# Patient Record
Sex: FEMALE | Race: WHITE | Employment: OTHER | URBAN - METROPOLITAN AREA
[De-identification: names, ages, dates, MRNs, and addresses within clinical notes are randomized per-mention and may not be internally consistent; named-entity substitution may affect disease eponyms.]

---

## 2017-02-17 ENCOUNTER — OFFICE VISIT (OUTPATIENT)
Dept: INTERNAL MEDICINE CLINIC | Age: 72
End: 2017-02-17

## 2017-02-17 VITALS
TEMPERATURE: 99 F | SYSTOLIC BLOOD PRESSURE: 117 MMHG | HEART RATE: 73 BPM | BODY MASS INDEX: 21.05 KG/M2 | HEIGHT: 62 IN | RESPIRATION RATE: 16 BRPM | DIASTOLIC BLOOD PRESSURE: 74 MMHG | OXYGEN SATURATION: 99 % | WEIGHT: 114.4 LBS

## 2017-02-17 DIAGNOSIS — K21.9 GASTROESOPHAGEAL REFLUX DISEASE WITHOUT ESOPHAGITIS: ICD-10-CM

## 2017-02-17 DIAGNOSIS — N89.8 VAGINAL CYSTS: ICD-10-CM

## 2017-02-17 DIAGNOSIS — Z23 ENCOUNTER FOR IMMUNIZATION: ICD-10-CM

## 2017-02-17 DIAGNOSIS — Z71.89 ADVANCE CARE PLANNING: ICD-10-CM

## 2017-02-17 DIAGNOSIS — Z00.00 MEDICARE ANNUAL WELLNESS VISIT, SUBSEQUENT: Primary | ICD-10-CM

## 2017-02-17 DIAGNOSIS — N75.1 ABSCESS OF BARTHOLIN'S GLAND: ICD-10-CM

## 2017-02-17 DIAGNOSIS — Z12.4 SCREENING FOR CERVICAL CANCER: ICD-10-CM

## 2017-02-17 RX ORDER — CEPHALEXIN 250 MG/1
250 CAPSULE ORAL 4 TIMES DAILY
Qty: 28 CAP | Refills: 0 | Status: SHIPPED | OUTPATIENT
Start: 2017-02-17 | End: 2017-02-24

## 2017-02-17 RX ORDER — LAMOTRIGINE 25 MG/1
25 TABLET ORAL DAILY
COMMUNITY
Start: 2017-01-19 | End: 2017-03-17 | Stop reason: ALTCHOICE

## 2017-02-17 RX ORDER — OMEPRAZOLE 20 MG/1
20 CAPSULE, DELAYED RELEASE ORAL DAILY
Qty: 30 CAP | Refills: 2 | Status: SHIPPED | OUTPATIENT
Start: 2017-02-17 | End: 2017-03-17 | Stop reason: ALTCHOICE

## 2017-02-17 NOTE — PROGRESS NOTES
CC:   Chief Complaint   Patient presents with    Other     burning in vagina with swelling    Heartburn     severe       HISTORY OF PRESENT ILLNESS  Stef Causey is a 73 y.o. female. Presents for evaluation of     ROS  Constitutional: negative for fevers, chills, night sweats  ENT:   negative for sore throat, nasal congestion, ear pains, hoarseness  Respiratory:  negative for cough, hemoptysis, dyspnea,wheezing  CV:   negative for chest pain, palpitations, lower extremity edema  GI:   negative for heartburn, abd pain, nausea, vomiting, diarrhea, constipation  Genitourinary: negative for frequency, dysuria and hematuria  Integument:  negative for rash and pruritus  Musculoskel: negative for myalgias, arthralgias, back pain, muscle weakness, joint pain  Neurological:  negative for headaches, dizziness, vertigo, gait problems  Behavl/Psych: negative for feelings of anxiety, depression, mood change     Patient Active Problem List   Diagnosis Code    Major depression F32.9    Anxiety disorder F41.9    Arthritis M19.90    Advance care planning Z71.89     Past Medical History   Diagnosis Date    Arthritis      Osteoarthritis    Excessive sweating      Occurs in summers; TSH/CMP/CBC normal    MRSA (methicillin resistant Staphylococcus aureus) infection      MRSA + abd abscess; nasal swab negative after treatment    Unspecified adverse effect of anesthesia      EYES WERE NOT TAPED CLOSED DURING -EYES VERY PAINFUL AFTER SURGERY FOR 3 DAYS     No Known Allergies  Current Outpatient Prescriptions   Medication Sig Dispense Refill    lamoTRIgine (LAMICTAL) 25 mg tablet Take 25 mg by mouth daily.            PHYSICAL EXAM  Visit Vitals    /74 (BP 1 Location: Left arm, BP Patient Position: Sitting)    Pulse 73    Temp 99 °F (37.2 °C) (Oral)    Resp 16    Ht 5' 1.5\" (1.562 m)    Wt 114 lb 6.4 oz (51.9 kg)    SpO2 99%    BMI 21.27 kg/m2       General: Well-developed and well-nourished, no distress. HEENT:  Head normocephalic/atraumatic, no scleral icterus  Lungs:  Clear to ausculation bilaterally. Good air movement. Heart:  Regular rate and rhythm, normal S1 and S2, no murmur, gallop, or rub  Extremities: No clubbing, cyanosis, or edema. Neurological: Alert and oriented. Psychiatric: Normal mood and affect. Behavior is normal.     Results for orders placed or performed in visit on 07/13/16   AMB POC RAPID STREP A   Result Value Ref Range    VALID INTERNAL CONTROL POC Yes     Group A Strep Ag Negative Negative         ASSESSMENT AND PLAN  {ASSESSMENT/PLAN:78578}    Provided patient and/or family with advanced directive information and answered pertinent questions. Encouraged patient to provide a copy of advanced directive to the office when available. I have discussed the diagnosis with the patient and the intended plan as seen in the above orders. Patient is in agreement. The patient has received an after-visit summary and questions were answered concerning future plans. I have discussed medication side effects and warnings with the patient as well.

## 2017-02-17 NOTE — PROGRESS NOTES
Reviewed record  In preparation for visit and have obtained necessary documentation. 1. Have you been to the ER, urgent care clinic since your last visit? Hospitalized since your last visit?no  2. Have you seen or consulted any other health care providers outside of the [facility] since your last visit? Include any pap smears or colon screening. Saw mental health and was prescribed a new medication  Patient does not currently have advance directives. Patient given information on advance directives and brochure and printed blank advance directive form made available to patient. Patient is also asked to make copy of directives available to this office for our/[facility] records once advanced directives are completed. Cannot afford eye exam at this time. Pneumococcal 13-valent (Prevnar 13)Conjugate vaccine 0.5 ml given right deltoid IM. [employer] exp. 2/18 Lot [id number] VIS given. Patient tolerated procedure without incident.

## 2017-02-17 NOTE — MR AVS SNAPSHOT
Visit Information Date & Time Provider Department Dept. Phone Encounter #  
 2/17/2017 10:10 AM Sarah Batres, [facility] [phone] [id number] Follow-up Instructions Return in about 4 months (around 6/17/2017), or if symptoms worsen or fail to improve, for depression, anxiety, arthritis. Upcoming Health Maintenance Date Due  
 GLAUCOMA SCREENING Q2Y 6/14/2012 Pneumococcal 65+ Low/Medium Risk (2 of 2 - PCV13) 2/9/2017 MEDICARE YEARLY EXAM 2/9/2017 BREAST CANCER SCRN MAMMOGRAM 12/9/2018 COLONOSCOPY 3/31/2025 DTaP/Tdap/Td series (2 - Td) 7/22/2025 Allergies as of 2/17/2017  Review Complete On: 2/17/2017 By: Sarah Batres MD  
 No Known Allergies Current Immunizations  Reviewed on 2/9/2016 Name Date Pneumococcal Conjugate (PCV-13)  Incomplete Pneumococcal Polysaccharide (PPSV-23) 2/9/2016 Tdap 7/22/2015 Not reviewed this visit You Were Diagnosed With   
  
 Codes Comments Medicare annual wellness visit, subsequent    -  Primary ICD-10-CM: Z00.00 ICD-9-CM: V70.0 Vaginal cysts     ICD-10-CM: N89.8 ICD-9-CM: 623.8 Abscess of Bartholin's gland     ICD-10-CM: N75.1 ICD-9-CM: 616.3 Encounter for immunization     ICD-10-CM: G95 ICD-9-CM: V03.89 Vitals BP Pulse Temp Resp Height(growth percentile) Weight(growth percentile) 117/74 (BP 1 Location: Left arm, BP Patient Position: Sitting) 73 99 °F (37.2 °C) (Oral) 16 5' 1.5\" (1.562 m) 114 lb 6.4 oz (51.9 kg) SpO2 BMI OB Status Smoking Status 99% 21.27 kg/m2 Hysterectomy Never Smoker Vitals History BMI and BSA Data Body Mass Index Body Surface Area  
 21.27 kg/m 2 1.5 m 2 Preferred Pharmacy Pharmacy Name Phone  [address] [phone] Your Updated Medication List  
  
   
This list is accurate as of: 2/17/17 10:51 AM.  Always use your most recent med list.  
  
  
  
 cephALEXin 250 mg capsule Commonly known as:  Bettina Abran Take 1 Cap by mouth four (4) times daily for 7 days. lamoTRIgine 25 mg tablet Commonly known as: LaMICtal  
Take 25 mg by mouth daily. Prescriptions Sent to Pharmacy Refills  
 cephALEXin (KEFLEX) 250 mg capsule 0 Sig: Take 1 Cap by mouth four (4) times daily for 7 days. Class: Normal  
 Pharmacy: [facility] [address] Ph #: [phone] Route: Oral  
  
We Performed the Following ADMIN INFLUENZA VIRUS VAC [ [facility]] PNEUMOCOCCAL CONJ VACCINE 13 VALENT IM [id number] CPT(R)] REFERRAL TO GYNECOLOGY [REF30 Custom] Comments:  
 Please evaluate for Bartholin cysts and possible abscess. Follow-up Instructions Return in about 4 months (around 6/17/2017), or if symptoms worsen or fail to improve, for depression, anxiety, arthritis. Referral Information Referral ID Referred By Referred To  
  
 [id number] [address] MD Juan Antonio   
   [address] [place] [address] [address] Phone: [phone] Fax: [phone] Visits Status Start Date End Date 1 New Request 2/17/17 2/17/18 If your referral has a status of pending review or denied, additional information will be sent to support the outcome of this decision. Patient Instructions Vaccine Information Statement Pneumococcal Conjugate Vaccine (PCV13): What You Need to Know Many Vaccine Information Statements are available in Cambodian and other languages. See www.immunize.org/vis. Hojas de información Sobre Vacunas están disponibles en español y en muchos otros idiomas. Visite www.immunize.org/vis. 1. Why get vaccinated? Vaccination can protect both children and adults from pneumococcal disease.  
 
Pneumococcal disease is caused by bacteria that can spread from person to person through close contact. It can cause ear infections, and it can also lead to more serious infections of the: 
 Lungs (pneumonia),  Blood (bacteremia), and 
 Covering of the brain and spinal cord (meningitis). Pneumococcal pneumonia is most common among adults. Pneumococcal meningitis can cause deafness and brain damage, and it kills about 1 child in 10 who get it. Anyone can get pneumococcal disease, but children under 3years of age and adults 72 years and older, people with certain medical conditions, and cigarette smokers are at the highest risk. Before there was a vaccine, the [facility] saw: 
 more than 700 cases of meningitis, 
 about 13,000 blood infections, 
 about 5 million ear infections, and 
 about 200 deaths 
in children under 5 each year from pneumococcal disease. Since vaccine became available, severe pneumococcal disease in these children has fallen by 88%. About 18,000 older adults die of pneumococcal disease each year in the [place]. Treatment of pneumococcal infections with penicillin and other drugs is not as effective as it used to be, because some strains of the disease have become resistant to these drugs. This makes prevention of the disease, through vaccination, even more important. 2. PCV13 vaccine Pneumococcal conjugate vaccine (called PCV13) protects against 13 types of pneumococcal bacteria. PCV13 is routinely given to children at 2, 4, 6, and 1515 months of age. It is also recommended for children and adults 3to 59years of age with certain health conditions, and for all adults 72years of age and older. Your doctor can give you details. 3. Some people should not get this vaccine Anyone who has ever had a life-threatening allergic reaction to a dose of this vaccine, to an earlier pneumococcal vaccine called PCV7, or to any vaccine containing diphtheria toxoid (for example, DTaP), should not get PCV13. Anyone with a severe allergy to any component of PCV13 should not get the vaccine. Tell your doctor if the person being vaccinated has any severe allergies. If the person scheduled for vaccination is not feeling well, your healthcare provider might decide to reschedule the shot on another day. 4. Risks of a vaccine reaction With any medicine, including vaccines, there is a chance of reactions. These are usually mild and go away on their own, but serious reactions are also possible. Problems reported following PCV13 varied by age and dose in the series. The most common problems reported among children were:  About half became drowsy after the shot, had a temporary loss of appetite, or had redness or tenderness where the shot was given.  About 1 out of 3 had swelling where the shot was given.  About 1 out of 3 had a mild fever, and about 1 in 20 had a fever over 102.2°F. 
 Up to about 8 out of 10 became fussy or irritable. Adults have reported pain, redness, and swelling where the shot was given; also mild fever, fatigue, headache, chills, or muscle pain. Levon Neumann children who get PCV13 along with inactivated flu vaccine at the same time may be at increased risk for seizures caused by fever. Ask your doctor for more information. Problems that could happen after any vaccine:  People sometimes faint after a medical procedure, including vaccination. Sitting or lying down for about 15 minutes can help prevent fainting, and injuries caused by a fall. Tell your doctor if you feel dizzy, or have vision changes or ringing in the ears.  Some older children and adults get severe pain in the shoulder and have difficulty moving the arm where a shot was given. This happens very rarely.  Any medication can cause a severe allergic reaction.  Such reactions from a vaccine are very rare, estimated at about 1 in a million doses, and would happen within a few minutes to a few hours after the vaccination. As with any medicine, there is a very small chance of a vaccine causing a serious injury or death. The safety of vaccines is always being monitored. For more information, visit: www.cdc.gov/vaccinesafety/  
 
5. What if there is a serious reaction? What should I look for?  Look for anything that concerns you, such as signs of a severe allergic reaction, very high fever, or unusual behavior. Signs of a severe allergic reaction can include hives, swelling of the face and throat, difficulty breathing, a fast heartbeat, dizziness, and weakness  usually within a few minutes to a few hours after the vaccination. What should I do?  If you think it is a severe allergic reaction or other emergency that cant wait, call 9-1-1 or get the person to the nearest hospital. Otherwise, call your doctor. Reactions should be reported to the Vaccine Adverse Event Reporting System (VAERS). Your doctor should file this report, or you can do it yourself through the VAERS web site at www.vaers. [facility].gov, or by calling 6-357.722.4648. VAERS does not give medical advice. 6. The National Vaccine Injury Compensation Program 
 
The [facility] Vaccine Injury Compensation Program (VICP) is a federal program that was created to compensate people who may have been injured by certain vaccines. Persons who believe they may have been injured by a vaccine can learn about the program and about filing a claim by calling 8-995.199.9566 or visiting the [employer] website at www.[facility].gov/vaccinecompensation. There is a time limit to file a claim for compensation. 7. How can I learn more?  Ask your healthcare provider. He or she can give you the vaccine package insert or suggest other sources of information.  Call your local or state health department.  
 Contact the Centers for Disease Control and Prevention (CDC): 
 - Call 0-230.491.4435 (1-800-CDC-INFO) or 
- Visit CDCs website at www.cdc.gov/vaccines Vaccine Information Statement PCV13 Vaccine 11/5/2015  
Matt Valenzuela [id number] [facility] [employer] Centers for Disease Control and Prevention Office Use Only Schedule of Personalized Health Plan (Provide Copy to Patient) The best way to stay healthy is to live a healthy lifestyle. A healthy lifestyle includes regular exercise, eating a well-balanced diet, keeping a healthy weight and not smoking. Regular physical exams and screening tests are another important way to take care of yourself. Preventive exams provided by health care providers can find health problems early when treatment works best and can keep you from getting certain diseases or illnesses. Preventive services include exams, lab tests, screenings, shots, monitoring and information to help you take care of your own health. All people over 65 should have a pneumonia shot. Pneumonia shots are usually only needed once in a lifetime unless your doctor decides differently. All people over 65 should have a yearly flu shot. People over 65 are at medium to high risk for Hepatitis B. Three shots are needed for complete protection. In addition to your physical exam, some screening tests are recommended: 
 
Bone mass measurement (dexa scan) is recommended every two years Diabetes Mellitus screening is recommended every year. Glaucoma is an eye disease caused by high pressure in the eye. An eye exam is recommended every year. Cardiovascular screening tests that check your cholesterol and other blood fat (lipid) levels are recommended every five years. Colorectal Cancer screening tests help to find pre-cancerous polyps (growths in the colon) so they can be removed before they turn into cancer. Tests ordered for screening depend on your personal and family history risk factors. Screening for Breast Cancer is recommended yearly with a mammogram. 
 
Screening for Cervical Cancer is recommended every two years (annually for certain risk factors, such as previous history of STD or abnormal PAP in past 7 years), with a Pelvic Exam with PAP Here is a list of your current Health Maintenance items with a due date: 
Health Maintenance Topic Date Due  GLAUCOMA SCREENING Q2Y  06/14/2012  Pneumococcal 65+ Low/Medium Risk (2 of 2 - PCV13) 02/09/2017  MEDICARE YEARLY EXAM  02/09/2017  BREAST CANCER SCRN MAMMOGRAM  12/09/2018  COLONOSCOPY  03/31/2025  DTaP/Tdap/Td series (2 - Td) 07/22/2025  Hepatitis C Screening  Completed  OSTEOPOROSIS SCREENING (DEXA)  Completed  ZOSTER VACCINE AGE 60>  Addressed  INFLUENZA AGE 9 TO ADULT  Addressed Introducing [facility] SERVICES! Dear Argentina Raymundo: 
Thank you for requesting a [employer] account. Our records indicate that you have previously registered for a [employer] account but its currently inactive. Please call our [employer] support line at [phone]. Additional Information If you have questions, please visit the Frequently Asked Questions section of the [employer] website at [email or web address] Remember, [employer] is NOT to be used for urgent needs. For medical emergencies, dial 911. Now available from your iPhone and Android! Please provide this summary of care documentation to your next provider. Your primary care clinician is listed as Saadia Kemp. If you have any questions after today's visit, please call [phone].

## 2017-02-17 NOTE — PATIENT INSTRUCTIONS
Vaccine Information Statement     Pneumococcal Conjugate Vaccine (PCV13): What You Need to Know    Many Vaccine Information Statements are available in Macedonian and other languages. See www.immunize.org/vis. Hojas de información Sobre Vacunas están disponibles en español y en muchos otros idiomas. Visite www.immunize.org/vis. 1. Why get vaccinated? Vaccination can protect both children and adults from pneumococcal disease. Pneumococcal disease is caused by bacteria that can spread from person to person through close contact. It can cause ear infections, and it can also lead to more serious infections of the:   Lungs (pneumonia),   Blood (bacteremia), and   Covering of the brain and spinal cord (meningitis). Pneumococcal pneumonia is most common among adults. Pneumococcal meningitis can cause deafness and brain damage, and it kills about 1 child in 10 who get it. Anyone can get pneumococcal disease, but children under 3years of age and adults 72 years and older, people with certain medical conditions, and cigarette smokers are at the highest risk. Before there was a vaccine, the [facility] saw:   more than 700 cases of meningitis,   about 13,000 blood infections,   about 5 million ear infections, and   about 200 deaths  in children under 5 each year from pneumococcal disease. Since vaccine became available, severe pneumococcal disease in these children has fallen by 88%. About 18,000 older adults die of pneumococcal disease each year in the [place]. Treatment of pneumococcal infections with penicillin and other drugs is not as effective as it used to be, because some strains of the disease have become resistant to these drugs. This makes prevention of the disease, through vaccination, even more important. 2. PCV13 vaccine    Pneumococcal conjugate vaccine (called PCV13) protects against 13 types of pneumococcal bacteria.       PCV13 is routinely given to children at 2, 4, 6, and 1515 months of age. It is also recommended for children and adults 3to 59years of age with certain health conditions, and for all adults 72years of age and older. Your doctor can give you details. 3. Some people should not get this vaccine    Anyone who has ever had a life-threatening allergic reaction to a dose of this vaccine, to an earlier pneumococcal vaccine called PCV7, or to any vaccine containing diphtheria toxoid (for example, DTaP), should not get PCV13. Anyone with a severe allergy to any component of PCV13 should not get the vaccine. Tell your doctor if the person being vaccinated has any severe allergies. If the person scheduled for vaccination is not feeling well, your healthcare provider might decide to reschedule the shot on another day. 4. Risks of a vaccine reaction    With any medicine, including vaccines, there is a chance of reactions. These are usually mild and go away on their own, but serious reactions are also possible. Problems reported following PCV13 varied by age and dose in the series. The most common problems reported among children were:    About half became drowsy after the shot, had a temporary loss of appetite, or had redness or tenderness where the shot was given.  About 1 out of 3 had swelling where the shot was given.  About 1 out of 3 had a mild fever, and about 1 in 20 had a fever over 102.2°F.   Up to about 8 out of 10 became fussy or irritable. Adults have reported pain, redness, and swelling where the shot was given; also mild fever, fatigue, headache, chills, or muscle pain. Cristina Narvaez children who get PCV13 along with inactivated flu vaccine at the same time may be at increased risk for seizures caused by fever. Ask your doctor for more information. Problems that could happen after any vaccine:     People sometimes faint after a medical procedure, including vaccination.  Sitting or lying down for about 15 minutes can help prevent fainting, and injuries caused by a fall. Tell your doctor if you feel dizzy, or have vision changes or ringing in the ears.  Some older children and adults get severe pain in the shoulder and have difficulty moving the arm where a shot was given. This happens very rarely.  Any medication can cause a severe allergic reaction. Such reactions from a vaccine are very rare, estimated at about 1 in a million doses, and would happen within a few minutes to a few hours after the vaccination. As with any medicine, there is a very small chance of a vaccine causing a serious injury or death. The safety of vaccines is always being monitored. For more information, visit: www.cdc.gov/vaccinesafety/     5. What if there is a serious reaction? What should I look for?  Look for anything that concerns you, such as signs of a severe allergic reaction, very high fever, or unusual behavior. Signs of a severe allergic reaction can include hives, swelling of the face and throat, difficulty breathing, a fast heartbeat, dizziness, and weakness  usually within a few minutes to a few hours after the vaccination. What should I do?  If you think it is a severe allergic reaction or other emergency that cant wait, call 9-1-1 or get the person to the nearest hospital. Otherwise, call your doctor. Reactions should be reported to the Vaccine Adverse Event Reporting System (VAERS). Your doctor should file this report, or you can do it yourself through the VAERS web site at www.vaers. hhs.gov, or by calling 2-998.812.6946. VAERS does not give medical advice. 6. The National Vaccine Injury Compensation Program    The [facility] Vaccine Injury Compensation Program (VICP) is a federal program that was created to compensate people who may have been injured by certain vaccines.     Persons who believe they may have been injured by a vaccine can learn about the program and about filing a claim by calling 1-993.410.8553 or visiting the wavecatch website at www.[facility].gov/vaccinecompensation. There is a time limit to file a claim for compensation. 7. How can I learn more?  Ask your healthcare provider. He or she can give you the vaccine package insert or suggest other sources of information.  Call your local or state health department.  Contact the Centers for Disease Control and Prevention (CDC):  - Call 7-386.482.9751 (9-411-BQD-INFO) or  - Visit CDCs website at www.cdc.gov/vaccines    Vaccine Information Statement   PCV13 Vaccine   11/5/2015   [address] [place] Cruz [id number]    Department of Health and Human Services  Centers for Disease Control and Prevention    Office Use Only        Schedule of Personalized Health Plan  (Provide Copy to Patient)  The best way to stay healthy is to live a healthy lifestyle. A healthy lifestyle includes regular exercise, eating a well-balanced diet, keeping a healthy weight and not smoking. Regular physical exams and screening tests are another important way to take care of yourself. Preventive exams provided by health care providers can find health problems early when treatment works best and can keep you from getting certain diseases or illnesses. Preventive services include exams, lab tests, screenings, shots, monitoring and information to help you take care of your own health. All people over 65 should have a pneumonia shot. Pneumonia shots are usually only needed once in a lifetime unless your doctor decides differently. All people over 65 should have a yearly flu shot. People over 65 are at medium to high risk for Hepatitis B. Three shots are needed for complete protection. In addition to your physical exam, some screening tests are recommended:    Bone mass measurement (dexa scan) is recommended every two years  Diabetes Mellitus screening is recommended every year. Glaucoma is an eye disease caused by high pressure in the eye.   An eye exam is recommended every year.     Cardiovascular screening tests that check your cholesterol and other blood fat (lipid) levels are recommended every five years. Colorectal Cancer screening tests help to find pre-cancerous polyps (growths in the colon) so they can be removed before they turn into cancer. Tests ordered for screening depend on your personal and family history risk factors.     Screening for Breast Cancer is recommended yearly with a mammogram.    Screening for Cervical Cancer is recommended every two years (annually for certain risk factors, such as previous history of STD or abnormal PAP in past 7 years), with a Pelvic Exam with PAP    Here is a list of your current Health Maintenance items with a due date:  Health Maintenance   Topic Date Due    GLAUCOMA SCREENING Q2Y  06/14/2012    Pneumococcal 65+ Low/Medium Risk (2 of 2 - PCV13) 02/09/2017    MEDICARE YEARLY EXAM  02/09/2017    BREAST CANCER SCRN MAMMOGRAM  12/09/2018    COLONOSCOPY  03/31/2025    DTaP/Tdap/Td series (2 - Td) 07/22/2025    Hepatitis C Screening  Completed    OSTEOPOROSIS SCREENING (DEXA)  Completed    ZOSTER VACCINE AGE 60>  Addressed    INFLUENZA AGE 9 TO ADULT  Addressed

## 2017-02-17 NOTE — PROGRESS NOTES
This is a Subsequent Medicare Annual Wellness Visit providing Personalized Prevention Plan Services (PPPS) (Performed 12 months after initial AWV and PPPS )    I have reviewed the patient's medical history in detail and updated the computerized patient record. History   Chely Lu is a 73 y.o. female. She presents for evaluation for gynecologic issues and Medicare Annual Wellness Visit. Today she complains of swelling in vagina and round white balls that have been swollen for 3 days. Associated with burning. Has had twice before, last time at end of ; went away on own after a week. No discharge. No fevers or chills, abdominal pain, or back pain. Follows with [facility]-Dr. Martin. Was changed to Lamictal; has been on for 3 weeks.      Constitutional: negative for fevers, chills, night sweats  ENT:   negative for sore throat, nasal congestion, ear pains, hoarseness  Respiratory:  negative for cough, hemoptysis, dyspnea,wheezing  CV:   negative for chest pain, palpitations, lower extremity edema  GI:   negative for abd pain, nausea, vomiting, diarrhea, constipation; has heartburn  Genitourinary: negative for frequency, dysuria and hematuria  Integument:  negative for rash and pruritus  Musculoskel: negative for myalgias, arthralgias, back pain, muscle weakness, joint pain  Neurological:  negative for headaches, dizziness, vertigo, gait problems  Behavl/Psych: negative for feelings of anxiety, depression, mood change    Past Medical History   Diagnosis Date    Arthritis      Osteoarthritis    Excessive sweating      Occurs in summers; TSH/CMP/CBC normal    MRSA (methicillin resistant Staphylococcus aureus) infection      MRSA + abd abscess; nasal swab negative after treatment    Unspecified adverse effect of anesthesia      EYES WERE NOT TAPED CLOSED DURING -EYES VERY PAINFUL AFTER SURGERY FOR 3 DAYS      Past Surgical History   Procedure Laterality Date    Pr breast surgery procedure unlisted Bilateral 1974     AUGMENTATION    Hx  section      Hx hysterectomy       Age 29 for endometriosis    Pr revise breast reconstruction  2014     Bilateral breast implant exchange due to  cosmetic defect of previous implants    Implant breast silicone/eq Bilateral 5267     1st set was 43 yrs ago, replaced in . Current Outpatient Prescriptions   Medication Sig Dispense Refill    lamoTRIgine (LAMICTAL) 25 mg tablet Take 25 mg by mouth daily. No Known Allergies  Family History   Problem Relation Age of Onset    Diabetes Father     Cancer Father      PROSTATE CANCER    Arthritis-osteo Mother     Arthritis-osteo Sister     Anesth Problems Neg Hx      Social History   Substance Use Topics    Smoking status: Never Smoker    Smokeless tobacco: Not on file    Alcohol use No     Patient Active Problem List   Diagnosis Code    Major depression F32.9    Anxiety disorder F41.9    Arthritis M19.90    Advance care planning Z71.89       Depression Risk Factor Screening:     PHQ 2 / 9, over the last two weeks 2016   Little interest or pleasure in doing things Not at all   Feeling down, depressed or hopeless Not at all   Total Score PHQ 2 0   Trouble falling or staying asleep, or sleeping too much -   Feeling tired or having little energy -   Poor appetite or overeating -   Feeling bad about yourself - or that you are a failure or have let yourself or your family down -   Trouble concentrating on things such as school, work, reading or watching TV -   Moving or speaking so slowly that other people could have noticed; or the opposite being so fidgety that others notice -   Thoughts of being better off dead, or hurting yourself in some way -   How difficult have these problems made it for you to do your work, take care of your home and get along with others -     Alcohol Risk Factor Screening:    On any occasion during the past 3 months, have you had more than 3 drinks containing alcohol? No    Do you average more than 7 drinks per week? No      Functional Ability and Level of Safety:     Hearing Loss   normal-to-mild    Activities of Daily Living   Self-care. Requires assistance with: no ADLs    Fall Risk     Fall Risk Assessment, last 12 mths 2/17/2017   Able to walk? Yes   Fall in past 12 months? No   Fall with injury? -   Number of falls in past 12 months -   Fall Risk Score -     Abuse Screen   Patient is not abused    Review of Systems   Pertinent items are noted in HPI. Physical Examination     Evaluation of Cognitive Function:  Mood/affect:  neutral  Appearance: age appropriate  Family member/caregiver input: none    Visit Vitals    /74 (BP 1 Location: Left arm, BP Patient Position: Sitting)    Pulse 73    Temp 99 °F (37.2 °C) (Oral)    Resp 16    Ht 5' 1.5\" (1.562 m)    Wt 114 lb 6.4 oz (51.9 kg)    SpO2 99%    BMI 21.27 kg/m2       General: Well-developed and well-nourished, no distress. HEENT:  Head normocephalic/atraumatic, no scleral icterus  Lungs:  Clear to ausculation bilaterally. Good air movement. Heart:  Regular rate and rhythm, normal S1 and S2, no murmur, gallop, or rub  Pelvic: Red papule at urethra, non-tender. 2 small white nodules, one at each side of lower labia minora. Mild swelling at left labia  Extremities: No clubbing, cyanosis, or edema. Neurological: Alert and oriented. Psychiatric: Normal mood and affect. Behavior is normal.     Patient Care Team:  Rhett Sarabia MD as PCP - General (Internal Medicine)      Advice/Referrals/Counseling   Education and counseling provided:  Are appropriate based on today's review and evaluation  End-of-Life planning (with patient's consent)  Pneumococcal Vaccine   Glaucoma Screening    Assessment/Plan       ICD-10-CM ICD-9-CM    1. Medicare annual wellness visit, subsequent Z00.00 V70.0    2. Vaginal cysts N89.8 623.8 REFERRAL TO GYNECOLOGY      CTNG,TRICH,HSV1/2, SHILA   3. Abscess of Bartholin's gland N75.1 616.3 REFERRAL TO GYNECOLOGY      cephALEXin (KEFLEX) 250 mg capsule   4. Gastroesophageal reflux disease without esophagitis K21.9 530.81 omeprazole (PRILOSEC) 20 mg capsule   5. Encounter for immunization Z23 V03.89 PNEUMOCOCCAL CONJ VACCINE 13 VALENT IM      ADMIN INFLUENZA VIRUS VAC       Kisha Torres was seen today for other, heartburn, Medicare Annual Wellness Visit and immunization/injection. Diagnoses and all orders for this visit:    Medicare annual wellness visit, subsequent    Vaginal cysts  Most likely Bartholin duct cysts based on location.   -     REFERRAL TO GYNECOLOGY  -     CTNG,TRICH,HSV1/2, SHILA    Abscess of Bartholin's gland  Possible abscess (not classic for it). I also suspect urethral caruncle or prolapse.   -     REFERRAL TO GYNECOLOGY  -     cephALEXin (KEFLEX) 250 mg capsule; Take 1 Cap by mouth four (4) times daily for 7 days. Gastroesophageal reflux disease without esophagitis  -     Start omeprazole (PRILOSEC) 20 mg capsule; Take 1 Cap by mouth daily. Indications: GASTROESOPHAGEAL REFLUX    Encounter for immunization  -     Pneumococcal Conjugate vaccine - 13 valent (50 years and older)  -     Administration fee () for Medicare insured patients    Follow-up Disposition:  Return in about 4 months (around 6/17/2017), or if symptoms worsen or fail to improve, for depression, anxiety, arthritis. .    Patient seen and had Medicare Annual Wellness Exam; Wellness Schedule printed, reviewed, and given to patient.

## 2017-02-19 NOTE — ACP (ADVANCE CARE PLANNING)

## 2017-02-20 ENCOUNTER — HOSPITAL ENCOUNTER (OUTPATIENT)
Dept: LAB | Age: 72
Discharge: HOME | End: 2017-02-20
Payer: MEDICARE

## 2017-02-20 PROCEDURE — 88142 CYTOPATH C/V THIN LAYER: CPT

## 2017-02-20 PROCEDURE — 87491 CHLMYD TRACH DNA AMP PROBE: CPT

## 2017-02-24 LAB
C TRACH RRNA SPEC QL NAA+PROBE: NEGATIVE
CYTOLOGIST CVX/VAG CYTO: NORMAL
CYTOLOGY CVX/VAG DOC THIN PREP: NORMAL
DX ICD CODE: NORMAL
HSV1 DNA SPEC QL NAA+PROBE: NEGATIVE
HSV2 DNA SPEC QL NAA+PROBE: POSITIVE
LABCORP, 190119: NORMAL
Lab: NORMAL
N GONORRHOEA RRNA SPEC QL NAA+PROBE: NEGATIVE
OTHER STN SPEC: NORMAL
PATH REPORT.FINAL DX SPEC: NORMAL
SPECIMEN STATUS REPORT, ROLRST: NORMAL
STAT OF ADQ CVX/VAG CYTO-IMP: NORMAL
T VAGINALIS RRNA SPEC QL NAA+PROBE: NEGATIVE

## 2017-02-28 NOTE — PROGRESS NOTES
Inform patient that her vaginal test showed normal Pap smear. Also showed she has genital herpes. No other infections seen. If she is still having vaginal pain, Dr. Yuriy Landry will send in a prescription for an anti-viral medication that helps with herpes flares.

## 2017-03-03 NOTE — PROGRESS NOTES
Spoke with patient and after verifying name and date of birth of patient gave her test results per Dr. Gui West. Patient stated understanding. She also stated she is not aware of any breakouts and does not believe she needs any medication for herpes treatment at this time.

## 2017-03-17 ENCOUNTER — OFFICE VISIT (OUTPATIENT)
Dept: INTERNAL MEDICINE CLINIC | Age: 72
End: 2017-03-17

## 2017-03-17 VITALS
BODY MASS INDEX: 20.91 KG/M2 | DIASTOLIC BLOOD PRESSURE: 68 MMHG | SYSTOLIC BLOOD PRESSURE: 104 MMHG | TEMPERATURE: 98.2 F | WEIGHT: 113.6 LBS | RESPIRATION RATE: 18 BRPM | OXYGEN SATURATION: 98 % | HEART RATE: 85 BPM | HEIGHT: 62 IN

## 2017-03-17 DIAGNOSIS — R05.9 COUGH: Primary | ICD-10-CM

## 2017-03-17 DIAGNOSIS — R06.2 WHEEZING: ICD-10-CM

## 2017-03-17 RX ORDER — RANITIDINE 150 MG/1
150 TABLET, FILM COATED ORAL DAILY
COMMUNITY
End: 2017-05-11 | Stop reason: ALTCHOICE

## 2017-03-17 RX ORDER — TOPIRAMATE 25 MG/1
TABLET ORAL
COMMUNITY
Start: 2017-03-08 | End: 2018-02-20 | Stop reason: ALTCHOICE

## 2017-03-17 NOTE — MR AVS SNAPSHOT
Visit Information Date & Time Provider Department Dept. Phone Encounter #  
 3/17/2017  1:20 PM MD Efren Munoz [phone] [id number] Follow-up Instructions Return if symptoms worsen or fail to improve, for Scheduled appointment 6/16/17. Your Appointments 6/16/2017 11:30 AM  
ROUTINE CARE with MD Efren Munoz [address]) Appt Note: 4mth f/u; depression, anxiety, arthritis [address] [facility] [address] [phone]  
  
   
 8 [facility] [address] Upcoming Health Maintenance Date Due  
 GLAUCOMA SCREENING Q2Y 6/14/2012 MEDICARE YEARLY EXAM 2/18/2018 BREAST CANCER SCRN MAMMOGRAM 12/9/2018 COLONOSCOPY 3/31/2025 DTaP/Tdap/Td series (2 - Td) 7/22/2025 Allergies as of 3/17/2017  Review Complete On: 3/17/2017 By: Lukas Crowe MD  
 No Known Allergies Current Immunizations  Reviewed on 2/17/2017 Name Date Pneumococcal Conjugate (PCV-13) 2/17/2017 Pneumococcal Polysaccharide (PPSV-23) 2/9/2016 Tdap 7/22/2015 Not reviewed this visit You Were Diagnosed With   
  
 Codes Comments Cough    -  Primary ICD-10-CM: F53 ICD-9-CM: 786.2 Wheezing     ICD-10-CM: R06.2 ICD-9-CM: 786.07 Vitals BP Pulse Temp Resp Height(growth percentile) Weight(growth percentile) 104/68 (BP 1 Location: Left arm, BP Patient Position: Sitting) 85 98.2 °F (36.8 °C) (Oral) 18 5' 1.5\" (1.562 m) 113 lb 9.6 oz (51.5 kg) SpO2 BMI OB Status Smoking Status 98% 21.12 kg/m2 Hysterectomy Never Smoker BMI and BSA Data Body Mass Index Body Surface Area  
 21.12 kg/m 2 1.49 m 2 Preferred Pharmacy Pharmacy Name Phone [facility] [address] [facility] [address] [phone] Your Updated Medication List  
  
   
 This list is accurate as of: 3/17/17  1:37 PM.  Always use your most recent med list.  
  
  
  
  
 topiramate 25 mg tablet Commonly known as:  TOPAMAX ZANTAC 150 mg tablet Generic drug:  raNITIdine Take 150 mg by mouth daily. Follow-up Instructions Return if symptoms worsen or fail to improve, for Scheduled appointment 6/16/17. Patient Instructions Cough: Care Instructions Your Care Instructions A cough is your body's response to something that bothers your throat or airways. Many things can cause a cough. You might cough because of a cold or the flu, bronchitis, or asthma. Smoking, postnasal drip, allergies, and stomach acid that backs up into your throat also can cause coughs. A cough is a symptom, not a disease. Most coughs stop when the cause, such as a cold, goes away. You can take a few steps at home to cough less and feel better. Follow-up care is a key part of your treatment and safety. Be sure to make and go to all appointments, and call your doctor if you are having problems. It's also a good idea to know your test results and keep a list of the medicines you take. How can you care for yourself at home? · Drink lots of water and other fluids. This helps thin the mucus and soothes a dry or sore throat. Honey or lemon juice in hot water or tea may ease a dry cough. · Take cough medicine as directed by your doctor. · Prop up your head on pillows to help you breathe and ease a dry cough. · Try cough drops to soothe a dry or sore throat. Cough drops don't stop a cough. Medicine-flavored cough drops are no better than candy-flavored drops or hard candy. · Do not smoke. Avoid secondhand smoke. If you need help quitting, talk to your doctor about stop-smoking programs and medicines. These can increase your chances of quitting for good. When should you call for help? Call 911 anytime you think you may need emergency care. For example, call if: · You have severe trouble breathing. Call your doctor now or seek immediate medical care if: 
· You cough up blood. · You have new or worse trouble breathing. · You have a new or higher fever. · You have a new rash. Watch closely for changes in your health, and be sure to contact your doctor if: 
· You cough more deeply or more often, especially if you notice more mucus or a change in the color of your mucus. · You have new symptoms, such as a sore throat, an earache, or sinus pain. · You do not get better as expected. Where can you learn more? Go to http://michael-carmela.info/. Enter D279 in the search box to learn more about \"Cough: Care Instructions. \" Current as of: May 27, 2016 Content Version: 11.1 © 6104-7046 [employer]. Care instructions adapted under license by [facility] (which disclaims liability or warranty for this information). If you have questions about a medical condition or this instruction, always ask your healthcare professional. [place] [address] any warranty or liability for your use of this information. Introducing [facility] & HE[facility]! Dear Zahraa Blood: 
Thank you for requesting a [employer] account. Our records indicate that you have previously registered for a [employer] account but its currently inactive. Please call our [employer] support line at [phone]. Additional Information If you have questions, please visit the Frequently Asked Questions section of the [employer] website at [email or web address] Remember, [employer] is NOT to be used for urgent needs. For medical emergencies, dial 911. Now available from your iPhone and Android! Please provide this summary of care documentation to your next provider. Your primary care clinician is listed as Jim Garces. If you have any questions after today's visit, please call [phone].

## 2017-03-17 NOTE — PROGRESS NOTES
CC:  Chief Complaint   Patient presents with    Cough     times two months     HISTORY OF PRESENT ILLNESS  Yadira Mesa is a 73 y.o. female. She complains of 2 month history of dry cough and wheezing when she takes in a deep breath. She is concerned because her twin sister and mother have problems with chronic cough. Denies dyspnea, fevers, runny nose, sneezing. She requests a referral to see a pulmonologist.    PMH: major depression with anxiety, osteoarthritis  Follows with [facility]-Dr. Martin. ROS  Constitutional: negative for fevers, chills, night sweats  ENT:   negative for sore throat, nasal congestion, ear pains, hoarseness  Respiratory:  negative for hemoptysis, dyspnea, wheezing  CV:   negative for chest pain, palpitations, lower extremity edema  GI:   negative for heartburn, abd pain, nausea, vomiting, diarrhea, constipation  Genitourinary: negative for frequency, dysuria and hematuria  Integument:  negative for rash and pruritus  Musculoskel: negative for myalgias, arthralgias, back pain, muscle weakness, joint pain  Neurological:  negative for headaches, dizziness, vertigo, gait problems  Behavl/Psych: negative for feelings of anxiety, depression, mood change     Patient Active Problem List   Diagnosis Code    Major depression F32.9    Anxiety disorder F41.9    Arthritis M19.90    Advance care planning Z71.89    Cough R05     Past Medical History:   Diagnosis Date    Arthritis     Osteoarthritis    Excessive sweating     Occurs in summers; TSH/CMP/CBC normal    MRSA (methicillin resistant Staphylococcus aureus) infection     MRSA + abd abscess; nasal swab negative after treatment    Unspecified adverse effect of anesthesia     EYES WERE NOT TAPED CLOSED DURING -EYES VERY PAINFUL AFTER SURGERY FOR 3 DAYS     No Known Allergies  Current Outpatient Prescriptions   Medication Sig Dispense Refill    raNITIdine (ZANTAC) 150 mg tablet Take 150 mg by mouth daily.       topiramate (TOPAMAX) 25 mg tablet            PHYSICAL EXAM  Visit Vitals    /68 (BP 1 Location: Left arm, BP Patient Position: Sitting)    Pulse 85    Temp 98.2 °F (36.8 °C) (Oral)    Resp 18    Ht 5' 1.5\" (1.562 m)    Wt 113 lb 9.6 oz (51.5 kg)    SpO2 98%    BMI 21.12 kg/m2       General: Well-developed and well-nourished, no distress. Coughs occasionally. HEENT:  Head normocephalic/atraumatic, no scleral icterus or conjunctival injection. Oropharynx benign. No sinus tenderness. Neck: Supple. No lymphadenopathy. Lungs:  Clear to ausculation bilaterally. Good air movement. No wheezing. Heart:  Regular rate and rhythm, normal S1 and S2, no murmur, gallop, or rub  Extremities: No clubbing, cyanosis, or edema. Neurological: Alert and oriented. Psychiatric: Normal mood and affect. Behavior is normal.       ASSESSMENT AND PLAN    ICD-10-CM ICD-9-CM    1. Cough R05 786.2 REFERRAL TO PULMONARY DISEASE   2. Wheezing R06.2 786.07 REFERRAL TO PULMONARY DISEASE       Kisha Torres was seen today for cough. Diagnoses and all orders for this visit:    Cough/Wheezing  -     REFERRAL TO PULMONARY DISEASE      Follow-up Disposition:  Return if symptoms worsen or fail to improve, for Scheduled appointment 6/16/17. Provided patient and/or family with advanced directive information and answered pertinent questions. Encouraged patient to provide a copy of advanced directive to the office when available. I have discussed the diagnosis with the patient and the intended plan as seen in the above orders. Patient is in agreement. The patient has received an after-visit summary and questions were answered concerning future plans. I have discussed medication side effects and warnings with the patient as well.

## 2017-03-17 NOTE — PROGRESS NOTES
Reviewed record  In preparation for visit and have obtained necessary documentation. 1. Have you been to the ER, urgent care clinic since your last visit? Hospitalized since your last visit?no  2. Have you seen or consulted any other health care providers outside of the [address] [place] [address] since your last visit? Include any pap smears or colon screening. No  Patient has been given information on advanced directives at a previous visit.

## 2017-03-17 NOTE — PATIENT INSTRUCTIONS
Cough: Care Instructions  Your Care Instructions  A cough is your body's response to something that bothers your throat or airways. Many things can cause a cough. You might cough because of a cold or the flu, bronchitis, or asthma. Smoking, postnasal drip, allergies, and stomach acid that backs up into your throat also can cause coughs. A cough is a symptom, not a disease. Most coughs stop when the cause, such as a cold, goes away. You can take a few steps at home to cough less and feel better. Follow-up care is a key part of your treatment and safety. Be sure to make and go to all appointments, and call your doctor if you are having problems. It's also a good idea to know your test results and keep a list of the medicines you take. How can you care for yourself at home? · Drink lots of water and other fluids. This helps thin the mucus and soothes a dry or sore throat. Honey or lemon juice in hot water or tea may ease a dry cough. · Take cough medicine as directed by your doctor. · Prop up your head on pillows to help you breathe and ease a dry cough. · Try cough drops to soothe a dry or sore throat. Cough drops don't stop a cough. Medicine-flavored cough drops are no better than candy-flavored drops or hard candy. · Do not smoke. Avoid secondhand smoke. If you need help quitting, talk to your doctor about stop-smoking programs and medicines. These can increase your chances of quitting for good. When should you call for help? Call 911 anytime you think you may need emergency care. For example, call if:  · You have severe trouble breathing. Call your doctor now or seek immediate medical care if:  · You cough up blood. · You have new or worse trouble breathing. · You have a new or higher fever. · You have a new rash.   Watch closely for changes in your health, and be sure to contact your doctor if:  · You cough more deeply or more often, especially if you notice more mucus or a change in the color of your mucus. · You have new symptoms, such as a sore throat, an earache, or sinus pain. · You do not get better as expected. Where can you learn more? Go to http://michael-carmela.info/. Enter D279 in the search box to learn more about \"Cough: Care Instructions. \"  Current as of: May 27, 2016  Content Version: 11.1  © 4070-8071 [employer]. Care instructions adapted under license by [employer] (which disclaims liability or warranty for this information). If you have questions about a medical condition or this instruction, always ask your healthcare professional. [address] any warranty or liability for your use of this information.

## 2017-04-17 ENCOUNTER — OFFICE VISIT (OUTPATIENT)
Dept: INTERNAL MEDICINE CLINIC | Age: 72
End: 2017-04-17

## 2017-04-17 VITALS
DIASTOLIC BLOOD PRESSURE: 59 MMHG | TEMPERATURE: 98.7 F | SYSTOLIC BLOOD PRESSURE: 116 MMHG | HEART RATE: 70 BPM | HEIGHT: 62 IN | WEIGHT: 112 LBS | RESPIRATION RATE: 16 BRPM | BODY MASS INDEX: 20.61 KG/M2 | OXYGEN SATURATION: 97 %

## 2017-04-17 DIAGNOSIS — L23.9 ALLERGIC DERMATITIS: Primary | ICD-10-CM

## 2017-04-17 RX ORDER — METHYLPREDNISOLONE SODIUM SUCCINATE 40 MG/ML
40 INJECTION, POWDER, LYOPHILIZED, FOR SOLUTION INTRAMUSCULAR; INTRAVENOUS ONCE
Qty: 1 VIAL | Refills: 0
Start: 2017-04-17 | End: 2017-04-17

## 2017-04-17 RX ORDER — METHYLPREDNISOLONE 4 MG/1
TABLET ORAL
Qty: 1 DOSE PACK | Refills: 0 | Status: SHIPPED | OUTPATIENT
Start: 2017-04-17 | End: 2017-05-11 | Stop reason: ALTCHOICE

## 2017-04-17 RX ORDER — TRIAMCINOLONE ACETONIDE 1 MG/G
CREAM TOPICAL 2 TIMES DAILY
Qty: 85.2 G | Refills: 0 | Status: SHIPPED | OUTPATIENT
Start: 2017-04-17 | End: 2017-05-11 | Stop reason: ALTCHOICE

## 2017-04-17 NOTE — PROGRESS NOTES
Reviewed record  In preparation for visit and have obtained necessary documentation. 1. Have you been to the ER, urgent care clinic since your last visit? Hospitalized since your last visit?no  2. Have you seen or consulted any other health care providers outside of the [address] [place] [address] since your last visit? Include any pap smears or colon screening. No  Patient has been given information on advanced directives at a previous visit. Still has contact information for eye exam. Requisition reprinted. After obtaining consent, and per orders of Dr. Beatrice Narvaez, injection of solu-medrol 40 mg given left gluteus IM. Patient instucted to wait in office after injection. Patient tolerated injection of solumedrol with out any negative side effects.

## 2017-04-17 NOTE — PATIENT INSTRUCTIONS
Poison [place], [place], and [place]: Care Instructions  Your Care Instructions    Poison ivy, poison oak, and poison sumac are plants that can cause a skin rash upon contact. The red, itchy rash often shows up in lines or streaks and may cause fluid-filled blisters or large, raised hives. The rash is caused by an allergic reaction to an oil in poison ivy, oak, and sumac. The rash may occur when you touch the plant or when you touch clothing, pet fur, sporting gear, gardening tools, or other objects that have come in contact with one of these plants. You cannot catch or spread the rash, even if you touch it or the blister fluid, because the plant oil will already have been absorbed or washed off the skin. The rash may seem to be spreading, but either it is still developing from earlier contact or you have touched something that still has the plant oil on it. Follow-up care is a key part of your treatment and safety. Be sure to make and go to all appointments, and call your doctor if you are having problems. It's also a good idea to know your test results and keep a list of the medicines you take. How can you care for yourself at home? · If your doctor prescribed a cream, use it as directed. If your doctor prescribed medicine, take it exactly as prescribed. Call your doctor if you think you are having a problem with your medicine. · Use cold, wet cloths to reduce itching. · Keep cool, and stay out of the sun. · Leave the rash open to the air. · Wash all clothing or other things that may have come in contact with the plant oil. · Avoid most lotions and ointments until the rash heals. Calamine lotion may help relieve symptoms of a plant rash. Use it 3 or 4 times a day. To prevent poison ivy exposure  If you know that you will be near poison ivy, oak, or sumac, you can try these options:  · Use a product designed to help prevent plant oil from getting on the skin.  These products, such as Ivy X Pre-Contact Skin Solution, come in lotions, sprays, or towelettes. You put the product on your skin right before you go outdoors. · If you did not use a preventive product and you have had contact with plant oil, clean it off your skin as soon as possible. Use a product such as [employer] Original Outdoor Skin Cleanser. These products can also be used to clean plant oil from clothing or tools. When should you call for help? Call your doctor now or seek immediate medical care if:  · Your rash gets worse, and you start to feel bad and have a fever, a stiff neck, nausea, and vomiting. · You have signs of infection, such as:  ¨ Increased pain, swelling, warmth, or redness. ¨ Red streaks leading from the rash. ¨ Pus draining from the rash. ¨ A fever. Watch closely for changes in your health, and be sure to contact your doctor if:  · You have new blisters or bruises, or the rash spreads and looks like a sunburn. · The rash gets worse, or it comes back after nearly disappearing. · You think a medicine you are using is making your rash worse. · Your rash does not clear up after 1 to 2 weeks of home treatment. · You have joint aches or body aches with your rash. Where can you learn more? Go to http://michael-carmela.info/. Enter T072 in the search box to learn more about \"Poison [place], [place], and [place]: Care Instructions. \"  Current as of: October 13, 2016  Content Version: 11.2  © 1584-1092 [employer]. Care instructions adapted under license by [employer] (which disclaims liability or warranty for this information). If you have questions about a medical condition or this instruction, always ask your healthcare professional. [place] [address] any warranty or liability for your use of this information.

## 2017-04-17 NOTE — MR AVS SNAPSHOT
Visit Information Date & Time Provider Department Dept. Phone Encounter #  
 4/17/2017  9:30 AM Mayur Méndez, [facility] [phone] [id number] Follow-up Instructions Return if symptoms worsen or fail to improve, for Schedule appointment 6/16/17. Your Appointments 6/16/2017 11:30 AM  
ROUTINE CARE with Mayur Méndez MD  
40 [facility]) Appt Note: 4mth f/u; depression, anxiety, arthritis [address] [place] [address] [phone]  
  
   
 8 [facility] [address] Upcoming Health Maintenance Date Due  
 GLAUCOMA SCREENING Q2Y 6/14/2012 MEDICARE YEARLY EXAM 2/18/2018 BREAST CANCER SCRN MAMMOGRAM 12/9/2018 COLONOSCOPY 3/31/2025 DTaP/Tdap/Td series (2 - Td) 7/22/2025 Allergies as of 4/17/2017  Review Complete On: 4/17/2017 By: Mayur Méndez MD  
 No Known Allergies Current Immunizations  Reviewed on 2/17/2017 Name Date Pneumococcal Conjugate (PCV-13) 2/17/2017 Pneumococcal Polysaccharide (PPSV-23) 2/9/2016 Tdap 7/22/2015 Not reviewed this visit You Were Diagnosed With   
  
 Codes Comments Allergic dermatitis    -  Primary ICD-10-CM: L23.9 ICD-9-CM: 692.9 Vitals BP Pulse Temp Resp Height(growth percentile) Weight(growth percentile) 116/59 (BP 1 Location: Left arm, BP Patient Position: Sitting) 70 98.7 °F (37.1 °C) (Oral) 16 5' 1.5\" (1.562 m) 112 lb (50.8 kg) SpO2 BMI OB Status Smoking Status 97% 20.82 kg/m2 Hysterectomy Never Smoker Vitals History BMI and BSA Data Body Mass Index Body Surface Area  
 20.82 kg/m 2 1.48 m 2 Preferred Pharmacy Pharmacy Name Phone [facility] [address] [facility], [facility] [address] [facility] [address] [facility] [place] [phone] Your Updated Medication List  
  
   
This list is accurate as of: 4/17/17 10:00 AM.  Always use your most recent med list.  
 methylPREDNISolone 4 mg tablet Commonly known as:  Auther Rick Use following package instructions. Start on 4/18/17. methylPREDNISolone 40 mg Solr solution Commonly known as:  SOLU-MEDROL 40 mg by IntraMUSCular route once for 1 dose. OTHER Cortisone cream 10%  
  
 topiramate 25 mg tablet Commonly known as:  TOPAMAX  
  
 triamcinolone acetonide 0.1 % topical cream  
Commonly known as:  KENALOG Apply  to affected area two (2) times a day. Use thin layer. ZANTAC 150 mg tablet Generic drug:  raNITIdine Take 150 mg by mouth daily. Prescriptions Sent to Pharmacy Refills  
 methylPREDNISolone (MEDROL DOSEPACK) 4 mg tablet 0 Sig: Use following package instructions. Start on 4/18/17. Class: Normal  
 Pharmacy: [address] [facility] Ph #: [phone]  
 triamcinolone acetonide (KENALOG) 0.1 % topical cream 0 Sig: Apply  to affected area two (2) times a day. Use thin layer. Class: Normal  
 Pharmacy: [id number] [place] [address] Ph #: [phone] Route: Topical  
  
We Performed the Following METHYLPREDNISOLONE INJECTION [ [facility]] [place] THER/PROPH/DIAG INJECTION, SUBCUT/IM [id number] CPT(R)] Follow-up Instructions Return if symptoms worsen or fail to improve, for Schedule appointment 6/16/17. Patient Instructions Poison [place], [place], and [place]: Care Instructions Your Care Instructions Poison ivy, poison oak, and poison sumac are plants that can cause a skin rash upon contact. The red, itchy rash often shows up in lines or streaks and may cause fluid-filled blisters or large, raised hives. The rash is caused by an allergic reaction to an oil in poison ivy, oak, and sumac.  The rash may occur when you touch the plant or when you touch clothing, pet fur, sporting gear, gardening tools, or other objects that have come in contact with one of these plants. You cannot catch or spread the rash, even if you touch it or the blister fluid, because the plant oil will already have been absorbed or washed off the skin. The rash may seem to be spreading, but either it is still developing from earlier contact or you have touched something that still has the plant oil on it. Follow-up care is a key part of your treatment and safety. Be sure to make and go to all appointments, and call your doctor if you are having problems. It's also a good idea to know your test results and keep a list of the medicines you take. How can you care for yourself at home? · If your doctor prescribed a cream, use it as directed. If your doctor prescribed medicine, take it exactly as prescribed. Call your doctor if you think you are having a problem with your medicine. · Use cold, wet cloths to reduce itching. · Keep cool, and stay out of the sun. · Leave the rash open to the air. · Wash all clothing or other things that may have come in contact with the plant oil. · Avoid most lotions and ointments until the rash heals. Calamine lotion may help relieve symptoms of a plant rash. Use it 3 or 4 times a day. To prevent poison ivy exposure If you know that you will be near poison ivy, oak, or sumac, you can try these options: · Use a product designed to help prevent plant oil from getting on the skin. These products, such as Ivy X Pre-Contact Skin Solution, come in lotions, sprays, or towelettes. You put the product on your skin right before you go outdoors. · If you did not use a preventive product and you have had contact with plant oil, clean it off your skin as soon as possible. Use a product such as [employer] Original Outdoor Skin Cleanser. These products can also be used to clean plant oil from clothing or tools. When should you call for help? Call your doctor now or seek immediate medical care if: · Your rash gets worse, and you start to feel bad and have a fever, a stiff neck, nausea, and vomiting. · You have signs of infection, such as: 
¨ Increased pain, swelling, warmth, or redness. ¨ Red streaks leading from the rash. ¨ Pus draining from the rash. ¨ A fever. Watch closely for changes in your health, and be sure to contact your doctor if: 
· You have new blisters or bruises, or the rash spreads and looks like a sunburn. · The rash gets worse, or it comes back after nearly disappearing. · You think a medicine you are using is making your rash worse. · Your rash does not clear up after 1 to 2 weeks of home treatment. · You have joint aches or body aches with your rash. Where can you learn more? Go to http://michael-carmela.info/. Enter Q328 in the search box to learn more about \"Poison [place], [place], and [place]: Care Instructions. \" Current as of: October 13, 2016 Content Version: 11.2 © 8591-2345 [employer]. Care instructions adapted under license by [employer] (which disclaims liability or warranty for this information). If you have questions about a medical condition or this instruction, always ask your healthcare professional. [address] any warranty or liability for your use of this information. Introducing [facility]! Dear Meliza Nelson: 
Thank you for requesting a xzoops account. Our records indicate that you have previously registered for a xzoops account but its currently inactive. Please call our xzoops support line at [phone]. Additional Information If you have questions, please visit the Frequently Asked Questions section of the xzoops website at [email or web address] Remember, xzoops is NOT to be used for urgent needs. For medical emergencies, dial 911. Now available from your iPhone and Android! Please provide this summary of care documentation to your next provider. Your primary care clinician is listed as Reinaldo So. If you have any questions after today's visit, please call [phone].

## 2017-04-17 NOTE — PROGRESS NOTES
CC:  Chief Complaint   Patient presents with    Allergic Reaction     rash on hands and bogy     HISTORY OF PRESENT ILLNESS  Maria Del Carmen Chavez is a 73 y.o. female. Presents with complaint of 1 week history of rash. Started at legs, but now also involves, arms, abdomen, and low back; very itchy; did some gardening the day before symptoms started. Does not know if she was exposed to poison ivy. No one else in her household is affected. Using cortisone-10, calamine lotion, and Benadryl tablets with much relief. Spoke to a pharmacist who recommended she see her PCP for a shot. Patient also complains of right eye pain and burning over past few days.     ROS  Constitutional: negative for fevers, chills, night sweats  ENT:   negative for sore throat, nasal congestion  Respiratory:  negative for cough, dyspnea,wheezing  CV:   negative for chest pain, palpitations, lower extremity edema  GI:   negative for heartburn, abd pain, nausea, vomiting, diarrhea, constipation  Genitourinary: negative for frequency, dysuria and hematuria  Integument:  positive for rash and pruritus  Musculoskel: negative for myalgias, arthralgias, back pain, muscle weakness  Neurological:  negative for headaches, dizziness, gait problems  Behavl/Psych: negative for feelings of anxiety, depression, mood change     Patient Active Problem List   Diagnosis Code    Major depression F32.9    Anxiety disorder F41.9    Arthritis M19.90    Advance care planning Z71.89    Cough R05     Past Medical History:   Diagnosis Date    Arthritis     Osteoarthritis    Excessive sweating     Occurs in summers; TSH/CMP/CBC normal    MRSA (methicillin resistant Staphylococcus aureus) infection     MRSA + abd abscess; nasal swab negative after treatment    Unspecified adverse effect of anesthesia     EYES WERE NOT TAPED CLOSED DURING -EYES VERY PAINFUL AFTER SURGERY FOR 3 DAYS     No Known Allergies  Current Outpatient Prescriptions   Medication Sig Dispense Refill    OTHER Cortisone cream 10%      raNITIdine (ZANTAC) 150 mg tablet Take 150 mg by mouth daily.  topiramate (TOPAMAX) 25 mg tablet            PHYSICAL EXAM  Visit Vitals    /59 (BP 1 Location: Left arm, BP Patient Position: Sitting)    Pulse 70    Temp 98.7 °F (37.1 °C) (Oral)    Resp 16    Ht 5' 1.5\" (1.562 m)    Wt 112 lb (50.8 kg)    SpO2 97%    BMI 20.82 kg/m2     General: Well-developed and well-nourished, no distress. HEENT:  Head normocephalic/atraumatic, no scleral icterus  Lungs:  Clear to ausculation bilaterally. Good air movement. Heart:  Regular rate and rhythm, normal S1 and S2, no murmur, gallop, or rub  Extremities: No clubbing, cyanosis, or edema. Skin: Erythematous excoriations, some linear, at legs and arms; small erythematous papules at legs, arms, including hands and finger webs, and abdomen  Neurological: Alert and oriented. Psychiatric: Normal mood and affect. Behavior is normal.       ASSESSMENT AND PLAN    ICD-10-CM ICD-9-CM    1. Allergic dermatitis L23.9 692.9 methylPREDNISolone (SOLU-MEDROL) 40 mg solr solution      METHYLPREDNISOLONE INJECTION      OR THER/PROPH/DIAG INJECTION, SUBCUT/IM      methylPREDNISolone (MEDROL DOSEPACK) 4 mg tablet      triamcinolone acetonide (KENALOG) 0.1 % topical cream       Kisha Torres was seen today for allergic reaction. Diagnoses and all orders for this visit:    Allergic dermatitis  Possible poison ivy. -Given Solumedrol 40 mg IM in clinic:   -     methylPREDNISolone (SOLU-MEDROL) 40 mg solr solution; 40 mg by IntraMUSCular route once for 1 dose. -     METHYLPREDNISOLONE INJECTION   -     OR THER/PROPH/DIAG INJECTION, SUBCUT/IM  -     Start methylPREDNISolone (MEDROL DOSEPACK) 4 mg tablet; Use following package instructions. Start on 4/18/17.  -     triamcinolone acetonide (KENALOG) 0.1 % topical cream; Apply  to affected area two (2) times a day. Use thin layer.     Follow-up Disposition:  Return if symptoms worsen or fail to improve, for Schedule appointment 6/16/17. Provided patient and/or family with advanced directive information and answered pertinent questions. Encouraged patient to provide a copy of advanced directive to the office when available. I have discussed the diagnosis with the patient and the intended plan as seen in the above orders. Patient is in agreement. The patient has received an after-visit summary and questions were answered concerning future plans. I have discussed medication side effects and warnings with the patient as well.

## 2017-05-11 ENCOUNTER — TELEPHONE (OUTPATIENT)
Dept: INTERNAL MEDICINE CLINIC | Age: 72
End: 2017-05-11

## 2017-05-11 ENCOUNTER — OFFICE VISIT (OUTPATIENT)
Dept: INTERNAL MEDICINE CLINIC | Age: 72
End: 2017-05-11

## 2017-05-11 ENCOUNTER — HOSPITAL ENCOUNTER (OUTPATIENT)
Dept: LAB | Age: 72
Discharge: HOME | End: 2017-05-11
Payer: MEDICARE

## 2017-05-11 VITALS
OXYGEN SATURATION: 99 % | HEART RATE: 65 BPM | SYSTOLIC BLOOD PRESSURE: 114 MMHG | HEIGHT: 62 IN | WEIGHT: 111 LBS | RESPIRATION RATE: 16 BRPM | DIASTOLIC BLOOD PRESSURE: 66 MMHG | BODY MASS INDEX: 20.43 KG/M2 | TEMPERATURE: 98 F

## 2017-05-11 DIAGNOSIS — R10.9 SIDE PAIN: ICD-10-CM

## 2017-05-11 DIAGNOSIS — Z13.220 LIPID SCREENING: ICD-10-CM

## 2017-05-11 DIAGNOSIS — F41.9 ANXIETY DISORDER, UNSPECIFIED TYPE: ICD-10-CM

## 2017-05-11 DIAGNOSIS — R10.9 RIGHT FLANK PAIN: Primary | ICD-10-CM

## 2017-05-11 DIAGNOSIS — R73.01 IFG (IMPAIRED FASTING GLUCOSE): ICD-10-CM

## 2017-05-11 DIAGNOSIS — E55.9 VITAMIN D DEFICIENCY: ICD-10-CM

## 2017-05-11 LAB
BILIRUB UR QL STRIP: NEGATIVE
GLUCOSE UR-MCNC: NEGATIVE MG/DL
KETONES P FAST UR STRIP-MCNC: NEGATIVE MG/DL
PH UR STRIP: 7 [PH] (ref 4.6–8)
PROT UR QL STRIP: NEGATIVE MG/DL
SP GR UR STRIP: 1.02 (ref 1–1.03)
UA UROBILINOGEN AMB POC: NORMAL (ref 0.2–1)
URINALYSIS CLARITY POC: CLEAR
URINALYSIS COLOR POC: YELLOW
URINE BLOOD POC: NEGATIVE
URINE LEUKOCYTES POC: NEGATIVE
URINE NITRITES POC: NEGATIVE

## 2017-05-11 PROCEDURE — 85025 COMPLETE CBC W/AUTO DIFF WBC: CPT

## 2017-05-11 PROCEDURE — 80061 LIPID PANEL: CPT

## 2017-05-11 PROCEDURE — 82306 VITAMIN D 25 HYDROXY: CPT

## 2017-05-11 PROCEDURE — 36415 COLL VENOUS BLD VENIPUNCTURE: CPT

## 2017-05-11 PROCEDURE — 84443 ASSAY THYROID STIM HORMONE: CPT

## 2017-05-11 PROCEDURE — 80053 COMPREHEN METABOLIC PANEL: CPT

## 2017-05-11 PROCEDURE — 83036 HEMOGLOBIN GLYCOSYLATED A1C: CPT

## 2017-05-11 NOTE — PROGRESS NOTES
CC:  Chief Complaint   Patient presents with    Side Pain     right side     HISTORY OF PRESENT ILLNESS  Clara Henriquez is a 73 y.o. female    She complains of 1 year history of right sided flank pain that has worsened in severity and frequency over the past 2 months. Pain starts at right flank and radiates to right mid-back and to right upper and mid-quadrant of abdomen. Burning pain, 10/10, intermittent, lasts for hours, used to occur a couple of times a month now weekly, worse with bending while doing yard work. Also occurred once after prolonged sitting on a plane. Reports increased urinary frequency yesterday that resolved. Denies associated heartburn, nausea, vomiting, diarrhea, constipation, fevers, chills, or change in appetite. Denies history of kidney stones, trauma/injury to affected areas. Occurs weekly. Patient Active Problem List   Diagnosis Code    Major depression F32.9    Anxiety disorder F41.9    Arthritis M19.90    Advance care planning Z71.89    Cough R05     Past Medical History:   Diagnosis Date    Arthritis     Osteoarthritis    Excessive sweating     Occurs in summers; TSH/CMP/CBC normal    MRSA (methicillin resistant Staphylococcus aureus) infection     MRSA + abd abscess; nasal swab negative after treatment    Unspecified adverse effect of anesthesia     EYES WERE NOT TAPED CLOSED DURING -EYES VERY PAINFUL AFTER SURGERY FOR 3 DAYS     No Known Allergies  Current Outpatient Prescriptions   Medication Sig Dispense Refill    ATOMOXETINE HCL (STRATTERA PO) Take 150 mg by mouth daily as needed.  topiramate (TOPAMAX) 25 mg tablet            PHYSICAL EXAM  Visit Vitals    /66 (BP 1 Location: Left arm, BP Patient Position: Sitting)    Pulse 65    Temp 98 °F (36.7 °C) (Oral)    Resp 16    Ht 5' 1.5\" (1.562 m)    Wt 111 lb (50.3 kg)    SpO2 99%    BMI 20.63 kg/m2       General: Well-developed and well-nourished, no distress.   HEENT:  Head normocephalic/atraumatic, no scleral icterus  Lungs:  Clear to ausculation bilaterally. Good air movement. Heart:  Regular rate and rhythm, normal S1 and S2, no murmur, gallop, or rub  Abdomen: Soft, non-distended, normal bowel sounds, no tenderness, no guarding, masses, rebound tenderness, or HSM. No flank or CVA tenderness. Back: Normal ROM. No paravertebral or vertebral tenderness. Extremities: No clubbing, cyanosis, or edema. Neurological: Alert and oriented. Psychiatric: Normal mood and affect. Behavior is normal.     Results for orders placed or performed in visit on 05/11/17   AMB POC URINALYSIS DIP STICK AUTO W/ MICRO   Result Value Ref Range    Color (UA POC) Yellow     Clarity (UA POC) Clear     Glucose (UA POC) Negative Negative    Bilirubin (UA POC) Negative Negative    Ketones (UA POC) Negative Negative    Specific gravity (UA POC) 1.020 1.001 - 1.035    Blood (UA POC) Negative Negative    pH (UA POC) 7.0 4.6 - 8.0    Protein (UA POC) Negative Negative mg/dL    Urobilinogen (UA POC) 0.2 mg/dL 0.2 - 1    Nitrites (UA POC) Negative Negative    Leukocyte esterase (UA POC) Negative Negative           ASSESSMENT AND PLAN    ICD-10-CM ICD-9-CM    1. Right flank pain B48.8 502.71 METABOLIC PANEL, COMPREHENSIVE      CBC WITH AUTOMATED DIFF      CT ABD PELV W WO CONT   2. Side pain R10.9 789.00 AMB POC URINALYSIS DIP STICK AUTO W/ MICRO   3. IFG (impaired fasting glucose) R73.01 790.21 HEMOGLOBIN A1C WITH EAG   4. Lipid screening Z13.220 V77.91 LIPID PANEL   5. Vitamin D deficiency E55.9 268.9 VITAMIN D, 25 HYDROXY   6. Anxiety disorder, unspecified type F41.9 300.00 TSH AND FREE T4       Kisha Torres was seen today for side pain. Diagnoses and all orders for this visit:    Right flank pain  No evidence of UTI.  Consider kidney stones, liver disease, atypical GERD, interstitial cystitis, or recurrent muscular strain.  -     METABOLIC PANEL, COMPREHENSIVE  -     CBC WITH AUTOMATED DIFF  -     CT ABD PELV W WO CONT; Future    Side pain  -     AMB POC URINALYSIS DIP STICK AUTO W/ MICRO    IFG (impaired fasting glucose)  -     HEMOGLOBIN A1C WITH EAG    Lipid screening  -     LIPID PANEL    Vitamin D deficiency  -     VITAMIN D, 25 HYDROXY    Anxiety disorder, unspecified type  -     TSH AND FREE T4      Follow-up Disposition:  Return in about 3 weeks (around 6/1/2017), or if symptoms worsen or fail to improve, for Right flank pain. Provided patient and/or family with advanced directive information and answered pertinent questions. Encouraged patient to provide a copy of advanced directive to the office when available. I have discussed the diagnosis with the patient and the intended plan as seen in the above orders. Patient is in agreement. The patient has received an after-visit summary and questions were answered concerning future plans. I have discussed medication side effects and warnings with the patient as well.

## 2017-05-11 NOTE — PROGRESS NOTES
Reviewed record  In preparation for visit and have obtained necessary documentation. 1. Have you been to the ER, urgent care clinic since your last visit? Hospitalized since your last visit?no  2. Have you seen or consulted any other health care providers outside of the [facility] since your last visit? Include any pap smears or colon screening. no  Per Dr. Avendaño Severe verbal order read back orders placed for poc urine dip. Patient has been given information on advanced directives at a previous visit.

## 2017-05-11 NOTE — PATIENT INSTRUCTIONS
Flank Pain: Care Instructions  Your Care Instructions  Flank pain is pain on the side of the back just below the rib cage and above the waist. It can be on one or both sides. Flank pain has many possible causes, including a kidney stone, a urinary tract infection, or back strain. Flank pain may get better on its own. But don't ignore new symptoms, such as fever, nausea and vomiting, urination problems, pain that gets worse, and dizziness. These may be signs of a more serious problem. You may have to have tests or other treatment. Follow-up care is a key part of your treatment and safety. Be sure to make and go to all appointments, and call your doctor if you are having problems. It's also a good idea to know your test results and keep a list of the medicines you take. How can you care for yourself at home? · Rest until you feel better. · Take pain medicines exactly as directed. ¨ If the doctor gave you a prescription medicine for pain, take it as prescribed. ¨ If you are not taking a prescription pain medicine, ask your doctor if you can take an over-the-counter pain medicine, such as acetaminophen (Tylenol), ibuprofen (Advil, Motrin), or naproxen (Aleve). Read and follow all instructions on the label. · If your doctor prescribed antibiotics, take them as directed. Do not stop taking them just because you feel better. You need to take the full course of antibiotics. · To apply heat, put a warm water bottle, a heating pad set on low, or a warm cloth on the painful area. Do not go to sleep with a heating pad on your skin. · To prevent dehydration, drink plenty of fluids, enough so that your urine is light yellow or clear like water. Choose water and other caffeine-free clear liquids until you feel better. If you have kidney, heart, or liver disease and have to limit fluids, talk with your doctor before you increase the amount of fluids you drink. When should you call for help?   Call your doctor now or seek immediate medical care if:  · Your flank pain gets worse. · You have new symptoms, such as fever, nausea, or vomiting. · You have symptoms of a urinary problem. For example:  ¨ You have blood or pus in your urine. ¨ You have chills or body aches. ¨ It hurts to urinate. ¨ You have groin or belly pain. Watch closely for changes in your health, and be sure to contact your doctor if you do not get better as expected. Where can you learn more? Go to http://michael-carmela.info/. Enter S191 in the search box to learn more about \"Flank Pain: Care Instructions. \"  Current as of: May 27, 2016  Content Version: 11.2  © 1274-5867 [employer]. Care instructions adapted under license by [employer] (which disclaims liability or warranty for this information). If you have questions about a medical condition or this instruction, always ask your healthcare professional. [place] [address] any warranty or liability for your use of this information.

## 2017-05-11 NOTE — TELEPHONE ENCOUNTER
Patient has concern over having to constantly clear her throat. She states she feels like she has a glob of mucus always hung up in the upper back part of her throat. She wants to know what she can do for this.

## 2017-05-11 NOTE — LETTER
5/17/2017 8:21 AM 
 
Ms. Esperanza GARY [facility] [id number] Dear Belinda Castro: 
 
Please find your most recent results below. Resulted Orders AMB POC URINALYSIS DIP STICK AUTO W/ MICRO Result Value Ref Range Color (UA POC) Yellow Clarity (UA POC) Clear Glucose (UA POC) Negative Negative Bilirubin (UA POC) Negative Negative Ketones (UA POC) Negative Negative Specific gravity (UA POC) 1.020 1.001 - 1.035 Blood (UA POC) Negative Negative pH (UA POC) 7.0 4.6 - 8.0 Protein (UA POC) Negative Negative mg/dL Urobilinogen (UA POC) 0.2 mg/dL 0.2 - 1 Nitrites (UA POC) Negative Negative Leukocyte esterase (UA POC) Negative Negative METABOLIC PANEL, COMPREHENSIVE Result Value Ref Range Glucose 90 65 - 99 mg/dL BUN 15 8 - 27 mg/dL Creatinine 0.87 0.57 - 1.00 mg/dL GFR est non-AA 68 >59 mL/min/1.73 GFR est AA 79 >59 mL/min/1.73  
 BUN/Creatinine ratio 17 12 - 28 Sodium 143 134 - 144 mmol/L Potassium 4.8 3.5 - 5.2 mmol/L Chloride 106 96 - 106 mmol/L  
 CO2 22 18 - 29 mmol/L Calcium 9.6 8.7 - 10.3 mg/dL Protein, total 6.4 6.0 - 8.5 g/dL Albumin 4.4 3.6 - 4.8 g/dL GLOBULIN, TOTAL 2.0 1.5 - 4.5 g/dL A-G Ratio 2.2 1.2 - 2.2 Bilirubin, total 0.4 0.0 - 1.2 mg/dL Alk. phosphatase 85 39 - 117 IU/L  
 AST (SGOT) 17 0 - 40 IU/L  
 ALT (SGPT) 12 0 - 32 IU/L Narrative CBC WITH AUTOMATED DIFF Result Value Ref Range WBC 3.8 3.4 - 10.8 x10E3/uL  
 RBC 4.41 3.77 - 5.28 x10E6/uL HGB 13.1 11.1 - 15.9 g/dL HCT 39.2 34.0 - 46.6 % MCV 89 79 - 97 fL  
 MCH 29.7 26.6 - 33.0 pg  
 MCHC 33.4 31.5 - 35.7 g/dL  
 RDW 14.7 12.3 - 15.4 % PLATELET 216 423 - 669 x10E3/uL NEUTROPHILS 54 % Lymphocytes 35 % MONOCYTES 9 % EOSINOPHILS 2 % BASOPHILS 0 %  
 ABS. NEUTROPHILS 2.0 1.4 - 7.0 x10E3/uL Abs Lymphocytes 1.3 0.7 - 3.1 x10E3/uL  
 ABS. MONOCYTES 0.4 0.1 - 0.9 x10E3/uL ABS. EOSINOPHILS 0.1 0.0 - 0.4 x10E3/uL  
 ABS. BASOPHILS 0.0 0.0 - 0.2 x10E3/uL IMMATURE GRANULOCYTES 0 %  
 ABS. IMM. GRANS. 0.0 0.0 - 0.1 x10E3/uL HEMOGLOBIN A1C WITH EAG Result Value Ref Range Hemoglobin A1c 5.8 (H) 4.8 - 5.6 % Comment:  
            Pre-diabetes: 5.7 - 6.4 Diabetes: >6.4 Glycemic control for adults with diabetes: <7.0 Estimated average glucose 120 mg/dL TSH AND FREE T4 Result Value Ref Range TSH 0.961 0.450 - 4.500 uIU/mL T4, Free 0.99 0.82 - 1.77 ng/dL LIPID PANEL Result Value Ref Range Cholesterol, total 215 (H) 100 - 199 mg/dL Triglyceride 72 0 - 149 mg/dL HDL Cholesterol 65 >39 mg/dL VLDL, calculated 14 5 - 40 mg/dL LDL, calculated 136 (H) 0 - 99 mg/dL VITAMIN D, 25 HYDROXY Result Value Ref Range VITAMIN D, 25-HYDROXY 23.0 (L) 30.0 - 100.0 ng/mL Your labs showed normal kidney and liver tests, blood counts, and thyroid. Your vitamin D level was low. Dr. Lashonda Main recommends you start taking OTC vitamin D 1,000 units once daily. Your diabetes screening test showed mild prediabetes, meaning your average blood sugar over the past 3 months has been a little high. Try to cut down on sweets and starchy foods. Your total cholesterol was high at 215. Normal is less than 200. To lower cholesterol, work on diet and exercise. For exercise, aim for 30 minutes of activity 5 days a week. For diet, increase fruits, vegetables, and low-fat dairy products (such as low-fat yogurt, 1% or skim milk), and decrease fried foods, fast foods, and red meats such as beef, marie, sausage, hot dogs, and pork. Please call me if you have any questions: [phone] Sincerely, Sonal Cline MD

## 2017-05-11 NOTE — MR AVS SNAPSHOT
Visit Information Date & Time Provider Department Dept. Phone Encounter #  
 5/11/2017 11:30 AM Tae Stewart Ranks [phone] [id number] Follow-up Instructions Return in about 3 weeks (around 6/1/2017), or if symptoms worsen or fail to improve, for Right flank pain. Your Appointments 6/16/2017 11:30 AM  
ROUTINE CARE with MD Tae Stewart Ranks [facility]) Appt Note: 4mth f/u; depression, anxiety, arthritis [address] [place] [address] [phone]  
  
   
 [facility] [address] Upcoming Health Maintenance Date Due  
 GLAUCOMA SCREENING Q2Y 6/14/2012 INFLUENZA AGE 9 TO ADULT 8/1/2017 MEDICARE YEARLY EXAM 2/18/2018 BREAST CANCER SCRN MAMMOGRAM 12/9/2018 COLONOSCOPY 3/31/2025 DTaP/Tdap/Td series (2 - Td) 7/22/2025 Allergies as of 5/11/2017  Review Complete On: 5/11/2017 By: Geneva Blackburn MD  
 No Known Allergies Current Immunizations  Reviewed on 2/17/2017 Name Date Pneumococcal Conjugate (PCV-13) 2/17/2017 Pneumococcal Polysaccharide (PPSV-23) 2/9/2016 Tdap 7/22/2015 Not reviewed this visit You Were Diagnosed With   
  
 Codes Comments Right flank pain    -  Primary ICD-10-CM: R10.9 ICD-9-CM: 789.09 Side pain     ICD-10-CM: R10.9 ICD-9-CM: 789.00 IFG (impaired fasting glucose)     ICD-10-CM: R73.01 
ICD-9-CM: 790.21 Lipid screening     ICD-10-CM: D04.073 ICD-9-CM: V77.91 Vitamin D deficiency     ICD-10-CM: E55.9 ICD-9-CM: 268.9 Anxiety disorder, unspecified type     ICD-10-CM: F41.9 ICD-9-CM: 300.00 Vitals BP Pulse Temp Resp Height(growth percentile) Weight(growth percentile) 114/66 (BP 1 Location: Left arm, BP Patient Position: Sitting) 65 98 °F (36.7 °C) (Oral) 16 5' 1.5\" (1.562 m) 111 lb (50.3 kg) SpO2 BMI OB Status Smoking Status 99% 20.63 kg/m2 Hysterectomy Never Smoker BMI and BSA Data Body Mass Index Body Surface Area  
 20.63 kg/m 2 1.48 m 2 Preferred Pharmacy Pharmacy Name Phone [facility] [address] [facility], [facility] [address] [place] Sánchez [phone] Your Updated Medication List  
  
   
This list is accurate as of: 5/11/17 11:53 AM.  Always use your most recent med list.  
  
  
  
  
 STRATTERA PO Take 150 mg by mouth daily as needed. topiramate 25 mg tablet Commonly known as:  TOPAMAX We Performed the Following AMB POC URINALYSIS DIP STICK AUTO W/ MICRO [64395 CPT(R)] CBC WITH AUTOMATED DIFF [46118 CPT(R)] HEMOGLOBIN A1C WITH EAG [10984 CPT(R)] LIPID PANEL [33023 CPT(R)] METABOLIC PANEL, COMPREHENSIVE [89538 CPT(R)] TSH AND FREE T4 [39687 CPT(R)] VITAMIN D, 25 HYDROXY [id number] CPT(R)] Follow-up Instructions Return in about 3 weeks (around 6/1/2017), or if symptoms worsen or fail to improve, for Right flank pain. To-Do List   
 05/13/2017 Imaging:  CT ABD PELV W WO CONT Patient Instructions Flank Pain: Care Instructions Your Care Instructions Flank pain is pain on the side of the back just below the rib cage and above the waist. It can be on one or both sides. Flank pain has many possible causes, including a kidney stone, a urinary tract infection, or back strain. Flank pain may get better on its own. But don't ignore new symptoms, such as fever, nausea and vomiting, urination problems, pain that gets worse, and dizziness. These may be signs of a more serious problem. You may have to have tests or other treatment. Follow-up care is a key part of your treatment and safety. Be sure to make and go to all appointments, and call your doctor if you are having problems. It's also a good idea to know your test results and keep a list of the medicines you take. How can you care for yourself at home? · Rest until you feel better. · Take pain medicines exactly as directed. ¨ If the doctor gave you a prescription medicine for pain, take it as prescribed. ¨ If you are not taking a prescription pain medicine, ask your doctor if you can take an over-the-counter pain medicine, such as acetaminophen (Tylenol), ibuprofen (Advil, Motrin), or naproxen (Aleve). Read and follow all instructions on the label. · If your doctor prescribed antibiotics, take them as directed. Do not stop taking them just because you feel better. You need to take the full course of antibiotics. · To apply heat, put a warm water bottle, a heating pad set on low, or a warm cloth on the painful area. Do not go to sleep with a heating pad on your skin. · To prevent dehydration, drink plenty of fluids, enough so that your urine is light yellow or clear like water. Choose water and other caffeine-free clear liquids until you feel better. If you have kidney, heart, or liver disease and have to limit fluids, talk with your doctor before you increase the amount of fluids you drink. When should you call for help? Call your doctor now or seek immediate medical care if: 
· Your flank pain gets worse. · You have new symptoms, such as fever, nausea, or vomiting. · You have symptoms of a urinary problem. For example: ¨ You have blood or pus in your urine. ¨ You have chills or body aches. ¨ It hurts to urinate. ¨ You have groin or belly pain. Watch closely for changes in your health, and be sure to contact your doctor if you do not get better as expected. Where can you learn more? Go to http://michael-carmela.info/. Enter S191 in the search box to learn more about \"Flank Pain: Care Instructions. \" Current as of: May 27, 2016 Content Version: 11.2 © 6995-1563 [employer].  Care instructions adapted under license by [employer] (which disclaims liability or warranty for this information). If you have questions about a medical condition or this instruction, always ask your healthcare professional. [address] any warranty or liability for your use of this information. Introducing [facility]! Dear Cathy Keyes: 
Thank you for requesting a iStorez account. Our records indicate that you have previously registered for a iStorez account but its currently inactive. Please call our iStorez support line at [phone]. Additional Information If you have questions, please visit the Frequently Asked Questions section of the iStorez website at [email or web address] Remember, iStorez is NOT to be used for urgent needs. For medical emergencies, dial 911. Now available from your iPhone and Android! Please provide this summary of care documentation to your next provider. Your primary care clinician is listed as Olga Lozano. If you have any questions after today's visit, please call [phone].

## 2017-05-12 LAB
25(OH)D3+25(OH)D2 SERPL-MCNC: 23 NG/ML (ref 30–100)
ALBUMIN SERPL-MCNC: 4.4 G/DL (ref 3.6–4.8)
ALBUMIN/GLOB SERPL: 2.2 {RATIO} (ref 1.2–2.2)
ALP SERPL-CCNC: 85 IU/L (ref 39–117)
ALT SERPL-CCNC: 12 IU/L (ref 0–32)
AST SERPL-CCNC: 17 IU/L (ref 0–40)
BASOPHILS # BLD AUTO: 0 X10E3/UL (ref 0–0.2)
BASOPHILS NFR BLD AUTO: 0 %
BILIRUB SERPL-MCNC: 0.4 MG/DL (ref 0–1.2)
BUN SERPL-MCNC: 15 MG/DL (ref 8–27)
BUN/CREAT SERPL: 17 (ref 12–28)
CALCIUM SERPL-MCNC: 9.6 MG/DL (ref 8.7–10.3)
CHLORIDE SERPL-SCNC: 106 MMOL/L (ref 96–106)
CHOLEST SERPL-MCNC: 215 MG/DL (ref 100–199)
CO2 SERPL-SCNC: 22 MMOL/L (ref 18–29)
CREAT SERPL-MCNC: 0.87 MG/DL (ref 0.57–1)
EOSINOPHIL # BLD AUTO: 0.1 X10E3/UL (ref 0–0.4)
EOSINOPHIL NFR BLD AUTO: 2 %
ERYTHROCYTE [DISTWIDTH] IN BLOOD BY AUTOMATED COUNT: 14.7 % (ref 12.3–15.4)
EST. AVERAGE GLUCOSE BLD GHB EST-MCNC: 120 MG/DL
GLOBULIN SER CALC-MCNC: 2 G/DL (ref 1.5–4.5)
GLUCOSE SERPL-MCNC: 90 MG/DL (ref 65–99)
HBA1C MFR BLD: 5.8 % (ref 4.8–5.6)
HCT VFR BLD AUTO: 39.2 % (ref 34–46.6)
HDLC SERPL-MCNC: 65 MG/DL
HGB BLD-MCNC: 13.1 G/DL (ref 11.1–15.9)
IMM GRANULOCYTES # BLD: 0 X10E3/UL (ref 0–0.1)
IMM GRANULOCYTES NFR BLD: 0 %
INTERPRETATION, 910389: NORMAL
LDLC SERPL CALC-MCNC: 136 MG/DL (ref 0–99)
LYMPHOCYTES # BLD AUTO: 1.3 X10E3/UL (ref 0.7–3.1)
LYMPHOCYTES NFR BLD AUTO: 35 %
MCH RBC QN AUTO: 29.7 PG (ref 26.6–33)
MCHC RBC AUTO-ENTMCNC: 33.4 G/DL (ref 31.5–35.7)
MCV RBC AUTO: 89 FL (ref 79–97)
MONOCYTES # BLD AUTO: 0.4 X10E3/UL (ref 0.1–0.9)
MONOCYTES NFR BLD AUTO: 9 %
NEUTROPHILS # BLD AUTO: 2 X10E3/UL (ref 1.4–7)
NEUTROPHILS NFR BLD AUTO: 54 %
PLATELET # BLD AUTO: 257 X10E3/UL (ref 150–379)
POTASSIUM SERPL-SCNC: 4.8 MMOL/L (ref 3.5–5.2)
PROT SERPL-MCNC: 6.4 G/DL (ref 6–8.5)
RBC # BLD AUTO: 4.41 X10E6/UL (ref 3.77–5.28)
SODIUM SERPL-SCNC: 143 MMOL/L (ref 134–144)
T4 FREE SERPL-MCNC: 0.99 NG/DL (ref 0.82–1.77)
TRIGL SERPL-MCNC: 72 MG/DL (ref 0–149)
TSH SERPL DL<=0.005 MIU/L-ACNC: 0.96 UIU/ML (ref 0.45–4.5)
VLDLC SERPL CALC-MCNC: 14 MG/DL (ref 5–40)
WBC # BLD AUTO: 3.8 X10E3/UL (ref 3.4–10.8)

## 2017-05-12 NOTE — TELEPHONE ENCOUNTER
Inform her that the sensation of having a lump in the throat, called globus sensation, is sometimes caused by acid reflux. Dr. Ana Blair recommends she take OTC omeprazole for 4 weeks and see if this helps.

## 2017-05-16 NOTE — PROGRESS NOTES
Returned call to patient at  [phone].  Left message for patient to call the office for test results.

## 2017-05-16 NOTE — PROGRESS NOTES
Your labs showed normal kidney and liver tests, blood counts, and thyroid. Your vitamin D level was low. Dr. Adriano Walker recommends you start taking OTC vitamin D 1,000 units once daily. Your diabetes screening test showed mild prediabetes, meaning your average blood sugar over the past 3 months has been a little high. Try to cut down on sweets and starchy foods. Your total cholesterol was high at 215. Normal is less than 200. To lower cholesterol, work on diet and exercise. For exercise, aim for 30 minutes of activity 5 days a week. For diet, increase fruits, vegetables, and low-fat dairy products (such as low-fat yogurt, 1% or skim milk), and decrease fried foods, fast foods, and red meats such as beef, marie, sausage, hot dogs, and pork.

## 2017-05-17 NOTE — PROGRESS NOTES
Spoke with patient and gave her test results per Dr. Anatoly Mckinley. Patient stated understanding. Gave her information on globus sensation. Mailing information on cholesterol nd prediabetes.

## 2017-05-24 ENCOUNTER — HOSPITAL ENCOUNTER (OUTPATIENT)
Dept: CT IMAGING | Age: 72
Discharge: HOME | End: 2017-05-24
Attending: INTERNAL MEDICINE
Payer: MEDICARE

## 2017-05-24 DIAGNOSIS — R10.9 RIGHT FLANK PAIN: ICD-10-CM

## 2017-05-24 PROCEDURE — 74011636320 HC RX REV CODE- 636/320: Performed by: INTERNAL MEDICINE

## 2017-05-24 PROCEDURE — 74178 CT ABD&PLV WO CNTR FLWD CNTR: CPT

## 2017-05-24 PROCEDURE — 74011000258 HC RX REV CODE- 258: Performed by: INTERNAL MEDICINE

## 2017-05-24 RX ORDER — SODIUM CHLORIDE 0.9 % (FLUSH) 0.9 %
10 SYRINGE (ML) INJECTION
Status: COMPLETED | OUTPATIENT
Start: 2017-05-24 | End: 2017-05-24

## 2017-05-24 RX ADMIN — SODIUM CHLORIDE 100 ML: 900 INJECTION, SOLUTION INTRAVENOUS at 13:41

## 2017-05-24 RX ADMIN — IOPAMIDOL 100 ML: 755 INJECTION, SOLUTION INTRAVENOUS at 13:40

## 2017-05-24 RX ADMIN — Medication 10 ML: at 13:40

## 2017-05-25 ENCOUNTER — TELEPHONE (OUTPATIENT)
Dept: INTERNAL MEDICINE CLINIC | Age: 72
End: 2017-05-25

## 2017-05-25 DIAGNOSIS — R10.9 RIGHT FLANK PAIN: Primary | ICD-10-CM

## 2017-05-25 NOTE — TELEPHONE ENCOUNTER
Your CT abd/pelvis showed several small kidney stones, but none seemed to be causing any blockages. You can feel pain from kidney stones only when they cause blockage somewhere in the urinary system. The CT scan also showed mild swelling at the right kidney due to an abnormality of the right ureter, which is a urine duct that drains from the kidney. Dr. Adriano Walker is referring you to see a urologist to see if any of these findings may be causing the pain you have been having. In the meantime, try to drink a lot of water to flush out some of the kidney stones.

## 2017-05-26 NOTE — TELEPHONE ENCOUNTER
Patient informed of note from dr Kristina San. Patient is currently driving out of state and will call back Tuesday to discuss issues/results in depth.

## 2017-06-05 ENCOUNTER — TELEPHONE (OUTPATIENT)
Dept: INTERNAL MEDICINE CLINIC | Age: 72
End: 2017-06-05

## 2017-06-05 NOTE — TELEPHONE ENCOUNTER
Per Fabio:    Dr. Dennis Castellanos / Telephone  Received: Today       Jannette LUNA [facility] Front Office Pool                     Pt would like to speak with someone about her CT Scan test results and the next step she is to take and possibly obtain a referral. Pt best contact number is  home [phone] or cell [phone].

## 2017-06-09 ENCOUNTER — OFFICE VISIT (OUTPATIENT)
Dept: INTERNAL MEDICINE CLINIC | Age: 72
End: 2017-06-09

## 2017-06-09 VITALS
RESPIRATION RATE: 16 BRPM | WEIGHT: 110 LBS | SYSTOLIC BLOOD PRESSURE: 116 MMHG | HEART RATE: 60 BPM | DIASTOLIC BLOOD PRESSURE: 62 MMHG | OXYGEN SATURATION: 98 % | HEIGHT: 62 IN | BODY MASS INDEX: 20.24 KG/M2 | TEMPERATURE: 98 F

## 2017-06-09 DIAGNOSIS — K21.9 GASTROESOPHAGEAL REFLUX DISEASE WITHOUT ESOPHAGITIS: ICD-10-CM

## 2017-06-09 DIAGNOSIS — S63.259S DISLOCATION OF FINGER, SEQUELA: ICD-10-CM

## 2017-06-09 DIAGNOSIS — N13.30 HYDRONEPHROSIS, UNSPECIFIED HYDRONEPHROSIS TYPE: Primary | ICD-10-CM

## 2017-06-09 DIAGNOSIS — H93.12 TINNITUS, LEFT EAR: ICD-10-CM

## 2017-06-09 DIAGNOSIS — N20.0 KIDNEY STONES: ICD-10-CM

## 2017-06-09 DIAGNOSIS — Z13.5 SCREENING FOR GLAUCOMA: ICD-10-CM

## 2017-06-09 NOTE — PATIENT INSTRUCTIONS
Learning About Diet for Kidney Stone Prevention  What are kidney stones? Kidney stones are made of salts and minerals in the urine that form small \"higinio. \" Stones can form in the kidneys and the ureters (the tubes that lead from the kidneys to the bladder). They can also form in the bladder. Stones may not cause a problem as long as they stay in the kidneys. But they can cause sudden, severe pain. Pain is most likely when the stones travel from the kidneys to the bladder. Kidney stones can cause bloody urine. Kidney stones often run in families. You are more likely to get them if you don't drink enough fluids, mainly water. Certain foods and drinks and some dietary supplements may also increase your risk for kidney stones if you consume too much of them. What can you do to prevent kidney stones? Changing what you eat may not prevent all types of kidney stones. But for people who have a history of certain kinds of kidney stones, some changes in diet may help. A dietitian can help you set up a meal plan that includes healthy, low-oxalate choices. Here are some general guidelines to get you started. Plan your meals and snacks around foods that are low in oxalate. These foods include:  · Corn, kale, parsnips, and squash,. · Beef, chicken, pork, turkey, and fish. · Milk, butter, cheese, and yogurt. You can eat certain foods that are medium-high in oxalate, but eat them only once in a while. These foods include:  · Bread. · Brown rice. · English muffins. · Figs. · Popcorn. · String beans. · Tomatoes. Limit very high-oxalate foods, including:  · Black tea. · Coffee. · Chocolate. · Dark green vegetables. · Nuts. Here are some other things you can do to help prevent kidney stones. · Drink plenty of fluids. If you have kidney, heart, or liver disease and have to limit fluids, talk with your doctor before you increase the amount of fluids you drink.   · Do not take more than the recommended daily dose of vitamins C and D.  · Limit the salt in your diet. · Eat a balanced diet that is not too high in protein. Follow-up care is a key part of your treatment and safety. Be sure to make and go to all appointments, and call your doctor if you are having problems. It's also a good idea to know your test results and keep a list of the medicines you take. Where can you learn more? Go to http://michael-carmela.info/. Enter C138 in the search box to learn more about \"Learning About Diet for Kidney Stone Prevention. \"  Current as of: July 26, 2016  Content Version: 11.2  © 4243-5766 [employer]. Care instructions adapted under license by [employer] (which disclaims liability or warranty for this information). If you have questions about a medical condition or this instruction, always ask your healthcare professional. [address] any warranty or liability for your use of this information.

## 2017-06-09 NOTE — MR AVS SNAPSHOT
Visit Information Date & Time Provider Department Dept. Phone Encounter #  
 6/9/2017 11:30 AM Duy Shearer [phone] [id number] Follow-up Instructions Return if symptoms worsen or fail to improve. Your Appointments 6/16/2017 11:30 AM  
ROUTINE CARE with MD Duy Shearer [address] [place] [address]) Appt Note: 4mth f/u; depression, anxiety, arthritis [address] [facility] [address] [phone]  
  
   
 8 [facility] [address]  Upcoming Health Maintenance Date Due  
 GLAUCOMA SCREENING Q2Y 6/14/2012 INFLUENZA AGE 9 TO ADULT 8/1/2017 MEDICARE YEARLY EXAM 2/18/2018 BREAST CANCER SCRN MAMMOGRAM 12/9/2018 COLONOSCOPY 3/31/2025 DTaP/Tdap/Td series (2 - Td) 7/22/2025 Allergies as of 6/9/2017  Review Complete On: 6/9/2017 By: Elmo Rangel MD  
 No Known Allergies Current Immunizations  Reviewed on 2/17/2017 Name Date Pneumococcal Conjugate (PCV-13) 2/17/2017 Pneumococcal Polysaccharide (PPSV-23) 2/9/2016 Tdap 7/22/2015 Not reviewed this visit You Were Diagnosed With   
  
 Codes Comments Hydronephrosis, unspecified hydronephrosis type    -  Primary ICD-10-CM: N13.30 ICD-9-CM: 860 Kidney stones     ICD-10-CM: N20.0 ICD-9-CM: 592.0 Gastroesophageal reflux disease without esophagitis     ICD-10-CM: K21.9 ICD-9-CM: 530.81 Tinnitus, left ear     ICD-10-CM: H93.12 
ICD-9-CM: 388.30 Screening for glaucoma     ICD-10-CM: Z13.5 ICD-9-CM: V80.1 Dislocation of finger, sequela     ICD-10-CM: W98.320G 
ICD-9-CM: 753. 6 Vitals BP Pulse Temp Resp Height(growth percentile) Weight(growth percentile) 116/62 (BP 1 Location: Left arm, BP Patient Position: Sitting) 60 98 °F (36.7 °C) (Oral) 16 5' 1.5\" (1.562 m) 110 lb (49.9 kg) SpO2 BMI OB Status Smoking Status 98% 20.45 kg/m2 Hysterectomy Never Smoker Vitals History BMI and BSA Data Body Mass Index Body Surface Area  
 20.45 kg/m 2 1.47 m 2 Preferred Pharmacy Pharmacy Name Phone [facility] [address] [place], [place] - [address] [place] [address] [place] [phone] Your Updated Medication List  
  
   
This list is accurate as of: 6/9/17 11:56 AM.  Always use your most recent med list.  
  
  
  
  
 topiramate 25 mg tablet Commonly known as:  TOPAMAX We Performed the Following REFERRAL TO ENT-OTOLARYNGOLOGY [IBZ52 Custom] Comments:  
 Please evaluate patient for tapping noise at left ear. REFERRAL TO OPHTHALMOLOGY [REF57 Custom] Comments:  
 Please evaluate patient for glaucoma exam.  
 REFERRAL TO PLASTIC SURGERY [REF89 Custom] Comments:  
 Straightening of fingers. Follow-up Instructions Return if symptoms worsen or fail to improve. Referral Information Referral ID Referred By Referred To  
  
 [id number] [facility],  Eloisa Bay MD   
   [address] [place]   
   [place], [address] Phone: [phone] Fax: [phone] Visits Status Start Date End Date 1 New Request 6/9/17 6/9/18 If your referral has a status of pending review or denied, additional information will be sent to support the outcome of this decision. Referral ID Referred By Referred To  
 [id number] [facility] [address] [place] [address] PhillipwVilma jamesmairayuliya 23 Fax: [phone] Visits Status Start Date End Date 1 New Request 6/9/17 6/9/18 If your referral has a status of pending review or denied, additional information will be sent to support the outcome of this decision. Referral ID Referred By Referred To  
 [id number] [facility], [address] [place] [address] [facility] [address] Visits Status Start Date End Date 1 New Request 6/9/17 6/9/18 If your referral has a status of pending review or denied, additional information will be sent to support the outcome of this decision. Patient Instructions Learning About Diet for Kidney Stone Prevention What are kidney stones? Kidney stones are made of salts and minerals in the urine that form small \"higinio. \" Stones can form in the kidneys and the ureters (the tubes that lead from the kidneys to the bladder). They can also form in the bladder. Stones may not cause a problem as long as they stay in the kidneys. But they can cause sudden, severe pain. Pain is most likely when the stones travel from the kidneys to the bladder. Kidney stones can cause bloody urine. Kidney stones often run in families. You are more likely to get them if you don't drink enough fluids, mainly water. Certain foods and drinks and some dietary supplements may also increase your risk for kidney stones if you consume too much of them. What can you do to prevent kidney stones? Changing what you eat may not prevent all types of kidney stones. But for people who have a history of certain kinds of kidney stones, some changes in diet may help. A dietitian can help you set up a meal plan that includes healthy, low-oxalate choices. Here are some general guidelines to get you started. Plan your meals and snacks around foods that are low in oxalate. These foods include: · Corn, kale, parsnips, and squash,. · Beef, chicken, pork, turkey, and fish. · Milk, butter, cheese, and yogurt. You can eat certain foods that are medium-high in oxalate, but eat them only once in a while. These foods include: · Bread. · Brown rice. · English muffins. · Figs. · Popcorn. · String beans. · Tomatoes. Limit very high-oxalate foods, including: · Black tea. · Coffee. · Chocolate. · Dark green vegetables. · Nuts. Here are some other things you can do to help prevent kidney stones. · Drink plenty of fluids. If you have kidney, heart, or liver disease and have to limit fluids, talk with your doctor before you increase the amount of fluids you drink. · Do not take more than the recommended daily dose of vitamins C and D. 
· Limit the salt in your diet. · Eat a balanced diet that is not too high in protein. Follow-up care is a key part of your treatment and safety. Be sure to make and go to all appointments, and call your doctor if you are having problems. It's also a good idea to know your test results and keep a list of the medicines you take. Where can you learn more? Go to http://michael-carmela.info/. Enter C138 in the search box to learn more about \"Learning About Diet for Kidney Stone Prevention. \" Current as of: July 26, 2016 Content Version: 11.2 © 7414-3567 [employer]. Care instructions adapted under license by [employer] (which disclaims liability or warranty for this information). If you have questions about a medical condition or this instruction, always ask your healthcare professional. [address] any warranty or liability for your use of this information. Introducing [facility]! Dear Brian Julian: 
Thank you for requesting a nVoq account. Our records indicate that you have previously registered for a nVoq account but its currently inactive. Please call our nVoq support line at [phone]. Additional Information If you have questions, please visit the Frequently Asked Questions section of the nVoq website at [email or web address]. [employer]/. Remember, nVoq is NOT to be used for urgent needs. For medical emergencies, dial 911. Now available from your iPhone and Android! Please provide this summary of care documentation to your next provider. Your primary care clinician is listed as Shruthi Monroy.  If you have any questions after today's visit, please call [phone].

## 2017-06-09 NOTE — PROGRESS NOTES
CC:   Chief Complaint   Patient presents with    Results     follow up on CT scan    Other     concerns over prediabetes status/cholesterol        HISTORY OF PRESENT ILLNESS  Karrie Bradley is a 73 y.o. female. Presents to discuss results of CT scan. Seen a month ago with complaint of  right flank pain that had worsened in severity and frequency. No improvement since last clinic visit. Pain is intermittent, up to 10/10 in intensity, lasts up to 2 hrs. Now occurs after she does prolonged yard work outdoors. Other issues:    1) She is also concerned that her lab results showed she has prediabetes and high cholesterol. Her 81 yo father, who is still living, has type 2 DM. Her 81 yo mother, also alive, has only osteoarthritis. 2) Patient also has osteoarthritis and mentioned at the end of our session that she wants a referral to see a plastic surgeon to straighten some of her fingers affected by arthritis and old injuries. 3) Has tapping and roaring noise at her left ear. Wants referral to see an ENT doctor. CT Abd Pelvis 5/24/17:  1. Duplicated right collecting system. Hydroureteronephrosis involving the lower  pole moiety extending to the right proximal ureter. Slight irregularity of the  ureter is seen in this region, which is not well evaluated with no delayed  images. Consider delayed postcontrast imaging for further evaluation of the  right ureter.     2. Multiple 1 mm nonobstructing calculi in the kidneys.     ROS  A complete review of systems was performed and is negative except for those mentioned in the HPI.        Patient Active Problem List   Diagnosis Code    Major depression F32.9    Anxiety disorder F41.9    Arthritis M19.90    Advance care planning Z71.89    Cough R05     Past Medical History:   Diagnosis Date    Arthritis     Osteoarthritis    Excessive sweating     Occurs in summers; TSH/CMP/CBC normal    MRSA (methicillin resistant Staphylococcus aureus) infection 2007    MRSA + abd abscess; nasal swab negative after treatment    Unspecified adverse effect of anesthesia     EYES WERE NOT TAPED CLOSED DURING -EYES VERY PAINFUL AFTER SURGERY FOR 3 DAYS     No Known Allergies  Current Outpatient Prescriptions   Medication Sig Dispense Refill    topiramate (TOPAMAX) 25 mg tablet            PHYSICAL EXAM  Vitals:    17 1104   BP: 116/62   Pulse: 60   Resp: 16   Temp: 98 °F (36.7 °C)   TempSrc: Oral   SpO2: 98%   Weight: 110 lb (49.9 kg)   Height: 5' 1.5\" (1.562 m)   PainSc:   0 - No pain       General: Well-developed and well-nourished, no distress. HEENT:  Head normocephalic/atraumatic, no scleral icterus  Neurological: Alert and oriented. Psychiatric: Anxious. Behavior is normal.     Results for orders placed or performed in visit on    METABOLIC PANEL, COMPREHENSIVE   Result Value Ref Range    Glucose 90 65 - 99 mg/dL    BUN 15 8 - 27 mg/dL    Creatinine 0.87 0.57 - 1.00 mg/dL    GFR est non-AA 68 >59 mL/min/1.73    GFR est AA 79 >59 mL/min/1.73    BUN/Creatinine ratio 17 12 - 28    Sodium 143 134 - 144 mmol/L    Potassium 4.8 3.5 - 5.2 mmol/L    Chloride 106 96 - 106 mmol/L    CO2 22 18 - 29 mmol/L    Calcium 9.6 8.7 - 10.3 mg/dL    Protein, total 6.4 6.0 - 8.5 g/dL    Albumin 4.4 3.6 - 4.8 g/dL    GLOBULIN, TOTAL 2.0 1.5 - 4.5 g/dL    A-G Ratio 2.2 1.2 - 2.2    Bilirubin, total 0.4 0.0 - 1.2 mg/dL    Alk. phosphatase 85 39 - 117 IU/L    AST (SGOT) 17 0 - 40 IU/L    ALT (SGPT) 12 0 - 32 IU/L   CBC WITH AUTOMATED DIFF   Result Value Ref Range    WBC 3.8 3.4 - 10.8 x10E3/uL    RBC 4.41 3.77 - 5.28 x10E6/uL    HGB 13.1 11.1 - 15.9 g/dL    HCT 39.2 34.0 - 46.6 %    MCV 89 79 - 97 fL    MCH 29.7 26.6 - 33.0 pg    MCHC 33.4 31.5 - 35.7 g/dL    RDW 14.7 12.3 - 15.4 %    PLATELET 510 276 - 063 x10E3/uL    NEUTROPHILS 54 %    Lymphocytes 35 %    MONOCYTES 9 %    EOSINOPHILS 2 %    BASOPHILS 0 %    ABS.  NEUTROPHILS 2.0 1.4 - 7.0 x10E3/uL    Abs Lymphocytes 1.3 0.7 - 3.1 x10E3/uL ABS. MONOCYTES 0.4 0.1 - 0.9 x10E3/uL    ABS. EOSINOPHILS 0.1 0.0 - 0.4 x10E3/uL    ABS. BASOPHILS 0.0 0.0 - 0.2 x10E3/uL    IMMATURE GRANULOCYTES 0 %    ABS. IMM. GRANS. 0.0 0.0 - 0.1 x10E3/uL   HEMOGLOBIN A1C WITH EAG   Result Value Ref Range    Hemoglobin A1c 5.8 (H) 4.8 - 5.6 %    Estimated average glucose 120 mg/dL   TSH AND FREE T4   Result Value Ref Range    TSH 0.961 0.450 - 4.500 uIU/mL    T4, Free 0.99 0.82 - 1.77 ng/dL   LIPID PANEL   Result Value Ref Range    Cholesterol, total 215 (H) 100 - 199 mg/dL    Triglyceride 72 0 - 149 mg/dL    HDL Cholesterol 65 >39 mg/dL    VLDL, calculated 14 5 - 40 mg/dL    LDL, calculated 136 (H) 0 - 99 mg/dL   VITAMIN D, 25 HYDROXY   Result Value Ref Range    VITAMIN D, 25-HYDROXY 23.0 (L) 30.0 - 100.0 ng/mL   CVD REPORT   Result Value Ref Range    INTERPRETATION Note    AMB POC URINALYSIS DIP STICK AUTO W/ MICRO   Result Value Ref Range    Color (UA POC) Yellow     Clarity (UA POC) Clear     Glucose (UA POC) Negative Negative    Bilirubin (UA POC) Negative Negative    Ketones (UA POC) Negative Negative    Specific gravity (UA POC) 1.020 1.001 - 1.035    Blood (UA POC) Negative Negative    pH (UA POC) 7.0 4.6 - 8.0    Protein (UA POC) Negative Negative mg/dL    Urobilinogen (UA POC) 0.2 mg/dL 0.2 - 1    Nitrites (UA POC) Negative Negative    Leukocyte esterase (UA POC) Negative Negative         ASSESSMENT AND PLAN    ICD-10-CM ICD-9-CM    1. Hydronephrosis, unspecified hydronephrosis type N13.30 591    2. Kidney stones N20.0 592.0    3. Gastroesophageal reflux disease without esophagitis K21.9 530.81    4. Tinnitus, left ear H93.12 388.30 REFERRAL TO ENT-OTOLARYNGOLOGY   5. Screening for glaucoma Z13.5 V80.1 REFERRAL TO OPHTHALMOLOGY   6. Dislocation of finger, sequela S63.259S 905.6 REFERRAL TO PLASTIC SURGERY       Brian Torres was seen today for results and other. I reviewed results of labs and CT abd pelvis.     Diagnoses and all orders for this visit:    Hydronephrosis, unspecified hydronephrosis type  Reprinted referral to Urology. Kidney stones  Advised she drink plenty of water. Gastroesophageal reflux disease without esophagitis  Controlled. Take occasional Rolaids or Tums. Tinnitus, left ear  -     REFERRAL TO ENT-OTOLARYNGOLOGY    Screening for glaucoma  -     REFERRAL TO OPHTHALMOLOGY    Dislocation of finger, sequela  -     REFERRAL TO PLASTIC SURGERY      Follow-up Disposition:  Return if symptoms worsen or fail to improve. Provided patient and/or family with advanced directive information and answered pertinent questions. Encouraged patient to provide a copy of advanced directive to the office when available. I have discussed the diagnosis with the patient and the intended plan as seen in the above orders. Patient is in agreement. The patient has received an after-visit summary and questions were answered concerning future plans. I have discussed medication side effects and warnings with the patient as well.

## 2017-06-09 NOTE — PROGRESS NOTES
Reviewed record  In preparation for visit and have obtained necessary documentation. 1. Have you been to the ER, urgent care clinic since your last visit? Hospitalized since your last visit?no  2. Have you seen or consulted any other health care providers outside of the [address] [place] [address] since your last visit? Include any pap smears or colon screening. No  Patient has been given information on advanced directives at a previous visit.

## 2017-06-09 NOTE — TELEPHONE ENCOUNTER
She was given referral to Dr Myers/contact information at the [address] [place] [address] Rd today.

## 2017-08-23 ENCOUNTER — TELEPHONE (OUTPATIENT)
Dept: INTERNAL MEDICINE CLINIC | Age: 72
End: 2017-08-23

## 2017-08-23 DIAGNOSIS — Z51.81 MEDICATION MONITORING ENCOUNTER: Primary | ICD-10-CM

## 2017-08-23 NOTE — TELEPHONE ENCOUNTER
Pt states taking a mood stabilizer (topomax) and was advised to check with her PCP to have a blood test done because a side effect is the possibility of acid building up in the blood  Would like return call from nurse to further discuss and see if needs to be seen in reference to this issue  May be reached at [phone]

## 2017-08-23 NOTE — TELEPHONE ENCOUNTER
We typically do not check topiramate levels, but Dr. Bertha Kinsey can check hers to see if it is high. She should schedule a time to come in for this non-fasting lab.

## 2017-08-23 NOTE — TELEPHONE ENCOUNTER
Patient has been on Topamax over 8 months. Patient read some information that her levels need to be checked. Patient reports dr Niya Mobley sp? Started this medication. Patient has developed kidney stones and one of the side effects from Topamax is it causes kidney stones.

## 2017-08-28 ENCOUNTER — HOSPITAL ENCOUNTER (OUTPATIENT)
Dept: LAB | Age: 72
Discharge: HOME | End: 2017-08-28
Payer: MEDICARE

## 2017-08-28 ENCOUNTER — APPOINTMENT (OUTPATIENT)
Dept: INTERNAL MEDICINE CLINIC | Age: 72
End: 2017-08-28

## 2017-08-28 DIAGNOSIS — Z51.81 MEDICATION MONITORING ENCOUNTER: ICD-10-CM

## 2017-08-28 PROCEDURE — 36415 COLL VENOUS BLD VENIPUNCTURE: CPT

## 2017-08-28 PROCEDURE — 80201 ASSAY OF TOPIRAMATE: CPT

## 2017-08-28 NOTE — LETTER
9/21/2017 2:04 PM 
 
[facility] [id number] Dear Amador Hansened: 
 
Please find your most recent results below. Resulted Orders TOPIRAMATE Result Value Ref Range Topiramate 3.6 2.0 - 25.0 ug/mL Comment:  
                                   Detection Limit = 1.0 Narrative Performed at:  [address]  [id number] : Zayda Andrade MD, Phone:  [phone] RECOMMENDATIONS: 
Your topiramate (Topamax) level was within normal range. Dr. Zack Hernandez saw that you were seen in the ED on 9/7/17 for depression. Schedule an appointment with your psychiatrist, Dr. Carlitos Ricketts ([facility]), or with the psychiatry group in [place] (you were given the phone number in your ED paperwork) as soon as possible. Please call me if you have any questions: [phone] Sincerely, [facility]

## 2017-08-29 LAB — TOPIRAMATE SERPL-MCNC: 3.6 UG/ML (ref 2–25)

## 2017-09-07 ENCOUNTER — HOSPITAL ENCOUNTER (EMERGENCY)
Age: 72
Discharge: HOME | End: 2017-09-08
Attending: EMERGENCY MEDICINE | Admitting: EMERGENCY MEDICINE
Payer: MEDICARE

## 2017-09-07 VITALS
BODY MASS INDEX: 20.08 KG/M2 | RESPIRATION RATE: 16 BRPM | SYSTOLIC BLOOD PRESSURE: 142 MMHG | HEART RATE: 66 BPM | HEIGHT: 60 IN | OXYGEN SATURATION: 100 % | TEMPERATURE: 97.6 F | WEIGHT: 102.29 LBS | DIASTOLIC BLOOD PRESSURE: 71 MMHG

## 2017-09-07 DIAGNOSIS — F32.A DEPRESSION, UNSPECIFIED DEPRESSION TYPE: Primary | ICD-10-CM

## 2017-09-07 DIAGNOSIS — F41.1 ANXIETY STATE: ICD-10-CM

## 2017-09-07 DIAGNOSIS — N39.0 URINARY TRACT INFECTION WITHOUT HEMATURIA, SITE UNSPECIFIED: ICD-10-CM

## 2017-09-07 DIAGNOSIS — Z00.8 MEDICAL CLEARANCE FOR PSYCHIATRIC ADMISSION: ICD-10-CM

## 2017-09-07 DIAGNOSIS — R45.851 SUICIDAL THOUGHTS: ICD-10-CM

## 2017-09-07 LAB
ALBUMIN SERPL-MCNC: 4.2 G/DL (ref 3.5–5)
ALBUMIN/GLOB SERPL: 1.4 {RATIO} (ref 1.1–2.2)
ALP SERPL-CCNC: 97 U/L (ref 45–117)
ALT SERPL-CCNC: 23 U/L (ref 12–78)
AMPHET UR QL SCN: NEGATIVE
ANION GAP SERPL CALC-SCNC: 7 MMOL/L (ref 5–15)
APAP SERPL-MCNC: <2 UG/ML (ref 10–30)
APPEARANCE UR: ABNORMAL
AST SERPL-CCNC: 16 U/L (ref 15–37)
BACTERIA URNS QL MICRO: ABNORMAL /HPF
BARBITURATES UR QL SCN: NEGATIVE
BENZODIAZ UR QL: NEGATIVE
BILIRUB SERPL-MCNC: 0.3 MG/DL (ref 0.2–1)
BILIRUB UR QL: NEGATIVE
BUN SERPL-MCNC: 20 MG/DL (ref 6–20)
BUN/CREAT SERPL: 20 (ref 12–20)
CALCIUM SERPL-MCNC: 8.7 MG/DL (ref 8.5–10.1)
CANNABINOIDS UR QL SCN: NEGATIVE
CAOX CRY URNS QL MICRO: ABNORMAL
CHLORIDE SERPL-SCNC: 107 MMOL/L (ref 97–108)
CO2 SERPL-SCNC: 24 MMOL/L (ref 21–32)
COCAINE UR QL SCN: NEGATIVE
COLOR UR: ABNORMAL
CREAT SERPL-MCNC: 1 MG/DL (ref 0.55–1.02)
DRUG SCRN COMMENT,DRGCM: NORMAL
EPITH CASTS URNS QL MICRO: ABNORMAL /LPF
ERYTHROCYTE [DISTWIDTH] IN BLOOD BY AUTOMATED COUNT: 13.1 % (ref 11.5–14.5)
ETHANOL SERPL-MCNC: <10 MG/DL
GLOBULIN SER CALC-MCNC: 3 G/DL (ref 2–4)
GLUCOSE SERPL-MCNC: 103 MG/DL (ref 65–100)
GLUCOSE UR STRIP.AUTO-MCNC: NEGATIVE MG/DL
HCT VFR BLD AUTO: 38.8 % (ref 35–47)
HGB BLD-MCNC: 12.9 G/DL (ref 11.5–16)
HGB UR QL STRIP: NEGATIVE
KETONES UR QL STRIP.AUTO: NEGATIVE MG/DL
LEUKOCYTE ESTERASE UR QL STRIP.AUTO: ABNORMAL
MCH RBC QN AUTO: 29.9 PG (ref 26–34)
MCHC RBC AUTO-ENTMCNC: 33.2 G/DL (ref 30–36.5)
MCV RBC AUTO: 90 FL (ref 80–99)
METHADONE UR QL: NEGATIVE
MUCOUS THREADS URNS QL MICRO: ABNORMAL /LPF
NITRITE UR QL STRIP.AUTO: NEGATIVE
OPIATES UR QL: NEGATIVE
PCP UR QL: NEGATIVE
PH UR STRIP: 6 [PH] (ref 5–8)
PLATELET # BLD AUTO: 220 K/UL (ref 150–400)
POTASSIUM SERPL-SCNC: 3.7 MMOL/L (ref 3.5–5.1)
PROT SERPL-MCNC: 7.2 G/DL (ref 6.4–8.2)
PROT UR STRIP-MCNC: ABNORMAL MG/DL
RBC # BLD AUTO: 4.31 M/UL (ref 3.8–5.2)
RBC #/AREA URNS HPF: ABNORMAL /HPF (ref 0–5)
SALICYLATES SERPL-MCNC: <1.7 MG/DL (ref 2.8–20)
SODIUM SERPL-SCNC: 138 MMOL/L (ref 136–145)
SP GR UR REFRACTOMETRY: 1.02 (ref 1–1.03)
UA: UC IF INDICATED,UAUC: ABNORMAL
UROBILINOGEN UR QL STRIP.AUTO: 0.2 EU/DL (ref 0.2–1)
WBC # BLD AUTO: 5.3 K/UL (ref 3.6–11)
WBC URNS QL MICRO: ABNORMAL /HPF (ref 0–4)

## 2017-09-07 PROCEDURE — 99283 EMERGENCY DEPT VISIT LOW MDM: CPT

## 2017-09-07 PROCEDURE — 80307 DRUG TEST PRSMV CHEM ANLYZR: CPT | Performed by: EMERGENCY MEDICINE

## 2017-09-07 PROCEDURE — 80053 COMPREHEN METABOLIC PANEL: CPT | Performed by: EMERGENCY MEDICINE

## 2017-09-07 PROCEDURE — 81001 URINALYSIS AUTO W/SCOPE: CPT | Performed by: EMERGENCY MEDICINE

## 2017-09-07 PROCEDURE — 85027 COMPLETE CBC AUTOMATED: CPT | Performed by: EMERGENCY MEDICINE

## 2017-09-07 PROCEDURE — 87086 URINE CULTURE/COLONY COUNT: CPT | Performed by: EMERGENCY MEDICINE

## 2017-09-07 PROCEDURE — 36415 COLL VENOUS BLD VENIPUNCTURE: CPT | Performed by: EMERGENCY MEDICINE

## 2017-09-08 PROCEDURE — 74011250637 HC RX REV CODE- 250/637: Performed by: EMERGENCY MEDICINE

## 2017-09-08 RX ORDER — CEPHALEXIN 500 MG/1
500 CAPSULE ORAL 3 TIMES DAILY
Qty: 21 CAP | Refills: 0 | Status: SHIPPED | OUTPATIENT
Start: 2017-09-08 | End: 2018-02-20 | Stop reason: ALTCHOICE

## 2017-09-08 RX ORDER — CEPHALEXIN 250 MG/1
500 CAPSULE ORAL
Status: COMPLETED | OUTPATIENT
Start: 2017-09-08 | End: 2017-09-08

## 2017-09-08 RX ADMIN — CEPHALEXIN 500 MG: 250 CAPSULE ORAL at 00:47

## 2017-09-08 NOTE — ED PROVIDER NOTES
HPI Comments: Blanca Kinsey is a 81 y.o. female with history significant for arthritis and MRSA presenting ambulatory to [facility] ED with need for medical clearance for admission into [facility]. Per pt and family, the pt has a history of suicidal ideations and depression which has been progressively worsening over the course of the past several months. Pt states she has attempted suicide in the past when she was in high school with the use of pills; pt notes she has since not attempted. Pt informs if she were to attempt again, she would use pills. Per family, pt is on Prozac and was recently placed on Topamax after which her condition seems to be worsening. Family informs that the pt will \"literally want to chop her head off at times\" or will \"claw and climb at the walls\" in her home. Family notes pt does not like to be home. Pt states her home is very isolated, \"dead and quiet\". Family notes pt has been accepted to [facility] pending medical clearance. Of note, family reports that the pt is not on \"good terms\" with her psychiatrist or counselor. Pt specifically denies any current suicidal ideations. Pt additionally specifically denies any nausea, vomiting, fevers, chills, abdominal pain, chest pain, or SOB. PCP: Quoc Britton MD    PSHx: hysterectomy   Social Hx: - EtOH; - Smoker; - Illicit Drugs    There are no other changes, complaints or physical findings at this time. Written by SIDDHARTHA Harrison, as dictated by Kenia Acuna MD.     The history is provided by the patient and a relative.       Past Medical History:   Diagnosis Date    Arthritis     Osteoarthritis    Excessive sweating     Occurs in summers; TSH/CMP/CBC normal    MRSA (methicillin resistant Staphylococcus aureus) infection     MRSA + abd abscess; nasal swab negative after treatment    Unspecified adverse effect of anesthesia     EYES WERE NOT TAPED CLOSED DURING -EYES VERY PAINFUL AFTER SURGERY FOR 3 DAYS     Past Surgical History:   Procedure Laterality Date    BREAST SURGERY PROCEDURE UNLISTED Bilateral 1974    AUGMENTATION    HX  SECTION      HX HYSTERECTOMY      Age 29 for endometriosis    IMPLANT BREAST SILICONE/EQ Bilateral 5827    1st set was 43 yrs ago, replaced in 2014.  REVISE BREAST RECONSTRUCTION  2014    Bilateral breast implant exchange due to  cosmetic defect of previous implants     Family History:   Problem Relation Age of Onset    Diabetes Father     Cancer Father      PROSTATE CANCER    Arthritis-osteo Mother     Arthritis-osteo Sister     Anesth Problems Neg Hx      Social History     Social History    Marital status:      Spouse name: N/A    Number of children: N/A    Years of education: N/A     Occupational History    Not on file. Social History Main Topics    Smoking status: Never Smoker    Smokeless tobacco: Not on file    Alcohol use No    Drug use: No    Sexual activity: Not on file     Other Topics Concern    Not on file     Social History Narrative     ALLERGIES: Review of patient's allergies indicates no known allergies. Review of Systems   Constitutional: Negative for activity change, appetite change, fatigue and fever. HENT: Negative. Negative for congestion, rhinorrhea and sore throat. Respiratory: Negative. Negative for cough, shortness of breath and wheezing. Cardiovascular: Negative. Negative for chest pain and leg swelling. Gastrointestinal: Negative. Negative for abdominal distention, abdominal pain, constipation, diarrhea, nausea and vomiting. Endocrine: Negative. Genitourinary: Negative for difficulty urinating, dysuria, menstrual problem, vaginal bleeding and vaginal discharge. Musculoskeletal: Negative. Negative for arthralgias, joint swelling and myalgias. Skin: Negative. Negative for rash. Neurological: Negative. Negative for dizziness, weakness, light-headedness and headaches. Psychiatric/Behavioral: Positive for dysphoric mood (depression). Negative for self-injury and suicidal ideas. Vitals:    09/07/17 2153   BP: 142/71   Pulse: 66   Resp: 16   Temp: 97.6 °F (36.4 °C)   SpO2: 100%   Weight: 46.4 kg (102 lb 4.7 oz)   Height: 5' (1.524 m)          Physical Exam   Constitutional: She is oriented to person, place, and time. Borderline cachectic    HENT:   Head: Atraumatic. Eyes: EOM are normal.   Cardiovascular: Normal rate, regular rhythm, normal heart sounds and intact distal pulses. Exam reveals no gallop and no friction rub. No murmur heard. Pulmonary/Chest: Effort normal and breath sounds normal. No respiratory distress. She has no wheezes. She has no rales. She exhibits no tenderness. Abdominal: Soft. Bowel sounds are normal. She exhibits no distension and no mass. There is no tenderness. There is no rebound and no guarding. Musculoskeletal: Normal range of motion. She exhibits no edema or tenderness. Neurological: She is oriented to person, place, and time. Skin: Skin is warm. Psychiatric:   Maintains eye contact but overall flat affect with acute change in demeanor with mentioning of returning home. Pt then became agitated and curled in a fetal position. Nursing note and vitals reviewed. MDM  Number of Diagnoses or Management Options  Diagnosis management comments: Acute on chronic depression reportedly here for medical clearance for [facility]. Will attempt to contact them for their medical clearance routine confirmation and assess necessary labs. Offered psychiatric evaluation for admission which was declined by family. Given pt not with current SI and plan, am comfortable with plan for admission into [facility].         Amount and/or Complexity of Data Reviewed  Clinical lab tests: reviewed and ordered  Obtain history from someone other than the patient: yes (Family)  Review and summarize past medical records: yes    Patient Progress  Patient progress: stable    ED Course     Procedures     Progress Note:   11:05 PM  [address] to confirm medical clearance testing required by their facility. Spoke to admission representative who stated they required a CBC, UA, UDS, and vital sign documentation. Informs there is no need for a pregnancy screen given pt's age. Admission #: [phone]; Fax #: [phone]. Written by Sophie Roberts, ED Scribe, as dictated by Melisa Sharma MD.     Progress Note:   12:26 AM  [address] to informs them of faxed medical results. Confirmed received results. Maciej Ibrahim states he will confirm acceptance with their MD and call back ASAP with an update. Written by Sophie Roberts ED Scribe, as dictated by Melisa Sharma MD.     Progress Note:   12:40 AM  Updated pt and family on correspondence with Maciej Ibrahim and waiting on MD acceptance. Written by Sophie Roberts ED Scribe, as dictated by Melisa Sharma MD.     Progress Note:   1:15 AM  Maciej Ibrahim called back to informs that pt was being accepted by Dr. Mady Shepherd. Report number [id number]. Written by Sophie Roberts ED Scribe, as dictated by Melisa Sharma MD.     Progress Note:   1:23 AM  Family updated on acceptance to [facility] and subsequent discharge.   Written by Sophie Roberts ED Scribe, as dictated by Melisa Sharma MD.     LABORATORY TESTS:  Recent Results (from the past 12 hour(s))   CBC W/O DIFF    Collection Time: 09/07/17 10:46 PM   Result Value Ref Range    WBC 5.3 3.6 - 11.0 K/uL    RBC 4.31 3.80 - 5.20 M/uL    HGB 12.9 11.5 - 16.0 g/dL    HCT 38.8 35.0 - 47.0 %    MCV 90.0 80.0 - 99.0 FL    MCH 29.9 26.0 - 34.0 PG    MCHC 33.2 30.0 - 36.5 g/dL    RDW 13.1 11.5 - 14.5 %    PLATELET 073 646 - 391 K/uL   METABOLIC PANEL, COMPREHENSIVE    Collection Time: 09/07/17 10:46 PM   Result Value Ref Range    Sodium 138 136 - 145 mmol/L    Potassium 3.7 3.5 - 5.1 mmol/L    Chloride 107 97 - 108 mmol/L    CO2 24 21 - 32 mmol/L    Anion gap 7 5 - 15 mmol/L    Glucose 103 (H) 65 - 100 mg/dL    BUN 20 6 - 20 MG/DL    Creatinine 1.00 0.55 - 1.02 MG/DL    BUN/Creatinine ratio 20 12 - 20      GFR est AA >60 >60 ml/min/1.73m2    GFR est non-AA 55 (L) >60 ml/min/1.73m2    Calcium 8.7 8.5 - 10.1 MG/DL    Bilirubin, total 0.3 0.2 - 1.0 MG/DL    ALT (SGPT) 23 12 - 78 U/L    AST (SGOT) 16 15 - 37 U/L    Alk.  phosphatase 97 45 - 117 U/L    Protein, total 7.2 6.4 - 8.2 g/dL    Albumin 4.2 3.5 - 5.0 g/dL    Globulin 3.0 2.0 - 4.0 g/dL    A-G Ratio 1.4 1.1 - 2.2     ACETAMINOPHEN    Collection Time: 09/07/17 10:46 PM   Result Value Ref Range    Acetaminophen level <2 (L) 10 - 30 ug/mL   SALICYLATE    Collection Time: 09/07/17 10:46 PM   Result Value Ref Range    SALICYLATE <8.0 (L) 2.8 - 20.0 MG/DL   ETHYL ALCOHOL    Collection Time: 09/07/17 10:46 PM   Result Value Ref Range    ALCOHOL(ETHYL),SERUM <10 <10 MG/DL   URINALYSIS W/ REFLEX CULTURE    Collection Time: 09/07/17 10:48 PM   Result Value Ref Range    Color YELLOW/STRAW      Appearance CLOUDY (A) CLEAR      Specific gravity 1.024 1.003 - 1.030      pH (UA) 6.0 5.0 - 8.0      Protein TRACE (A) NEG mg/dL    Glucose NEGATIVE  NEG mg/dL    Ketone NEGATIVE  NEG mg/dL    Bilirubin NEGATIVE  NEG      Blood NEGATIVE  NEG      Urobilinogen 0.2 0.2 - 1.0 EU/dL    Nitrites NEGATIVE  NEG      Leukocyte Esterase LARGE (A) NEG      WBC 10-20 0 - 4 /hpf    RBC 0-5 0 - 5 /hpf    Epithelial cells FEW FEW /lpf    Bacteria 2+ (A) NEG /hpf    UA:UC IF INDICATED URINE CULTURE ORDERED (A) CNI      Mucus 2+ (A) NEG /lpf    CA Oxalate crystals 3+ (A) NEG   DRUG SCREEN, URINE    Collection Time: 09/07/17 10:48 PM   Result Value Ref Range    AMPHETAMINES NEGATIVE  NEG      BARBITURATES NEGATIVE  NEG      BENZODIAZEPINE NEGATIVE  NEG      COCAINE NEGATIVE  NEG      METHADONE NEGATIVE  NEG      OPIATES NEGATIVE  NEG      PCP(PHENCYCLIDINE) NEGATIVE  NEG      THC (TH-CANNABINOL) NEGATIVE  NEG      Drug screen comment (NOTE) MEDICATIONS GIVEN:  Medications   cephALEXin (KEFLEX) capsule 500 mg (500 mg Oral Given 9/8/17 0047)     IMPRESSION:  1. Depression, unspecified depression type    2. Suicidal thoughts    3. Anxiety state    4. Medical clearance for psychiatric admission    5. Urinary tract infection without hematuria, site unspecified      PLAN:  1. Current Discharge Medication List      START taking these medications    Details   cephALEXin (KEFLEX) 500 mg capsule Take 1 Cap by mouth three (3) times daily. Qty: 21 Cap, Refills: 0           2. Follow-up Information     Follow up With Details Comments Contact Info    [facility] AT [address] [place] [address] Today as scheduled for admission [address] [place] [address]  [phone]    [facility] EMERGENCY DEPT  As needed, If symptoms worsen [address] [place] [address]  [phone]        Return to ED if worse     DISCHARGE NOTE:    1:25 AM  The patient is ready for discharge. The patient signs, symptoms, diagnosis, and discharge instructions have been discussed and the patient has conveyed their understanding. The patient is to follow-up as reccommended or returned to the ER should their symptoms worsen. Plan has been discussed and patient is in agreement. This note is prepared by Eddie Machado acting as Scribe for Donald Morales MD.    Donald Morales MD: This scribe's documentation has been prepared under my direction and personally reviewed by me in its entirety. I confirm that the note above accurately reflects all work, treatment procedures and medical decision makings performed by me.

## 2017-09-08 NOTE — ED NOTES
Pt eval. Complete. Pt discharged and medically cleared by Dr. Jacob Smith and is going to [facility] via private vehicle. Report called to Bill Rodriguez RN at [facility].

## 2017-09-08 NOTE — DISCHARGE INSTRUCTIONS
Depression and Chronic Disease: Care Instructions  Your Care Instructions  A chronic disease is one that you have for a long time. Some chronic diseases can be controlled, but they usually cannot be cured. Depression is common in people with chronic diseases, but it often goes unnoticed. Many people have concerns about seeking treatment for a mental health problem. You may think it's a sign of weakness, or you don't want people to know about it. It's important to overcome these reasons for not seeking treatment. Treating depression or anxiety is good for your health. Follow-up care is a key part of your treatment and safety. Be sure to make and go to all appointments, and call your doctor if you are having problems. It's also a good idea to know your test results and keep a list of the medicines you take. How can you care for yourself at home? Watch for symptoms of depression  The symptoms of depression are often subtle at first. You may think they are caused by your disease rather than depression. Or you may think it is normal to be depressed when you have a chronic disease. If you are depressed you may:  · Feel sad or hopeless. · Feel guilty or worthless. · Not enjoy the things you used to enjoy. · Feel hopeless, as though life is not worth living. · Have trouble thinking or remembering. · Have low energy, and you may not eat or sleep well. · Pull away from others. · Think often about death or killing yourself. (Keep the numbers for these national suicide hotlines: 6-652-091-TALK [1-333.704.3343] and 4-293-LIRXTFW [1-100.871.7921]. )  Get treatment  By treating your depression, you can feel more hopeful and have more energy. If you feel better, you may take better care of yourself, so your health may improve. · Talk to your doctor if you have any changes in mood during treatment for your disease. · Ask your doctor for help.  Counseling, antidepressant medicine, or a combination of the two can help most people with depression. Often a combination works best. Counseling can also help you cope with having a chronic disease. When should you call for help? Call 911 anytime you think you may need emergency care. For example, call if:  · You feel like hurting yourself or someone else. · Someone you know has depression and is about to attempt or is attempting suicide. Call your doctor now or seek immediate medical care if:  · You hear voices. · Someone you know has depression and:  ¨ Starts to give away his or her possessions. ¨ Uses illegal drugs or drinks alcohol heavily. ¨ Talks or writes about death, including writing suicide notes or talking about guns, knives, or pills. ¨ Starts to spend a lot of time alone. ¨ Acts very aggressively or suddenly appears calm. Watch closely for changes in your health, and be sure to contact your doctor if:  · You do not get better as expected. Where can you learn more? Go to http://michael-carmela.info/. Enter G416 in the search box to learn more about \"Depression and Chronic Disease: Care Instructions. \"  Current as of: July 26, 2016  Content Version: 11.3  © 3251-8099 [employer]. Care instructions adapted under license by [employer] (which disclaims liability or warranty for this information). If you have questions about a medical condition or this instruction, always ask your healthcare professional. [address] any warranty or liability for your use of this information.

## 2017-09-09 LAB
BACTERIA SPEC CULT: NORMAL
CC UR VC: NORMAL
SERVICE CMNT-IMP: NORMAL

## 2017-09-11 NOTE — PROGRESS NOTES
Your topiramate (Topamax) level was within normal range. Dr. Soham Cabrera saw that you were seen in the ED on 9/7/17 for depression. Schedule an appointment with your psychiatrist, Dr. Liliana Diallo ([facility]), or with the psychiatry group in [facility] (you were given the phone number in your ED paperwork) as soon as possible.

## 2017-09-12 NOTE — PROGRESS NOTES
Attempted call to patient and received message that \"the number you have dialed is unallocated\". Will attempt latter.

## 2017-12-04 ENCOUNTER — HOSPITAL ENCOUNTER (OUTPATIENT)
Dept: MAMMOGRAPHY | Age: 72
Discharge: HOME | End: 2017-12-04
Attending: INTERNAL MEDICINE
Payer: MEDICARE

## 2017-12-04 DIAGNOSIS — Z12.31 VISIT FOR SCREENING MAMMOGRAM: ICD-10-CM

## 2017-12-04 PROCEDURE — 77063 BREAST TOMOSYNTHESIS BI: CPT

## 2018-02-20 ENCOUNTER — HOSPITAL ENCOUNTER (OUTPATIENT)
Dept: LAB | Age: 73
Discharge: HOME | End: 2018-02-20
Payer: MEDICARE

## 2018-02-20 ENCOUNTER — OFFICE VISIT (OUTPATIENT)
Dept: INTERNAL MEDICINE CLINIC | Age: 73
End: 2018-02-20

## 2018-02-20 VITALS
OXYGEN SATURATION: 98 % | RESPIRATION RATE: 16 BRPM | HEART RATE: 72 BPM | TEMPERATURE: 98.5 F | SYSTOLIC BLOOD PRESSURE: 111 MMHG | DIASTOLIC BLOOD PRESSURE: 70 MMHG | HEIGHT: 60 IN | BODY MASS INDEX: 23.56 KG/M2 | WEIGHT: 120 LBS

## 2018-02-20 DIAGNOSIS — Z13.5 SCREENING FOR GLAUCOMA: ICD-10-CM

## 2018-02-20 DIAGNOSIS — E55.9 VITAMIN D DEFICIENCY: ICD-10-CM

## 2018-02-20 DIAGNOSIS — Z71.89 ADVANCE CARE PLANNING: ICD-10-CM

## 2018-02-20 DIAGNOSIS — R73.01 IFG (IMPAIRED FASTING GLUCOSE): ICD-10-CM

## 2018-02-20 DIAGNOSIS — K21.9 GASTROESOPHAGEAL REFLUX DISEASE, ESOPHAGITIS PRESENCE NOT SPECIFIED: ICD-10-CM

## 2018-02-20 DIAGNOSIS — Z00.00 MEDICARE ANNUAL WELLNESS VISIT, SUBSEQUENT: Primary | ICD-10-CM

## 2018-02-20 DIAGNOSIS — F41.9 ANXIETY DISORDER, UNSPECIFIED TYPE: ICD-10-CM

## 2018-02-20 DIAGNOSIS — F33.1 MODERATE EPISODE OF RECURRENT MAJOR DEPRESSIVE DISORDER (HCC): ICD-10-CM

## 2018-02-20 DIAGNOSIS — Z12.89 ENCOUNTER FOR PELVIC SCREENING FOR CANCER: ICD-10-CM

## 2018-02-20 DIAGNOSIS — Z13.220 SCREENING, LIPID: ICD-10-CM

## 2018-02-20 PROBLEM — R05.9 COUGH: Status: RESOLVED | Noted: 2017-03-17 | Resolved: 2018-02-20

## 2018-02-20 PROCEDURE — 83036 HEMOGLOBIN GLYCOSYLATED A1C: CPT

## 2018-02-20 PROCEDURE — 36415 COLL VENOUS BLD VENIPUNCTURE: CPT

## 2018-02-20 PROCEDURE — 80053 COMPREHEN METABOLIC PANEL: CPT

## 2018-02-20 PROCEDURE — 84443 ASSAY THYROID STIM HORMONE: CPT

## 2018-02-20 PROCEDURE — 80061 LIPID PANEL: CPT

## 2018-02-20 PROCEDURE — 82306 VITAMIN D 25 HYDROXY: CPT

## 2018-02-20 PROCEDURE — 85025 COMPLETE CBC W/AUTO DIFF WBC: CPT

## 2018-02-20 RX ORDER — ESCITALOPRAM OXALATE 10 MG/1
10 TABLET ORAL DAILY
Status: ON HOLD | COMMUNITY
Start: 2018-02-09 | End: 2018-05-10

## 2018-02-20 RX ORDER — OXCARBAZEPINE 600 MG/1
600 TABLET, FILM COATED ORAL 2 TIMES DAILY
Status: ON HOLD | COMMUNITY
Start: 2018-02-09 | End: 2018-05-10

## 2018-02-20 NOTE — PATIENT INSTRUCTIONS
Schedule of Personalized Health Plan  (Provide Copy to Patient)  The best way to stay healthy is to live a healthy lifestyle. A healthy lifestyle includes regular exercise, eating a well-balanced diet, keeping a healthy weight and not smoking. Regular physical exams and screening tests are another important way to take care of yourself. Preventive exams provided by health care providers can find health problems early when treatment works best and can keep you from getting certain diseases or illnesses. Preventive services include exams, lab tests, screenings, shots, monitoring and information to help you take care of your own health. All people over 65 should have a pneumonia shot. Pneumonia shots are usually only needed once in a lifetime unless your doctor decides differently. All people over 65 should have a yearly flu shot. People over 65 are at medium to high risk for Hepatitis B. Three shots are needed for complete protection. In addition to your physical exam, some screening tests are recommended:    Bone mass measurement (dexa scan) is recommended every two years  Diabetes Mellitus screening is recommended every year. Glaucoma is an eye disease caused by high pressure in the eye. An eye exam is recommended every year. Cardiovascular screening tests that check your cholesterol and other blood fat (lipid) levels are recommended every five years. Colorectal Cancer screening tests help to find pre-cancerous polyps (growths in the colon) so they can be removed before they turn into cancer. Tests ordered for screening depend on your personal and family history risk factors.     Screening for Breast Cancer is recommended yearly with a mammogram.    Screening for Cervical Cancer is recommended every two years (annually for certain risk factors, such as previous history of STD or abnormal PAP in past 7 years), with a Pelvic Exam with PAP    Here is a list of your current Health Maintenance items with a due date:  Health Maintenance   Topic Date Due    GLAUCOMA SCREENING Q2Y  06/14/2012    MEDICARE YEARLY EXAM  02/18/2018    BREAST CANCER SCRN MAMMOGRAM  12/04/2019    COLONOSCOPY  03/31/2025    DTaP/Tdap/Td series (2 - Td) 07/22/2025    Hepatitis C Screening  Completed    OSTEOPOROSIS SCREENING (DEXA)  Completed    ZOSTER VACCINE AGE 60>  Addressed    Pneumococcal 65+ Low/Medium Risk  Completed    Influenza Age 5 to Adult  Addressed

## 2018-02-20 NOTE — MR AVS SNAPSHOT
[address] [facility] 
 
 
 [address] [facility] [address] [phone] Patient: Raghu Treadwell MRN: [id number] EJY:[id number] Visit Information Date & Time Provider Department Dept. Phone Encounter #  
 2/20/2018 10:10 AM Annalee Siemens, 40 [facility] [phone] [id number] Follow-up Instructions Return in about 6 months (around 8/20/2018), or if symptoms worsen or fail to improve, for GERD, depression. Your Appointments 2/27/2018  2:40 PM  
ESTABLISHED PATIENT with Annalee Siemens, MD  
40 [facility] 3651 [facility]) Appt Note: Annual CPE  
 [address] [facility] [address] [phone]  
  
   
 8 [facility] [address] Upcoming Health Maintenance Date Due  
 GLAUCOMA SCREENING Q2Y 6/14/2012 MEDICARE YEARLY EXAM 2/18/2018 BREAST CANCER SCRN MAMMOGRAM 12/4/2019 COLONOSCOPY 3/31/2025 DTaP/Tdap/Td series (2 - Td) 7/22/2025 Allergies as of 2/20/2018  Review Complete On: 2/20/2018 By: Annalee Siemens, MD  
 No Known Allergies Current Immunizations  Reviewed on 2/17/2017 Name Date Pneumococcal Conjugate (PCV-13) 2/17/2017 Pneumococcal Polysaccharide (PPSV-23) 2/9/2016 Tdap 7/22/2015 Not reviewed this visit You Were Diagnosed With   
  
 Codes Comments Medicare annual wellness visit, subsequent    -  Primary ICD-10-CM: Z00.00 ICD-9-CM: V70.0 Gastroesophageal reflux disease, esophagitis presence not specified     ICD-10-CM: K21.9 ICD-9-CM: 530.81 Moderate episode of recurrent major depressive disorder (HCC)     ICD-10-CM: F33.1 ICD-9-CM: 296.32 Anxiety disorder, unspecified type     ICD-10-CM: F41.9 ICD-9-CM: 300.00 Vitamin D deficiency     ICD-10-CM: E55.9 ICD-9-CM: 268.9 Screening for glaucoma     ICD-10-CM: Z13.5 ICD-9-CM: V80.1 Encounter for pelvic screening for cancer     ICD-10-CM: Z12.89 ICD-9-CM: V76.49 Advance care planning     ICD-10-CM: Z71.89 ICD-9-CM: V65.49 IFG (impaired fasting glucose)     ICD-10-CM: R73.01 
ICD-9-CM: 790.21 Screening, lipid     ICD-10-CM: D32.708 ICD-9-CM: V77.91 Vitals BP Pulse Temp Resp Height(growth percentile) Weight(growth percentile) 111/70 (BP 1 Location: Left arm, BP Patient Position: Sitting) 72 98.5 °F (36.9 °C) (Oral) 16 5' (1.524 m) 120 lb (54.4 kg) SpO2 BMI OB Status Smoking Status 98% 23.44 kg/m2 Hysterectomy Never Smoker Vitals History BMI and BSA Data Body Mass Index Body Surface Area  
 23.44 kg/m 2 1.52 m 2 Preferred Pharmacy Pharmacy Name Phone [facility] [address] [place] [address] Ritesh Og [phone] Your Updated Medication List  
  
   
This list is accurate as of: 2/20/18 10:56 AM.  Always use your most recent med list.  
  
  
  
  
 escitalopram oxalate 10 mg tablet Commonly known as:  Vernal Maryann Take 10 mg by mouth daily. OXcarbaxepine 600 mg tablet Commonly known as:  TRILEPTAL Take 600 mg by mouth two (2) times a day. We Performed the Following CBC WITH AUTOMATED DIFF [77250 CPT(R)] HEMOGLOBIN A1C WITH EAG [56448 CPT(R)] LIPID PANEL [02829 CPT(R)] METABOLIC PANEL, COMPREHENSIVE [06252 CPT(R)] REFERRAL TO GASTROENTEROLOGY [HKN88 Custom] Comments: Worsening GERD symptoms. REFERRAL TO GYNECOLOGY [REF30 Custom] Comments:  
 Routine pelvic exam.  
 REFERRAL TO OPHTHALMOLOGY [REF57 Custom] Comments:  
 Please evaluate patient for glaucoma exam.  
 TSH RFX ON ABNORMAL TO FREE T4 [[id number] Custom] VITAMIN D, 25 HYDROXY [id number] CPT(R)] Follow-up Instructions Return in about 6 months (around 8/20/2018), or if symptoms worsen or fail to improve, for GERD, depression. Referral Information Referral ID Referred By Referred To  
  
 [id number] [facility], [address] 7 [facility], [address] Phone: [phone] Fax: [phone] Visits Status Start Date End Date 1 New Request 2/20/18 2/20/19 If your referral has a status of pending review or denied, additional information will be sent to support the outcome of this decision. Referral ID Referred By Referred To  
 [id number] [facility], SEKOU Alvares MD  
   [facility] 202 [address] [facility], [address] Phone: [phone] Fax: [phone] Visits Status Start Date End Date 1 New Request 2/20/18 2/20/19 If your referral has a status of pending review or denied, additional information will be sent to support the outcome of this decision. Referral ID Referred By Referred To  
 [id number] [facility], SEKOU Nassar MD  
   [address] Phone: [phone] Fax: [phone] Visits Status Start Date End Date 1 New Request 2/20/18 2/20/19 If your referral has a status of pending review or denied, additional information will be sent to support the outcome of this decision. Patient Instructions Schedule of Personalized Health Plan (Provide Copy to Patient) The best way to stay healthy is to live a healthy lifestyle. A healthy lifestyle includes regular exercise, eating a well-balanced diet, keeping a healthy weight and not smoking. Regular physical exams and screening tests are another important way to take care of yourself. Preventive exams provided by health care providers can find health problems early when treatment works best and can keep you from getting certain diseases or illnesses. Preventive services include exams, lab tests, screenings, shots, monitoring and information to help you take care of your own health. All people over 65 should have a pneumonia shot. Pneumonia shots are usually only needed once in a lifetime unless your doctor decides differently. All people over 65 should have a yearly flu shot. People over 65 are at medium to high risk for Hepatitis B. Three shots are needed for complete protection. In addition to your physical exam, some screening tests are recommended: 
 
Bone mass measurement (dexa scan) is recommended every two years Diabetes Mellitus screening is recommended every year. Glaucoma is an eye disease caused by high pressure in the eye. An eye exam is recommended every year. Cardiovascular screening tests that check your cholesterol and other blood fat (lipid) levels are recommended every five years. Colorectal Cancer screening tests help to find pre-cancerous polyps (growths in the colon) so they can be removed before they turn into cancer. Tests ordered for screening depend on your personal and family history risk factors. Screening for Breast Cancer is recommended yearly with a mammogram. 
 
Screening for Cervical Cancer is recommended every two years (annually for certain risk factors, such as previous history of STD or abnormal PAP in past 7 years), with a Pelvic Exam with PAP Here is a list of your current Health Maintenance items with a due date: 
Health Maintenance Topic Date Due  GLAUCOMA SCREENING Q2Y  06/14/2012  MEDICARE YEARLY EXAM  02/18/2018  BREAST CANCER SCRN MAMMOGRAM  12/04/2019  COLONOSCOPY  03/31/2025  DTaP/Tdap/Td series (2 - Td) 07/22/2025  Hepatitis C Screening  Completed  OSTEOPOROSIS SCREENING (DEXA)  Completed  ZOSTER VACCINE AGE 60>  Addressed  Pneumococcal 65+ Low/Medium Risk  Completed  Influenza Age 5 to Adult  Addressed Introducing [facility]! Dear Cassandra Bell: 
Thank you for requesting a [employer] account.   Our records indicate that you have previously registered for a [employer] account but its currently inactive. Please call our [employer] support line at [phone]. Additional Information If you have questions, please visit the Frequently Asked Questions section of the [employer] website at [email or web address] Remember, [employer] is NOT to be used for urgent needs. For medical emergencies, dial 911. Now available from your iPhone and Android! Please provide this summary of care documentation to your next provider. Your primary care clinician is listed as Ivory Orantes. If you have any questions after today's visit, please call [phone].

## 2018-02-20 NOTE — PROGRESS NOTES
Kisha Torres  Identified pt with two pt identifiers(name and ). No chief complaint on file. 1. Have you been to the ER, urgent care clinic since your last visit? Hospitalized since your last visit? NO    2. Have you seen or consulted any other health care providers outside of the [address] [place] [address] since your last visit? Include any pap smears or colon screening.had hand surgery with Dr Trcay Herbert provider has been notified of reason for visit, vitals and flowsheets obtained on patients.      Patient received paperwork for advance directive during previous visit but has not completed at this time     Reviewed record In preparation for visit, huddled with provider and have obtained necessary documentation      Health Maintenance Due   Topic    GLAUCOMA SCREENING Q2Y     Influenza Age 5 to Adult     MEDICARE YEARLY EXAM        Wt Readings from Last 3 Encounters:   18 120 lb (54.4 kg)   17 102 lb 4.7 oz (46.4 kg)   17 110 lb (49.9 kg)     Temp Readings from Last 3 Encounters:   18 98.5 °F (36.9 °C) (Oral)   17 97.6 °F (36.4 °C)   17 98 °F (36.7 °C) (Oral)     BP Readings from Last 3 Encounters:   18 111/70   17 142/71   17 116/62     Pulse Readings from Last 3 Encounters:   18 72   17 66   17 60     Vitals:    18 1027   BP: 111/70   Pulse: 72   Resp: 16   Temp: 98.5 °F (36.9 °C)   TempSrc: Oral   SpO2: 98%   Weight: 120 lb (54.4 kg)   Height: 5' (1.524 m)   PainSc:   0 - No pain         Learning Assessment:  :     Learning Assessment 2015   PRIMARY LEARNER Patient   BARRIERS PRIMARY LEARNER NONE   CO-LEARNER CAREGIVER No   CO-LEARNER HIGHEST LEVEL OF EDUCATION GRADUATED HIGH SCHOOL OR GED   PRIMARY LANGUAGE ENGLISH   LEARNER PREFERENCE PRIMARY LISTENING   ANSWERED BY patient   RELATIONSHIP SELF       Depression Screening:  :     PHQ over the last two weeks 2016   Little interest or pleasure in doing things Not at all   Feeling down, depressed or hopeless Not at all   Total Score PHQ 2 0   Trouble falling or staying asleep, or sleeping too much -   Feeling tired or having little energy -   Poor appetite or overeating -   Feeling bad about yourself - or that you are a failure or have let yourself or your family down -   Trouble concentrating on things such as school, work, reading or watching TV -   Moving or speaking so slowly that other people could have noticed; or the opposite being so fidgety that others notice -   Thoughts of being better off dead, or hurting yourself in some way -   How difficult have these problems made it for you to do your work, take care of your home and get along with others -       Fall Risk Assessment:  :     Fall Risk Assessment, last 12 mths 2/20/2018   Able to walk? Yes   Fall in past 12 months? No   Fall with injury? -   Number of falls in past 12 months -   Fall Risk Score -       Abuse Screening:  :     Abuse Screening Questionnaire 11/23/2015   Do you ever feel afraid of your partner? N   Are you in a relationship with someone who physically or mentally threatens you? N   Is it safe for you to go home?  Y       ADL Screening:  :     ADL Assessment 1/9/2015   Feeding yourself No Help Needed   Getting from bed to chair No Help Needed   Getting dressed No Help Needed   Bathing or showering No Help Needed   Walk across the room (includes cane/walker) No Help Needed   Using the telphone No Help Needed   Taking your medications No Help Needed   Preparing meals No Help Needed   Managing money (expenses/bills) No Help Needed   Moderately strenuous housework (laundry) No Help Needed   Shopping for personal items (toiletries/medicines) No Help Needed   Shopping for groceries No Help Needed   Driving No Help Needed   Climbing a flight of stairs No Help Needed   Getting to places beyond walking distances No Help Needed     Medication reconciliation up to date and corrected with patient at this time.

## 2018-02-20 NOTE — PROGRESS NOTES
This is a Subsequent Medicare Annual Wellness Visit providing Personalized Prevention Plan Services (PPPS) (Performed 12 months after initial AWV and PPPS )    I have reviewed the patient's medical history in detail and updated the computerized patient record. History     Ruben Bynum is a 81 y.o. female. Presents for Giovana Visit and 6 month follow up visit. She complains of heartburn that has been bothering her daily despite taking Zantac 2 tabs a day. Over the past few months had surgery on left 5th DIP joint and right 3rd DIP joint by Dr. Nella Urena; healed well. Now following with Dr. Frida Bess for Psychiatry. Health Maintenance  Flu vaccine: declined                                           Pneumonia vaccine: PPSV-23 16, PCV-13 17                                                                                       Tetanus vaccine:  Tdap 7/22/15                                                                  Zoster vaccine: declined 7/22/15  Colonoscopy: 3/31/15  Mammogram: 17  Bone density: DEXA 9/29/15- osteopenia  Eye exam: due for this  Lipids:  (tot chol 215, )  A1c: 17 (5.8%)  Advanced Directives: given information   End of Life: given information                     Past Medical History:   Diagnosis Date    Arthritis     Osteoarthritis    Excessive sweating     Occurs in summers; TSH/CMP/CBC normal    MRSA (methicillin resistant Staphylococcus aureus) infection     MRSA + abd abscess; nasal swab negative after treatment    Unspecified adverse effect of anesthesia     EYES WERE NOT TAPED CLOSED DURING -EYES VERY PAINFUL AFTER SURGERY FOR 3 DAYS      Past Surgical History:   Procedure Laterality Date    BREAST SURGERY PROCEDURE UNLISTED Bilateral     AUGMENTATION    HX  SECTION      HX HYSTERECTOMY      Age 29 for endometriosis    IMPLANT BREAST SILICONE/EQ Bilateral 9675    1st set was 43 yrs ago, replaced in 2014.  REVISE BREAST RECONSTRUCTION  03/12/2014    Bilateral breast implant exchange due to  cosmetic defect of previous implants     Current Outpatient Prescriptions   Medication Sig Dispense Refill    escitalopram oxalate (LEXAPRO) 10 mg tablet Take 10 mg by mouth daily.  OXcarbaxepine (TRILEPTAL) 600 mg tablet Take 600 mg by mouth two (2) times a day. No Known Allergies     Family History   Problem Relation Age of Onset    Diabetes Father     Cancer Father      PROSTATE CANCER    Arthritis-osteo Mother     Arthritis-osteo Sister     Anesth Problems Neg Hx      Social History   Substance Use Topics    Smoking status: Never Smoker    Smokeless tobacco: Never Used    Alcohol use No     Patient Active Problem List   Diagnosis Code    Major depression F32.9    Anxiety disorder F41.9    Arthritis M19.90    Advance care planning Z71.89    Cough R05       Depression Risk Factor Screening:     PHQ over the last two weeks 7/13/2016   Little interest or pleasure in doing things Not at all   Feeling down, depressed or hopeless Not at all   Total Score PHQ 2 0   Trouble falling or staying asleep, or sleeping too much -   Feeling tired or having little energy -   Poor appetite or overeating -   Feeling bad about yourself - or that you are a failure or have let yourself or your family down -   Trouble concentrating on things such as school, work, reading or watching TV -   Moving or speaking so slowly that other people could have noticed; or the opposite being so fidgety that others notice -   Thoughts of being better off dead, or hurting yourself in some way -   How difficult have these problems made it for you to do your work, take care of your home and get along with others -     Alcohol Risk Factor Screening: You do not drink alcohol or very rarely. Functional Ability and Level of Safety:     Hearing Loss   Hearing is good. Activities of Daily Living   Self-care.    Requires assistance with: no ADLs    Fall Risk     Fall Risk Assessment, last 12 mths 2/20/2018   Able to walk? Yes   Fall in past 12 months? No   Fall with injury? -   Number of falls in past 12 months -   Fall Risk Score -     Abuse Screen   Patient is not abused    Review of Systems   Pertinent items are noted in HPI. Physical Examination     Evaluation of Cognitive Function:  Mood/affect:  neutral  Appearance: age appropriate  Family member/caregiver input: none    Visit Vitals    /70 (BP 1 Location: Left arm, BP Patient Position: Sitting)    Pulse 72    Temp 98.5 °F (36.9 °C) (Oral)    Resp 16    Ht 5' (1.524 m)    Wt 120 lb (54.4 kg)    SpO2 98%    BMI 23.44 kg/m2       General: Well-developed and well-nourished, no distress. HEENT:  Head normocephalic/atraumatic, no scleral icterus  Lungs:  Clear to ausculation bilaterally. Good air movement. Heart:  Regular rate and rhythm, normal S1 and S2, no murmur, gallop, or rub  Extremities: No clubbing, cyanosis, or edema. Neurological: Alert and oriented. Psychiatric: Normal mood and affect. Behavior is normal.     Patient Care Team:  Roney Rivera MD as PCP - General (Internal Medicine)  Syd Ayala MD (Psychiatry)      Advice/Referrals/Counseling   Education and counseling provided:  Are appropriate based on today's review and evaluation  End-of-Life planning (with patient's consent)  Screening for glaucoma    Assessment/Plan       ICD-10-CM ICD-9-CM    1. Medicare annual wellness visit, subsequent Z00.00 V70.0    2. Gastroesophageal reflux disease, esophagitis presence not specified K21.9 530.81 REFERRAL TO GASTROENTEROLOGY   3. Moderate episode of recurrent major depressive disorder (HCC) I11.7 561.51 METABOLIC PANEL, COMPREHENSIVE      CBC WITH AUTOMATED DIFF      TSH RFX ON ABNORMAL TO FREE T4   4. Anxiety disorder, unspecified type F41.9 300.00    5.  Vitamin D deficiency E55.9 268.9 VITAMIN D, 25 HYDROXY   6. Screening for glaucoma Z13.5 V80.1 REFERRAL TO OPHTHALMOLOGY   7. Encounter for pelvic screening for cancer Z12.89 V76.49 REFERRAL TO GYNECOLOGY   8. Advance care planning Z71.89 V65.49    9. IFG (impaired fasting glucose) R73.01 790.21 HEMOGLOBIN A1C WITH EAG   10. Screening, lipid Z13.220 V77.91 LIPID PANEL       Diagnoses and all orders for this visit:    1. Medicare annual wellness visit, subsequent    2. Gastroesophageal reflux disease, esophagitis presence not specified  -     REFERRAL TO GASTROENTEROLOGY    3. Moderate episode of recurrent major depressive disorder ([facility] [address]  Controlled. Continue to follow with Dr. Loraine Muse.  -     METABOLIC PANEL, COMPREHENSIVE  -     CBC WITH AUTOMATED DIFF  -     TSH RFX ON ABNORMAL TO FREE T4    4. Anxiety disorder, unspecified type  Mild anxiety. Continue following with Dr. Loraine Muse. 5. Vitamin D deficiency  -     VITAMIN D, 25 HYDROXY    6. Screening for glaucoma  -     REFERRAL TO OPHTHALMOLOGY    7. Encounter for pelvic screening for cancer  -     REFERRAL TO GYNECOLOGY    8. Advance care planning    9. IFG (impaired fasting glucose)  -     HEMOGLOBIN A1C WITH EAG    10. Screening, lipid  -     LIPID PANEL      Follow-up Disposition:  Return in about 6 months (around 8/20/2018), or if symptoms worsen or fail to improve, for GERD, depression. Patient seen and had Medicare Annual Wellness Exam; Wellness Schedule printed, reviewed, and given to patient.

## 2018-02-20 NOTE — LETTER
2/27/2018 8:18 AM 
 
Ms. Esperanza [place] [phone] Dear Roe Johnson: 
 
Please find your most recent results below. Resulted Orders METABOLIC PANEL, COMPREHENSIVE Result Value Ref Range Glucose 107 (H) 65 - 99 mg/dL BUN 15 8 - 27 mg/dL Creatinine 0.73 0.57 - 1.00 mg/dL GFR est non-AA 84 >59 GFR est AA 96 >59 BUN/Creatinine ratio 21 12 - 28 Sodium 140 134 - 144 mmol/L Potassium 4.6 3.5 - 5.2 mmol/L Chloride 100 96 - 106 mmol/L  
 CO2 24 18 - 29 mmol/L Calcium 8.8 8.7 - 10.3 mg/dL Protein, total 6.3 6.0 - 8.5 g/dL Albumin 4.3 3.5 - 4.8 g/dL GLOBULIN, TOTAL 2.0 1.5 - 4.5 A-G Ratio 2.2 1.2 - 2.2 Bilirubin, total 0.3 0.0 - 1.2 mg/dL Alk. phosphatase 99 39 - 117 IU/L  
 AST (SGOT) 21 0 - 40 IU/L  
 ALT (SGPT) 18 0 - 32 IU/L  
   
   
CBC WITH AUTOMATED DIFF Result Value Ref Range WBC 3.8 3.4 - 10.8 x10E3/uL  
 RBC 4.23 3.77 - 5.28 x10E6/uL HGB 12.6 11.1 - 15.9 g/dL HCT 38.0 34.0 - 46.6 % MCV 90 79 - 97 fL  
 MCH 29.8 26.6 - 33.0 pg  
 MCHC 33.2 31.5 - 35.7 g/dL  
 RDW 13.3 12.3 - 15.4 % PLATELET 334 993 - 736 x10E3/uL NEUTROPHILS 60 Not Estab. % Lymphocytes 29 Not Estab. % MONOCYTES 8 Not Estab. % EOSINOPHILS 2 Not Estab. % BASOPHILS 1 Not Estab. %  
 ABS. NEUTROPHILS 2.4 1.4 - 7.0 x10E3/uL Abs Lymphocytes 1.1 0.7 - 3.1 x10E3/uL  
 ABS. MONOCYTES 0.3 0.1 - 0.9 x10E3/uL  
 ABS. EOSINOPHILS 0.1 0.0 - 0.4 x10E3/uL  
 ABS. BASOPHILS 0.0 0.0 - 0.2 x10E3/uL IMMATURE GRANULOCYTES 0 Not Estab. %  
 ABS. IMM. GRANS. 0.0 0.0 - 0.1 x10E3/uL HEMOGLOBIN A1C WITH EAG Result Value Ref Range Hemoglobin A1c 5.1 4.8 - 5.6 % Comment:  
            Pre-diabetes: 5.7 - 6.4 Diabetes: >6.4 Glycemic control for adults with diabetes: <7.0 Estimated average glucose 100 TSH RFX ON ABNORMAL TO FREE T4 Result Value Ref Range TSH 1.590 0.450 - 4.500 uIU/mL  
   
   
LIPID PANEL Result Value Ref Range Cholesterol, total 212 (H) 100 - 199 mg/dL Triglyceride 102 0 - 149 mg/dL HDL Cholesterol 68 >39 mg/dL VLDL, calculated 20 5 - 40 LDL, calculated 124 (H) 0 - 99 VITAMIN D, 25 HYDROXY Result Value Ref Range VITAMIN D, 25-HYDROXY 22.0 (L) 30.0 - 100.0 ng/mL Comment:  
 
RECOMMENDATIONS: 
Your labs showed that your cholesterol is high at 212. Normal is less than 200. Dr. Danial Andrade recommends you stay on a low-fat, low-cholesterol diet and exercise regularly. Your vitamin D level was low. Dr. Danial Andrade recommends you take OTC vitamin D 1000 units daily. The rest of your labs were normal, including your kidney and liver tests, blood counts, diabetes screening test, and thyroid tests. Please call me if you have any questions: [phone] Sincerely, Rd Farah LPN

## 2018-02-21 LAB
25(OH)D3+25(OH)D2 SERPL-MCNC: 22 NG/ML (ref 30–100)
ALBUMIN SERPL-MCNC: 4.3 G/DL (ref 3.5–4.8)
ALBUMIN/GLOB SERPL: 2.2 {RATIO} (ref 1.2–2.2)
ALP SERPL-CCNC: 99 IU/L (ref 39–117)
ALT SERPL-CCNC: 18 IU/L (ref 0–32)
AST SERPL-CCNC: 21 IU/L (ref 0–40)
BASOPHILS # BLD AUTO: 0 X10E3/UL (ref 0–0.2)
BASOPHILS NFR BLD AUTO: 1 %
BILIRUB SERPL-MCNC: 0.3 MG/DL (ref 0–1.2)
BUN SERPL-MCNC: 15 MG/DL (ref 8–27)
BUN/CREAT SERPL: 21 (ref 12–28)
CALCIUM SERPL-MCNC: 8.8 MG/DL (ref 8.7–10.3)
CHLORIDE SERPL-SCNC: 100 MMOL/L (ref 96–106)
CHOLEST SERPL-MCNC: 212 MG/DL (ref 100–199)
CO2 SERPL-SCNC: 24 MMOL/L (ref 18–29)
CREAT SERPL-MCNC: 0.73 MG/DL (ref 0.57–1)
EOSINOPHIL # BLD AUTO: 0.1 X10E3/UL (ref 0–0.4)
EOSINOPHIL NFR BLD AUTO: 2 %
ERYTHROCYTE [DISTWIDTH] IN BLOOD BY AUTOMATED COUNT: 13.3 % (ref 12.3–15.4)
EST. AVERAGE GLUCOSE BLD GHB EST-MCNC: 100 MG/DL
GFR SERPLBLD CREATININE-BSD FMLA CKD-EPI: 84 ML/MIN/{1.73_M2}
GFR SERPLBLD CREATININE-BSD FMLA CKD-EPI: 96 ML/MIN/{1.73_M2}
GLOBULIN SER CALC-MCNC: 2 G/L (ref 1.5–4.5)
GLUCOSE SERPL-MCNC: 107 MG/DL (ref 65–99)
HBA1C MFR BLD: 5.1 % (ref 4.8–5.6)
HCT VFR BLD AUTO: 38 % (ref 34–46.6)
HDLC SERPL-MCNC: 68 MG/DL
HGB BLD-MCNC: 12.6 G/DL (ref 11.1–15.9)
IMM GRANULOCYTES # BLD: 0 X10E3/UL (ref 0–0.1)
IMM GRANULOCYTES NFR BLD: 0 %
INTERPRETATION, 910389: NORMAL
LDLC SERPL CALC-MCNC: 124 MG/DL (ref 0–99)
LYMPHOCYTES # BLD AUTO: 1.1 X10E3/UL (ref 0.7–3.1)
LYMPHOCYTES NFR BLD AUTO: 29 %
MCH RBC QN AUTO: 29.8 PG (ref 26.6–33)
MCHC RBC AUTO-ENTMCNC: 33.2 G/DL (ref 31.5–35.7)
MCV RBC AUTO: 90 FL (ref 79–97)
MONOCYTES # BLD AUTO: 0.3 X10E3/UL (ref 0.1–0.9)
MONOCYTES NFR BLD AUTO: 8 %
NEUTROPHILS # BLD AUTO: 2.4 X10E3/UL (ref 1.4–7)
NEUTROPHILS NFR BLD AUTO: 60 %
PLATELET # BLD AUTO: 179 X10E3/UL (ref 150–379)
POTASSIUM SERPL-SCNC: 4.6 MMOL/L (ref 3.5–5.2)
PROT SERPL-MCNC: 6.3 G/DL (ref 6–8.5)
RBC # BLD AUTO: 4.23 X10E6/UL (ref 3.77–5.28)
SODIUM SERPL-SCNC: 140 MMOL/L (ref 134–144)
TRIGL SERPL-MCNC: 102 MG/DL (ref 0–149)
TSH SERPL DL<=0.005 MIU/L-ACNC: 1.59 UIU/ML (ref 0.45–4.5)
VLDLC SERPL CALC-MCNC: 20 MG/DL (ref 5–40)
WBC # BLD AUTO: 3.8 X10E3/UL (ref 3.4–10.8)

## 2018-02-21 NOTE — ACP (ADVANCE CARE PLANNING)

## 2018-02-23 NOTE — PROGRESS NOTES
Your labs showed that your cholesterol is high at 212. Normal is less than 200. Dr. Alesia Olson recommends you stay on a low-fat, low-cholesterol diet and exercise regularly. [Her 10-yr ASCVD risk is 7.1%.] Your vitamin D level was low. Dr. Alesia Olson recommends you take OTC vitamin D 1000 units daily. The rest of your labs were normal, including your kidney and liver tests, blood counts, diabetes screening test, and thyroid tests.

## 2018-05-10 ENCOUNTER — ANESTHESIA (OUTPATIENT)
Dept: ENDOSCOPY | Age: 73
End: 2018-05-10
Payer: MEDICARE

## 2018-05-10 ENCOUNTER — ANESTHESIA EVENT (OUTPATIENT)
Dept: ENDOSCOPY | Age: 73
End: 2018-05-10
Payer: MEDICARE

## 2018-05-10 ENCOUNTER — HOSPITAL ENCOUNTER (OUTPATIENT)
Age: 73
Setting detail: OUTPATIENT SURGERY
Discharge: HOME | End: 2018-05-10
Attending: SPECIALIST | Admitting: SPECIALIST
Payer: MEDICARE

## 2018-05-10 VITALS
OXYGEN SATURATION: 98 % | TEMPERATURE: 97.9 F | HEIGHT: 60 IN | BODY MASS INDEX: 23.56 KG/M2 | SYSTOLIC BLOOD PRESSURE: 149 MMHG | DIASTOLIC BLOOD PRESSURE: 64 MMHG | HEART RATE: 81 BPM | WEIGHT: 120 LBS | RESPIRATION RATE: 22 BRPM

## 2018-05-10 PROCEDURE — 74011250636 HC RX REV CODE- 250/636

## 2018-05-10 PROCEDURE — 76060000031 HC ANESTHESIA FIRST 0.5 HR: Performed by: SPECIALIST

## 2018-05-10 PROCEDURE — 74011000250 HC RX REV CODE- 250

## 2018-05-10 PROCEDURE — 76040000019: Performed by: SPECIALIST

## 2018-05-10 PROCEDURE — 77030009426 HC FCPS BIOP ENDOSC BSC -B: Performed by: SPECIALIST

## 2018-05-10 PROCEDURE — 74011000258 HC RX REV CODE- 258

## 2018-05-10 PROCEDURE — 88305 TISSUE EXAM BY PATHOLOGIST: CPT | Performed by: SPECIALIST

## 2018-05-10 RX ORDER — SODIUM CHLORIDE 9 MG/ML
50 INJECTION, SOLUTION INTRAVENOUS CONTINUOUS
Status: DISPENSED | OUTPATIENT
Start: 2018-05-10 | End: 2018-05-10

## 2018-05-10 RX ORDER — ATROPINE SULFATE 0.1 MG/ML
0.5 INJECTION INTRAVENOUS
Status: ACTIVE | OUTPATIENT
Start: 2018-05-10 | End: 2018-05-10

## 2018-05-10 RX ORDER — FLUMAZENIL 0.1 MG/ML
0.2 INJECTION INTRAVENOUS
Status: ACTIVE | OUTPATIENT
Start: 2018-05-10 | End: 2018-05-10

## 2018-05-10 RX ORDER — RANITIDINE HCL 75 MG
75 TABLET ORAL AS NEEDED
COMMUNITY
End: 2018-10-08 | Stop reason: ALTCHOICE

## 2018-05-10 RX ORDER — CALCIUM CARBONATE 200(500)MG
2 TABLET,CHEWABLE ORAL AS NEEDED
COMMUNITY
End: 2018-06-29 | Stop reason: ALTCHOICE

## 2018-05-10 RX ORDER — SODIUM CHLORIDE 0.9 % (FLUSH) 0.9 %
5-10 SYRINGE (ML) INJECTION EVERY 8 HOURS
Status: ACTIVE | OUTPATIENT
Start: 2018-05-10 | End: 2018-05-10

## 2018-05-10 RX ORDER — PROPOFOL 10 MG/ML
INJECTION, EMULSION INTRAVENOUS AS NEEDED
Status: DISCONTINUED | OUTPATIENT
Start: 2018-05-10 | End: 2018-05-10 | Stop reason: HOSPADM

## 2018-05-10 RX ORDER — EPINEPHRINE 0.1 MG/ML
1 INJECTION INTRACARDIAC; INTRAVENOUS
Status: ACTIVE | OUTPATIENT
Start: 2018-05-10 | End: 2018-05-10

## 2018-05-10 RX ORDER — PANTOPRAZOLE SODIUM 20 MG/1
20 TABLET, DELAYED RELEASE ORAL DAILY
COMMUNITY
End: 2018-10-08 | Stop reason: ALTCHOICE

## 2018-05-10 RX ORDER — LIDOCAINE HYDROCHLORIDE 20 MG/ML
INJECTION, SOLUTION EPIDURAL; INFILTRATION; INTRACAUDAL; PERINEURAL AS NEEDED
Status: DISCONTINUED | OUTPATIENT
Start: 2018-05-10 | End: 2018-05-10 | Stop reason: HOSPADM

## 2018-05-10 RX ORDER — DEXTROMETHORPHAN/PSEUDOEPHED 2.5-7.5/.8
1.2 DROPS ORAL
Status: DISCONTINUED | OUTPATIENT
Start: 2018-05-10 | End: 2018-05-11 | Stop reason: HOSPADM

## 2018-05-10 RX ORDER — MIDAZOLAM HYDROCHLORIDE 1 MG/ML
.25-1 INJECTION, SOLUTION INTRAMUSCULAR; INTRAVENOUS
Status: ACTIVE | OUTPATIENT
Start: 2018-05-10 | End: 2018-05-10

## 2018-05-10 RX ORDER — SODIUM CHLORIDE 9 MG/ML
INJECTION, SOLUTION INTRAVENOUS
Status: DISCONTINUED | OUTPATIENT
Start: 2018-05-10 | End: 2018-05-10 | Stop reason: HOSPADM

## 2018-05-10 RX ORDER — NALOXONE HYDROCHLORIDE 0.4 MG/ML
0.4 INJECTION, SOLUTION INTRAMUSCULAR; INTRAVENOUS; SUBCUTANEOUS
Status: ACTIVE | OUTPATIENT
Start: 2018-05-10 | End: 2018-05-10

## 2018-05-10 RX ORDER — SODIUM CHLORIDE 0.9 % (FLUSH) 0.9 %
5-10 SYRINGE (ML) INJECTION AS NEEDED
Status: ACTIVE | OUTPATIENT
Start: 2018-05-10 | End: 2018-05-10

## 2018-05-10 RX ORDER — FENTANYL CITRATE 50 UG/ML
200 INJECTION, SOLUTION INTRAMUSCULAR; INTRAVENOUS
Status: ACTIVE | OUTPATIENT
Start: 2018-05-10 | End: 2018-05-10

## 2018-05-10 RX ADMIN — PROPOFOL 25 MG: 10 INJECTION, EMULSION INTRAVENOUS at 14:39

## 2018-05-10 RX ADMIN — PROPOFOL 25 MG: 10 INJECTION, EMULSION INTRAVENOUS at 14:41

## 2018-05-10 RX ADMIN — LIDOCAINE HYDROCHLORIDE 60 MG: 20 INJECTION, SOLUTION EPIDURAL; INFILTRATION; INTRACAUDAL; PERINEURAL at 14:34

## 2018-05-10 RX ADMIN — PROPOFOL 25 MG: 10 INJECTION, EMULSION INTRAVENOUS at 14:35

## 2018-05-10 RX ADMIN — SODIUM CHLORIDE: 9 INJECTION, SOLUTION INTRAVENOUS at 14:29

## 2018-05-10 RX ADMIN — PROPOFOL 50 MG: 10 INJECTION, EMULSION INTRAVENOUS at 14:34

## 2018-05-10 RX ADMIN — PROPOFOL 25 MG: 10 INJECTION, EMULSION INTRAVENOUS at 14:38

## 2018-05-10 NOTE — DISCHARGE INSTRUCTIONS
Esha [facility]  [id number]  1945    EGD DISCHARGE INSTRUCTIONS  Discomfort:  Sore throat- throat lozenges or warm salt water gargle  redness at IV site- apply warm compress to area; if redness or soreness persist- contact your physician  Gaseous discomfort- walking, belching will help relieve any discomfort  You may not operate a vehicle for 12 hours  You may not engage in an occupation involving machinery or appliances for rest of today. You may not drink alcoholic beverages for at least 12 hours  Avoid making any critical decisions for at least 24 hour  DIET  You may resume your regular diet - however -  remember your colon is empty and a heavy meal will produce gas. Avoid these foods:  vegetables, fried / greasy foods, carbonated drinks  MEDICATIONS   Regarding Aspirin or Nonsteroidal medications specifically, please see below. ACTIVITY  You may resume your normal daily activities. Spend the remainder of the day resting -  avoid any strenuous activity. CALL M.D. ANY SIGN OF   Increasing pain, nausea, vomiting  Abdominal distension (swelling)  New increased bleeding (oral or rectal)  Fever (chills)  Pain in chest area  Bloody discharge from nose or mouth  Shortness of breath    You may not  take any Advil, Aspirin, Ibuprofen, Motrin, Aleve, or Goodys for 10 days, ONLY  Tylenol as needed for pain. Follow-up Instructions:   Call Dr. Truong Fournier  Results of procedure / biopsy in 10 days, take Pantoprazole as prescribed.   Telephone #  [phone]        DISCHARGE SUMMARY from Nurse    The following personal items collected during your admission are returned to you:   Dental Appliance: Dental Appliances: None  Vision: Visual Aid: None  Hearing Aid:    Jewelry:    Clothing:    Other Valuables:    Valuables sent to safe:

## 2018-05-10 NOTE — IP AVS SNAPSHOT
[address] 
[phone] Patient: Nicolasa Cornelius MRN: [id number]:[id number] About your hospitalization You were admitted on:  May 10, 2018 [facility] You were discharged on:  May 10, 2018 Why you were hospitalized Your primary diagnosis was:  Not on File Follow-up Information None Discharge Orders None A check torsten indicates which time of day the medication should be taken. My Medications CONTINUE taking these medications Instructions Each Dose to Equal  
 Morning Noon Evening Bedtime  
 calcium carbonate 200 mg calcium (500 mg) Chew Commonly known as:  TUMS Your last dose was: Your next dose is: Take 2 Tabs by mouth as needed. 2 Tab  
    
   
   
   
  
 pantoprazole 20 mg tablet Commonly known as:  PROTONIX Your last dose was: Your next dose is: Take 20 mg by mouth daily. 20 mg  
    
   
   
   
  
 ZANTAC 75 75 mg tablet Generic drug:  raNITIdine Your last dose was: Your next dose is: Take 75 mg by mouth two (2) times a day. 75 mg Discharge Instructions Nicolasa Cornelius [id number] 1945 EGD DISCHARGE INSTRUCTIONS Discomfort: 
Sore throat- throat lozenges or warm salt water gargle 
redness at IV site- apply warm compress to area; if redness or soreness persist- contact your physician Gaseous discomfort- walking, belching will help relieve any discomfort You may not operate a vehicle for 12 hours You may not engage in an occupation involving machinery or appliances for rest of today. You may not drink alcoholic beverages for at least 12 hours Avoid making any critical decisions for at least 24 hour DIET You may resume your regular diet  however -  remember your colon is empty and a heavy meal will produce gas. Avoid these foods:  vegetables, fried / greasy foods, carbonated drinks MEDICATIONS Regarding Aspirin or Nonsteroidal medications specifically, please see below. ACTIVITY You may resume your normal daily activities. Spend the remainder of the day resting -  avoid any strenuous activity. CALL M.D. ANY SIGN OF Increasing pain, nausea, vomiting Abdominal distension (swelling) New increased bleeding (oral or rectal) Fever (chills) Pain in chest area Bloody discharge from nose or mouth Shortness of breath You may not  take any Advil, Aspirin, Ibuprofen, Motrin, Aleve, or Goodys for 10 days, ONLY  Tylenol as needed for pain. Follow-up Instructions: 
 Call Dr. Clara Shoemaker Results of procedure / biopsy in 10 days, take Pantoprazole as prescribed. Telephone #  [phone] DISCHARGE SUMMARY from Nurse The following personal items collected during your admission are returned to you:  
Dental Appliance: Dental Appliances: None Vision: Visual Aid: None Hearing Aid:   
Jewelry:   
Clothing:   
Other Valuables:   
Valuables sent to safe:   
 
 
 
 
  
 
 
ACO Transitions of Care [place] [address] Steffanie Jefry offers a voluntary care coordination program to provide high quality service and care to [facility] fee-for-service beneficiaries. Abran Siddiqi was designed to help you enhance your health and well-being through the following services: ? Transitions of Care  support for individuals who are transitioning from one care setting to another (example: Hospital to home). ? Chronic and Complex Care Coordination  support for individuals and caregivers of those with serious or chronic illnesses or with more than one chronic (ongoing) condition and those who take a number of different medications.   
 
 
If you meet specific medical criteria, a [employer] Coordinator may call you directly to coordinate your care with your primary care physician and your other care providers. For questions about the [facility] programs, please, contact your physicians office. For general questions or additional information about Accountable Care Organizations: 
Please visit www.medicare.gov/acos. html or call 1-800-MEDICARE (6-756.464.1476) TTY users should call 6-142.802.6194. Introducing [facility]! Dear Reilly Obando: 
Thank you for requesting a [employer] account. Our records indicate that you have previously registered for a C[employer]onne[employer] account but its currently inactive. Please call our [employer] support line at [phone]. Additional Information If you have questions, please visit the Frequently Asked Questions section of the [employer] website at [email or web address] Remember, [employer]ok[employer] is NOT to be used for urgent needs. For medical emergencies, dial 911. Now available from your iPhone and Android! Introducing Brian Bender As a [facility] patient, I wanted to make you aware of our electronic visit tool called Brian Bender. [employer] 24/7 allows you to connect within minutes with a medical provider 24 hours a day, seven days a week via a mobile device or tablet or logging into a secure website from your computer. You can access Brian Bender from anywhere in the [place]. A virtual visit might be right for you when you have a simple condition and feel like you just dont want to get out of bed, or cant get away from work for an appointment, when your regular [facility] provider is not available (evenings, weekends or holidays), or when youre out of town and need minor care. Electronic visits cost only $49 and if the [facility]els [facility] 24/7 provider determines a prescription is needed to treat your condition, one can be electronically transmitted to a nearby pharmacy*. Please take a moment to enroll today if you have not already done so. The enrollment process is free and takes just a few minutes. To enroll, please download the [employer] 24/7 claudia to your tablet or phone, or visit [email or web address] to enroll on your computer. And, as an [address] [place] [address] patient with a [employer] Electronic account, the results of your visits will be scanned into your electronic medical record and your primary care provider will be able to view the scanned results. We urge you to continue to see your regular [employer] provider for your ongoing medical care. And while your primary care provider may not be the one available when you seek a tenfarms virtual visit, the peace of mind you get from getting a real diagnosis real time can be priceless. For more information on tenfarms, view our Frequently Asked Questions (FAQs) at [email or web address]. Sincerely, 
 
Rissa Segovia MD 
Chief Medical Officer [facility] Steffanie Capps *:  certain medications cannot be prescribed via tenfarms Providers Seen During Your Hospitalization Provider Specialty Primary office phone Eldia Brown MD Gastroenterology [phone] Your Primary Care Physician (PCP) Primary Care Physician Office Phone Office Fax Jona Augustin [phone] You are allergic to the following No active allergies Recent Documentation Height Weight Breastfeeding? BMI OB Status Smoking Status 1.524 m 54.4 kg No 23.44 kg/m2 Hysterectomy Never Smoker Emergency Contacts Name Discharge Info Relation Home Work Mobile TashiRaniAngeline DISCHARGE CAREGIVER [3] Sister [23] [phone] Jameson Menon DISCHARGE CAREGIVER [3] Friend [5] [phone] Bernardino Platt [5] [phone] Patient Belongings The following personal items are in your possession at time of discharge: Dental Appliances: None  Visual Aid: None Please provide this summary of care documentation to your next provider. Signatures-by signing, you are acknowledging that this After Visit Summary has been reviewed with you and you have received a copy. Patient Signature:  ____________________________________________________________ Date:  ____________________________________________________________  
  
Lillie Pane Provider Signature:  ____________________________________________________________ Date:  ____________________________________________________________

## 2018-05-10 NOTE — ANESTHESIA POSTPROCEDURE EVALUATION
Post-Anesthesia Evaluation and Assessment    Patient: Gianluca Ramirez MRN: [id number]  SSN: [id number]    YOB: 1945  Age: 81 y.o. Sex: female       Cardiovascular Function/Vital Signs  Visit Vitals    /64    Pulse 79    Temp 36.6 °C (97.9 °F)    Resp 14    Ht 5' (1.524 m)    Wt 54.4 kg (120 lb)    SpO2 96%    Breastfeeding No    BMI 23.44 kg/m2       Patient is status post MAC anesthesia for Procedure(s):  ESOPHAGOGASTRODUODENOSCOPY (EGD)  ESOPHAGOGASTRODUODENAL (EGD) BIOPSY. Nausea/Vomiting: None    Postoperative hydration reviewed and adequate. Pain:  Pain Scale 1: Adult Nonverbal Pain Scale (05/10/18 1448)  Pain Intensity 1: 0 (05/10/18 1448)   Managed    Neurological Status: At baseline    Mental Status and Level of Consciousness: Arousable    Pulmonary Status:   O2 Device: Room air (05/10/18 1448)   Adequate oxygenation and airway patent    Complications related to anesthesia: None    Post-anesthesia assessment completed.  No concerns    Signed By: Ced Villafana MD     May 10, 2018

## 2018-05-10 NOTE — H&P
Pre-endoscopy H and P     The patient was seen and examined in the endoscopy suite. The airway was assessed and docuemented. The problem list and medications were reviewed. Patient Active Problem List   Diagnosis Code    Moderate episode of recurrent major depressive disorder (HCC) F33.1    Anxiety disorder F41.9    Primary osteoarthritis of both hands M19.041, M19.042    Advance care planning Z71.89     Social History     Social History    Marital status:      Spouse name: N/A    Number of children: N/A    Years of education: N/A     Occupational History    Not on file. Social History Main Topics    Smoking status: Never Smoker    Smokeless tobacco: Never Used    Alcohol use No    Drug use: No    Sexual activity: Not on file     Other Topics Concern    Not on file     Social History Narrative     Past Medical History:   Diagnosis Date    Arthritis     Osteoarthritis    Excessive sweating     Occurs in summers; TSH/CMP/CBC normal    GERD (gastroesophageal reflux disease)     MRSA (methicillin resistant Staphylococcus aureus) infection 2007    MRSA + abd abscess; nasal swab negative after treatment         Prior to Admission Medications   Prescriptions Last Dose Informant Patient Reported? Taking?   calcium carbonate (TUMS) 200 mg calcium (500 mg) chew 5/7/2018  Yes Yes   Sig: Take 2 Tabs by mouth as needed. raNITIdine (ZANTAC 75) 75 mg tablet 5/8/2018  Yes Yes   Sig: Take 75 mg by mouth two (2) times a day. Facility-Administered Medications: None       Chief complaint, history of present illness, and review of systems and Past medical History are positive for: Heartburn, GERD and chest pain. The heart, lungs and mental status were satisfactory for the administration of sedation and for the procedure. I discussed with the patient the objectives, risks, consequences and alternatives to the procedure.      Plan: Endoscopic procedure with sedation     Roderick Stallings MD 5/10/2018  2:26 PM

## 2018-05-10 NOTE — PERIOP NOTES
1434 Procedure being performed under MAC; MARJORIE Linda at bedside monitoring patient. See anesthesia notes. 1438 Endoscope was pre-cleaned at bedside immediately following procedure by Carline De La Cruz. 1444 Patient received Propofol and Lidocaine, per MARJORIE Linda. Care of patient assumed from the anesthesia provider. Patient tolerated procedure well. Abdomen remains soft and non tender post procedure, no complaints or indication of discomfort noted at this time. See anesthesia note. Patient transferred to Endoscopy Recovery and report given to recovery nurse.

## 2018-05-10 NOTE — PROCEDURES
2626 [facility]  174 [facility], [address] [place] [address]                 Nury Obregon   :   1945   MRN:   [id number]     Date/Time:  5/10/2018 2:56 PM    Esophagogastroduodenoscopy (EGD) Procedure Note    :  Lynn Tong MD    Referring Provider:  Adwoa Mills MD    Anethesia/Sedation:  MAC anesthesia Propofol    Preoperative diagnosis: CHEST PAIN, EPIGASTRIC PAIN     Postoperative diagnosis: Large Hiatal hernia  Gastritis  Esophagitis    Procedure Details     After infom consent was obtained for the procedure, with all risks and benefits of procedure explained the patient was taken to the endoscopy suite and placed in the left lateral decubitus position. Following sequential administration of sedation as per above, the [id number] gastroscope was inserted into the mouth and advanced under direct vision to second portion of the duodenum. A careful inspection was made as the gastroscope was withdrawn, including a retroflexed view of the proximal stomach; findings and interventions are described below. Findings:  Esophagus:Large hiatal hernia, Z line at 31 cm, Grade 2 distal esophagitis biopsied. Stomach:Antral gastritis, biopsies done. Duodenum/jejunum:normal      Therapies:  none    Specimens: Antral biopsies           EBL: None    Complications:   None; patient tolerated the procedure well. Impression:    See Postoperative diagnosis above    Recommendations:  -Acid suppression with a proton pump inhibitor. , -Await pathology. , -No NSAIDS    Discharge disposition:  Home in the company of  when able to ambulate    Lynn Tong MD

## 2018-05-10 NOTE — ROUTINE PROCESS
Anastasia Preciado  1945  [id number]    Situation:  Verbal report received from: Susan Aceves RN  Procedure: Procedure(s):  ESOPHAGOGASTRODUODENOSCOPY (EGD)    Background:    Preoperative diagnosis: CHEST PAIN, EPIGASTRIC PAIN   Postoperative diagnosis: Large Hiatal hernia  Gastritis  Esophagitis    :  Dr. Kb Rebollar  Assistant(s): Endoscopy Technician-1: Pedro Hanna  Endoscopy RN-1: Flor De Dios RN    Specimens:   ID Type Source Tests Collected by Time Destination   1 : Antrum bxs Preservative   Katey Park MD 5/10/2018 1438 Pathology   2 : bxs Preservative Esophagus, Distal  Katey Park MD 5/10/2018 1440 Pathology     H. Pylori  no    Assessment:  Intra-procedure medications     Anesthesia gave intra-procedure sedation and medications, see anesthesia flow sheet yes    Intravenous fluids: NS@ KVO     Vital signs stable     Abdominal assessment: round and soft     Recommendation:  Discharge patient per MD order.     Family or Friend   Permission to share finding with family or friend yes

## 2018-06-29 ENCOUNTER — OFFICE VISIT (OUTPATIENT)
Dept: INTERNAL MEDICINE CLINIC | Facility: CLINIC | Age: 73
End: 2018-06-29

## 2018-06-29 VITALS
RESPIRATION RATE: 16 BRPM | OXYGEN SATURATION: 96 % | TEMPERATURE: 99 F | HEART RATE: 77 BPM | SYSTOLIC BLOOD PRESSURE: 132 MMHG | WEIGHT: 124.4 LBS | BODY MASS INDEX: 24.42 KG/M2 | HEIGHT: 60 IN | DIASTOLIC BLOOD PRESSURE: 72 MMHG

## 2018-06-29 DIAGNOSIS — M19.042 PRIMARY OSTEOARTHRITIS OF BOTH HANDS: ICD-10-CM

## 2018-06-29 DIAGNOSIS — Z00.00 ROUTINE GENERAL MEDICAL EXAMINATION AT A HEALTH CARE FACILITY: Primary | ICD-10-CM

## 2018-06-29 DIAGNOSIS — M19.041 PRIMARY OSTEOARTHRITIS OF BOTH HANDS: ICD-10-CM

## 2018-06-29 DIAGNOSIS — K21.00 GASTROESOPHAGEAL REFLUX DISEASE WITH ESOPHAGITIS: ICD-10-CM

## 2018-06-29 DIAGNOSIS — E55.9 VITAMIN D DEFICIENCY: ICD-10-CM

## 2018-06-29 DIAGNOSIS — E78.00 PURE HYPERCHOLESTEROLEMIA: ICD-10-CM

## 2018-06-29 RX ORDER — CHOLECALCIFEROL TAB 125 MCG (5000 UNIT) 125 MCG
5000 TAB ORAL DAILY
Qty: 90 TAB | Refills: 1
Start: 2018-06-29 | End: 2019-06-14

## 2018-06-29 NOTE — PROGRESS NOTES
Kisha Torres  Identified pt with two pt identifiers(name and ). Chief Complaint   Patient presents with    Physical       1. Have you been to the ER, urgent care clinic since your last visit? Hospitalized since your last visit? NO    2. Have you seen or consulted any other health care providers outside of the [address] [place] [address] since your last visit? Include any pap smears or colon screening. DR CHAD Comer([facility])    Today's provider has been notified of reason for visit, vitals and flowsheets obtained on patients.      Patient received paperwork for advance directive during previous visit but has not completed at this time     Reviewed record In preparation for visit, huddled with provider and have obtained necessary documentation      Health Maintenance Due   Topic    GLAUCOMA SCREENING Q2Y        Wt Readings from Last 3 Encounters:   18 124 lb 6.4 oz (56.4 kg)   05/10/18 120 lb (54.4 kg)   18 120 lb (54.4 kg)     Temp Readings from Last 3 Encounters:   18 99 °F (37.2 °C) (Oral)   05/10/18 97.9 °F (36.6 °C)   18 98.5 °F (36.9 °C) (Oral)     BP Readings from Last 3 Encounters:   18 132/72   05/10/18 149/64   18 111/70     Pulse Readings from Last 3 Encounters:   18 77   05/10/18 81   18 72     Vitals:    18 1358   BP: 132/72   Pulse: 77   Resp: 16   Temp: 99 °F (37.2 °C)   TempSrc: Oral   SpO2: 96%   Weight: 124 lb 6.4 oz (56.4 kg)   Height: 5' (1.524 m)   PainSc:   0 - No pain         Learning Assessment:  :     Learning Assessment 2015   PRIMARY LEARNER Patient   BARRIERS PRIMARY LEARNER NONE   CO-LEARNER CAREGIVER No   CO-LEARNER HIGHEST LEVEL OF EDUCATION GRADUATED HIGH SCHOOL OR GED   PRIMARY LANGUAGE ENGLISH   LEARNER PREFERENCE PRIMARY LISTENING   ANSWERED BY patient   RELATIONSHIP SELF       Depression Screening:  :     PHQ over the last two weeks 2018   Little interest or pleasure in doing things Not at all   Feeling down, depressed or hopeless Not at all   Total Score PHQ 2 0   Trouble falling or staying asleep, or sleeping too much -   Feeling tired or having little energy -   Poor appetite or overeating -   Feeling bad about yourself - or that you are a failure or have let yourself or your family down -   Trouble concentrating on things such as school, work, reading or watching TV -   Moving or speaking so slowly that other people could have noticed; or the opposite being so fidgety that others notice -   Thoughts of being better off dead, or hurting yourself in some way -   How difficult have these problems made it for you to do your work, take care of your home and get along with others -       Fall Risk Assessment:  :     Fall Risk Assessment, last 12 mths 6/29/2018   Able to walk? Yes   Fall in past 12 months? No   Fall with injury? -   Number of falls in past 12 months -   Fall Risk Score -       Abuse Screening:  :     Abuse Screening Questionnaire 6/29/2018 11/23/2015   Do you ever feel afraid of your partner? N N   Are you in a relationship with someone who physically or mentally threatens you? N N   Is it safe for you to go home?  Y Y       ADL Screening:  :     ADL Assessment 6/29/2018   Feeding yourself No Help Needed   Getting from bed to chair No Help Needed   Getting dressed No Help Needed   Bathing or showering No Help Needed   Walk across the room (includes cane/walker) No Help Needed   Using the telphone No Help Needed   Taking your medications No Help Needed   Preparing meals No Help Needed   Managing money (expenses/bills) No Help Needed   Moderately strenuous housework (laundry) No Help Needed   Shopping for personal items (toiletries/medicines) No Help Needed   Shopping for groceries No Help Needed   Driving No Help Needed   Climbing a flight of stairs No Help Needed   Getting to places beyond walking distances No Help Needed                 Medication reconciliation up to date and corrected with patient at this time.

## 2018-06-29 NOTE — MR AVS SNAPSHOT
[address] [address] [phone] Patient: Candie Vergara MRN: [id number] SBT:[id number]/[id number] Visit Information Date & Time Provider Department Dept. Phone Encounter #  
 6/29/2018  1:40 PM Jacklyn Montiel MD [facility] Internal Medicine [address] [id number] Follow-up Instructions Return in about 4 months (around 10/29/2018), or if symptoms worsen or fail to improve, for Hyperlipdemia, vit D. Upcoming Health Maintenance Date Due  
 GLAUCOMA SCREENING Q2Y 6/14/2012 Influenza Age 5 to Adult 8/1/2018 MEDICARE YEARLY EXAM 2/21/2019 BREAST CANCER SCRN MAMMOGRAM 12/4/2019 COLONOSCOPY 3/31/2025 DTaP/Tdap/Td series (2 - Td) 7/22/2025 Allergies as of 6/29/2018  Review Complete On: 6/29/2018 By: Jacklyn Montiel MD  
 No Known Allergies Current Immunizations  Reviewed on 2/17/2017 Name Date Pneumococcal Conjugate (PCV-13) 2/17/2017 Pneumococcal Polysaccharide (PPSV-23) 2/9/2016 Tdap 7/22/2015 Not reviewed this visit You Were Diagnosed With   
  
 Codes Comments Routine general medical examination at a health care facility    -  Primary ICD-10-CM: Z00.00 ICD-9-CM: V70.0 Primary osteoarthritis of both hands     ICD-10-CM: M19.041, N70.044 ICD-9-CM: 715.14 Gastroesophageal reflux disease with esophagitis     ICD-10-CM: K21.0 ICD-9-CM: 530.11 Vitamin D deficiency     ICD-10-CM: E55.9 ICD-9-CM: 268.9 Pure hypercholesterolemia     ICD-10-CM: E78.00 ICD-9-CM: 272.0 Vitals BP Pulse Temp Resp Height(growth percentile) Weight(growth percentile) 132/72 (BP 1 Location: Left arm, BP Patient Position: Sitting) 77 99 °F (37.2 °C) (Oral) 16 5' (1.524 m) 124 lb 6.4 oz (56.4 kg) SpO2 BMI OB Status Smoking Status 96% 24.3 kg/m2 Hysterectomy Never Smoker BMI and BSA Data  Body Mass Index Body Surface Area  
 24.3 kg/m 2 1.55 m 2  
  
  
 Preferred Pharmacy Pharmacy Name Phone [facility] [address] [facility], [place]aljit Mendoza Numbers [phone] Your Updated Medication List  
  
   
This list is accurate as of 6/29/18  2:24 PM.  Always use your most recent med list.  
  
  
  
  
 pantoprazole 20 mg tablet Commonly known as:  PROTONIX Take 20 mg by mouth daily. ZANTAC 75 75 mg tablet Generic drug:  raNITIdine Take 75 mg by mouth as needed. Follow-up Instructions Return in about 4 months (around 10/29/2018), or if symptoms worsen or fail to improve, for Hyperlipdemia, vit D. To-Do List   
 10/08/2018 Lab:  LIPID PANEL   
  
 10/08/2018 Lab:  METABOLIC PANEL, BASIC   
  
 10/08/2018 Lab:  VITAMIN D, 25 HYDROXY Patient Instructions A Healthy Lifestyle: Care Instructions Your Care Instructions A healthy lifestyle can help you feel good, stay at a healthy weight, and have plenty of energy for both work and play. A healthy lifestyle is something you can share with your whole family. A healthy lifestyle also can lower your risk for serious health problems, such as high blood pressure, heart disease, and diabetes. You can follow a few steps listed below to improve your health and the health of your family. Follow-up care is a key part of your treatment and safety. Be sure to make and go to all appointments, and call your doctor if you are having problems. It's also a good idea to know your test results and keep a list of the medicines you take. How can you care for yourself at home? · Do not eat too much sugar, fat, or fast foods. You can still have dessert and treats now and then. The goal is moderation. · Start small to improve your eating habits. Pay attention to portion sizes, drink less juice and soda pop, and eat more fruits and vegetables. ¨ Eat a healthy amount of food.  A 3-ounce serving of meat, for example, is about the size of a deck of cards. Fill the rest of your plate with vegetables and whole grains. ¨ Limit the amount of soda and sports drinks you have every day. Drink more water when you are thirsty. ¨ Eat at least 5 servings of fruits and vegetables every day. It may seem like a lot, but it is not hard to reach this goal. A serving or helping is 1 piece of fruit, 1 cup of vegetables, or 2 cups of leafy, raw vegetables. Have an apple or some carrot sticks as an afternoon snack instead of a candy bar. Try to have fruits and/or vegetables at every meal. 
· Make exercise part of your daily routine. You may want to start with simple activities, such as walking, bicycling, or slow swimming. Try to be active 30 to 60 minutes every day. You do not need to do all 30 to 60 minutes all at once. For example, you can exercise 3 times a day for 10 or 20 minutes. Moderate exercise is safe for most people, but it is always a good idea to talk to your doctor before starting an exercise program. 
· Keep moving. Suyapa Hunger the lawn, work in the garden, or [employer]. Take the stairs instead of the elevator at work. · If you smoke, quit. People who smoke have an increased risk for heart attack, stroke, cancer, and other lung illnesses. Quitting is hard, but there are ways to boost your chance of quitting tobacco for good. ¨ Use nicotine gum, patches, or lozenges. ¨ Ask your doctor about stop-smoking programs and medicines. ¨ Keep trying. In addition to reducing your risk of diseases in the future, you will notice some benefits soon after you stop using tobacco. If you have shortness of breath or asthma symptoms, they will likely get better within a few weeks after you quit. · Limit how much alcohol you drink. Moderate amounts of alcohol (up to 2 drinks a day for men, 1 drink a day for women) are okay. But drinking too much can lead to liver problems, high blood pressure, and other health problems. Family health If you have a family, there are many things you can do together to improve your health. · Eat meals together as a family as often as possible. · Eat healthy foods. This includes fruits, vegetables, lean meats and dairy, and whole grains. · Include your family in your fitness plan. Most people think of activities such as jogging or tennis as the way to fitness, but there are many ways you and your family can be more active. Anything that makes you breathe hard and gets your heart pumping is exercise. Here are some tips: 
¨ Walk to do errands or to take your child to school or the bus. ¨ Go for a family bike ride after dinner instead of watching TV. Where can you learn more? Go to http://michael-carmela.info/. Enter N293 in the search box to learn more about \"A Healthy Lifestyle: Care Instructions. \" Current as of: May 12, 2017 Content Version: 11.4 © 9226-6445 [employer]. Care instructions adapted under license by [employer]) (which disclaims liability or warranty for this information). If you have questions about a medical condition or this instruction, always ask your healthcare professional. [address] [address] any warranty or liability for your use of this information. Introducing \[facility]\"" & HEALTH SERVICES! Jessi Rose introduces [employer] patient portal. Now you can access parts of your medical record, email your doctor's office, and request medication refills online. 1. In your internet browser, go to [email or web address] 2. Click on the First Time User? Click Here link in the Sign In box. You will see the New Member Sign Up page. 3. Enter your [employer] Access Code exactly as it appears below. You will not need to use this code after youve completed the sign-up process. If you do not sign up before the expiration date, you must request a new code. · [employer] Access Code: [email or web address] Expires: 9/27/2018  2:05 PM 
 
 4. Enter the last four digits of your Social Security Number (xxxx) and Date of Birth (mm/dd/yyyy) as indicated and click Submit. You will be taken to the next sign-up page. 5. Create a Datavail ID. This will be your Datavail login ID and cannot be changed, so think of one that is secure and easy to remember. 6. Create a Datavail password. You can change your password at any time. 7. Enter your Password Reset Question and Answer. This can be used at a later time if you forget your password. 8. Enter your e-mail address. You will receive e-mail notification when new information is available in [address] 9. Click Sign Up. You can now view and download portions of your medical record. 10. Click the Download Summary menu link to download a portable copy of your medical information. If you have questions, please visit the Frequently Asked Questions section of the [employer] website. Remember, [employer] is NOT to be used for urgent needs. For medical emergencies, dial 911. Now available from your iPhone and Android! Please provide this summary of care documentation to your next provider. Your primary care clinician is listed as Sabrina Machado. If you have any questions after today's visit, please call [phone].

## 2018-06-29 NOTE — PATIENT INSTRUCTIONS

## 2018-06-29 NOTE — PROGRESS NOTES
CC:   Chief Complaint   Patient presents with    Physical       HISTORY OF PRESENT ILLNESS  Prudence Sons is a 72 y.o. female. Presents for physical exam.  Last seen 4 months ago. She has GERD, hypercholesterolemia, OA of hands, and depression with anxiety. She has no complaints today. Has been having surgery on hand joints done by Dr. Alphonse Fuller. Most recent surgery was at [facility] of right thumb. Previously had surgery on left 5th DIP joint and right 3rd DIP joint. Follows with Dr. Debi Pérez for Psychiatry; presently having psychotherapy for management of depression and anxiety. Patient reports that her mood has been good on no medications. Also reports no heartburn on Protonix daily with Zantac as needed. Had an EGD done by Dr. Truong Fournier on 5/10/18. Showed gastritis and esophagitis. Health Maintenance  Flu vaccine: declined                                           Pneumonia vaccine: PPSV-23 2/9/16, PCV-13 2/17/17                                                                                       Tetanus vaccine: Tdap 7/22/15                                                                  Zoster vaccine: declined   Colonoscopy: 3/31/15  Mammogram: 12/4/17  Bone density: DEXA 9/29/15- osteopenia  Eye exam: Dr. Gabe Diego, 12/17  Lipids: 2/20/18 (tot chol 212,   A1c: 2/20/18 (normal at 5.1%)  Advanced Directives: given information   End of Life: given information                 ROS  A complete review of systems was performed and is negative except for those mentioned in the HPI.        Patient Active Problem List   Diagnosis Code    Moderate episode of recurrent major depressive disorder (HCC) F33.1    Anxiety disorder F41.9    Primary osteoarthritis of both hands M19.041, M19.042    Advance care planning Z71.89     Past Medical History:   Diagnosis Date    Arthritis     Osteoarthritis    Excessive sweating     Occurs in summers; TSH/CMP/CBC normal    GERD (gastroesophageal reflux disease)     MRSA (methicillin resistant Staphylococcus aureus) infection 2007    MRSA + abd abscess; nasal swab negative after treatment     No Known Allergies    Current Outpatient Prescriptions   Medication Sig Dispense Refill    calcium carbonate (TUMS) 200 mg calcium (500 mg) chew Take 2 Tabs by mouth as needed.  raNITIdine (ZANTAC 75) 75 mg tablet Take 75 mg by mouth as needed.  pantoprazole (PROTONIX) 20 mg tablet Take 20 mg by mouth daily. PHYSICAL EXAM  Visit Vitals    /72 (BP 1 Location: Left arm, BP Patient Position: Sitting)    Pulse 77    Temp 99 °F (37.2 °C) (Oral)    Resp 16    Ht 5' (1.524 m)    Wt 124 lb 6.4 oz (56.4 kg)    SpO2 96%    BMI 24.3 kg/m2       General: Well-developed and well-nourished, no distress. HEENT:  Head normocephalic/atraumatic, no scleral icterus  Lungs:  Clear to ausculation bilaterally. Good air movement. Heart:  Regular rate and rhythm, normal S1 and S2, no murmur, gallop, or rub  Extremities: No clubbing, cyanosis, or edema. Normal ROM at both knees. Healing laceration at dorsum of right thumb. Neurological: Alert and oriented. Psychiatric: Normal mood and affect. Behavior is normal.     Results for orders placed or performed in visit on 54/19/57   METABOLIC PANEL, COMPREHENSIVE   Result Value Ref Range    Glucose 107 (H) 65 - 99 mg/dL    BUN 15 8 - 27 mg/dL    Creatinine 0.73 0.57 - 1.00 mg/dL    GFR est non-AA 84 >59    GFR est AA 96 >59    BUN/Creatinine ratio 21 12 - 28    Sodium 140 134 - 144 mmol/L    Potassium 4.6 3.5 - 5.2 mmol/L    Chloride 100 96 - 106 mmol/L    CO2 24 18 - 29 mmol/L    Calcium 8.8 8.7 - 10.3 mg/dL    Protein, total 6.3 6.0 - 8.5 g/dL    Albumin 4.3 3.5 - 4.8 g/dL    GLOBULIN, TOTAL 2.0 1.5 - 4.5    A-G Ratio 2.2 1.2 - 2.2    Bilirubin, total 0.3 0.0 - 1.2 mg/dL    Alk.  phosphatase 99 39 - 117 IU/L    AST (SGOT) 21 0 - 40 IU/L    ALT (SGPT) 18 0 - 32 IU/L   CBC WITH AUTOMATED DIFF   Result Value Ref Range WBC 3.8 3.4 - 10.8 x10E3/uL    RBC 4.23 3.77 - 5.28 x10E6/uL    HGB 12.6 11.1 - 15.9 g/dL    HCT 38.0 34.0 - 46.6 %    MCV 90 79 - 97 fL    MCH 29.8 26.6 - 33.0 pg    MCHC 33.2 31.5 - 35.7 g/dL    RDW 13.3 12.3 - 15.4 %    PLATELET 786 701 - 092 x10E3/uL    NEUTROPHILS 60 Not Estab. %    Lymphocytes 29 Not Estab. %    MONOCYTES 8 Not Estab. %    EOSINOPHILS 2 Not Estab. %    BASOPHILS 1 Not Estab. %    ABS. NEUTROPHILS 2.4 1.4 - 7.0 x10E3/uL    Abs Lymphocytes 1.1 0.7 - 3.1 x10E3/uL    ABS. MONOCYTES 0.3 0.1 - 0.9 x10E3/uL    ABS. EOSINOPHILS 0.1 0.0 - 0.4 x10E3/uL    ABS. BASOPHILS 0.0 0.0 - 0.2 x10E3/uL    IMMATURE GRANULOCYTES 0 Not Estab. %    ABS. IMM. GRANS. 0.0 0.0 - 0.1 x10E3/uL   HEMOGLOBIN A1C WITH EAG   Result Value Ref Range    Hemoglobin A1c 5.1 4.8 - 5.6 %    Estimated average glucose 100    TSH RFX ON ABNORMAL TO FREE T4   Result Value Ref Range    TSH 1.590 0.450 - 4.500 uIU/mL   LIPID PANEL   Result Value Ref Range    Cholesterol, total 212 (H) 100 - 199 mg/dL    Triglyceride 102 0 - 149 mg/dL    HDL Cholesterol 68 >39 mg/dL    VLDL, calculated 20 5 - 40    LDL, calculated 124 (H) 0 - 99   VITAMIN D, 25 HYDROXY   Result Value Ref Range    VITAMIN D, 25-HYDROXY 22.0 (L) 30.0 - 100.0 ng/mL   CVD REPORT   Result Value Ref Range    INTERPRETATION Note          ASSESSMENT AND PLAN    ICD-10-CM ICD-9-CM    1. Routine general medical examination at a health care facility Z00.00 V70.0    2. Primary osteoarthritis of both hands M19.041 715.14     M19.042     3. Gastroesophageal reflux disease with esophagitis K21.0 530.11    4. Vitamin D deficiency E55.9 268.9 VITAMIN D, 25 HYDROXY   5. Pure hypercholesterolemia Q01.67 058.7 METABOLIC PANEL, BASIC      LIPID PANEL       Diagnoses and all orders for this visit:    1. Routine general medical examination at a health care facility    2. Primary osteoarthritis of both hands    3.  Gastroesophageal reflux disease with esophagitis  Continue Protonix and Zantac. 4. Vitamin D deficiency  Continue vitamin D 5000 units once daily. -     VITAMIN D, 25 HYDROXY; Future    5. Pure hypercholesterolemia  Counseled on low-fat diet. -     METABOLIC PANEL, BASIC; Future  -     LIPID PANEL; Future      Follow-up Disposition:  Return in about 4 months (around 10/29/2018), or if symptoms worsen or fail to improve, for Hyperlipdemia, vit D. Provided patient and/or family with advanced directive information and answered pertinent questions. Encouraged patient to provide a copy of advanced directive to the office when available. I have discussed the diagnosis with the patient and the intended plan as seen in the above orders. Patient is in agreement. The patient has received an after-visit summary and questions were answered concerning future plans. I have discussed medication side effects and warnings with the patient as well.

## 2018-10-01 ENCOUNTER — DOCUMENTATION ONLY (OUTPATIENT)
Dept: INTERNAL MEDICINE CLINIC | Facility: CLINIC | Age: 73
End: 2018-10-01

## 2018-10-08 ENCOUNTER — OFFICE VISIT (OUTPATIENT)
Dept: INTERNAL MEDICINE CLINIC | Facility: CLINIC | Age: 73
End: 2018-10-08

## 2018-10-08 VITALS
SYSTOLIC BLOOD PRESSURE: 128 MMHG | DIASTOLIC BLOOD PRESSURE: 75 MMHG | WEIGHT: 117.4 LBS | HEART RATE: 82 BPM | RESPIRATION RATE: 16 BRPM | TEMPERATURE: 97.6 F | OXYGEN SATURATION: 98 % | HEIGHT: 60 IN | BODY MASS INDEX: 23.05 KG/M2

## 2018-10-08 DIAGNOSIS — E55.9 VITAMIN D DEFICIENCY: Primary | ICD-10-CM

## 2018-10-08 DIAGNOSIS — Z01.419 VISIT FOR PELVIC EXAM: ICD-10-CM

## 2018-10-08 DIAGNOSIS — E78.00 PURE HYPERCHOLESTEROLEMIA: ICD-10-CM

## 2018-10-08 DIAGNOSIS — F33.1 MODERATE EPISODE OF RECURRENT MAJOR DEPRESSIVE DISORDER (HCC): ICD-10-CM

## 2018-10-08 DIAGNOSIS — F41.1 GENERALIZED ANXIETY DISORDER: ICD-10-CM

## 2018-10-08 RX ORDER — ESCITALOPRAM OXALATE 10 MG/1
TABLET ORAL
COMMUNITY
Start: 2018-08-27 | End: 2019-05-06 | Stop reason: SDUPTHER

## 2018-10-08 RX ORDER — ERGOCALCIFEROL 1.25 MG/1
50000 CAPSULE ORAL
Qty: 12 CAP | Refills: 0 | Status: SHIPPED | OUTPATIENT
Start: 2018-10-08 | End: 2019-01-15

## 2018-10-08 NOTE — PROGRESS NOTES
Chief Complaint   Patient presents with    Cholesterol Problem     hyperlipdemia           1. Have you been to the ER, urgent care clinic since your last visit? Hospitalized since your last visit? no    2. Have you seen or consulted any other health care providers outside of the [address] [place] [address] since your last visit? Include any pap smears or colon screening. N0    Refused flu and shingles vacine today.

## 2018-10-08 NOTE — PROGRESS NOTES
CC:   Chief Complaint   Patient presents with    Cholesterol Problem     hyperlipdemia       HISTORY OF PRESENT ILLNESS  Libra Narvaez is a 72 y.o. female. Presents for 4 month follow up evaluation. She has GERD, hypercholesterolemia, OA of hands, and depression with anxiety. She has no complaints today. Follows with Dr. Clakr Terrell for Psychiatry for depression and anxiety. Was started on Lexapro 20 mg daily about 1.5 months ago; helping her mood, tolerating with no side effects. Read that it could lower sodium level; wanted to see how her sodium level is. Denies SI/HI. Would like to see a therapist also. Reports she stopped taking Protonix and Zantac on her own about a month. Denies heartburn, problems swallowing, or epigastric abdominal pain. Had an EGD done by Dr. Denver Mckoy on 5/10/18. Showed gastritis and esophagitis.     Health Maintenance  Flu vaccine: declined                                           Pneumonia vaccine: PPSV-23 2/9/16, PCV-13 2/17/17                                                                                       Tetanus vaccine: Tdap 7/22/15     Shingles vaccine: declined                                                               Colonoscopy: 3/31/15  Mammogram: 12/4/17  Bone density: DEXA 9/29/15- osteopenia  Eye exam: Dr. Olga Morales, 12/17  Lipids: 10/2/18 (tot chol 193, ); 2/20/18 (tot chol 212, )  A1c: 2/20/18 (normal at 5.1%)  Advanced Directives: given information   End of Life: given information               ROS  A complete review of systems was performed and is negative except for those mentioned in the HPI.        Patient Active Problem List   Diagnosis Code    Moderate episode of recurrent major depressive disorder (HCC) F33.1    Anxiety disorder F41.9    Primary osteoarthritis of both hands M19.041, M19.042    Advance care planning Z71.89    Pure hypercholesterolemia E78.00    Vitamin D deficiency E55.9     Past Medical History: Diagnosis Date    Arthritis     Osteoarthritis    Excessive sweating     Occurs in summers; TSH/CMP/CBC normal    GERD (gastroesophageal reflux disease)     MRSA (methicillin resistant Staphylococcus aureus) infection 2007    MRSA + abd abscess; nasal swab negative after treatment     No Known Allergies    Current Outpatient Prescriptions   Medication Sig Dispense Refill    escitalopram oxalate (LEXAPRO) 10 mg tablet       cholecalciferol, VITAMIN D3, (VITAMIN D3) 5,000 unit tab tablet Take 1 Tab by mouth daily. 90 Tab 1         PHYSICAL EXAM  Visit Vitals    /75 (BP 1 Location: Left arm, BP Patient Position: Sitting)    Pulse 82    Temp 97.6 °F (36.4 °C) (Oral)    Resp 16    Ht 5' (1.524 m)    Wt 117 lb 6.4 oz (53.3 kg)    SpO2 98%    BMI 22.93 kg/m2       General: Well-developed and well-nourished, no distress. HEENT:  Head normocephalic/atraumatic, no scleral icterus  Neck: Supple. No carotid bruits, JVD, lymphadenopathy, or thyromegaly. Lungs:  Clear to ausculation bilaterally. Good air movement. Heart:  Regular rate and rhythm, normal S1 and S2, no murmur, gallop, or rub  Abdomen: Soft, non-distended, normal bowel sounds, no tenderness, no guarding, masses, rebound tenderness, or HSM. Extremities: No clubbing, cyanosis, or edema. 2+ dorsalis pedis pulses. Neurological: Alert and oriented. Psychiatric: Normal mood and affect.  Behavior is normal.     Results for orders placed or performed in visit on 44/37/32   METABOLIC PANEL, BASIC   Result Value Ref Range    Glucose 112 (H) 65 - 99 mg/dL    BUN 14 8 - 27 mg/dL    Creatinine 0.88 0.57 - 1.00 mg/dL    GFR est non-AA 66 >59 mL/min/1.73    GFR est AA 76 >59 mL/min/1.73    BUN/Creatinine ratio 16 12 - 28    Sodium 137 134 - 144 mmol/L    Potassium 4.7 3.5 - 5.2 mmol/L    Chloride 99 96 - 106 mmol/L    CO2 23 20 - 29 mmol/L    Calcium 9.7 8.7 - 10.3 mg/dL   LIPID PANEL   Result Value Ref Range    Cholesterol, total 193 100 - 199 mg/dL Triglyceride 74 0 - 149 mg/dL    HDL Cholesterol 59 >39 mg/dL    VLDL, calculated 15 5 - 40 mg/dL    LDL, calculated 119 (H) 0 - 99 mg/dL   VITAMIN D, 25 HYDROXY   Result Value Ref Range    VITAMIN D, 25-HYDROXY 24.4 (L) 30.0 - 100.0 ng/mL         ASSESSMENT AND PLAN    ICD-10-CM ICD-9-CM    1. Vitamin D deficiency E55.9 268.9    2. Pure hypercholesterolemia E78.00 272.0    3. Moderate episode of recurrent major depressive disorder (HCC) F33.1 296.32    4. Generalized anxiety disorder F41.1 300.02    5. Visit for pelvic exam Z01.419 V72.31 REFERRAL TO OBSTETRICS AND GYNECOLOGY     Discussed lab results from 10/2/18 with patient. Normal BMP except .  Tot chol 193, ; was tot chol 212,  in 2/18.  Vitamin D level low at 24.4; was 22.0 in 2/18. Diagnoses and all orders for this visit:    1. Vitamin D deficiency  Vitamin D level still low despite taking cholecalciferol 5000 units daily. Stop cholecalciferol and take ergocalciferol 50,000 units every 7 days for 3 months then restart cholecalciferol 5000 units daily. 2. Pure hypercholesterolemia  Improving by diet. 3. Moderate episode of recurrent major depressive disorder (HCC)  Controlled on Lexapro. 4. Generalized anxiety disorder  Controlled on Lexapro. Mailed list of places to call for a therapist and referral to Gyn to patient at patient's request.      Follow-up Disposition:  Return in about 4 months (around 2/8/2019), or if symptoms worsen or fail to improve, for Medicare [facility]. I have discussed the diagnosis with the patient and the intended plan as seen in the above orders. Patient is in agreement. The patient has received an after-visit summary and questions were answered concerning future plans. I have discussed medication side effects and warnings with the patient as well.

## 2018-12-11 ENCOUNTER — HOSPITAL ENCOUNTER (OUTPATIENT)
Dept: MAMMOGRAPHY | Age: 73
Discharge: HOME | End: 2018-12-11
Attending: INTERNAL MEDICINE
Payer: MEDICARE

## 2018-12-11 DIAGNOSIS — Z12.39 SCREENING BREAST EXAMINATION: ICD-10-CM

## 2018-12-11 PROCEDURE — 77063 BREAST TOMOSYNTHESIS BI: CPT

## 2019-01-15 ENCOUNTER — OFFICE VISIT (OUTPATIENT)
Dept: INTERNAL MEDICINE CLINIC | Facility: CLINIC | Age: 74
End: 2019-01-15

## 2019-01-15 VITALS
DIASTOLIC BLOOD PRESSURE: 69 MMHG | RESPIRATION RATE: 16 BRPM | HEIGHT: 60 IN | SYSTOLIC BLOOD PRESSURE: 121 MMHG | BODY MASS INDEX: 23.56 KG/M2 | TEMPERATURE: 98.4 F | OXYGEN SATURATION: 96 % | HEART RATE: 92 BPM | WEIGHT: 120 LBS

## 2019-01-15 DIAGNOSIS — F41.1 GENERALIZED ANXIETY DISORDER: ICD-10-CM

## 2019-01-15 DIAGNOSIS — Z13.5 SCREENING FOR GLAUCOMA: ICD-10-CM

## 2019-01-15 DIAGNOSIS — M25.511 CHRONIC PAIN OF BOTH SHOULDERS: ICD-10-CM

## 2019-01-15 DIAGNOSIS — G89.29 CHRONIC PAIN OF BOTH SHOULDERS: ICD-10-CM

## 2019-01-15 DIAGNOSIS — M85.89 OSTEOPENIA OF MULTIPLE SITES: ICD-10-CM

## 2019-01-15 DIAGNOSIS — H02.9 EYELID PROBLEM: ICD-10-CM

## 2019-01-15 DIAGNOSIS — M25.512 CHRONIC PAIN OF BOTH SHOULDERS: ICD-10-CM

## 2019-01-15 DIAGNOSIS — F33.1 MODERATE EPISODE OF RECURRENT MAJOR DEPRESSIVE DISORDER (HCC): ICD-10-CM

## 2019-01-15 DIAGNOSIS — E55.9 VITAMIN D DEFICIENCY: Primary | ICD-10-CM

## 2019-01-15 NOTE — PATIENT INSTRUCTIONS
Shoulder Stretches: Exercises Your Care Instructions Here are some examples of exercises for your shoulder. Start each exercise slowly. Ease off the exercise if you start to have pain. Your doctor or physical therapist will tell you when you can start these exercises and which ones will work best for you. How to do the exercises Shoulder stretch 1.  a doorway and place one arm against the door frame. Your elbow should be a little higher than your shoulder. 2. Relax your shoulders as you lean forward, allowing your chest and shoulder muscles to stretch. You can also turn your body slightly away from your arm to stretch the muscles even more. 3. Hold for 15 to 30 seconds. 4. Repeat 2 to 4 times with each arm. Shoulder and chest stretch 1. Shoulder and chest stretch 2. While sitting, relax your upper body so you slump slightly in your chair. 3. As you breathe in, straighten your back and open your arms out to the sides. 4. Gently pull your shoulder blades back and downward. 5. Hold for 15 to 30 seconds as your breathe normally. 6. Repeat 2 to 4 times. Overhead stretch 1. Reach up over your head with both arms. 2. Hold for 15 to 30 seconds. 3. Repeat 2 to 4 times. Follow-up care is a key part of your treatment and safety. Be sure to make and go to all appointments, and call your doctor if you are having problems. It's also a good idea to know your test results and keep a list of the medicines you take. Where can you learn more? Go to http://michael-carmela.info/. Enter S254 in the search box to learn more about \"Shoulder Stretches: Exercises. \" Current as of: November 29, 2017 Content Version: 11.8 © 2430-6942 [employer]. Care instructions adapted under license by [employer] (which disclaims liability or warranty for this information).  If you have questions about a medical condition or this instruction, always ask your healthcare professional. [address] any warranty or liability for your use of this information. Rotator Cuff: Exercises Your Care Instructions Here are some examples of typical rehabilitation exercises for your condition. Start each exercise slowly. Ease off the exercise if you start to have pain. Your doctor or physical therapist will tell you when you can start these exercises and which ones will work best for you. How to do the exercises Pendulum swing 1. Hold on to a table or the back of a chair with your good arm. Then bend forward a little and let your sore arm hang straight down. This exercise does not use the arm muscles. Rather, use your legs and your hips to create movement that makes your arm swing freely. 2. Use the movement from your hips and legs to guide the slightly swinging arm back and forth like a pendulum (or elephant trunk). Then guide it in circles that start small (about the size of a dinner plate). Make the circles a bit larger each day, as your pain allows. 3. Do this exercise for 5 minutes, 5 to 7 times each day. 4. As you have less pain, try bending over a little farther to do this exercise. This will increase the amount of movement at your shoulder. Posterior stretching exercise 1. Hold the elbow of your injured arm with your other hand. 2. Use your hand to pull your injured arm gently up and across your body. You will feel a gentle stretch across the back of your injured shoulder. 3. Hold for at least 15 to 30 seconds. Then slowly lower your arm. 4. Repeat 2 to 4 times. Up-the-back stretch 1. Put your hand in your back pocket. Let it rest there to stretch your shoulder. 2. With your other hand, hold your injured arm (palm outward) behind your back by the wrist. Pull your arm up gently to stretch your shoulder. 3. Next, put a towel over your other shoulder.  Put the hand of your injured arm behind your back. Now hold the back end of the towel. With the other hand, hold the front end of the towel in front of your body. Pull gently on the front end of the towel. This will bring your hand farther up your back to stretch your shoulder. Overhead stretch 1. Standing about an arm's length away, grasp onto a solid surface. You could use a countertop, a doorknob, or the back of a sturdy chair. 2. With your knees slightly bent, bend forward with your arms straight. Lower your upper body, and let your shoulders stretch. 3. As your shoulders are able to stretch farther, you may need to take a step or two backward. 4. Hold for at least 15 to 30 seconds. Then stand up and relax. If you had stepped back during your stretch, step forward so you can keep your hands on the solid surface. 5. Repeat 2 to 4 times. Shoulder flexion (lying down) 1. Lie on your back, holding a wand with both hands. Your palms should face down as you hold the wand. 2. Keeping your elbows straight, slowly raise your arms over your head. Raise them until you feel a stretch in your shoulders, upper back, and chest. 
3. Hold for 15 to 30 seconds. 4. Repeat 2 to 4 times. Shoulder rotation (lying down) 1. Lie on your back. Hold a wand with both hands with your elbows bent and palms up. 2. Keep your elbows close to your body, and move the wand across your body toward the sore arm. 3. Hold for 8 to 12 seconds. 4. Repeat 2 to 4 times. Wall climbing (to the side) 1. Stand with your side to a wall so that your fingers can just touch it at an angle about 30 degrees toward the front of your body. 2. Walk the fingers of your injured arm up the wall as high as pain permits. Try not to shrug your shoulder up toward your ear as you move your arm up. 3. Hold that position for a count of at least 15 to 20. 
4. Walk your fingers back down to the starting position. 5. Repeat at least 2 to 4 times. Try to reach higher each time. Wall climbing (to the front) 1. Face a wall, and stand so your fingers can just touch it. 2. Keeping your shoulder down, walk the fingers of your injured arm up the wall as high as pain permits. (Don't shrug your shoulder up toward your ear.) 3. Hold your arm in that position for at least 15 to 30 seconds. 4. Slowly walk your fingers back down to where you started. 5. Repeat at least 2 to 4 times. Try to reach higher each time. Shoulder blade squeeze 1. Stand with your arms at your sides, and squeeze your shoulder blades together. Do not raise your shoulders up as you squeeze. 2. Hold 6 seconds. 3. Repeat 8 to 12 times. Scapular exercise: Arm reach 1. Lie flat on your back. This exercise is a very slight motion that starts with your arms raised (elbows straight, arms straight). 2. From this position, reach higher toward the medina or ceiling. Keep your elbows straight. All motion should be from your shoulder blade only. 3. Relax your arms back to where you started. 4. Repeat 8 to 12 times. Arm raise to the side 1. Slowly raise your injured arm to the side, with your thumb facing up. Raise your arm 60 degrees at the most (shoulder level is 90 degrees). 2. Hold the position for 3 to 5 seconds. Then lower your arm back to your side. If you need to, bring your \"good\" arm across your body and place it under the elbow as you lower your injured arm. Use your good arm to keep your injured arm from dropping down too fast. 
3. Repeat 8 to 12 times. 4. When you first start out, don't hold any extra weight in your hand. As you get stronger, you may use a 1-pound to 2-pound dumbbell or a small can of food. Shoulder flexor and extensor exercise 1. Push forward (flex): Stand facing a wall or doorjamb, about 6 inches or less back. Hold your injured arm against your body.  Make a closed fist with your thumb on top. Then gently push your hand forward into the wall with about 25% to 50% of your strength. Don't let your body move backward as you push. Hold for about 6 seconds. Relax for a few seconds. Repeat 8 to 12 times. 2. Push backward (extend): Stand with your back flat against a wall. Your upper arm should be against the wall, with your elbow bent 90 degrees (your hand straight ahead). Push your elbow gently back against the wall with about 25% to 50% of your strength. Don't let your body move forward as you push. Hold for about 6 seconds. Relax for a few seconds. Repeat 8 to 12 times. Scapular exercise: Wall push-ups 1. Stand facing a wall, about 12 inches to 18 inches away. 2. Place your hands on the wall at shoulder height. 3. Slowly bend your elbows and bring your face to the wall. Keep your back and hips straight. 4. Push back to where you started. 5. Repeat 8 to 12 times. 6. When you can do this exercise against a wall comfortably, you can try it against a counter. You can then slowly progress to the end of a couch, then to a sturdy chair, and finally to the floor. Scapular exercise: Retraction 1. Put the band around a solid object at about waist level. (A bedpost will work well.) Each hand should hold an end of the band. 2. With your elbows at your sides and bent to 90 degrees, pull the band back. Your shoulder blades should move toward each other. Then move your arms back where you started. 3. Repeat 8 to 12 times. 4. If you have good range of motion in your shoulders, try this exercise with your arms lifted out to the sides. Keep your elbows at a 90-degree angle. Raise the elastic band up to about shoulder level. Pull the band back to move your shoulder blades toward each other. Then move your arms back where you started. Internal rotator strengthening exercise 1. Start by tying a piece of elastic exercise material to a doorknob.  You can use surgical tubing or Thera-Band. 2. Stand or sit with your shoulder relaxed and your elbow bent 90 degrees. Your upper arm should rest comfortably against your side. Squeeze a rolled towel between your elbow and your body for comfort. This will help keep your arm at your side. 3. Hold one end of the elastic band in the hand of the painful arm. 4. Slowly rotate your forearm toward your body until it touches your belly. Slowly move it back to where you started. 5. Keep your elbow and upper arm firmly tucked against the towel roll or at your side. 6. Repeat 8 to 12 times. External rotator strengthening exercise 1. Start by tying a piece of elastic exercise material to a doorknob. You can use surgical tubing or Thera-Band. (You may also hold one end of the band in each hand.) 2. Stand or sit with your shoulder relaxed and your elbow bent 90 degrees. Your upper arm should rest comfortably against your side. Squeeze a rolled towel between your elbow and your body for comfort. This will help keep your arm at your side. 3. Hold one end of the elastic band with the hand of the painful arm. 4. Start with your forearm across your belly. Slowly rotate the forearm out away from your body. Keep your elbow and upper arm tucked against the towel roll or the side of your body until you begin to feel tightness in your shoulder. Slowly move your arm back to where you started. 5. Repeat 8 to 12 times. Follow-up care is a key part of your treatment and safety. Be sure to make and go to all appointments, and call your doctor if you are having problems. It's also a good idea to know your test results and keep a list of the medicines you take. Where can you learn more? Go to http://michael-carmela.info/. Enter Franciso Aschoff in the search box to learn more about \"Rotator Cuff: Exercises. \" Current as of: November 29, 2017 Content Version: 11.8 © 2775-5929 [employer]. Care instructions adapted under license by [employer] (which disclaims liability or warranty for this information). If you have questions about a medical condition or this instruction, always ask your healthcare professional. [address] any warranty or liability for your use of this information.

## 2019-01-15 NOTE — PROGRESS NOTES
CC:  
Chief Complaint Patient presents with  Vitamin D Deficiency HISTORY OF PRESENT ILLNESS Ronda Pace is a 72 y.o. female. Presents for 4 month follow up evaluation. She has hypercholesterolemia, vitamin D deficiency, osteopenia, OA of hands, and depression with anxiety. Today she complains of: 1) stiffness and burning pain at both shoulders when she moves them back. 2) Sensation of bilateral eyelid tightness for the past 2 months. Would like to see an ophthalmologist.  Completed 3 month course of ergocalciferol a few days ago. Has OTC cholecalciferol 5,000 units but has not taken over the 3 months. 
  
Follows with Dr. Olesya Goldsmith for Psychiatry for depression and anxiety. On Lexapro 20 mg daily; helping her mood, tolerating with no side effects.   
   
Health Maintenance Flu vaccine: declined                                          
Pneumonia vaccine: PPSV-23 2/9/16, PCV-13 2/17/17                                                                                      
Tetanus vaccine: Tdap 7/22/15    
Shingles vaccine: declined                                                              
Colonoscopy: 3/31/15 Mammogram: 12/4/17 Bone density: DEXA 9/29/15- osteopenia, due for repeat DEXA Eye exam: Dr. Barbie Weiner, 12/17 Lipids: 10/2/18 (tot chol 193, ); 2/20/18 (tot chol 212, ) A1c: 2/20/18 (normal at 5.1%) Advanced Directives: given information End of Life: given information        
     
ROS A complete review of systems was performed and is negative except for those mentioned in the HPI. 
   
Patient Active Problem List  
Diagnosis Code  Moderate episode of recurrent major depressive disorder (HCC) F33.1  Anxiety disorder F41.9  Primary osteoarthritis of both hands M19.041, [id number]  Advance care planning Z71.89  Pure hypercholesterolemia E78.00  Vitamin D deficiency E55.9 Past Medical History:  
Diagnosis Date  Arthritis Osteoarthritis  Excessive sweating Occurs in summers; TSH/CMP/CBC normal  
 GERD (gastroesophageal reflux disease)  MRSA (methicillin resistant Staphylococcus aureus) infection 2007 MRSA + abd abscess; nasal swab negative after treatment No Known Allergies Current Outpatient Medications Medication Sig Dispense Refill  escitalopram oxalate (LEXAPRO) 10 mg tablet  cholecalciferol, VITAMIN D3, (VITAMIN D3) 5,000 unit tab tablet Take 1 Tab by mouth daily. 90 Tab 1 PHYSICAL EXAM 
Visit Vitals /69 (BP 1 Location: Left arm, BP Patient Position: Sitting) Pulse 92 Temp 98.4 °F (36.9 °C) (Oral) Resp 16 Ht 5' (1.524 m) Wt 120 lb (54.4 kg) SpO2 96% BMI 23.44 kg/m² General: Well-developed and well-nourished, no distress. HEENT:  Head normocephalic/atraumatic, no scleral icterus. No eyelid swelling, masses, or other abnormalities seen. Lungs:  Clear to ausculation bilaterally. Good air movement. Heart:  Regular rate and rhythm, normal S1 and S2, no murmur, gallop, or rub Extremities: No clubbing, cyanosis, or edema. Neurological: Alert and oriented. Psychiatric: Normal mood and affect. Behavior is normal.  
 
 
 
ASSESSMENT AND PLAN 
  ICD-10-CM ICD-9-CM 1. Vitamin D deficiency E55.9 268.9 VITAMIN D, 25 HYDROXY 2. Eyelid problem H02.9 374.9 REFERRAL TO OPHTHALMOLOGY 3. Chronic pain of both shoulders M25.511 719.41   
 G89.29 338.29   
 M25.512    
4. Moderate episode of recurrent major depressive disorder (HCC) F33.1 296.32   
5. Generalized anxiety disorder F41.1 300.02   
6. Screening for glaucoma Z13.5 V80.1 REFERRAL TO OPHTHALMOLOGY 7. Osteopenia of multiple sites M85.89 733.90 Diagnoses and all orders for this visit: 1. Vitamin D deficiency If vitamin D level is at least 25, will plan on advising her to restart cholecalciferol. 
-     VITAMIN D, 25 HYDROXY 2. Eyelid problem Suspect her eyelid tightness is due to dry eyes. Reports her twin sister has dry eyes. -     REFERRAL TO OPHTHALMOLOGY 3. Chronic pain of both shoulders Given handout on shoulder exercises. 4. Moderate episode of recurrent major depressive disorder (HCC) Continue Lexapro. 5. Generalized anxiety disorder Continue Lexapro. 6. Screening for glaucoma 
-     REFERRAL TO OPHTHALMOLOGY 7. Osteopenia of multiple sites Plan on referral for repeat DEXA at next clinic visit. Follow-up Disposition: 
Return in about 2 months (around 3/15/2019), or if symptoms worsen or fail to improve, for Medicare AWV. I have discussed the diagnosis with the patient and the intended plan as seen in the above orders. Patient is in agreement. The patient has received an after-visit summary and questions were answered concerning future plans. I have discussed medication side effects and warnings with the patient as well.

## 2019-01-15 NOTE — PROGRESS NOTES
Kisha Torres  Identified pt with two pt identifiers(name and ). Chief Complaint Patient presents with  Vitamin D Deficiency 1. Have you been to the ER, urgent care clinic since your last visit? Hospitalized since your last visit? NO 
 
2. Have you seen or consulted any other health care providers outside of the [address] [place] [address] since your last visit? Include any pap smears or colon screening. NO Today's provider has been notified of reason for visit, vitals and flowsheets obtained on patients. Patient received paperwork for advance directive during previous visit but has not completed at this time Reviewed record In preparation for visit, huddled with provider and have obtained necessary documentation There are no preventive care reminders to display for this patient. Wt Readings from Last 3 Encounters:  
01/15/19 120 lb (54.4 kg) 10/08/18 117 lb 6.4 oz (53.3 kg) 18 124 lb 6.4 oz (56.4 kg) Temp Readings from Last 3 Encounters:  
01/15/19 98.4 °F (36.9 °C) (Oral) 10/08/18 97.6 °F (36.4 °C) (Oral) 18 99 °F (37.2 °C) (Oral) BP Readings from Last 3 Encounters:  
01/15/19 121/69  
10/08/18 128/75  
18 132/72 Pulse Readings from Last 3 Encounters:  
01/15/19 92  
10/08/18 82  
18 77 Vitals:  
 01/15/19 1408 BP: 121/69 Pulse: 92 Resp: 16 Temp: 98.4 °F (36.9 °C) TempSrc: Oral  
SpO2: 96% Weight: 120 lb (54.4 kg) Height: 5' (1.524 m) PainSc:   0 - No pain Learning Assessment: 
:  
 
Learning Assessment 2015 PRIMARY LEARNER Patient BARRIERS PRIMARY LEARNER NONE  
CO-LEARNER CAREGIVER No  
CO-LEARNER HIGHEST LEVEL OF EDUCATION GRADUATED HIGH SCHOOL OR GED PRIMARY LANGUAGE ENGLISH  
LEARNER PREFERENCE PRIMARY LISTENING  
ANSWERED BY patient RELATIONSHIP SELF Depression Screening: 
:  
 
PHQ over the last two weeks 1/15/2019 Little interest or pleasure in doing things Not at all Feeling down, depressed, irritable, or hopeless Not at all Total Score PHQ 2 0 Trouble falling or staying asleep, or sleeping too much - Feeling tired or having little energy - Poor appetite, weight loss, or overeating - Feeling bad about yourself - or that you are a failure or have let yourself or your family down - Trouble concentrating on things such as school, work, reading, or watching TV - Moving or speaking so slowly that other people could have noticed; or the opposite being so fidgety that others notice - Thoughts of being better off dead, or hurting yourself in some way - How difficult have these problems made it for you to do your work, take care of your home and get along with others - Fall Risk Assessment: 
:  
 
Fall Risk Assessment, last 12 mths 6/29/2018 Able to walk? Yes Fall in past 12 months? No  
Fall with injury? -  
Number of falls in past 12 months - Fall Risk Score -  
 
 
Abuse Screening: 
:  
 
Abuse Screening Questionnaire 6/29/2018 11/23/2015 Do you ever feel afraid of your partner? Elray Artist Are you in a relationship with someone who physically or mentally threatens you? Elray Artist Is it safe for you to go home? Y Y  
 
 
ADL Screening: 
:  
 
ADL Assessment 6/29/2018 Feeding yourself No Help Needed Getting from bed to chair No Help Needed Getting dressed No Help Needed Bathing or showering No Help Needed Walk across the room (includes cane/walker) No Help Needed Using the telphone No Help Needed Taking your medications No Help Needed Preparing meals No Help Needed Managing money (expenses/bills) No Help Needed Moderately strenuous housework (laundry) No Help Needed Shopping for personal items (toiletries/medicines) No Help Needed Shopping for groceries No Help Needed Driving No Help Needed Climbing a flight of stairs No Help Needed Getting to places beyond walking distances No Help Needed Medication reconciliation up to date and corrected with patient at this time.

## 2019-01-16 LAB — 25(OH)D3+25(OH)D2 SERPL-MCNC: 38.4 NG/ML (ref 30–100)

## 2019-01-16 NOTE — PROGRESS NOTES
Attempted to reach patient by phone. Unable to reach patient or leave message requesting a return call. A letter is being sent.

## 2019-01-16 NOTE — PROGRESS NOTES
Your vitamin D level was normal.  This means you can go back to taking your OTC vitamin D tablets (cholecalciferol 5000 units daily).

## 2019-05-06 ENCOUNTER — TELEPHONE (OUTPATIENT)
Dept: INTERNAL MEDICINE CLINIC | Facility: CLINIC | Age: 74
End: 2019-05-06

## 2019-05-06 NOTE — TELEPHONE ENCOUNTER
----- Message from Eriberto Peters sent at 5/6/2019  9:43 AM EDT -----  Regarding: Dr. Robbins/telephone/ refill   Pt stated that she sent over a request for two medication to be refill and only need one which will be \" Lexapro 20 mg \" and NOT \" Topamax\" .  Callback: [phone]

## 2019-05-06 NOTE — TELEPHONE ENCOUNTER
Pt called wanting to speak with Dr Radha Vora. Pt stated she received a list from dr Radha oVra of psychiatrist but none are exception new pt's per pt. Pt stated she is running out of her Topamax and Lexpro and wanted to know if Dr Radha Vora could fill medication.

## 2019-05-07 RX ORDER — ESCITALOPRAM OXALATE 10 MG/1
10 TABLET ORAL DAILY
Qty: 30 TAB | Refills: 3 | Status: SHIPPED | OUTPATIENT
Start: 2019-05-07 | End: 2019-06-14

## 2019-06-14 ENCOUNTER — OFFICE VISIT (OUTPATIENT)
Dept: INTERNAL MEDICINE CLINIC | Facility: CLINIC | Age: 74
End: 2019-06-14

## 2019-06-14 VITALS
WEIGHT: 121.8 LBS | DIASTOLIC BLOOD PRESSURE: 64 MMHG | RESPIRATION RATE: 14 BRPM | HEART RATE: 74 BPM | SYSTOLIC BLOOD PRESSURE: 106 MMHG | TEMPERATURE: 98.1 F | BODY MASS INDEX: 23.91 KG/M2 | OXYGEN SATURATION: 97 % | HEIGHT: 60 IN

## 2019-06-14 DIAGNOSIS — R10.2 VAGINAL PAIN: ICD-10-CM

## 2019-06-14 DIAGNOSIS — Z00.00 MEDICARE ANNUAL WELLNESS VISIT, SUBSEQUENT: Primary | ICD-10-CM

## 2019-06-14 DIAGNOSIS — N30.01 ACUTE CYSTITIS WITH HEMATURIA: ICD-10-CM

## 2019-06-14 DIAGNOSIS — R73.02 IGT (IMPAIRED GLUCOSE TOLERANCE): ICD-10-CM

## 2019-06-14 DIAGNOSIS — M85.89 OSTEOPENIA OF MULTIPLE SITES: ICD-10-CM

## 2019-06-14 DIAGNOSIS — F33.1 MODERATE EPISODE OF RECURRENT MAJOR DEPRESSIVE DISORDER (HCC): ICD-10-CM

## 2019-06-14 DIAGNOSIS — R10.31 RIGHT LOWER QUADRANT ABDOMINAL PAIN: ICD-10-CM

## 2019-06-14 DIAGNOSIS — R39.15 URINARY URGENCY: ICD-10-CM

## 2019-06-14 DIAGNOSIS — E78.00 PURE HYPERCHOLESTEROLEMIA: ICD-10-CM

## 2019-06-14 DIAGNOSIS — E55.9 VITAMIN D DEFICIENCY: ICD-10-CM

## 2019-06-14 LAB
BILIRUB UR QL STRIP: NEGATIVE
GLUCOSE UR-MCNC: NEGATIVE MG/DL
KETONES P FAST UR STRIP-MCNC: NEGATIVE MG/DL
PH UR STRIP: 6 [PH] (ref 4.6–8)
PROT UR QL STRIP: POSITIVE
SP GR UR STRIP: 1.02 (ref 1–1.03)
UA UROBILINOGEN AMB POC: NORMAL (ref 0.2–1)
URINALYSIS CLARITY POC: NORMAL
URINALYSIS COLOR POC: NORMAL
URINE BLOOD POC: NORMAL
URINE LEUKOCYTES POC: NORMAL
URINE NITRITES POC: NEGATIVE

## 2019-06-14 RX ORDER — TOPIRAMATE 25 MG/1
25 TABLET ORAL
COMMUNITY
Start: 2019-06-11 | End: 2021-12-08 | Stop reason: ALTCHOICE

## 2019-06-14 RX ORDER — SULFAMETHOXAZOLE AND TRIMETHOPRIM 800; 160 MG/1; MG/1
1 TABLET ORAL 2 TIMES DAILY
Qty: 14 TAB | Refills: 0 | Status: SHIPPED | OUTPATIENT
Start: 2019-06-14 | End: 2019-06-21

## 2019-06-14 NOTE — PATIENT INSTRUCTIONS
Medicare Wellness Visit, Female     The best way to live healthy is to have a lifestyle where you eat a well-balanced diet, exercise regularly, limit alcohol use, and quit all forms of tobacco/nicotine, if applicable. Regular preventive services are another way to keep healthy. Preventive services (vaccines, screening tests, monitoring & exams) can help personalize your care plan, which helps you manage your own care. Screening tests can find health problems at the earliest stages, when they are easiest to treat. Ankur Talbot follows the current, evidence-based guidelines published by the [facility] ([facility]) when recommending preventive services for our patients. Because we follow these guidelines, sometimes recommendations change over time as research supports it. (For example, mammograms used to be recommended annually. Even though Medicare will still pay for an annual mammogram, the newer guidelines recommend a mammogram every two years for women of average risk.)  Of course, you and your doctor may decide to screen more often for some diseases, based on your risk and your health status. Preventive services for you include:  - Medicare offers their members a free annual wellness visit, which is time for you and your primary care provider to discuss and plan for your preventive service needs. Take advantage of this benefit every year!  -All adults over the age of 72 should receive the recommended pneumonia vaccines. Current USPSTF guidelines recommend a series of two vaccines for the best pneumonia protection.   -All adults should have a flu vaccine yearly and a tetanus vaccine every 10 years. All adults age 61 and older should receive a shingles vaccine once in their lifetime.    -A bone mass density test is recommended when a woman turns 65 to screen for osteoporosis. This test is only recommended one time, as a screening.  Some providers will use this same test as a disease monitoring tool if you already have osteoporosis. -All adults age 38-68 who are overweight should have a diabetes screening test once every three years.   -Other screening tests and preventive services for persons with diabetes include: an eye exam to screen for diabetic retinopathy, a kidney function test, a foot exam, and stricter control over your cholesterol.   -Cardiovascular screening for adults with routine risk involves an electrocardiogram (ECG) at intervals determined by your doctor.   -Colorectal cancer screenings should be done for adults age 54-65 with no increased risk factors for colorectal cancer. There are a number of acceptable methods of screening for this type of cancer. Each test has its own benefits and drawbacks. Discuss with your doctor what is most appropriate for you during your annual wellness visit. The different tests include: colonoscopy (considered the best screening method), a fecal occult blood test, a fecal DNA test, and sigmoidoscopy. -Breast cancer screenings are recommended every other year for women of normal risk, age 54-69.  -Cervical cancer screenings for women over age 72 are only recommended with certain risk factors.   -All adults born between [facility] should be screened once for Hepatitis C.      Here is a list of your current Health Maintenance items (your personalized list of preventive services) with a due date:  Health Maintenance Due   Topic Date Due    Annual Well Visit  02/21/2019

## 2019-06-14 NOTE — PROGRESS NOTES
Kisha Torres  Identified pt with two pt identifiers(name and ). Chief Complaint   Patient presents with    Side Pain     Room // R side pain x        1. Have you been to the ER, urgent care clinic since your last visit? Hospitalized since your last visit? NO    2. Have you seen or consulted any other health care providers outside of the [address] [place] [address] since your last visit? Include any pap smears or colon screening. NO      Provider notified of reason for visit, vitals and flowsheets obtained on patients. Patient received paperwork for advance directive during previous visit but has not completed at this time     Reviewed record In preparation for visit, huddled with provider and have obtained necessary documentation      Health Maintenance Due   Topic    MEDICARE YEARLY EXAM        Wt Readings from Last 3 Encounters:   01/15/19 120 lb (54.4 kg)   10/08/18 117 lb 6.4 oz (53.3 kg)   18 124 lb 6.4 oz (56.4 kg)     Temp Readings from Last 3 Encounters:   01/15/19 98.4 °F (36.9 °C) (Oral)   10/08/18 97.6 °F (36.4 °C) (Oral)   18 99 °F (37.2 °C) (Oral)     BP Readings from Last 3 Encounters:   01/15/19 121/69   10/08/18 128/75   18 132/72     Pulse Readings from Last 3 Encounters:   01/15/19 92   10/08/18 82   18 77     There were no vitals filed for this visit.       Learning Assessment:  :     Learning Assessment 2015   PRIMARY LEARNER Patient   BARRIERS PRIMARY LEARNER NONE   CO-LEARNER CAREGIVER No   CO-LEARNER HIGHEST LEVEL OF EDUCATION GRADUATED HIGH SCHOOL OR GED   PRIMARY LANGUAGE ENGLISH   LEARNER PREFERENCE PRIMARY LISTENING   ANSWERED BY patient   RELATIONSHIP SELF       Depression Screening:  :     3 most recent PHQ Screens 1/15/2019   Little interest or pleasure in doing things Not at all   Feeling down, depressed, irritable, or hopeless Not at all   Total Score PHQ 2 0   Trouble falling or staying asleep, or sleeping too much -   Feeling tired or having little energy -   Poor appetite, weight loss, or overeating -   Feeling bad about yourself - or that you are a failure or have let yourself or your family down -   Trouble concentrating on things such as school, work, reading, or watching TV -   Moving or speaking so slowly that other people could have noticed; or the opposite being so fidgety that others notice -   Thoughts of being better off dead, or hurting yourself in some way -   How difficult have these problems made it for you to do your work, take care of your home and get along with others -       Fall Risk Assessment:  :     Fall Risk Assessment, last 12 mths 6/29/2018   Able to walk? Yes   Fall in past 12 months? No   Fall with injury? -   Number of falls in past 12 months -   Fall Risk Score -       Abuse Screening:  :     Abuse Screening Questionnaire 6/29/2018 11/23/2015   Do you ever feel afraid of your partner? N N   Are you in a relationship with someone who physically or mentally threatens you? N N   Is it safe for you to go home? Y Y       ADL Screening:  :     ADL Assessment 6/29/2018   Feeding yourself No Help Needed   Getting from bed to chair No Help Needed   Getting dressed No Help Needed   Bathing or showering No Help Needed   Walk across the room (includes cane/walker) No Help Needed   Using the telphone No Help Needed   Taking your medications No Help Needed   Preparing meals No Help Needed   Managing money (expenses/bills) No Help Needed   Moderately strenuous housework (laundry) No Help Needed   Shopping for personal items (toiletries/medicines) No Help Needed   Shopping for groceries No Help Needed   Driving No Help Needed   Climbing a flight of stairs No Help Needed   Getting to places beyond walking distances No Help Needed         Medication reconciliation up to date and corrected with patient at this time.

## 2019-06-14 NOTE — ACP (ADVANCE CARE PLANNING)
Advance Care Planning (ACP) Provider Conversation Snapshot    Date of ACP Conversation: 06/14/19  Persons included in Conversation:  patient  Length of ACP Conversation in minutes:  <16 minutes (Non-Billable)    Authorized Decision Maker (if patient is incapable of making informed decisions):    This person is:   Healthcare Agent/Medical Power of  under Advance Directive            For Patients with Decision Making Capacity:   Values/Goals: Exploration of values, goals, and preferences if recovery is not expected, even with continued medical treatment in the event of:  Imminent death  Severe, permanent brain injury    Conversation Outcomes / Follow-Up Plan:   Recommended completion of Advance Directive form after review of ACP materials and conversation with prospective healthcare agent
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
## 2019-06-16 LAB
25(OH)D3+25(OH)D2 SERPL-MCNC: 28.8 NG/ML (ref 30–100)
ALBUMIN SERPL-MCNC: 4.5 G/DL (ref 3.5–4.8)
ALBUMIN/GLOB SERPL: 2 {RATIO} (ref 1.2–2.2)
ALP SERPL-CCNC: 101 IU/L (ref 39–117)
ALT SERPL-CCNC: 15 IU/L (ref 0–32)
AST SERPL-CCNC: 19 IU/L (ref 0–40)
BASOPHILS # BLD AUTO: 0 X10E3/UL (ref 0–0.2)
BASOPHILS NFR BLD AUTO: 0 %
BILIRUB SERPL-MCNC: <0.2 MG/DL (ref 0–1.2)
BUN SERPL-MCNC: 24 MG/DL (ref 8–27)
BUN/CREAT SERPL: 26 (ref 12–28)
C TRACH RRNA SPEC QL NAA+PROBE: NEGATIVE
CALCIUM SERPL-MCNC: 9.5 MG/DL (ref 8.7–10.3)
CHLORIDE SERPL-SCNC: 106 MMOL/L (ref 96–106)
CHOLEST SERPL-MCNC: 232 MG/DL (ref 100–199)
CO2 SERPL-SCNC: 21 MMOL/L (ref 20–29)
CREAT SERPL-MCNC: 0.93 MG/DL (ref 0.57–1)
EOSINOPHIL # BLD AUTO: 0.1 X10E3/UL (ref 0–0.4)
EOSINOPHIL NFR BLD AUTO: 2 %
ERYTHROCYTE [DISTWIDTH] IN BLOOD BY AUTOMATED COUNT: 14 % (ref 12.3–15.4)
EST. AVERAGE GLUCOSE BLD GHB EST-MCNC: 117 MG/DL
GLOBULIN SER CALC-MCNC: 2.2 G/DL (ref 1.5–4.5)
GLUCOSE SERPL-MCNC: 87 MG/DL (ref 65–99)
HBA1C MFR BLD: 5.7 % (ref 4.8–5.6)
HCT VFR BLD AUTO: 41.2 % (ref 34–46.6)
HDLC SERPL-MCNC: 77 MG/DL
HGB BLD-MCNC: 13.4 G/DL (ref 11.1–15.9)
IMM GRANULOCYTES # BLD AUTO: 0 X10E3/UL (ref 0–0.1)
IMM GRANULOCYTES NFR BLD AUTO: 0 %
LDLC SERPL CALC-MCNC: 129 MG/DL (ref 0–99)
LYMPHOCYTES # BLD AUTO: 1.6 X10E3/UL (ref 0.7–3.1)
LYMPHOCYTES NFR BLD AUTO: 22 %
MCH RBC QN AUTO: 29.8 PG (ref 26.6–33)
MCHC RBC AUTO-ENTMCNC: 32.5 G/DL (ref 31.5–35.7)
MCV RBC AUTO: 92 FL (ref 79–97)
MONOCYTES # BLD AUTO: 0.4 X10E3/UL (ref 0.1–0.9)
MONOCYTES NFR BLD AUTO: 5 %
N GONORRHOEA RRNA SPEC QL NAA+PROBE: NEGATIVE
NEUTROPHILS # BLD AUTO: 5.2 X10E3/UL (ref 1.4–7)
NEUTROPHILS NFR BLD AUTO: 71 %
PLATELET # BLD AUTO: 257 X10E3/UL (ref 150–450)
POTASSIUM SERPL-SCNC: 4.1 MMOL/L (ref 3.5–5.2)
PROT SERPL-MCNC: 6.7 G/DL (ref 6–8.5)
RBC # BLD AUTO: 4.49 X10E6/UL (ref 3.77–5.28)
SODIUM SERPL-SCNC: 142 MMOL/L (ref 134–144)
T VAGINALIS RRNA VAG QL NAA+PROBE: NEGATIVE
TRIGL SERPL-MCNC: 131 MG/DL (ref 0–149)
TSH SERPL DL<=0.005 MIU/L-ACNC: 1.36 UIU/ML (ref 0.45–4.5)
VLDLC SERPL CALC-MCNC: 26 MG/DL (ref 5–40)
WBC # BLD AUTO: 7.4 X10E3/UL (ref 3.4–10.8)

## 2019-06-18 PROBLEM — R73.03 PREDIABETES: Status: ACTIVE | Noted: 2019-06-18

## 2019-06-18 PROBLEM — E78.2 MIXED HYPERLIPIDEMIA: Status: ACTIVE | Noted: 2019-06-18

## 2019-06-18 PROBLEM — E78.2 MIXED HYPERLIPIDEMIA: Status: RESOLVED | Noted: 2019-06-18 | Resolved: 2019-06-18

## 2019-06-18 NOTE — PROGRESS NOTES
Your labs showed normal kidney and liver tests, blood counts, and thyroid. Your total cholesterol was high at 232, normal is less than 200. You also have mild prediabetes and mildly low vitamin D level. Your urine STD tests all returned negative. To improve on prediabetes and lower your cholesterol, work on diet and exercise. For diet, decrease sweets, soft drinks, juices, white bread, white rice, potatoes, crackers, and potato chips. Also decrease fried foods, fast foods, beef, and pork.

## 2019-07-30 ENCOUNTER — OFFICE VISIT (OUTPATIENT)
Dept: INTERNAL MEDICINE CLINIC | Facility: CLINIC | Age: 74
End: 2019-07-30

## 2019-07-30 VITALS
TEMPERATURE: 98.1 F | SYSTOLIC BLOOD PRESSURE: 116 MMHG | HEART RATE: 72 BPM | WEIGHT: 118.6 LBS | BODY MASS INDEX: 23.29 KG/M2 | RESPIRATION RATE: 16 BRPM | DIASTOLIC BLOOD PRESSURE: 76 MMHG | OXYGEN SATURATION: 98 % | HEIGHT: 60 IN

## 2019-07-30 DIAGNOSIS — M54.50 CHRONIC RIGHT-SIDED LOW BACK PAIN WITHOUT SCIATICA: ICD-10-CM

## 2019-07-30 DIAGNOSIS — H92.09 EAR ACHE: ICD-10-CM

## 2019-07-30 DIAGNOSIS — R30.0 BURNING WITH URINATION: Primary | ICD-10-CM

## 2019-07-30 DIAGNOSIS — G89.29 CHRONIC RIGHT-SIDED LOW BACK PAIN WITHOUT SCIATICA: ICD-10-CM

## 2019-07-30 LAB
BILIRUB UR QL STRIP: NEGATIVE
GLUCOSE UR-MCNC: NEGATIVE MG/DL
KETONES P FAST UR STRIP-MCNC: NEGATIVE MG/DL
PH UR STRIP: 6 [PH] (ref 4.6–8)
PROT UR QL STRIP: NEGATIVE
SP GR UR STRIP: 1.02 (ref 1–1.03)
UA UROBILINOGEN AMB POC: NORMAL (ref 0.2–1)
URINALYSIS CLARITY POC: CLEAR
URINALYSIS COLOR POC: YELLOW
URINE BLOOD POC: NEGATIVE
URINE LEUKOCYTES POC: NORMAL
URINE NITRITES POC: NEGATIVE

## 2019-07-30 NOTE — PATIENT INSTRUCTIONS
Earache: Care Instructions  Your Care Instructions    Even though infection is a common cause of ear pain, not all ear pain means an infection. If you have ear pain and don't have an infection, it could be because of a jaw problem, such as temporomandibular joint (TMJ) pain. Or it could be because of a neck problem. When ear discomfort or pain is mild or comes and goes without other symptoms, home treatment may be all you need. Follow-up care is a key part of your treatment and safety. Be sure to make and go to all appointments, and call your doctor if you are having problems. It's also a good idea to know your test results and keep a list of the medicines you take. How can you care for yourself at home? · Apply heat on the ear to ease pain. To apply heat, put a warm water bottle, a heating pad set on low, or a warm cloth on your ear. Do not go to sleep with a heating pad on your skin. · Take an over-the-counter pain medicine, such as acetaminophen (Tylenol), ibuprofen (Advil, Motrin), or naproxen (Aleve). Be safe with medicines. Read and follow all instructions on the label. · Do not take two or more pain medicines at the same time unless the doctor told you to. Many pain medicines have acetaminophen, which is Tylenol. Too much acetaminophen (Tylenol) can be harmful. · Never insert anything, such as a cotton swab or a kateryna pin, into the ear. When should you call for help? Call your doctor now or seek immediate medical care if:    · You have new or worse symptoms of infection, such as:  ? Increased pain, swelling, warmth, or redness. ? Red streaks leading from the area. ? Pus draining from the area. ? A fever.    Watch closely for changes in your health, and be sure to contact your doctor if:    · You have new or worse discharge coming from the ear.     · You do not get better as expected. Where can you learn more? Go to http://michael-carmela.info/.   Enter P519 in the search box to learn more about \"Earache: Care Instructions. \"  Current as of: October 21, 2018  Content Version: 12.1  © 7399-0682 [employer]. Care instructions adapted under license by [employer] (which disclaims liability or warranty for this information). If you have questions about a medical condition or this instruction, always ask your healthcare professional. [address] any warranty or liability for your use of this information. Painful Urination (Dysuria): Care Instructions  Your Care Instructions  Burning pain with urination (dysuria) is a common symptom of a urinary tract infection or other urinary problems. The bladder may become inflamed. This can cause pain when the bladder fills and empties. You may also feel pain if the tube that carries urine from the bladder to the outside of the body (urethra) gets irritated or infected. Sexually transmitted infections (STIs) also may cause pain when you urinate. Sometimes the pain can be caused by things other than an infection. The urethra can be irritated by soaps, perfumes, or foreign objects in the urethra. Kidney stones can cause pain when they pass through the urethra. The cause may be hard to find. You may need tests. Treatment for painful urination depends on the cause. Follow-up care is a key part of your treatment and safety. Be sure to make and go to all appointments, and call your doctor if you are having problems. It's also a good idea to know your test results and keep a list of the medicines you take. How can you care for yourself at home? · Drink extra water for the next day or two. This will help make the urine less concentrated. (If you have kidney, heart, or liver disease and have to limit fluids, talk with your doctor before you increase the amount of fluids you drink.)  · Avoid drinks that are carbonated or have caffeine. They can irritate the bladder. · Urinate often.  Try to empty your bladder each time. For women:  · Urinate right after you have sex. · After going to the bathroom, wipe from front to back. · Avoid douches, bubble baths, and feminine hygiene sprays. And avoid other feminine hygiene products that have deodorants. When should you call for help? Call your doctor now or seek immediate medical care if:    · You have new symptoms, such as fever, nausea, or vomiting.     · You have new or worse symptoms of a urinary problem. For example:  ? You have blood or pus in your urine. ? You have chills or body aches. ? It hurts worse to urinate. ? You have groin or belly pain. ? You have pain in your back just below your rib cage (the flank area).    Watch closely for changes in your health, and be sure to contact your doctor if you have any problems. Where can you learn more? Go to http://michael-carmela.info/. Enter H549 in the search box to learn more about \"Painful Urination (Dysuria): Care Instructions. \"  Current as of: December 19, 2018  Content Version: 12.1  © 4170-2262 [employer]. Care instructions adapted under license by [employer] (which disclaims liability or warranty for this information). If you have questions about a medical condition or this instruction, always ask your healthcare professional. [address] any warranty or liability for your use of this information.

## 2019-07-30 NOTE — PROGRESS NOTES
Aleksandra John is a 69 y.o. female    Patient would like to be referred to a urologist at [address]   Patient presents with    Back Pain    Urinary Pain     chronic over 6 months       1. Have you been to the ER, urgent care clinic since your last visit? Hospitalized since your last visit? No  M  2. Have you seen or consulted any other health care providers outside of the [address] [place] [address] since your last visit? Include any pap smears or colon screening. No      Visit Vitals  /76 (BP 1 Location: Right arm, BP Patient Position: Sitting)   Pulse 72   Temp 98.1 °F (36.7 °C) (Oral)   Resp 16   Ht 5' (1.524 m)   Wt 118 lb 9.6 oz (53.8 kg)   SpO2 98%   BMI 23.16 kg/m²           There are no preventive care reminders to display for this patient. Medication Reconciliation completed, changes noted.   Please  Update medication list.

## 2019-07-30 NOTE — PROGRESS NOTES
CC:   Chief Complaint   Patient presents with    Back Pain    Urinary Pain     chronic over 6 months       HISTORY OF PRESENT ILLNESS  Narendra Johnson is a 69 y.o. female. Patient complains of burning sensation when she first starts urinating for the past 6 months and right-sided low back pain present for years but worse over the past few months. Has strong odor to her urine. Is concerned that she has a problem with her right kidney. She complains of bilateral ear congestion and pain. Was treated for UTI last month. Not enough urine to send for urine culture. She has hypercholesterolemia, vitamin D deficiency, osteopenia, OA of hands, and bipolar disorder. Patient Active Problem List   Diagnosis Code    Moderate episode of recurrent major depressive disorder ([facility] [address] F33.1    Anxiety disorder F41.9    Primary osteoarthritis of both hands M19.041, M19.042    Hypercholesterolemia E78.00    Vitamin D deficiency E55.9    Osteopenia of multiple sites M85.89    Prediabetes R73.03     Past Medical History:   Diagnosis Date    Arthritis     Osteoarthritis    Excessive sweating     Occurs in summers; TSH/CMP/CBC normal    GERD (gastroesophageal reflux disease)     MRSA (methicillin resistant Staphylococcus aureus) infection 2007    MRSA + abd abscess; nasal swab negative after treatment     No Known Allergies    Current Outpatient Medications   Medication Sig Dispense Refill    topiramate (TOPAMAX) 25 mg tablet 75 mg. PHYSICAL EXAM  Visit Vitals  /76 (BP 1 Location: Right arm, BP Patient Position: Sitting)   Pulse 72   Temp 98.1 °F (36.7 °C) (Oral)   Resp 16   Ht 5' (1.524 m)   Wt 118 lb 9.6 oz (53.8 kg)   SpO2 98%   BMI 23.16 kg/m²       General: Well-developed and well-nourished, no distress. HEENT:  Head normocephalic/atraumatic, no scleral icterus. Normal TM's and ear canals bilaterally. Lungs:  Clear to ausculation bilaterally. Good air movement.   Heart:  Regular rate and rhythm, normal S1 and S2, no murmur, gallop, or rub  Back: No CVA tenderness bilaterally. Abdomen: Soft, non-distended, normal bowel sounds, no tenderness, no guarding, masses, rebound tenderness, or HSM. Extremities: No clubbing, cyanosis, or edema. Neurological: Alert and oriented. Psychiatric: Normal mood and affect. Behavior is normal.     Results for orders placed or performed in visit on 07/30/19   AMB POC URINALYSIS DIP STICK AUTO W/O MICRO   Result Value Ref Range    Color (UA POC) Yellow     Clarity (UA POC) Clear     Glucose (UA POC) Negative Negative    Bilirubin (UA POC) Negative Negative    Ketones (UA POC) Negative Negative    Specific gravity (UA POC) 1.025 1.001 - 1.035    Blood (UA POC) Negative Negative    pH (UA POC) 6.0 4.6 - 8.0    Protein (UA POC) Negative Negative    Urobilinogen (UA POC) 0.2 mg/dL 0.2 - 1    Nitrites (UA POC) Negative Negative    Leukocyte esterase (UA POC) Trace Negative           ASSESSMENT AND PLAN    ICD-10-CM ICD-9-CM    1. Burning with urination R30.0 788.1 AMB POC URINALYSIS DIP STICK AUTO W/O MICRO      REFERRAL TO UROLOGY   2. Chronic right-sided low back pain without sciatica M54.5 724.2 AMB POC URINALYSIS DIP STICK AUTO W/O MICRO    G89.29 338.29 REFERRAL TO UROLOGY   3. Ear ache H92.09 388.70      Diagnoses and all orders for this visit:    1. Burning with urination  No evidence of UTI today. Suspect atrophic vaginitis. -     AMB POC URINALYSIS DIP STICK AUTO W/O MICRO  -     REFERRAL TO UROLOGY (Dr. Becky Feliz, Urogynecology)    2. Chronic right-sided low back pain without sciatica  -     AMB POC URINALYSIS DIP STICK AUTO W/O MICRO  -     REFERRAL TO UROLOGY    3. Ear ache      Follow-up and Dispositions    · Return in about 4 months (around 11/30/2019), or if symptoms worsen or fail to improve, for Back pain, dysuria. I have discussed the diagnosis with the patient and the intended plan as seen in the above orders. Patient is in agreement.   The patient has received an after-visit summary and questions were answered concerning future plans. I have discussed medication side effects and warnings with the patient as well.

## 2019-08-12 ENCOUNTER — ANESTHESIA (OUTPATIENT)
Dept: ENDOSCOPY | Age: 74
End: 2019-08-12
Payer: MEDICARE

## 2019-08-12 ENCOUNTER — HOSPITAL ENCOUNTER (OUTPATIENT)
Age: 74
Setting detail: OUTPATIENT SURGERY
Discharge: HOME | End: 2019-08-12
Attending: INTERNAL MEDICINE | Admitting: INTERNAL MEDICINE
Payer: MEDICARE

## 2019-08-12 ENCOUNTER — ANESTHESIA EVENT (OUTPATIENT)
Dept: ENDOSCOPY | Age: 74
End: 2019-08-12
Payer: MEDICARE

## 2019-08-12 VITALS
HEART RATE: 64 BPM | RESPIRATION RATE: 10 BRPM | HEIGHT: 60 IN | OXYGEN SATURATION: 99 % | BODY MASS INDEX: 23.04 KG/M2 | WEIGHT: 117.38 LBS | DIASTOLIC BLOOD PRESSURE: 73 MMHG | TEMPERATURE: 98.1 F | SYSTOLIC BLOOD PRESSURE: 115 MMHG

## 2019-08-12 PROCEDURE — 74011250636 HC RX REV CODE- 250/636: Performed by: NURSE ANESTHETIST, CERTIFIED REGISTERED

## 2019-08-12 PROCEDURE — 76040000007: Performed by: INTERNAL MEDICINE

## 2019-08-12 PROCEDURE — 76060000032 HC ANESTHESIA 0.5 TO 1 HR: Performed by: INTERNAL MEDICINE

## 2019-08-12 PROCEDURE — 74011250636 HC RX REV CODE- 250/636: Performed by: INTERNAL MEDICINE

## 2019-08-12 PROCEDURE — 77030039825 HC MSK NSL PAP SUPERNO2VA VYRM -B: Performed by: ANESTHESIOLOGY

## 2019-08-12 RX ORDER — SODIUM CHLORIDE 9 MG/ML
INJECTION, SOLUTION INTRAVENOUS
Status: DISCONTINUED | OUTPATIENT
Start: 2019-08-12 | End: 2019-08-12 | Stop reason: HOSPADM

## 2019-08-12 RX ORDER — FENTANYL CITRATE 50 UG/ML
100 INJECTION, SOLUTION INTRAMUSCULAR; INTRAVENOUS
Status: DISCONTINUED | OUTPATIENT
Start: 2019-08-12 | End: 2019-08-12 | Stop reason: HOSPADM

## 2019-08-12 RX ORDER — MIDAZOLAM HYDROCHLORIDE 1 MG/ML
.25-1 INJECTION, SOLUTION INTRAMUSCULAR; INTRAVENOUS
Status: DISCONTINUED | OUTPATIENT
Start: 2019-08-12 | End: 2019-08-12 | Stop reason: HOSPADM

## 2019-08-12 RX ORDER — SODIUM CHLORIDE 9 MG/ML
50 INJECTION, SOLUTION INTRAVENOUS CONTINUOUS
Status: DISCONTINUED | OUTPATIENT
Start: 2019-08-12 | End: 2019-08-12 | Stop reason: HOSPADM

## 2019-08-12 RX ORDER — LIDOCAINE HYDROCHLORIDE 20 MG/ML
INJECTION, SOLUTION EPIDURAL; INFILTRATION; INTRACAUDAL; PERINEURAL AS NEEDED
Status: DISCONTINUED | OUTPATIENT
Start: 2019-08-12 | End: 2019-08-12 | Stop reason: HOSPADM

## 2019-08-12 RX ORDER — SODIUM CHLORIDE 0.9 % (FLUSH) 0.9 %
5-40 SYRINGE (ML) INJECTION EVERY 8 HOURS
Status: DISCONTINUED | OUTPATIENT
Start: 2019-08-12 | End: 2019-08-12 | Stop reason: HOSPADM

## 2019-08-12 RX ORDER — NALOXONE HYDROCHLORIDE 0.4 MG/ML
0.4 INJECTION, SOLUTION INTRAMUSCULAR; INTRAVENOUS; SUBCUTANEOUS
Status: DISCONTINUED | OUTPATIENT
Start: 2019-08-12 | End: 2019-08-12 | Stop reason: HOSPADM

## 2019-08-12 RX ORDER — SODIUM CHLORIDE 0.9 % (FLUSH) 0.9 %
5-40 SYRINGE (ML) INJECTION AS NEEDED
Status: DISCONTINUED | OUTPATIENT
Start: 2019-08-12 | End: 2019-08-12 | Stop reason: HOSPADM

## 2019-08-12 RX ORDER — DEXTROMETHORPHAN/PSEUDOEPHED 2.5-7.5/.8
1.2 DROPS ORAL
Status: DISCONTINUED | OUTPATIENT
Start: 2019-08-12 | End: 2019-08-12 | Stop reason: HOSPADM

## 2019-08-12 RX ORDER — PROPOFOL 10 MG/ML
INJECTION, EMULSION INTRAVENOUS AS NEEDED
Status: DISCONTINUED | OUTPATIENT
Start: 2019-08-12 | End: 2019-08-12 | Stop reason: HOSPADM

## 2019-08-12 RX ORDER — EPINEPHRINE 0.1 MG/ML
1 INJECTION INTRACARDIAC; INTRAVENOUS
Status: DISCONTINUED | OUTPATIENT
Start: 2019-08-12 | End: 2019-08-12 | Stop reason: HOSPADM

## 2019-08-12 RX ORDER — ATROPINE SULFATE 0.1 MG/ML
0.5 INJECTION INTRAVENOUS
Status: DISCONTINUED | OUTPATIENT
Start: 2019-08-12 | End: 2019-08-12 | Stop reason: HOSPADM

## 2019-08-12 RX ORDER — FLUMAZENIL 0.1 MG/ML
0.2 INJECTION INTRAVENOUS
Status: DISCONTINUED | OUTPATIENT
Start: 2019-08-12 | End: 2019-08-12 | Stop reason: HOSPADM

## 2019-08-12 RX ADMIN — PROPOFOL 25 MG: 10 INJECTION, EMULSION INTRAVENOUS at 16:49

## 2019-08-12 RX ADMIN — PROPOFOL 50 MG: 10 INJECTION, EMULSION INTRAVENOUS at 16:50

## 2019-08-12 RX ADMIN — PROPOFOL 50 MG: 10 INJECTION, EMULSION INTRAVENOUS at 16:39

## 2019-08-12 RX ADMIN — SODIUM CHLORIDE: 900 INJECTION, SOLUTION INTRAVENOUS at 16:00

## 2019-08-12 RX ADMIN — PROPOFOL 25 MG: 10 INJECTION, EMULSION INTRAVENOUS at 16:41

## 2019-08-12 RX ADMIN — PROPOFOL 50 MG: 10 INJECTION, EMULSION INTRAVENOUS at 16:40

## 2019-08-12 RX ADMIN — SODIUM CHLORIDE 50 ML/HR: 900 INJECTION, SOLUTION INTRAVENOUS at 16:24

## 2019-08-12 RX ADMIN — LIDOCAINE HYDROCHLORIDE 40 MG: 20 INJECTION, SOLUTION EPIDURAL; INFILTRATION; INTRACAUDAL; PERINEURAL at 16:39

## 2019-08-12 NOTE — PROGRESS NOTES
procedure stopped patient had an airway issues, 1650 procedure resumed when oxygen saturation returned

## 2019-08-12 NOTE — PROGRESS NOTES
Endoscope was pre-cleaned at the bedside immediately following procedure by pascual agarwal tech    Received report from Anesthesia-see anesthesia record for vital signs and medications administered during procedure. Resumed care for vital signs and medications. Monik James

## 2019-08-12 NOTE — ROUTINE PROCESS
[address] 1945 
[id number] Situation: 
Verbal report received from: Jasbir Geno Procedure: Procedure(s): ESOPHAGOGASTRODUODENOSCOPY (EGD) Background: 
 
Preoperative diagnosis: CHEST PAIN, EPIGASTRIC PAIN, GERD, HIATAL HERNIA Postoperative diagnosis: hiatal hernia, grade A esophagitis :  Dr. Ana Rosa Piper Assistant(s): Endoscopy Technician-1: Sara Vaz Endoscopy RN-1: Paul Dyson Specimens: * No specimens in log * H. Pylori  no Assessment: 
Intra-procedure medications Anesthesia gave intra-procedure sedation and medications, see anesthesia flow sheet yes Intravenous fluids: NS@ Ruff Andrew Vital signs stable Abdominal assessment: round and soft Recommendation: 
Discharge patient per MD order. Family Permission to share finding with family or friend yes

## 2019-08-12 NOTE — PROCEDURES
[address]  [address] [place]  [place], [address]   [phone]                            NAME:  Ivan Brady   :      MRN:   [id number]     Date/Time:  2019 4:56 PM    Esophagogastroduodenoscopy (EGD) Procedure Note    :  Analilia Hassan MD    Referring Provider:  Nichole Valiente MD    Anethesia/Sedation:  MAC anesthesia    Procedure Details   After infomed consent was obtained for the procedure, with all risks and benefits of procedure explained the patient was taken to the endoscopy suite and placed in the left lateral decubitus position. Following sequential administration of sedation as per above, the gastroscope was inserted into the mouth and advanced under direct vision to second portion of the duodenum. A careful inspection was made as the gastroscope was withdrawn, including a retroflexed view of the proximal stomach; findings and interventions are described below. Findings:  Esophagus:normal esophageal mucosa, grade A esophagitis at GE junction, GE junction at 32 cm from the incisors, large (5 cm) sliding hiatal hernia  Stomach:normal   Duodenum/jejunum:normal    Interventions:  none           Specimens Removed:  * No specimens in log *    Complications: None. EBL:  None    Impression:    See Postoperative diagnosis above    Recommendations:   - Refer for barium swallow and esophageal manometry. - Follow up in GI clinic.   - Resume normal medications.     Discharge disposition:  Home in the company of  when able to ambulate    Analilia Hassan MD

## 2019-08-12 NOTE — DISCHARGE INSTRUCTIONS
[address]  [address] [facility] [address] [facility] [address] [place], [address]  [phone]                                  Bunny Truong  [id number]  1945    It was my pleasure seeing you for your procedure. You will also receive a summary report with the findings from this procedure and any further recommendations. If you had polyps removed or biopsies taken during your procedure, you will receive a separate letter from me within the next 2 weeks. If you don't receive this letter or if you have any questions, please call my office [phone]. Please take note of the post procedure instructions listed below. Best Wishes,    Dr. Ratliff Hallmark    These instructions give you information on caring for yourself after your procedure. Call your doctor if you have any problems or questions after your procedure. HOME CARE  · Walk if you have belly cramping or gas. Walking will help get rid of the air and reduce the bloated feeling in your belly (abdomen). · Your IV site (where you received drugs) may be tender to touch. Place warm towels on the site; keep your arm up on two pillows if you have any swelling or soreness in the area. · You may shower. ACTIVITY:  · Take frequent rest periods and move at a slower pace for the next 24 hours. .  · You may resume your regular activity tomorrow if you are feeling back to normal.  · Do not drive or ride a bicycle for at least 24 hours (because of the medicine (anesthesia) used during the test). · Do not sign any important legal documents or use or operate any machinery for 24 hours  · Do not take sleeping medicines/nerve drugs for 24 hours unless the doctor tells you. · You can return to work/school tomorrow unless otherwise instructed. NUTRITION:  · Drink plenty of fluids to keep your pee (urine) clear or pale yellow  · Begin with a light meal and progress to your normal diet.  Heavy or fried foods are harder to digest and may make you feel sick to your stomach (nauseated). · Once you are feeling back to normal, you may resume your normal diet as instructed by your doctor. · Avoid alcoholic beverages for 24 hours or as instructed. IF YOU HAD BIOPSIES TAKEN OR POLYPS REMOVED DURING THE PROCEDURE:  · For the next 7 days, avoid all non-steroidal antiinflammatory medications such as Ibuprofen, Motrin, Advil, Alleve, Melyssa-seltzer, Goody's powder, BC powder. · If you do not have an heart condition that requires you to take a daily aspirin, you should avoid taking aspirin for 7 days. · Eat a soft diet for 24 hours. · Monitor your stools for any blood or dark black, tar-like, stools as this may be a sign of bleeding and if you see any blood, notify your doctor immediately. GET HELP RIGHT AWAY AND SEEK IMMEDIATE MEDICAL CARE IF:  · You have more than a spotting of blood in your stool. · You pass clumps of tissue (blood clots) or fill the toilet with blood. · Your belly is painfully swollen or puffy (abdominal distention). · You throw up (vomit). · You have a fever. · You have redness, pain or swelling at the IV site that last greater than two days. · You have abdominal pain or discomfort that is severe or gets worse throughout the day. Post-procedure diagnosis:  hiatal hernia, grade A esophagitis    Findings:  Esophagus:normal esophageal mucosa, grade A esophagitis at GE junction, GE junction at 32 cm from the incisors, large (5 cm) sliding hiatal hernia  Stomach:normal   Duodenum/jejunum:normal    Recommendations:   - Refer for barium swallow and esophageal manometry. - Follow up in GI clinic.   - Resume normal medications.

## 2019-08-12 NOTE — H&P
[address] [facility] [address]  [address] [facility] [address], [address]  [phone]                                History and Physical     NAME: Celestine Crook   :  1945   MRN:  [id number]     HPI:  The patient was seen and examined. Past Surgical History:   Procedure Laterality Date    BREAST SURGERY PROCEDURE UNLISTED Bilateral 1974    AUGMENTATION    HX  SECTION  1971    HX COLONOSCOPY  2016    HX ENDOSCOPY  05/10/2018    Dr. Ronaldo Woodruff; gastritis and esophagitis    HX HYSTERECTOMY      Age [address] [place] for endometriosis    IMPLANT BREAST SILICONE/EQ Bilateral 8995    1st set was 43 yrs ago, replaced in .  REVISE BREAST RECONSTRUCTION  2014    Bilateral breast implant exchange due to  cosmetic defect of previous implants     Past Medical History:   Diagnosis Date    Arthritis     Osteoarthritis    Excessive sweating     Occurs in summers; TSH/CMP/CBC normal    GERD (gastroesophageal reflux disease)     MRSA (methicillin resistant Staphylococcus aureus) infection     MRSA + abd abscess; nasal swab negative after treatment     Social History     Tobacco Use    Smoking status: Never Smoker    Smokeless tobacco: Never Used   Substance Use Topics    Alcohol use: No    Drug use: No     No Known Allergies  Family History   Problem Relation Age of Onset    Diabetes Father     Cancer Father         PROSTATE CANCER    Arthritis-osteo Mother     Arthritis-osteo Sister     Anesth Problems Neg Hx      No current facility-administered medications for this encounter. Current Outpatient Medications   Medication Sig    topiramate (TOPAMAX) 25 mg tablet 75 mg. PHYSICAL EXAM:  General: WD, WN. Alert, cooperative, no acute distress    HEENT: NC, Atraumatic. PERRLA, EOMI. Anicteric sclerae. Lungs:  CTA Bilaterally. No Wheezing/Rhonchi/Rales.   Heart:  Regular  rhythm,  No murmur, No Rubs, No Gallops  Abdomen: Soft, Non distended, Non tender.  +Bowel sounds, no HSM  Extremities: No c/c/e  Neurologic:  CN 2-12 gi, Alert and oriented X 3. No acute neurological distress   Psych:   Good insight. Not anxious nor agitated. The heart, lungs and mental status were satisfactory for the administration of MAC sedation and for the procedure.       Mallampati score: 2       Assessment:   · Dysphagia, hiatal hernia    Plan:   · Endoscopic procedure :egd  · MAC sedation

## 2019-08-12 NOTE — ANESTHESIA POSTPROCEDURE EVALUATION
Procedure(s):  ESOPHAGOGASTRODUODENOSCOPY (EGD). MAC    Anesthesia Post Evaluation        Patient location during evaluation: PACU  Patient participation: complete - patient participated  Level of consciousness: awake  Pain management: adequate  Airway patency: patent  Anesthetic complications: no  Cardiovascular status: hemodynamically stable  Respiratory status: acceptable  Hydration status: acceptable  Comments: I have seen and evaluated the patient. The patient is ready for PACU discharge.   [address], DO                         Vitals Value Taken Time   /74 8/12/2019  5:07 PM   Temp     Pulse 71 8/12/2019  5:07 PM   Resp 17 8/12/2019  5:07 PM   SpO2 98 % 8/12/2019  5:07 PM

## 2019-08-27 ENCOUNTER — HOSPITAL ENCOUNTER (OUTPATIENT)
Dept: GENERAL RADIOLOGY | Age: 74
Discharge: HOME | End: 2019-08-27
Attending: INTERNAL MEDICINE
Payer: MEDICARE

## 2019-08-27 DIAGNOSIS — K21.9 ESOPHAGEAL REFLUX: ICD-10-CM

## 2019-08-27 DIAGNOSIS — R10.13 ABDOMINAL PAIN, EPIGASTRIC: ICD-10-CM

## 2019-08-27 DIAGNOSIS — S27.809A RUPTURE OF DIAPHRAGM: ICD-10-CM

## 2019-08-27 PROCEDURE — 74220 X-RAY XM ESOPHAGUS 1CNTRST: CPT

## 2019-09-03 ENCOUNTER — HOSPITAL ENCOUNTER (OUTPATIENT)
Age: 74
Setting detail: OUTPATIENT SURGERY
Discharge: HOME | End: 2019-09-03
Attending: SPECIALIST | Admitting: SPECIALIST
Payer: MEDICARE

## 2019-09-03 VITALS
DIASTOLIC BLOOD PRESSURE: 69 MMHG | BODY MASS INDEX: 22.97 KG/M2 | HEIGHT: 60 IN | OXYGEN SATURATION: 100 % | RESPIRATION RATE: 15 BRPM | WEIGHT: 117 LBS | SYSTOLIC BLOOD PRESSURE: 140 MMHG | HEART RATE: 58 BPM

## 2019-09-03 PROCEDURE — 76040000007: Performed by: SPECIALIST

## 2019-09-03 PROCEDURE — 74011000250 HC RX REV CODE- 250: Performed by: SPECIALIST

## 2019-09-03 RX ORDER — LIDOCAINE HYDROCHLORIDE 20 MG/ML
JELLY TOPICAL ONCE
Status: COMPLETED | OUTPATIENT
Start: 2019-09-03 | End: 2019-09-03

## 2019-09-03 RX ADMIN — LIDOCAINE HYDROCHLORIDE 5 ML: 20 JELLY TOPICAL at 10:27

## 2019-09-03 NOTE — DISCHARGE INSTRUCTIONS
Lottie Cedeno  [id number]  1945      MANOMETRY DISCHARGE INSTRUCTION    You may resume your regular diet as tolerated. You may resume your normal daily activities. If you develop a sore throat- throat lozenges or warm salt water gargles will help. Call your Physician if you have any complications or questions. Care[employer] Activation    Thank you for requesting access to [employer]. Please follow the instructions below to securely access and download your online medical record. [employer] allows you to send messages to your doctor, view your test results, renew your prescriptions, schedule appointments, and more. How Do I Sign Up? 1. In your internet browser, go to [email or web address]  2. Click on the First Time User? Click Here link in the Sign In box. You will be redirect to the New Member Sign Up page. 3. Enter your [employer] Access Code exactly as it appears below. You will not need to use this code after youve completed the sign-up process. If you do not sign up before the expiration date, you must request a new code. [employer] Access Code: [email or web address]  Expires: 2019  9:03 AM (This is the date your [employer] access code will )    4. Enter the last four digits of your Social Security Number (xxxx) and Date of Birth (mm/dd/yyyy) as indicated and click Submit. You will be taken to the next sign-up page. 5. Create a [employer] ID. This will be your [employer] login ID and cannot be changed, so think of one that is secure and easy to remember. 6. Create a [employer] password. You can change your password at any time. 7. Enter your Password Reset Question and Answer. This can be used at a later time if you forget your password. 8. Enter your e-mail address. You will receive e-mail notification when new information is available in [address] 9. Click Sign Up. You can now view and download portions of your medical record.   10. Click the Download Summary menu link to download a portable copy of your medical information. Additional Information    If you have questions, please visit the Frequently Asked Questions section of the [employer] website at [email or web address] Remember, [employer] is NOT to be used for urgent needs. For medical emergencies, dial 911.

## 2019-09-03 NOTE — PROGRESS NOTES
.40cc viscous lidocaine inhaled into left nare per MD orders. Probe inserted into  left nare without difficulty. Pt tolerated procedure well.

## 2019-09-16 NOTE — PROCEDURES
Esophageal High-Resolution Manometry Report Summary     Date HRM Performed: 9/3/19  Referring physician:self   Indication:     PROCEDURE:   A solid-state recording assembly comprised of 36 circumferential pressure sensors spaced at 1 cm intervals was placed transnasally into the esophagus and positioned through the EGJ. The patient was positioned in the supine position. Mean EGJ junction pressures were measured during a 30-second baseline recording during which the patient was instructed to minimize swallowing. Contractility and pressurization pattern was assessed during ten 5-ml water swallows in the supine position at least 20-30 seconds apart, to generate the [facility] Classification diagnosis. Moreover, multiple rapid swallows (5 2-mL water swallows <3 seconds apart) were performed. ? RESULTS:   Assembly traversed diaphragm? Yes   Location of proximal border of LES: 35.0 cm  EGJ morphology: Type II  LES-CD separation: 3 cm   End-expiratory LESP: 20.6 mm Hg (nl 4.8-32.0 mm Hg)  Mid-respiratory LESP: 24.1 mm Hg (nl 13-43 mm Hg)  Mean IRP: 3.8 mm Hg (nl <15 mm Hg)  Mean DCI: 1058 mm Hg-cm-sec (nl 450-8000)  Swallows: 10/10 intact   9/10 with complete transit seen on impedance. MRS with augmentation normal.   ?  IMPRESSION:  EGJ: The EGJ morphology is consistent with type II and is normotensive. There is evidence of normal EGJ outflow   pressures based on IRP. Esophageal body: The contractile pattern is consistent with normal peristalsis/contractility. These findings are consistent with a Dorr Classification 3.0 diagnosis of normal motility.    ?

## 2019-09-26 PROBLEM — K21.9 GASTROESOPHAGEAL REFLUX DISEASE WITHOUT ESOPHAGITIS: Status: ACTIVE | Noted: 2019-09-26

## 2019-11-01 ENCOUNTER — OFFICE VISIT (OUTPATIENT)
Dept: INTERNAL MEDICINE CLINIC | Facility: CLINIC | Age: 74
End: 2019-11-01

## 2019-11-01 VITALS
SYSTOLIC BLOOD PRESSURE: 117 MMHG | DIASTOLIC BLOOD PRESSURE: 70 MMHG | HEIGHT: 60 IN | OXYGEN SATURATION: 97 % | RESPIRATION RATE: 16 BRPM | WEIGHT: 112 LBS | BODY MASS INDEX: 21.99 KG/M2 | HEART RATE: 76 BPM | TEMPERATURE: 98 F

## 2019-11-01 DIAGNOSIS — Z78.0 ASYMPTOMATIC POSTMENOPAUSAL STATUS: ICD-10-CM

## 2019-11-01 DIAGNOSIS — L84 CORN OR CALLUS: ICD-10-CM

## 2019-11-01 DIAGNOSIS — Z13.820 SCREENING FOR OSTEOPOROSIS: ICD-10-CM

## 2019-11-01 DIAGNOSIS — F31.62 BIPOLAR DISORDER, CURRENT EPISODE MIXED, MODERATE (HCC): ICD-10-CM

## 2019-11-01 DIAGNOSIS — L30.9 DERMATITIS: Primary | ICD-10-CM

## 2019-11-01 RX ORDER — SERTRALINE HYDROCHLORIDE 100 MG/1
200 TABLET, FILM COATED ORAL
COMMUNITY
Start: 2019-10-30

## 2019-11-01 RX ORDER — OMEPRAZOLE 40 MG/1
CAPSULE, DELAYED RELEASE ORAL
Refills: 3 | COMMUNITY
Start: 2019-09-19 | End: 2019-11-01

## 2019-11-01 NOTE — PATIENT INSTRUCTIONS
Corns and Calluses: Care Instructions Your Care Instructions Corns and calluses are areas of thick, hard, dead skin. They form to protect your skin from injury. Corns usually form where toes rub together. Calluses often form on the hands or feet. They may form wherever the skin rubs against something, such as shoes. In most cases, you can take steps at home to care for a corn or callus. Follow-up care is a key part of your treatment and safety. Be sure to make and go to all appointments, and call your doctor if you are having problems. It's also a good idea to know your test results and keep a list of the medicines you take. How can you care for yourself at home? · Wear shoes and other footwear that fit correctly and are roomy. This will reduce rubbing and give corns or calluses time to heal. 
· Use protective pads, such as moleskin, to cushion the callus or corn. · Soften the corn or callus and remove the dead skin by using over-the-counter products such as salicylic acid. If you have a condition that causes problems with blood flow (such as peripheral vascular disease) or loss of feeling in your feet (such as diabetes), talk to your doctor before you try any home treatment. · Soak your corn or callus in warm water, and then use a pumice stone to rub dead skin away. · Wash your feet regularly, and rub lotion into your feet while they are still moist. Dry skin can cause a callus to crack and bleed. · Never cut the corn or callus yourself, especially if you have problems with blood flow to your legs or feet. When should you call for help? Call your doctor now or seek immediate medical care if: 
  · You have signs of infection, such as: 
? Increased pain, swelling, warmth, or redness around the corn or callus. ? Red streaks leading from the corn or callus. ? Pus draining from the corn or callus. ? A fever.  
 Watch closely for changes in your health, and be sure to contact your doctor if:   · You do not get better as expected. Where can you learn more? Go to http://michael-carmela.info/. Enter R244 in the search box to learn more about \"Corns and Calluses: Care Instructions. \" Current as of: April 1, 2019 Content Version: 12.2 © 6427-2369 [employer]. Care instructions adapted under license by [employer] (which disclaims liability or warranty for this information). If you have questions about a medical condition or this instruction, always ask your healthcare professional. [address] any warranty or liability for your use of this information.

## 2019-11-01 NOTE — PROGRESS NOTES
CC:   Chief Complaint   Patient presents with    Other     wants referral ro derm    Foot Pain     corn on left foot/toe       HISTORY OF PRESENT ILLNESS  Tiffany Castillo is a 69 y.o. female. She complains of having a bad reaction to sunscreen. About a month ago, applied Neutrogena baby lotion sunscreen to her face while before working outside for the day. The following morning had redness looking like sun burn on her face. Had used this same sunscreen before with no problems. The erythema resolved but she has prickly sensation on the skin across her entire face. Would like a referral to see a dermatologist.    Also complains of a painful corn on her left 5th toe that makes it hard to wear most of her shoes. Used OTC topical solution with no relief. Has osteopenia. Last DEXA in 2015. Is interested in having a repeat DEXA. Patient Active Problem List   Diagnosis Code    Bipolar disorder ([facility] [address] F31.9    Anxiety disorder F41.9    Primary osteoarthritis of both hands M19.041, M19.042    Hypercholesterolemia E78.00    Vitamin D deficiency E55.9    Osteopenia of multiple sites M85.89    Prediabetes R73.03    Gastroesophageal reflux disease without esophagitis K21.9     Past Medical History:   Diagnosis Date    Arthritis     Osteoarthritis    Excessive sweating     Occurs in summers; TSH/CMP/CBC normal    GERD (gastroesophageal reflux disease)     MRSA (methicillin resistant Staphylococcus aureus) infection 2007    MRSA + abd abscess; nasal swab negative after treatment     No Known Allergies    Current Outpatient Medications   Medication Sig Dispense Refill    sertraline (ZOLOFT) 100 mg tablet       topiramate (TOPAMAX) 25 mg tablet 75 mg.            PHYSICAL EXAM  Visit Vitals  /70 (BP 1 Location: Left arm, BP Patient Position: Sitting)   Pulse 76   Temp 98 °F (36.7 °C) (Oral)   Resp 16   Ht 5' (1.524 m)   Wt 112 lb (50.8 kg)   SpO2 97%   BMI 21.87 kg/m²       General: Well-developed and well-nourished, no distress. HEENT:  Head normocephalic/atraumatic, no scleral icterus  Extremities: No clubbing, cyanosis, or edema. 1 cm papule on left 5th toe with overlying thickened skin. Skin: No rash or lesions. Psychiatric: Normal mood and affect. Behavior is normal.         ASSESSMENT AND PLAN    ICD-10-CM ICD-9-CM    1. Dermatitis L30.9 692.9 REFERRAL TO DERMATOLOGY   2. Corn or callus L84 700 REFERRAL TO PODIATRY   3. Bipolar disorder, current episode mixed, moderate (HCC) F31.62 296.62    4. Asymptomatic postmenopausal status Z78.0 V49.81 DEXA BONE DENSITY STUDY AXIAL   5. Screening for osteoporosis Z13.820 V82.81 DEXA BONE DENSITY STUDY AXIAL     Diagnoses and all orders for this visit:    1. Dermatitis  -     REFERRAL TO DERMATOLOGY for evaluation and recommendation on sunscreens. 2. Corn or callus  Painful corn at left 5th toe.  -     REFERRAL TO PODIATRY    3. Bipolar disorder, current episode mixed, moderate (HCC)  Stable on Topamax and sertraline. 4. Asymptomatic postmenopausal status  -     DEXA BONE DENSITY STUDY AXIAL; Future    5. Screening for osteoporosis  -     DEXA BONE DENSITY STUDY AXIAL; Future      Follow-up and Dispositions    · Return in about 4 months (around 3/1/2020), or if symptoms worsen or fail to improve, for Cholesterol, osteopenia. I have discussed the diagnosis with the patient and the intended plan as seen in the above orders. Patient is in agreement. The patient has received an after-visit summary and questions were answered concerning future plans. I have discussed medication side effects and warnings with the patient as well.

## 2019-11-01 NOTE — PROGRESS NOTES
Kisha Torres  Identified pt with two pt identifiers(name and ). Chief Complaint   Patient presents with    Other     wants referral ro derm    Foot Pain     corn on left foot/toe       1. Have you been to the ER, urgent care clinic since your last visit? Hospitalized since your last visit? NO    2. Have you seen or consulted any other health care providers outside of the [address] [place] [address] since your last visit? Include any pap smears or colon screening. Dr Darien Rosales provider has been notified of reason for visit, vitals and flowsheets obtained on patients.      Patient received paperwork for advance directive during previous visit but has not completed at this time     Reviewed record In preparation for visit, huddled with provider and have obtained necessary documentation      Health Maintenance Due   Topic    Influenza Age 5 to Adult        Wt Readings from Last 3 Encounters:   19 112 lb (50.8 kg)   19 117 lb (53.1 kg)   19 117 lb 6 oz (53.2 kg)     Temp Readings from Last 3 Encounters:   19 98 °F (36.7 °C) (Oral)   19 98.1 °F (36.7 °C)   19 98.1 °F (36.7 °C) (Oral)     BP Readings from Last 3 Encounters:   19 117/70   19 140/69   19 115/73     Pulse Readings from Last 3 Encounters:   19 76   19 (!) 58   19 64     Vitals:    19 0938   BP: 117/70   Pulse: 76   Resp: 16   Temp: 98 °F (36.7 °C)   TempSrc: Oral   SpO2: 97%   Weight: 112 lb (50.8 kg)   Height: 5' (1.524 m)   PainSc:  10 - Worst pain ever   PainLoc: Foot         Learning Assessment:  :     Learning Assessment 2015   PRIMARY LEARNER Patient   BARRIERS PRIMARY LEARNER NONE   CO-LEARNER CAREGIVER No   CO-LEARNER HIGHEST LEVEL OF EDUCATION GRADUATED HIGH SCHOOL OR GED   PRIMARY LANGUAGE ENGLISH   LEARNER PREFERENCE PRIMARY LISTENING   ANSWERED BY patient   RELATIONSHIP SELF       Depression Screening:  :     3 most recent PHQ Screens 1/15/2019 Little interest or pleasure in doing things Not at all   Feeling down, depressed, irritable, or hopeless Not at all   Total Score PHQ 2 0   Trouble falling or staying asleep, or sleeping too much -   Feeling tired or having little energy -   Poor appetite, weight loss, or overeating -   Feeling bad about yourself - or that you are a failure or have let yourself or your family down -   Trouble concentrating on things such as school, work, reading, or watching TV -   Moving or speaking so slowly that other people could have noticed; or the opposite being so fidgety that others notice -   Thoughts of being better off dead, or hurting yourself in some way -   How difficult have these problems made it for you to do your work, take care of your home and get along with others -       Fall Risk Assessment:  :     Fall Risk Assessment, last 12 mths 11/1/2019   Able to walk? Yes   Fall in past 12 months? No   Fall with injury? -   Number of falls in past 12 months -   Fall Risk Score -       Abuse Screening:  :     Abuse Screening Questionnaire 11/1/2019 6/29/2018 11/23/2015   Do you ever feel afraid of your partner? N N N   Are you in a relationship with someone who physically or mentally threatens you? N N N   Is it safe for you to go home?  Y Y Y       ADL Screening:  :     ADL Assessment 6/29/2018   Feeding yourself No Help Needed   Getting from bed to chair No Help Needed   Getting dressed No Help Needed   Bathing or showering No Help Needed   Walk across the room (includes cane/walker) No Help Needed   Using the telphone No Help Needed   Taking your medications No Help Needed   Preparing meals No Help Needed   Managing money (expenses/bills) No Help Needed   Moderately strenuous housework (laundry) No Help Needed   Shopping for personal items (toiletries/medicines) No Help Needed   Shopping for groceries No Help Needed   Driving No Help Needed   Climbing a flight of stairs No Help Needed   Getting to places beyond walking distances No Help Needed                 Medication reconciliation up to date and corrected with patient at this time.

## 2019-11-11 ENCOUNTER — TELEPHONE (OUTPATIENT)
Dept: INTERNAL MEDICINE CLINIC | Facility: CLINIC | Age: 74
End: 2019-11-11

## 2019-11-11 NOTE — TELEPHONE ENCOUNTER
Per [employer]  ----- Message from Brianna Villar sent at 11/11/2019  2:29 PM EST -----  Regarding: Dr. Chilo Simon Message/Vendor Calls    Caller's first and last name: Drake Nicholas      Reason for call: podiatrist appointment      Callback required yes/no and why: yes      Best contact number(s): [phone]      Details to clarify the request: Patient scheduled an appointment with Dr. Norman Greene on November 14, 2019 at 11:30 am. She called them today and they told her they could not see her she needed to contact a podiatrist. There name is [facility]. Their number is [phone] and the patient is confused please help can you call them and confirm they are podiatry.      Brianna Villar

## 2019-12-05 ENCOUNTER — HOSPITAL ENCOUNTER (OUTPATIENT)
Dept: MAMMOGRAPHY | Age: 74
Discharge: HOME | End: 2019-12-05
Attending: INTERNAL MEDICINE
Payer: MEDICARE

## 2019-12-05 DIAGNOSIS — Z13.820 SCREENING FOR OSTEOPOROSIS: ICD-10-CM

## 2019-12-05 DIAGNOSIS — Z12.31 VISIT FOR SCREENING MAMMOGRAM: ICD-10-CM

## 2019-12-05 DIAGNOSIS — Z78.0 ASYMPTOMATIC POSTMENOPAUSAL STATUS: ICD-10-CM

## 2019-12-05 PROCEDURE — 77063 BREAST TOMOSYNTHESIS BI: CPT

## 2019-12-05 PROCEDURE — 77080 DXA BONE DENSITY AXIAL: CPT

## 2019-12-12 NOTE — PROGRESS NOTES
Your DEXA, or bone density study, shows you have osteoporosis. This means low bone mass, or thin bones. Compared to your DEXA from 2015, your bone mass has decreased significantly. Osteoporosis increases the risk of having fractures. Dr. Tanner Beaver wants to start you on medication to build up your bone mass. The medication is called alendronate, or Fosamax. It should be taken every 7 days, first thing in the morning on an empty stomach with a full glass of water and no other medications at the same time. You should not eat, drink any liquids other than water, or lie down for at least 30 mins after taking the medication. Possible side effects include heartburn and muscle or bone aches. [If she is okay with taking alendronate, pend me rx for alendronate 70 mg, 1 tab by mouth every 7 days, #4, 5 RF. ]

## 2019-12-23 ENCOUNTER — TELEPHONE (OUTPATIENT)
Dept: INTERNAL MEDICINE CLINIC | Facility: CLINIC | Age: 74
End: 2019-12-23

## 2019-12-24 NOTE — TELEPHONE ENCOUNTER
----- Message from Brent Randhawa sent at 12/23/2019  5:11 PM EST -----  Regarding: Dr Robbins/telehone  Patient return call    Caller's first and last name and relationship (if not the patient):      Best contact number(s):or [phone] best or   [phone]     Whose call is being returned:nurse      Details to clarify the request:regarding her bone density test      Brent Randhawa

## 2019-12-31 RX ORDER — ALENDRONATE SODIUM 70 MG/1
70 TABLET ORAL
Qty: 4 TAB | Refills: 5 | Status: SHIPPED | OUTPATIENT
Start: 2019-12-31 | End: 2020-03-09

## 2019-12-31 NOTE — TELEPHONE ENCOUNTER
Spoke with patient and after verifying name and date of birth of patient gave patient test results/bone density results and any instructions in note per provider. Patient stated understanding. she agrees to trying fosamax. She uses [facility].  Per written order of Dr Antonio Gutiérrez prescription pended for alendronate 70 mg, 1 tab by mouth every 7 days, #4, 5 RF.

## 2019-12-31 NOTE — PROGRESS NOTES
Spoke with patient and after verifying name and date of birth of patient gave patient test results/bone density results and any instructions in note per provider. Patient stated understanding. she agrees to trying fosamax. She uses [facility]. Per written order of Dr Angus Freed prescription pended for alendronate 70 mg, 1 tab by mouth every 7 days, #4, 5 RF.

## 2020-01-09 ENCOUNTER — APPOINTMENT (OUTPATIENT)
Dept: GENERAL RADIOLOGY | Age: 75
End: 2020-01-09
Attending: EMERGENCY MEDICINE
Payer: MEDICARE

## 2020-01-09 ENCOUNTER — HOSPITAL ENCOUNTER (EMERGENCY)
Age: 75
Discharge: HOME | End: 2020-01-09
Attending: EMERGENCY MEDICINE
Payer: MEDICARE

## 2020-01-09 VITALS
RESPIRATION RATE: 16 BRPM | TEMPERATURE: 97.6 F | OXYGEN SATURATION: 98 % | SYSTOLIC BLOOD PRESSURE: 107 MMHG | HEART RATE: 67 BPM | DIASTOLIC BLOOD PRESSURE: 57 MMHG

## 2020-01-09 DIAGNOSIS — S82.831A OTHER CLOSED FRACTURE OF DISTAL END OF RIGHT FIBULA, INITIAL ENCOUNTER: ICD-10-CM
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
## 2020-01-09 PROCEDURE — 99283 EMERGENCY DEPT VISIT LOW MDM: CPT

## 2020-01-09 PROCEDURE — L4360 PNEUMAT WALKING BOOT PRE CST: HCPCS

## 2020-01-09 PROCEDURE — 73610 X-RAY EXAM OF ANKLE: CPT

## 2020-01-09 NOTE — ED NOTES
Discharge instructions given by Reji Mcfarlaen NP.  RN unable to obtain discharge brittany;ls prior to patient leaving

## 2020-01-09 NOTE — ED TRIAGE NOTES
Pt stated she tripped about 2 hours ago and heard a pop in her ankle, c/o right ankle pain, +moderated amt swelling noted to lateral malleolus

## 2020-01-09 NOTE — ED PROVIDER NOTES
Patient is a 72-year-old female with history of arthritis who presents today with right ankle pain. States that she was trying to  a folder and take it to her car and when she got to the car it started to slide and pinned her against the car door. When she twisted she felt a cracking sensation to her right ankle and then states she dropped a rock on her big toe. She was able to weight-bear and went home and able to get the rock out of the car. Did not take any medication prior to arrival.  Denies any other pain except her right ankle. No other acute complaints. Past Medical History:   Diagnosis Date    Arthritis     Osteoarthritis    Excessive sweating     Occurs in summers; TSH/CMP/CBC normal    GERD (gastroesophageal reflux disease)     MRSA (methicillin resistant Staphylococcus aureus) infection     MRSA + abd abscess; nasal swab negative after treatment       Past Surgical History:   Procedure Laterality Date    BREAST SURGERY PROCEDURE UNLISTED Bilateral 1974    AUGMENTATION    HX  SECTION      HX COLONOSCOPY      HX ENDOSCOPY  05/10/2018    Dr. Janeth Cortez; gastritis and esophagitis    HX HYSTERECTOMY      Age 29 for endometriosis    IMPLANT BREAST SILICONE/EQ Bilateral 1270    1st surgery , replaced bilaterally in .     ND REVISE BREAST RECONSTRUCTION  2014    Bilateral breast implant exchange due to  cosmetic defect of previous implants         Family History:   Problem Relation Age of Onset    Diabetes Father     Cancer Father         PROSTATE CANCER    Arthritis-osteo Mother     Arthritis-osteo Sister     Anesth Problems Neg Hx        Social History     Socioeconomic History    Marital status:      Spouse name: Not on file    Number of children: Not on file    Years of education: Not on file    Highest education level: Not on file   Occupational History    Not on file   Social Needs    Financial resource strain: Not on file   [address] [place] [address] Food insecurity:     Worry: Not on file     Inability: Not on file    Transportation needs:     Medical: Not on file     Non-medical: Not on file   Tobacco Use    Smoking status: Never Smoker    Smokeless tobacco: Never Used   Substance and Sexual Activity    Alcohol use: No    Drug use: No    Sexual activity: Not on file   Lifestyle    Physical activity:     Days per week: Not on file     Minutes per session: Not on file    Stress: Not on file   Relationships    Social connections:     Talks on phone: Not on file     Gets together: Not on file     Attends Faith service: Not on file     Active member of club or organization: Not on file     Attends meetings of clubs or organizations: Not on file     Relationship status: Not on file    Intimate partner violence:     Fear of current or ex partner: Not on file     Emotionally abused: Not on file     Physically abused: Not on file     Forced sexual activity: Not on file   Other Topics Concern    Not on file   Social History Narrative    Not on file         ALLERGIES: Patient has no known allergies. Review of Systems   Constitutional: Negative for chills and fever. Musculoskeletal: Positive for arthralgias and joint swelling. Skin: Negative. All other systems reviewed and are negative. Vitals:    01/09/20 1444   BP: 107/57   Pulse: 67   Resp: 16   Temp: 97.6 °F (36.4 °C)   SpO2: 98%            Physical Exam  Vitals signs and nursing note reviewed. Constitutional:       Appearance: Normal appearance. Neck:      Musculoskeletal: Normal range of motion and neck supple. Cardiovascular:      Rate and Rhythm: Normal rate and regular rhythm. Heart sounds: No murmur. Pulmonary:      Effort: Pulmonary effort is normal. No respiratory distress. Breath sounds: Normal breath sounds. Abdominal:      Palpations: Abdomen is soft. Tenderness: There is no tenderness.    Musculoskeletal:         General: Swelling, tenderness and signs of injury present. Comments: Right lateral ankle with moderate swelling and ecchymosis to the lateral malleolus no medial malleolus pain. No pain over the great toe. Sensation intact distally. Skin:     General: Skin is warm and dry. Findings: Bruising present. Neurological:      Mental Status: She is alert and oriented to person, place, and time. Psychiatric:         Mood and Affect: Mood normal.         Behavior: Behavior normal.          MDM  Number of Diagnoses or Management Options  Fall, initial encounter:   Other closed fracture of distal end of right fibula, initial encounter:   Diagnosis management comments: Patient is a 68-year-old female who presents with right ankle pain and swelling after she twisted and felt a popping sensation. X-rays confirm a distal fibular fracture. Pictures were sent to orthopedics who recommended posterior splint and follow-up. However, patient was very adamant that she would not let us put a posterior splint on. She requested a boot instead. I have discussed that this is not with orthopedic physician recommended may not provide the stability that they are looking for. She is very well aware of that. But she still insists on a boot. Will place that for stability. Crutches given to help her ambulate. Patient uses them well. Discharged home with orthopedic follow-up. Amount and/or Complexity of Data Reviewed  Discuss the patient with other providers: Dante Will)    Patient Progress  Patient progress: stable         Procedures    I was personally available for consultation in the emergency department. I have reviewed the chart and agree with the documentation recorded by the [facility], including the assessment, treatment plan, and disposition.   Rafaela Taylor MD

## 2020-01-09 NOTE — ED NOTES
I have given crutches to the patient, adjusted them and provided complete instructions on safe use.  Boot applied

## 2020-02-28 ENCOUNTER — OFFICE VISIT (OUTPATIENT)
Dept: INTERNAL MEDICINE CLINIC | Facility: CLINIC | Age: 75
End: 2020-02-28

## 2020-02-28 VITALS
RESPIRATION RATE: 16 BRPM | BODY MASS INDEX: 21.17 KG/M2 | DIASTOLIC BLOOD PRESSURE: 65 MMHG | HEART RATE: 69 BPM | TEMPERATURE: 97.6 F | WEIGHT: 107.8 LBS | OXYGEN SATURATION: 97 % | HEIGHT: 60 IN | SYSTOLIC BLOOD PRESSURE: 110 MMHG

## 2020-02-28 DIAGNOSIS — M21.961 ACQUIRED FOOT DEFORMITY, RIGHT: ICD-10-CM

## 2020-02-28 DIAGNOSIS — H60.501 ACUTE OTITIS EXTERNA OF RIGHT EAR, UNSPECIFIED TYPE: Primary | ICD-10-CM

## 2020-02-28 RX ORDER — NEOMYCIN SULFATE, POLYMYXIN B SULFATE AND HYDROCORTISONE 10; 3.5; 1 MG/ML; MG/ML; [USP'U]/ML
3 SUSPENSION/ DROPS AURICULAR (OTIC) 3 TIMES DAILY
Qty: 10 ML | Refills: 0 | Status: SHIPPED | OUTPATIENT
Start: 2020-02-28 | End: 2020-03-09

## 2020-02-28 NOTE — PROGRESS NOTES
Kisha Torres  Identified pt with two pt identifiers(name and ). Chief Complaint   Patient presents with    Ear Pain     Room 3B // Both // Left - Annoying // Right - Painful // x 1 month // Using q-tips        Reviewed record In preparation for visit and have obtained necessary documentation. 1. Have you been to the ER, urgent care clinic or hospitalized since your last visit? No     2. Have you seen or consulted any other health care providers outside of the [address] [place] [address] since your last visit? Include any pap smears or colon screening. No    Patient has advance directive and was advised to bring in a copy. Vitals reviewed with provider. Health Maintenance reviewed: There are no preventive care reminders to display for this patient.        Wt Readings from Last 3 Encounters:   20 107 lb 12.8 oz (48.9 kg)   19 112 lb (50.8 kg)   19 117 lb (53.1 kg)        Temp Readings from Last 3 Encounters:   20 97.6 °F (36.4 °C) (Oral)   20 97.6 °F (36.4 °C)   19 98 °F (36.7 °C) (Oral)        BP Readings from Last 3 Encounters:   20 110/65   20 107/57   19 117/70        Pulse Readings from Last 3 Encounters:   20 69   20 67   19 76        Vitals:    20 0858   BP: 110/65   Pulse: 69   Resp: 16   Temp: 97.6 °F (36.4 °C)   TempSrc: Oral   SpO2: 97%   Weight: 107 lb 12.8 oz (48.9 kg)   Height: 5' (1.524 m)   PainSc:   4   PainLoc: Ear          Learning Assessment:   :       Learning Assessment 2015   PRIMARY LEARNER Patient   BARRIERS PRIMARY LEARNER NONE   CO-LEARNER CAREGIVER No   CO-LEARNER HIGHEST LEVEL OF EDUCATION GRADUATED HIGH SCHOOL OR GED   PRIMARY LANGUAGE ENGLISH   LEARNER PREFERENCE PRIMARY LISTENING   ANSWERED BY patient   RELATIONSHIP SELF        Depression Screening:   :       3 most recent PHQ Screens 1/15/2019   Little interest or pleasure in doing things Not at all   Feeling down, depressed, irritable, or hopeless Not at all   Total Score PHQ 2 0   Trouble falling or staying asleep, or sleeping too much -   Feeling tired or having little energy -   Poor appetite, weight loss, or overeating -   Feeling bad about yourself - or that you are a failure or have let yourself or your family down -   Trouble concentrating on things such as school, work, reading, or watching TV -   Moving or speaking so slowly that other people could have noticed; or the opposite being so fidgety that others notice -   Thoughts of being better off dead, or hurting yourself in some way -   How difficult have these problems made it for you to do your work, take care of your home and get along with others -        Fall Risk Assessment:   :       Fall Risk Assessment, last 12 mths 2/28/2020   Able to walk? Yes   Fall in past 12 months? Yes   Fall with injury? Yes   Number of falls in past 12 months -   Fall Risk Score -        Abuse Screening:   :       Abuse Screening Questionnaire 2/28/2020 11/1/2019 6/29/2018 11/23/2015   Do you ever feel afraid of your partner? N N N N   Are you in a relationship with someone who physically or mentally threatens you? N N N N   Is it safe for you to go home?  Y Y Y Y        ADL Screening:   :       ADL Assessment 6/29/2018   Feeding yourself No Help Needed   Getting from bed to chair No Help Needed   Getting dressed No Help Needed   Bathing or showering No Help Needed   Walk across the room (includes cane/walker) No Help Needed   Using the telphone No Help Needed   Taking your medications No Help Needed   Preparing meals No Help Needed   Managing money (expenses/bills) No Help Needed   Moderately strenuous housework (laundry) No Help Needed   Shopping for personal items (toiletries/medicines) No Help Needed   Shopping for groceries No Help Needed   Driving No Help Needed   Climbing a flight of stairs No Help Needed   Getting to places beyond walking distances No Help Needed

## 2020-02-28 NOTE — PROGRESS NOTES
CC:   Chief Complaint   Patient presents with    Ear Pain     Room 3B // Both // Left - Annoying // Right - Painful // x 1 month // Using q-tips        HISTORY OF PRESENT ILLNESS  Cyndi Harrell is a 69 y.o. female. Patient complains of pain at both ears for the past month. Right worse than left; pain at right ear is 4/10. Feels like there is soapy water in the right ear. Tried using a Q-tip to right ear but was not able to get any fluid out. Left ear more with annoying sensation. Denies swimming recently. Also complains of a bump on her right foot that hurts when she is walking. Had an unpleasant experience at Dr. Audelia Fabry clinic so would like to see a different podiatrist.    Jessica Platt 12/5/19 showed osteoporosis. Tolerating alendronate with no problems. Patient Active Problem List   Diagnosis Code    Bipolar disorder ([facility] [address] F31.9    Anxiety disorder F41.9    Primary osteoarthritis of both hands M19.041, M19.042    Hypercholesterolemia E78.00    Vitamin D deficiency E55.9    Osteopenia of multiple sites M85.89    Prediabetes R73.03    Gastroesophageal reflux disease without esophagitis K21.9     Past Medical History:   Diagnosis Date    Arthritis     Osteoarthritis    Excessive sweating     Occurs in summers; TSH/CMP/CBC normal    GERD (gastroesophageal reflux disease)     MRSA (methicillin resistant Staphylococcus aureus) infection 2007    MRSA + abd abscess; nasal swab negative after treatment     No Known Allergies    Current Outpatient Medications   Medication Sig Dispense Refill    sertraline (ZOLOFT) 100 mg tablet       topiramate (TOPAMAX) 25 mg tablet 75 mg.  alendronate (FOSAMAX) 70 mg tablet Take 1 Tab by mouth every seven (7) days.  4 Tab 5         PHYSICAL EXAM  Visit Vitals  /65 (BP 1 Location: Left arm, BP Patient Position: Sitting)   Pulse 69   Temp 97.6 °F (36.4 °C) (Oral)   Resp 16   Ht 5' (1.524 m)   Wt 107 lb 12.8 oz (48.9 kg)   SpO2 97%   BMI 21.05 kg/m² General: Well-developed and well-nourished, no distress. HEENT:  Head normocephalic/atraumatic, no scleral icterus. TM's normal bilaterally. Right ear canal edematous. Lungs:  Clear to ausculation bilaterally. Good air movement. Heart:  Regular rate and rhythm, normal S1 and S2, no murmur, gallop, or rub  Extremities: No clubbing, cyanosis, or edema. Bony projection at dorsal surface of right foot. Neurological: Alert and oriented. Psychiatric: Normal mood and affect. Behavior is normal.         ASSESSMENT AND PLAN    ICD-10-CM ICD-9-CM    1. Acute otitis externa of right ear, unspecified type H60.501 380.10 neomycin-polymyxin-hydrocortisone, buffered, (PEDIOTIC) 3.5-10,000-1 mg/mL-unit/mL-% otic suspension   2. Acquired foot deformity, right M21.961 736.70 REFERRAL TO PODIATRY     Diagnoses and all orders for this visit:    1. Acute otitis externa of right ear, unspecified type  -     Start neomycin-polymyxin-hydrocortisone, buffered, (PEDIOTIC) 3.5-10,000-1 mg/mL-unit/mL-% otic suspension; Administer 3 Drops in right ear three (3) times daily. 2. Acquired foot deformity, right  -     REFERRAL TO PODIATRY      Follow-up and Dispositions    · Return in about 4 months (around 6/28/2020), or if symptoms worsen or fail to improve, for Medicare AW; schedule fasting labs 1 week prior to appointment. I have discussed the diagnosis with the patient and the intended plan as seen in the above orders. Patient is in agreement. The patient has received an after-visit summary and questions were answered concerning future plans. I have discussed medication side effects and warnings with the patient as well.

## 2020-03-09 ENCOUNTER — HOSPITAL ENCOUNTER (OUTPATIENT)
Dept: PREADMISSION TESTING | Age: 75
Discharge: HOME | End: 2020-03-09
Payer: SELF-PAY

## 2020-03-09 VITALS — HEIGHT: 60 IN | WEIGHT: 108 LBS | BODY MASS INDEX: 21.2 KG/M2

## 2020-03-09 LAB
ANION GAP SERPL CALC-SCNC: 6 MMOL/L (ref 5–15)
BASOPHILS # BLD: 0 K/UL (ref 0–0.1)
BASOPHILS NFR BLD: 0 % (ref 0–1)
BUN SERPL-MCNC: 27 MG/DL (ref 6–20)
BUN/CREAT SERPL: 31 (ref 12–20)
CALCIUM SERPL-MCNC: 8.9 MG/DL (ref 8.5–10.1)
CHLORIDE SERPL-SCNC: 109 MMOL/L (ref 97–108)
CO2 SERPL-SCNC: 24 MMOL/L (ref 21–32)
CREAT SERPL-MCNC: 0.88 MG/DL (ref 0.55–1.02)
DIFFERENTIAL METHOD BLD: NORMAL
EOSINOPHIL # BLD: 0 K/UL (ref 0–0.4)
EOSINOPHIL NFR BLD: 1 % (ref 0–7)
ERYTHROCYTE [DISTWIDTH] IN BLOOD BY AUTOMATED COUNT: 13.3 % (ref 11.5–14.5)
GLUCOSE SERPL-MCNC: 93 MG/DL (ref 65–100)
HCT VFR BLD AUTO: 40 % (ref 35–47)
HGB BLD-MCNC: 12.7 G/DL (ref 11.5–16)
IMM GRANULOCYTES # BLD AUTO: 0 K/UL (ref 0–0.04)
IMM GRANULOCYTES NFR BLD AUTO: 0 % (ref 0–0.5)
LYMPHOCYTES # BLD: 1.4 K/UL (ref 0.8–3.5)
LYMPHOCYTES NFR BLD: 26 % (ref 12–49)
MCH RBC QN AUTO: 30 PG (ref 26–34)
MCHC RBC AUTO-ENTMCNC: 31.8 G/DL (ref 30–36.5)
MCV RBC AUTO: 94.6 FL (ref 80–99)
MONOCYTES # BLD: 0.5 K/UL (ref 0–1)
MONOCYTES NFR BLD: 10 % (ref 5–13)
NEUTS SEG # BLD: 3.5 K/UL (ref 1.8–8)
NEUTS SEG NFR BLD: 63 % (ref 32–75)
NRBC # BLD: 0 K/UL (ref 0–0.01)
NRBC BLD-RTO: 0 PER 100 WBC
PLATELET # BLD AUTO: 181 K/UL (ref 150–400)
PMV BLD AUTO: 10.5 FL (ref 8.9–12.9)
POTASSIUM SERPL-SCNC: 4.4 MMOL/L (ref 3.5–5.1)
RBC # BLD AUTO: 4.23 M/UL (ref 3.8–5.2)
SODIUM SERPL-SCNC: 139 MMOL/L (ref 136–145)
WBC # BLD AUTO: 5.6 K/UL (ref 3.6–11)

## 2020-03-09 PROCEDURE — 85025 COMPLETE CBC W/AUTO DIFF WBC: CPT

## 2020-03-09 PROCEDURE — 80048 BASIC METABOLIC PNL TOTAL CA: CPT

## 2020-03-09 PROCEDURE — 93005 ELECTROCARDIOGRAM TRACING: CPT

## 2020-03-09 PROCEDURE — 36415 COLL VENOUS BLD VENIPUNCTURE: CPT

## 2020-03-09 RX ORDER — OMEPRAZOLE 40 MG/1
40 CAPSULE, DELAYED RELEASE ORAL AS NEEDED
COMMUNITY
End: 2020-06-18

## 2020-03-09 NOTE — PERIOP NOTES
PREOP, VERBAL & WRITTEN INSTRUCTIONS GIVEN TO PT. PATIENT GIVEN SURGICAL SITE INFECTION FAQs HANDOUT. PT GIVEN 2 BOTTLES OF CHG SOL WITH VERBAL & WRITTEN INSTRUCTIONS. PT GIVEN OPPORTUNITY TO EXPRESS CONCERNS & ASK QUESTIONS. PREOP, VERBAL & WRITTEN INSTRUCTIONS GIVEN TO PT.

## 2020-03-10 LAB
ATRIAL RATE: 58 BPM
CALCULATED P AXIS, ECG09: 33 DEGREES
CALCULATED R AXIS, ECG10: 64 DEGREES
CALCULATED T AXIS, ECG11: 43 DEGREES
DIAGNOSIS, 93000: NORMAL
P-R INTERVAL, ECG05: 116 MS
Q-T INTERVAL, ECG07: 426 MS
QRS DURATION, ECG06: 72 MS
QTC CALCULATION (BEZET), ECG08: 418 MS
VENTRICULAR RATE, ECG03: 58 BPM

## 2020-04-03 ENCOUNTER — TELEPHONE (OUTPATIENT)
Dept: INTERNAL MEDICINE CLINIC | Facility: CLINIC | Age: 75
End: 2020-04-03

## 2020-04-03 RX ORDER — ALENDRONATE SODIUM 70 MG/1
70 TABLET ORAL
Qty: 4 TAB | Refills: 5
Start: 2020-04-03 | End: 2020-06-18

## 2020-06-18 ENCOUNTER — VIRTUAL VISIT (OUTPATIENT)
Dept: INTERNAL MEDICINE CLINIC | Facility: CLINIC | Age: 75
End: 2020-06-18

## 2020-06-18 DIAGNOSIS — R10.9 RIGHT FLANK PAIN: ICD-10-CM

## 2020-06-18 DIAGNOSIS — F31.62 BIPOLAR DISORDER, CURRENT EPISODE MIXED, MODERATE (HCC): ICD-10-CM

## 2020-06-18 DIAGNOSIS — L65.9 HAIR LOSS: Primary | ICD-10-CM

## 2020-06-18 DIAGNOSIS — M81.0 AGE-RELATED OSTEOPOROSIS WITHOUT CURRENT PATHOLOGICAL FRACTURE: ICD-10-CM

## 2020-06-18 DIAGNOSIS — M53.82 WEAKNESS OF NECK: ICD-10-CM

## 2020-06-18 DIAGNOSIS — R73.02 IGT (IMPAIRED GLUCOSE TOLERANCE): ICD-10-CM

## 2020-06-18 DIAGNOSIS — L60.3 BRITTLE NAILS: ICD-10-CM

## 2020-06-18 DIAGNOSIS — E55.9 VITAMIN D DEFICIENCY: ICD-10-CM

## 2020-06-18 DIAGNOSIS — E78.00 HYPERCHOLESTEROLEMIA: ICD-10-CM

## 2020-06-18 RX ORDER — ALENDRONATE SODIUM 70 MG/1
70 TABLET ORAL
Qty: 12 TAB | Refills: 3 | Status: SHIPPED | OUTPATIENT
Start: 2020-06-18 | End: 2020-06-19 | Stop reason: ALTCHOICE

## 2020-06-18 NOTE — PROGRESS NOTES
René Garcia is a 70 y.o. female who was seen by synchronous (real-time) audio-video technology on 6/18/2020. Consent: René Garcia, who was seen by synchronous (real-time) audio-video technology, and/or her healthcare decision maker, is aware that this patient-initiated, Telehealth encounter on 6/18/2020 is a billable service, with coverage as determined by her insurance carrier. She is aware that she may receive a bill and has provided verbal consent to proceed: Yes. Assessment & Plan:     Diagnoses and all orders for this visit:    1. Hair loss  Rule out thyroid disorder, iron deficiency or vitamin D deficiency. -     FERRITIN  -     IRON PROFILE  -     TSH AND FREE T4    2. Brittle nails  Same as above. 3. Age-related osteoporosis without current pathological fracture  -     Refill alendronate (FOSAMAX) 70 mg tablet; Take 1 Tab by mouth every seven (7) days. 4. Right flank pain  Rule out kidney stones or abnormal right ureter.  -     CT ABD PELV WO CONT; Future    5. Weakness of neck  Likely due to cervical OA. 6. Bipolar disorder, current episode mixed, moderate (HCC)  Continue sertraline and Topamax, follow up with psychiatrist.    7. Hypercholesterolemia  Diet-controlled. -     METABOLIC PANEL, COMPREHENSIVE  -     CBC WITH AUTOMATED DIFF  -     LIPID PANEL    8. Vitamin D deficiency  -     VITAMIN D, 25 HYDROXY    9. IGT (impaired glucose tolerance)  -     HEMOGLOBIN A1C WITH EAG      Follow-up and Dispositions    · Return in about 3 months (around 9/18/2020), or if symptoms worsen or fail to improve, for Medicare AWV.           712  Subjective:   René Garcia is a 70 y.o. female who was seen for Skin Problem (within a month patient feels her skin looks older/ has lost hair/brittle fingernails)    Patient complaints of 1 month history of skin looking older, hair loss, and brittle fingernails. Is very worried because symptoms started suddenly.   Skin sagging, looking suddenly older than her twin sister. Mild fatigue and weight loss. Denies heat or cold intolerance, diarrhea/constipation, tremors, or heart palpitations. Denies pain. Reports she is scheduled for a facelift on 7/15/20 with Dr. Candi Chong. Other complaints:  1) Ran out of Fosamax. Wants to know if she needs to continue taking it. Started DEXA 12/5/19 showed osteoporosis. Tolerating with no side effects. 2) Neck weakness when she lies down. Has moments when she finds it hard to lift her head. 3) Right-sided flank pain. Intermittent. Occurring for years but getting worse recently. Had CT abd pelvis 5/24/17: Slight irregularity of right ureter, multiple 1 mm non-obstructing calculi in the kidneys. Prior to Admission medications    Medication Sig Start Date End Date Taking? Authorizing Provider   sertraline (ZOLOFT) 100 mg tablet 200 mg nightly. 10/30/19  Yes Provider, Historical   topiramate (TOPAMAX) 25 mg tablet 75 mg nightly.  6/11/19  Yes Provider, Historical     No Known Allergies    Patient Active Problem List   Diagnosis Code    Bipolar disorder ([facility] [address] F31.9    Anxiety disorder F41.9    Primary osteoarthritis of both hands M19.041, M19.042    Hypercholesterolemia E78.00    Vitamin D deficiency E55.9    Osteopenia of multiple sites M85.89    Prediabetes R73.03    Gastroesophageal reflux disease without esophagitis K21.9       Objective:   Patient reported: 100 lbs   General: alert, cooperative, no distress   Mental  status: normal mood, behavior, speech, dress, motor activity, and thought processes, able to follow commands   HENT: NCAT   Neck: no visualized mass   Resp: no respiratory distress   Neuro: no gross deficits   Skin: no discoloration or lesions of concern on visible areas   Psychiatric: normal affect, consistent with stated mood, no evidence of hallucinations     Additional exam findings: talkative      We discussed the expected course, resolution and complications of the diagnosis(es) in detail. Medication risks, benefits, costs, interactions, and alternatives were discussed as indicated. I advised her to contact the office if her condition worsens, changes or fails to improve as anticipated. She expressed understanding with the diagnosis(es) and plan. Magui Montenegro is a 70 y.o. female who was evaluated by a video visit encounter for concerns as above. Patient identification was verified prior to start of the visit. A caregiver was present when appropriate. Due to this being a TeleHealth encounter (During OUQPO-27 public health emergency), evaluation of the following organ systems was limited: Vitals/Constitutional/EENT/Resp/CV/GI//MS/Neuro/Skin/Heme-Lymph-Imm. Pursuant to the emergency declaration under the [facility], [facility] waiver authority and the [employer] Act, this Virtual  Visit was conducted, with patient's (and/or legal guardian's) consent, to reduce the patient's risk of exposure to COVID-19 and provide necessary medical care. THIS VISIT WAS COMPLETED VIRTUALLY USING [employer]    Services were provided through a video synchronous discussion virtually to substitute for in-person clinic visit. Patient and provider were located at their individual homes.       Megan Baumgarten, MD

## 2020-06-18 NOTE — PROGRESS NOTES
Kisha Torres  Identified pt with two pt identifiers(name and ). Chief Complaint   Patient presents with    Skin Problem   within a month patient feels her skin looks older/ has lost hair/brittle fingernails  Ran out of fosamax-does she still need this ? No pain  Is having a facelift on 7/15/20 at [facility] with Dr Shameka Ash    1. Have you been to the ER, urgent care clinic since your last visit? Hospitalized since your last visit? NO    2. Have you seen or consulted any other health care providers outside of the [address] [place] [address] since your last visit? Include any pap smears or colon screening. NO    Today's provider has been notified of reason for visit, vitals and flowsheets obtained on patients. Reviewed record In preparation for visit, huddled with provider and have obtained necessary documentation      Health Maintenance Due   Topic    A1C test (Diabetic or Prediabetic)     Medicare Yearly Exam        Wt Readings from Last 3 Encounters:   20 108 lb (49 kg)   20 107 lb 12.8 oz (48.9 kg)   19 112 lb (50.8 kg)     Temp Readings from Last 3 Encounters:   20 97.6 °F (36.4 °C) (Oral)   20 97.6 °F (36.4 °C)   19 98 °F (36.7 °C) (Oral)     BP Readings from Last 3 Encounters:   20 110/65   20 107/57   19 117/70     Pulse Readings from Last 3 Encounters:   20 69   20 67   19 76     There were no vitals filed for this visit.       Learning Assessment:  :     Learning Assessment 2015   PRIMARY LEARNER Patient   BARRIERS PRIMARY LEARNER NONE   CO-LEARNER CAREGIVER No   CO-LEARNER HIGHEST LEVEL OF EDUCATION GRADUATED HIGH SCHOOL OR GED   PRIMARY LANGUAGE ENGLISH   LEARNER PREFERENCE PRIMARY LISTENING   ANSWERED BY patient   RELATIONSHIP SELF       Depression Screening:  :     3 most recent PHQ Screens 1/15/2019   Little interest or pleasure in doing things Not at all   Feeling down, depressed, irritable, or hopeless Not at all Total Score PHQ 2 0   Trouble falling or staying asleep, or sleeping too much -   Feeling tired or having little energy -   Poor appetite, weight loss, or overeating -   Feeling bad about yourself - or that you are a failure or have let yourself or your family down -   Trouble concentrating on things such as school, work, reading, or watching TV -   Moving or speaking so slowly that other people could have noticed; or the opposite being so fidgety that others notice -   Thoughts of being better off dead, or hurting yourself in some way -   How difficult have these problems made it for you to do your work, take care of your home and get along with others -       Fall Risk Assessment:  :     Fall Risk Assessment, last 12 mths 2/28/2020   Able to walk? Yes   Fall in past 12 months? Yes   Fall with injury? Yes   Number of falls in past 12 months -   Fall Risk Score -       Abuse Screening:  :     Abuse Screening Questionnaire 2/28/2020 11/1/2019 6/29/2018 11/23/2015   Do you ever feel afraid of your partner? N N N N   Are you in a relationship with someone who physically or mentally threatens you? N N N N   Is it safe for you to go home?  Y Y Y Y       ADL Screening:  :     ADL Assessment 6/29/2018   Feeding yourself No Help Needed   Getting from bed to chair No Help Needed   Getting dressed No Help Needed   Bathing or showering No Help Needed   Walk across the room (includes cane/walker) No Help Needed   Using the telphone No Help Needed   Taking your medications No Help Needed   Preparing meals No Help Needed   Managing money (expenses/bills) No Help Needed   Moderately strenuous housework (laundry) No Help Needed   Shopping for personal items (toiletries/medicines) No Help Needed   Shopping for groceries No Help Needed   Driving No Help Needed   Climbing a flight of stairs No Help Needed   Getting to places beyond walking distances No Help Needed                 Medication reconciliation up to date and corrected with patient at this time.

## 2020-06-19 ENCOUNTER — TELEPHONE (OUTPATIENT)
Dept: INTERNAL MEDICINE CLINIC | Facility: CLINIC | Age: 75
End: 2020-06-19

## 2020-06-19 DIAGNOSIS — M54.2 CERVICALGIA: Primary | ICD-10-CM

## 2020-06-19 NOTE — TELEPHONE ENCOUNTER
Called pt to schedule follow up visit. Pt stated she wanted to let Dr. Carl Lilly know that she stopped taking her \"bone density medication\" because she started to have \"really bad heartburn\". Pt stated she had dealt with reflux in the past, but this was much worse than her previous issues. Pt stated she could not remember why she cx this med order when she was speaking with the Dr. Justus Benton. Pt stated she has not had the severe heartburn since she stopped this medication.

## 2020-06-19 NOTE — TELEPHONE ENCOUNTER
Message noted. A prescription for Fosamax was sent to her pharmacy yesterday with her consent. Let her know that it is up to her to decide if she wants to try it again. If she does take it, she should be sure to take the medication first thing in the morning on an empty stomach with a full glass of plain water (no other beverages, not even mineral water) and no other medications.   She should not lie down for at least 30 mins and not until eating breakfast.

## 2020-06-19 NOTE — TELEPHONE ENCOUNTER
Verified patients name and date of birth. Patient does not want to take Fosamax as she is concerned about having bad reflux. Patient states she has a problem that occurs when she sleeps at night. The problem has been going on for about 2 years. Patient states when she sleeps at night she has severe neck pain and has to move her head with her hands during the night. Patient is requesting a RX for a soft foam neck brace.

## 2020-06-29 ENCOUNTER — HOSPITAL ENCOUNTER (OUTPATIENT)
Dept: CT IMAGING | Age: 75
Discharge: HOME | End: 2020-06-29
Attending: INTERNAL MEDICINE
Payer: MEDICARE

## 2020-06-29 DIAGNOSIS — R10.9 RIGHT FLANK PAIN: ICD-10-CM

## 2020-06-29 PROCEDURE — 74176 CT ABD & PELVIS W/O CONTRAST: CPT

## 2020-06-30 NOTE — PROGRESS NOTES
Inform patient that her CT abd pelv scan returned okay. It showed no kidney stones that were causing blockages or any other problems that would cause right-sided pain.

## 2020-06-30 NOTE — PROGRESS NOTES
Spoke with patient and after verifying name and date of birth of patient gave patient test results and any instructions in note per provider. Patient stated understanding. She is still having right sided flank pain that can be a 8.5 on a scale from 0-10. She states she has had the pain for a decade of more. States she was having pain at time of CT. Is there anything else that can be done?

## 2020-07-01 ENCOUNTER — TELEPHONE (OUTPATIENT)
Dept: INTERNAL MEDICINE CLINIC | Facility: CLINIC | Age: 75
End: 2020-07-01

## 2020-07-01 ENCOUNTER — HOSPITAL ENCOUNTER (OUTPATIENT)
Dept: PREADMISSION TESTING | Age: 75
Discharge: HOME | End: 2020-07-01
Payer: SELF-PAY

## 2020-07-01 VITALS
BODY MASS INDEX: 20.03 KG/M2 | HEIGHT: 60 IN | HEART RATE: 58 BPM | DIASTOLIC BLOOD PRESSURE: 67 MMHG | SYSTOLIC BLOOD PRESSURE: 121 MMHG | WEIGHT: 102 LBS | TEMPERATURE: 98.2 F | RESPIRATION RATE: 16 BRPM

## 2020-07-01 DIAGNOSIS — R10.9 RIGHT FLANK PAIN: Primary | ICD-10-CM

## 2020-07-01 LAB
ANION GAP SERPL CALC-SCNC: 4 MMOL/L (ref 5–15)
BASOPHILS # BLD: 0 K/UL (ref 0–0.1)
BASOPHILS NFR BLD: 1 % (ref 0–1)
BUN SERPL-MCNC: 16 MG/DL (ref 6–20)
BUN/CREAT SERPL: 21 (ref 12–20)
CALCIUM SERPL-MCNC: 8 MG/DL (ref 8.5–10.1)
CHLORIDE SERPL-SCNC: 111 MMOL/L (ref 97–108)
CO2 SERPL-SCNC: 26 MMOL/L (ref 21–32)
CREAT SERPL-MCNC: 0.78 MG/DL (ref 0.55–1.02)
DIFFERENTIAL METHOD BLD: NORMAL
EOSINOPHIL # BLD: 0.1 K/UL (ref 0–0.4)
EOSINOPHIL NFR BLD: 1 % (ref 0–7)
ERYTHROCYTE [DISTWIDTH] IN BLOOD BY AUTOMATED COUNT: 13.3 % (ref 11.5–14.5)
GLUCOSE SERPL-MCNC: 104 MG/DL (ref 65–100)
HCT VFR BLD AUTO: 37.6 % (ref 35–47)
HGB BLD-MCNC: 12 G/DL (ref 11.5–16)
IMM GRANULOCYTES # BLD AUTO: 0 K/UL (ref 0–0.04)
IMM GRANULOCYTES NFR BLD AUTO: 0 % (ref 0–0.5)
LYMPHOCYTES # BLD: 1.1 K/UL (ref 0.8–3.5)
LYMPHOCYTES NFR BLD: 27 % (ref 12–49)
MCH RBC QN AUTO: 29.8 PG (ref 26–34)
MCHC RBC AUTO-ENTMCNC: 31.9 G/DL (ref 30–36.5)
MCV RBC AUTO: 93.3 FL (ref 80–99)
MONOCYTES # BLD: 0.3 K/UL (ref 0–1)
MONOCYTES NFR BLD: 8 % (ref 5–13)
NEUTS SEG # BLD: 2.6 K/UL (ref 1.8–8)
NEUTS SEG NFR BLD: 63 % (ref 32–75)
NRBC # BLD: 0 K/UL (ref 0–0.01)
NRBC BLD-RTO: 0 PER 100 WBC
PLATELET # BLD AUTO: 183 K/UL (ref 150–400)
PMV BLD AUTO: 9.8 FL (ref 8.9–12.9)
POTASSIUM SERPL-SCNC: 4.5 MMOL/L (ref 3.5–5.1)
RBC # BLD AUTO: 4.03 M/UL (ref 3.8–5.2)
SODIUM SERPL-SCNC: 141 MMOL/L (ref 136–145)
WBC # BLD AUTO: 4.2 K/UL (ref 3.6–11)

## 2020-07-01 PROCEDURE — 85025 COMPLETE CBC W/AUTO DIFF WBC: CPT

## 2020-07-01 PROCEDURE — 80048 BASIC METABOLIC PNL TOTAL CA: CPT

## 2020-07-01 RX ORDER — DEXTROMETHORPHAN HYDROBROMIDE, GUAIFENESIN 5; 100 MG/5ML; MG/5ML
650-1300 LIQUID ORAL AS NEEDED
COMMUNITY
End: 2021-12-08 | Stop reason: ALTCHOICE

## 2020-07-01 NOTE — TELEPHONE ENCOUNTER
Notes recorded by Stephanie Salcedo LPN on 0/46/1969 at 4:69 PM EDT  Spoke with patient and after verifying name and date of birth of patient gave patient test results and any instructions in note per provider. Patient stated understanding. She is still having right sided flank pain that can be a 8.5 on a scale from 0-10. She states she has had the pain for a decade of more. States she was having pain at time of CT. Is there anything else that can be done?

## 2020-07-01 NOTE — TELEPHONE ENCOUNTER
----- Message from Chavez Baca LPN sent at 4/68/9283  3:40 PM EDT -----    ----- Message -----  From: Rios Wolf MD  Sent: 6/30/2020   1:43 PM EDT  To: Chavez Baca LPN    Inform patient that her CT abd pelv scan returned okay. It showed no kidney stones that were causing blockages or any other problems that would cause right-sided pain.

## 2020-07-01 NOTE — PERIOP NOTES
Patient given surgical site infection FAQs handout and hand hygiene tips sheet. Pre-operative instructions reviewed and patient verbalizes understanding of instructions. Patient has been given the opportunity to ask additional questions. Pt given 2 bottles of CHG soap and instructed in use. Pt instructed that they will be scheduled for COVID 19 test 4 days prior to surgery. COVID instruction sheet and instructions given to PT. Pt instructed they must self quarantine between  COVID 19 test and day of surgery. Cell phone lot instructions with phone number given to patient. Instructions reviewed with patient.

## 2020-07-01 NOTE — TELEPHONE ENCOUNTER
Inform patient that she can see a gastroenterologist about this pain. I have placed a referral order for her to see Dr. Paulette Epley.

## 2020-07-08 ENCOUNTER — TELEPHONE (OUTPATIENT)
Dept: INTERNAL MEDICINE CLINIC | Facility: CLINIC | Age: 75
End: 2020-07-08

## 2020-07-08 NOTE — TELEPHONE ENCOUNTER
Patient called with concerns that she was given referral to Dr Melody Whitt for her right side pain. She stated she has seen this doctor for her throat. Explained that Dr Melody Whitt is a GI doctor and Dr Padmini Lee does want her to be seen for the right side pain by Dr Caroline Torres. Patient has had this pain in her right side for about 5 years. Today the right side pain is rated at an 8 on a scale for 0-10. Patient is scheduled for plastic surgery soon and does not want to have the surgery rescheduled. Patient has no desire to eat, states she is on Topamax 75 mg from [facility]. She is down to a size zero pants and has lost weight. She is drinking Ensure(4 in one day for protein per her plastic surgeon Dr Adrianne Dickinson). Advised patient to call [facility] regarding possible link between between appetite and Topamax. Advised patient she can likely put off being seen for side pain given it has been going on for 5 years, but if pain changes, becomes worse or is unrelenting she should go to Er. Please advise.

## 2020-07-08 NOTE — TELEPHONE ENCOUNTER
I agree with all the advice given to her. Can schedule appointment with Dr. Micaela Vazquez after she is healed from surgery.

## 2020-07-11 ENCOUNTER — HOSPITAL ENCOUNTER (OUTPATIENT)
Dept: PREADMISSION TESTING | Age: 75
Discharge: HOME | End: 2020-07-11
Payer: SELF-PAY

## 2020-07-11 DIAGNOSIS — Z20.822 ENCOUNTER FOR LABORATORY TESTING FOR COVID-19 VIRUS: ICD-10-CM

## 2020-07-11 PROCEDURE — 87635 SARS-COV-2 COVID-19 AMP PRB: CPT

## 2020-07-12 LAB — SARS-COV-2, COV2NT: NOT DETECTED

## 2020-07-23 ENCOUNTER — TELEPHONE (OUTPATIENT)
Dept: INTERNAL MEDICINE CLINIC | Facility: CLINIC | Age: 75
End: 2020-07-23

## 2020-07-23 NOTE — TELEPHONE ENCOUNTER
Patient called to say her cosmetic procedure was rescheduled for 9/16 and her GI appt is scheduled in Sept, but not with Dr Basurto Case as she is on maternity leave. Patient has complaint of left eye pressure. Given names of Sylvester Benton and Adeline Rios.  She will call once appt is made for referral.

## 2020-09-02 NOTE — PERIOP NOTES
PATIENT CALLED AND MADE AWARE OF COVID-19 TESTING REQUIRED TO BE DONE WITHIN 96 HOURS OF SURGERY. COVID-19 TESTING APPOINTMENT MADE FOR PATIENT. PATIENT INSTRUCTED ON SELF QUARANTINE BETWEEN TESTING AND ARRIVAL TIME DAY OF SURGERY. INSTRUCTED NOT TO USE NASAL SPRAY OR NASAL OINTMENTS DAY OF TESTING, PRIOR TO TEST. LOCATION OF TESTING DISCUSSED WITH PATIENT.

## 2020-09-12 ENCOUNTER — HOSPITAL ENCOUNTER (OUTPATIENT)
Dept: PREADMISSION TESTING | Age: 75
Discharge: HOME | End: 2020-09-12
Attending: SURGERY
Payer: SELF-PAY

## 2020-09-12 DIAGNOSIS — Z01.812 PRE-PROCEDURE LAB EXAM: ICD-10-CM

## 2020-09-12 PROCEDURE — 87635 SARS-COV-2 COVID-19 AMP PRB: CPT

## 2020-09-13 LAB — SARS-COV-2, COV2NT: NOT DETECTED

## 2020-09-16 ENCOUNTER — ANESTHESIA EVENT (OUTPATIENT)
Dept: MEDSURG UNIT | Age: 75
End: 2020-09-16
Payer: SELF-PAY

## 2020-09-16 ENCOUNTER — HOSPITAL ENCOUNTER (OUTPATIENT)
Age: 75
Setting detail: OUTPATIENT SURGERY
Discharge: HOME | End: 2020-09-16
Attending: SURGERY | Admitting: SURGERY
Payer: SELF-PAY

## 2020-09-16 ENCOUNTER — ANESTHESIA (OUTPATIENT)
Dept: MEDSURG UNIT | Age: 75
End: 2020-09-16
Payer: SELF-PAY

## 2020-09-16 VITALS
HEIGHT: 60 IN | OXYGEN SATURATION: 93 % | WEIGHT: 103 LBS | DIASTOLIC BLOOD PRESSURE: 62 MMHG | RESPIRATION RATE: 10 BRPM | SYSTOLIC BLOOD PRESSURE: 131 MMHG | TEMPERATURE: 98.4 F | BODY MASS INDEX: 20.22 KG/M2 | HEART RATE: 73 BPM

## 2020-09-16 LAB
ANION GAP SERPL CALC-SCNC: 3 MMOL/L (ref 5–15)
BASOPHILS # BLD: 0 K/UL (ref 0–0.1)
BASOPHILS NFR BLD: 0 % (ref 0–1)
BUN SERPL-MCNC: 21 MG/DL (ref 6–20)
BUN/CREAT SERPL: 22 (ref 12–20)
CALCIUM SERPL-MCNC: 8.9 MG/DL (ref 8.5–10.1)
CHLORIDE SERPL-SCNC: 111 MMOL/L (ref 97–108)
CO2 SERPL-SCNC: 24 MMOL/L (ref 21–32)
CREAT SERPL-MCNC: 0.95 MG/DL (ref 0.55–1.02)
DIFFERENTIAL METHOD BLD: NORMAL
EOSINOPHIL # BLD: 0.1 K/UL (ref 0–0.4)
EOSINOPHIL NFR BLD: 2 % (ref 0–7)
ERYTHROCYTE [DISTWIDTH] IN BLOOD BY AUTOMATED COUNT: 13.9 % (ref 11.5–14.5)
GLUCOSE SERPL-MCNC: 92 MG/DL (ref 65–100)
HCT VFR BLD AUTO: 41.5 % (ref 35–47)
HGB BLD-MCNC: 13.4 G/DL (ref 11.5–16)
IMM GRANULOCYTES # BLD AUTO: 0 K/UL (ref 0–0.04)
IMM GRANULOCYTES NFR BLD AUTO: 0 % (ref 0–0.5)
LYMPHOCYTES # BLD: 1.8 K/UL (ref 0.8–3.5)
LYMPHOCYTES NFR BLD: 29 % (ref 12–49)
MCH RBC QN AUTO: 30.2 PG (ref 26–34)
MCHC RBC AUTO-ENTMCNC: 32.3 G/DL (ref 30–36.5)
MCV RBC AUTO: 93.7 FL (ref 80–99)
MONOCYTES # BLD: 0.4 K/UL (ref 0–1)
MONOCYTES NFR BLD: 7 % (ref 5–13)
NEUTS SEG # BLD: 3.8 K/UL (ref 1.8–8)
NEUTS SEG NFR BLD: 62 % (ref 32–75)
NRBC # BLD: 0 K/UL (ref 0–0.01)
NRBC BLD-RTO: 0 PER 100 WBC
PLATELET # BLD AUTO: 209 K/UL (ref 150–400)
PMV BLD AUTO: 9.5 FL (ref 8.9–12.9)
POTASSIUM SERPL-SCNC: 5.1 MMOL/L (ref 3.5–5.1)
RBC # BLD AUTO: 4.43 M/UL (ref 3.8–5.2)
SODIUM SERPL-SCNC: 138 MMOL/L (ref 136–145)
WBC # BLD AUTO: 6.2 K/UL (ref 3.6–11)

## 2020-09-16 PROCEDURE — 77030002974 HC SUT PLN J&J -A: Performed by: SURGERY

## 2020-09-16 PROCEDURE — 80048 BASIC METABOLIC PNL TOTAL CA: CPT

## 2020-09-16 PROCEDURE — 77030003666 HC NDL SPINAL BD -A: Performed by: SURGERY

## 2020-09-16 PROCEDURE — 76030000012 HC AMB SURG OR TIME 6 TO 6.5: Performed by: SURGERY

## 2020-09-16 PROCEDURE — 77030040361 HC SLV COMPR DVT MDII -B: Performed by: SURGERY

## 2020-09-16 PROCEDURE — 2709999900 HC NON-CHARGEABLE SUPPLY: Performed by: SURGERY

## 2020-09-16 PROCEDURE — 36415 COLL VENOUS BLD VENIPUNCTURE: CPT

## 2020-09-16 PROCEDURE — 74011000272 HC RX REV CODE- 272: Performed by: SURGERY

## 2020-09-16 PROCEDURE — 74011250636 HC RX REV CODE- 250/636: Performed by: NURSE ANESTHETIST, CERTIFIED REGISTERED

## 2020-09-16 PROCEDURE — 77030008684 HC TU ET CUF COVD -B: Performed by: ANESTHESIOLOGY

## 2020-09-16 PROCEDURE — 77030002966 HC SUT PDS J&J -A: Performed by: SURGERY

## 2020-09-16 PROCEDURE — 77030040506 HC DRN WND MDII -A: Performed by: SURGERY

## 2020-09-16 PROCEDURE — 77030026438 HC STYL ET INTUB CARD -A: Performed by: ANESTHESIOLOGY

## 2020-09-16 PROCEDURE — 77030013474 HC CRD BPLR DISP ADLR -A: Performed by: SURGERY

## 2020-09-16 PROCEDURE — 74011000250 HC RX REV CODE- 250: Performed by: NURSE ANESTHETIST, CERTIFIED REGISTERED

## 2020-09-16 PROCEDURE — 77030012407 HC DRN WND BARD -B: Performed by: SURGERY

## 2020-09-16 PROCEDURE — 76210000035 HC AMBSU PH I REC 1 TO 1.5 HR: Performed by: SURGERY

## 2020-09-16 PROCEDURE — 77030040356 HC CORD BPLR FRCP COVD -A: Performed by: SURGERY

## 2020-09-16 PROCEDURE — 76060000071 HC AMB SURG ANES 6 TO 6.5 HR: Performed by: SURGERY

## 2020-09-16 PROCEDURE — 74011250636 HC RX REV CODE- 250/636: Performed by: ANESTHESIOLOGY

## 2020-09-16 PROCEDURE — 77030002933 HC SUT MCRYL J&J -A: Performed by: SURGERY

## 2020-09-16 PROCEDURE — 74011250636 HC RX REV CODE- 250/636: Performed by: SURGERY

## 2020-09-16 PROCEDURE — 77030011247 HC DRN WND KT TLS STRY -B: Performed by: SURGERY

## 2020-09-16 PROCEDURE — 77030002916 HC SUT ETHLN J&J -A: Performed by: SURGERY

## 2020-09-16 PROCEDURE — 85025 COMPLETE CBC W/AUTO DIFF WBC: CPT

## 2020-09-16 PROCEDURE — 77030018836 HC SOL IRR NACL ICUM -A: Performed by: SURGERY

## 2020-09-16 PROCEDURE — 77030002996 HC SUT SLK J&J -A: Performed by: SURGERY

## 2020-09-16 PROCEDURE — 74011000258 HC RX REV CODE- 258: Performed by: NURSE ANESTHETIST, CERTIFIED REGISTERED

## 2020-09-16 PROCEDURE — 74011000250 HC RX REV CODE- 250: Performed by: SURGERY

## 2020-09-16 PROCEDURE — 77030013629 HC ELECTRD NDL STRY -B: Performed by: SURGERY

## 2020-09-16 RX ORDER — ONDANSETRON 2 MG/ML
4 INJECTION INTRAMUSCULAR; INTRAVENOUS AS NEEDED
Status: DISCONTINUED | OUTPATIENT
Start: 2020-09-16 | End: 2020-09-16 | Stop reason: HOSPADM

## 2020-09-16 RX ORDER — ACETAMINOPHEN 325 MG/1
650 TABLET ORAL ONCE
Status: DISCONTINUED | OUTPATIENT
Start: 2020-09-16 | End: 2020-09-16 | Stop reason: HOSPADM

## 2020-09-16 RX ORDER — SODIUM CHLORIDE 9 MG/ML
25 INJECTION, SOLUTION INTRAVENOUS CONTINUOUS
Status: DISCONTINUED | OUTPATIENT
Start: 2020-09-16 | End: 2020-09-16 | Stop reason: HOSPADM

## 2020-09-16 RX ORDER — SODIUM CHLORIDE 0.9 % (FLUSH) 0.9 %
5-40 SYRINGE (ML) INJECTION EVERY 8 HOURS
Status: DISCONTINUED | OUTPATIENT
Start: 2020-09-16 | End: 2020-09-16 | Stop reason: HOSPADM

## 2020-09-16 RX ORDER — ROPIVACAINE HYDROCHLORIDE 5 MG/ML
30 INJECTION, SOLUTION EPIDURAL; INFILTRATION; PERINEURAL ONCE
Status: DISCONTINUED | OUTPATIENT
Start: 2020-09-16 | End: 2020-09-16 | Stop reason: HOSPADM

## 2020-09-16 RX ORDER — DIPHENHYDRAMINE HYDROCHLORIDE 50 MG/ML
12.5 INJECTION, SOLUTION INTRAMUSCULAR; INTRAVENOUS AS NEEDED
Status: DISCONTINUED | OUTPATIENT
Start: 2020-09-16 | End: 2020-09-16 | Stop reason: HOSPADM

## 2020-09-16 RX ORDER — LIDOCAINE HYDROCHLORIDE 20 MG/ML
INJECTION, SOLUTION EPIDURAL; INFILTRATION; INTRACAUDAL; PERINEURAL AS NEEDED
Status: DISCONTINUED | OUTPATIENT
Start: 2020-09-16 | End: 2020-09-16 | Stop reason: HOSPADM

## 2020-09-16 RX ORDER — GLYCOPYRROLATE 0.2 MG/ML
INJECTION INTRAMUSCULAR; INTRAVENOUS AS NEEDED
Status: DISCONTINUED | OUTPATIENT
Start: 2020-09-16 | End: 2020-09-16 | Stop reason: HOSPADM

## 2020-09-16 RX ORDER — CEFAZOLIN SODIUM/WATER 2 G/20 ML
2 SYRINGE (ML) INTRAVENOUS ONCE
Status: DISCONTINUED | OUTPATIENT
Start: 2020-09-16 | End: 2020-09-16 | Stop reason: HOSPADM

## 2020-09-16 RX ORDER — SODIUM CHLORIDE 0.9 % (FLUSH) 0.9 %
5-40 SYRINGE (ML) INJECTION AS NEEDED
Status: DISCONTINUED | OUTPATIENT
Start: 2020-09-16 | End: 2020-09-16 | Stop reason: HOSPADM

## 2020-09-16 RX ORDER — MIDAZOLAM HYDROCHLORIDE 1 MG/ML
1 INJECTION, SOLUTION INTRAMUSCULAR; INTRAVENOUS AS NEEDED
Status: DISCONTINUED | OUTPATIENT
Start: 2020-09-16 | End: 2020-09-16 | Stop reason: HOSPADM

## 2020-09-16 RX ORDER — DEXAMETHASONE SODIUM PHOSPHATE 4 MG/ML
INJECTION, SOLUTION INTRA-ARTICULAR; INTRALESIONAL; INTRAMUSCULAR; INTRAVENOUS; SOFT TISSUE AS NEEDED
Status: DISCONTINUED | OUTPATIENT
Start: 2020-09-16 | End: 2020-09-16 | Stop reason: HOSPADM

## 2020-09-16 RX ORDER — SODIUM CHLORIDE, SODIUM LACTATE, POTASSIUM CHLORIDE, CALCIUM CHLORIDE 600; 310; 30; 20 MG/100ML; MG/100ML; MG/100ML; MG/100ML
75 INJECTION, SOLUTION INTRAVENOUS CONTINUOUS
Status: DISCONTINUED | OUTPATIENT
Start: 2020-09-16 | End: 2020-09-16 | Stop reason: HOSPADM

## 2020-09-16 RX ORDER — SODIUM CHLORIDE, SODIUM LACTATE, POTASSIUM CHLORIDE, CALCIUM CHLORIDE 600; 310; 30; 20 MG/100ML; MG/100ML; MG/100ML; MG/100ML
125 INJECTION, SOLUTION INTRAVENOUS CONTINUOUS
Status: DISCONTINUED | OUTPATIENT
Start: 2020-09-16 | End: 2020-09-16 | Stop reason: HOSPADM

## 2020-09-16 RX ORDER — MIDAZOLAM HYDROCHLORIDE 1 MG/ML
INJECTION, SOLUTION INTRAMUSCULAR; INTRAVENOUS AS NEEDED
Status: DISCONTINUED | OUTPATIENT
Start: 2020-09-16 | End: 2020-09-16 | Stop reason: HOSPADM

## 2020-09-16 RX ORDER — MORPHINE SULFATE 10 MG/ML
2 INJECTION, SOLUTION INTRAMUSCULAR; INTRAVENOUS
Status: DISCONTINUED | OUTPATIENT
Start: 2020-09-16 | End: 2020-09-16 | Stop reason: HOSPADM

## 2020-09-16 RX ORDER — HYDROMORPHONE HYDROCHLORIDE 1 MG/ML
0.2 INJECTION, SOLUTION INTRAMUSCULAR; INTRAVENOUS; SUBCUTANEOUS
Status: DISCONTINUED | OUTPATIENT
Start: 2020-09-16 | End: 2020-09-16 | Stop reason: HOSPADM

## 2020-09-16 RX ORDER — LIDOCAINE HYDROCHLORIDE 10 MG/ML
0.1 INJECTION, SOLUTION EPIDURAL; INFILTRATION; INTRACAUDAL; PERINEURAL AS NEEDED
Status: DISCONTINUED | OUTPATIENT
Start: 2020-09-16 | End: 2020-09-16 | Stop reason: HOSPADM

## 2020-09-16 RX ORDER — PROPOFOL 10 MG/ML
INJECTION, EMULSION INTRAVENOUS AS NEEDED
Status: DISCONTINUED | OUTPATIENT
Start: 2020-09-16 | End: 2020-09-16 | Stop reason: HOSPADM

## 2020-09-16 RX ORDER — MIDAZOLAM HYDROCHLORIDE 1 MG/ML
0.5 INJECTION, SOLUTION INTRAMUSCULAR; INTRAVENOUS
Status: DISCONTINUED | OUTPATIENT
Start: 2020-09-16 | End: 2020-09-16 | Stop reason: HOSPADM

## 2020-09-16 RX ORDER — ROCURONIUM BROMIDE 10 MG/ML
INJECTION, SOLUTION INTRAVENOUS AS NEEDED
Status: DISCONTINUED | OUTPATIENT
Start: 2020-09-16 | End: 2020-09-16 | Stop reason: HOSPADM

## 2020-09-16 RX ORDER — HYDROMORPHONE HYDROCHLORIDE 2 MG/ML
INJECTION, SOLUTION INTRAMUSCULAR; INTRAVENOUS; SUBCUTANEOUS AS NEEDED
Status: DISCONTINUED | OUTPATIENT
Start: 2020-09-16 | End: 2020-09-16 | Stop reason: HOSPADM

## 2020-09-16 RX ORDER — FENTANYL CITRATE 50 UG/ML
50 INJECTION, SOLUTION INTRAMUSCULAR; INTRAVENOUS AS NEEDED
Status: DISCONTINUED | OUTPATIENT
Start: 2020-09-16 | End: 2020-09-16 | Stop reason: HOSPADM

## 2020-09-16 RX ORDER — ONDANSETRON 2 MG/ML
INJECTION INTRAMUSCULAR; INTRAVENOUS AS NEEDED
Status: DISCONTINUED | OUTPATIENT
Start: 2020-09-16 | End: 2020-09-16 | Stop reason: HOSPADM

## 2020-09-16 RX ORDER — FENTANYL CITRATE 50 UG/ML
25 INJECTION, SOLUTION INTRAMUSCULAR; INTRAVENOUS
Status: DISCONTINUED | OUTPATIENT
Start: 2020-09-16 | End: 2020-09-16 | Stop reason: HOSPADM

## 2020-09-16 RX ORDER — PHENYLEPHRINE HCL IN 0.9% NACL 0.4MG/10ML
SYRINGE (ML) INTRAVENOUS AS NEEDED
Status: DISCONTINUED | OUTPATIENT
Start: 2020-09-16 | End: 2020-09-16 | Stop reason: HOSPADM

## 2020-09-16 RX ORDER — FENTANYL CITRATE 50 UG/ML
INJECTION, SOLUTION INTRAMUSCULAR; INTRAVENOUS AS NEEDED
Status: DISCONTINUED | OUTPATIENT
Start: 2020-09-16 | End: 2020-09-16 | Stop reason: HOSPADM

## 2020-09-16 RX ADMIN — DEXMEDETOMIDINE HYDROCHLORIDE 6 MCG: 100 INJECTION, SOLUTION, CONCENTRATE INTRAVENOUS at 13:28

## 2020-09-16 RX ADMIN — Medication 80 MCG: at 13:54

## 2020-09-16 RX ADMIN — Medication 40 MCG: at 10:05

## 2020-09-16 RX ADMIN — ROCURONIUM BROMIDE 10 MG: 10 SOLUTION INTRAVENOUS at 12:01

## 2020-09-16 RX ADMIN — Medication 80 MCG: at 14:07

## 2020-09-16 RX ADMIN — HYDROMORPHONE HYDROCHLORIDE 0.5 MG: 2 INJECTION, SOLUTION INTRAMUSCULAR; INTRAVENOUS; SUBCUTANEOUS at 15:04

## 2020-09-16 RX ADMIN — FENTANYL CITRATE 50 MCG: 50 INJECTION, SOLUTION INTRAMUSCULAR; INTRAVENOUS at 10:04

## 2020-09-16 RX ADMIN — FENTANYL CITRATE 50 MCG: 50 INJECTION, SOLUTION INTRAMUSCULAR; INTRAVENOUS at 10:21

## 2020-09-16 RX ADMIN — ROCURONIUM BROMIDE 25 MG: 10 SOLUTION INTRAVENOUS at 10:05

## 2020-09-16 RX ADMIN — HYDROMORPHONE HYDROCHLORIDE 0.5 MG: 2 INJECTION, SOLUTION INTRAMUSCULAR; INTRAVENOUS; SUBCUTANEOUS at 12:24

## 2020-09-16 RX ADMIN — ROCURONIUM BROMIDE 10 MG: 10 SOLUTION INTRAVENOUS at 12:45

## 2020-09-16 RX ADMIN — SODIUM CHLORIDE, SODIUM LACTATE, POTASSIUM CHLORIDE, AND CALCIUM CHLORIDE 125 ML/HR: 600; 310; 30; 20 INJECTION, SOLUTION INTRAVENOUS at 08:40

## 2020-09-16 RX ADMIN — LIDOCAINE HYDROCHLORIDE 60 MG: 20 INJECTION, SOLUTION EPIDURAL; INFILTRATION; INTRACAUDAL; PERINEURAL at 10:05

## 2020-09-16 RX ADMIN — PROPOFOL 50 MG: 10 INJECTION, EMULSION INTRAVENOUS at 12:01

## 2020-09-16 RX ADMIN — Medication 80 MCG: at 11:55

## 2020-09-16 RX ADMIN — SODIUM CHLORIDE, SODIUM LACTATE, POTASSIUM CHLORIDE, AND CALCIUM CHLORIDE 75 ML/HR: 600; 310; 30; 20 INJECTION, SOLUTION INTRAVENOUS at 16:21

## 2020-09-16 RX ADMIN — Medication 80 MCG: at 12:41

## 2020-09-16 RX ADMIN — SODIUM CHLORIDE 1 G: 900 INJECTION, SOLUTION INTRAVENOUS at 10:25

## 2020-09-16 RX ADMIN — DEXAMETHASONE SODIUM PHOSPHATE 4 MG: 4 INJECTION, SOLUTION INTRAMUSCULAR; INTRAVENOUS at 10:21

## 2020-09-16 RX ADMIN — Medication 80 MCG: at 15:37

## 2020-09-16 RX ADMIN — ONDANSETRON HYDROCHLORIDE 4 MG: 2 INJECTION, SOLUTION INTRAMUSCULAR; INTRAVENOUS at 15:58

## 2020-09-16 RX ADMIN — PROPOFOL 110 MG: 10 INJECTION, EMULSION INTRAVENOUS at 10:05

## 2020-09-16 RX ADMIN — Medication 80 MCG: at 10:49

## 2020-09-16 RX ADMIN — ROCURONIUM BROMIDE 5 MG: 10 SOLUTION INTRAVENOUS at 10:04

## 2020-09-16 RX ADMIN — MIDAZOLAM 2 MG: 1 INJECTION INTRAMUSCULAR; INTRAVENOUS at 10:01

## 2020-09-16 RX ADMIN — DEXMEDETOMIDINE HYDROCHLORIDE 8 MCG: 100 INJECTION, SOLUTION, CONCENTRATE INTRAVENOUS at 13:14

## 2020-09-16 RX ADMIN — Medication 80 MCG: at 13:30

## 2020-09-16 RX ADMIN — Medication 80 MCG: at 15:26

## 2020-09-16 RX ADMIN — PROPOFOL 40 MG: 10 INJECTION, EMULSION INTRAVENOUS at 15:02

## 2020-09-16 RX ADMIN — SODIUM CHLORIDE, SODIUM LACTATE, POTASSIUM CHLORIDE, AND CALCIUM CHLORIDE: 600; 310; 30; 20 INJECTION, SOLUTION INTRAVENOUS at 14:36

## 2020-09-16 RX ADMIN — GLYCOPYRROLATE 0.2 MG: 0.2 INJECTION, SOLUTION INTRAMUSCULAR; INTRAVENOUS at 10:49

## 2020-09-16 NOTE — ANESTHESIA PREPROCEDURE EVALUATION
Anesthetic History   No history of anesthetic complications            Review of Systems / Medical History  Patient summary reviewed, nursing notes reviewed and pertinent labs reviewed    Pulmonary  Within defined limits                 Neuro/Psych   Within defined limits           Cardiovascular  Within defined limits                     GI/Hepatic/Renal  Within defined limits   GERD           Endo/Other        Arthritis     Other Findings              Physical Exam    Airway  Mallampati: II  TM Distance: > 6 cm  Neck ROM: normal range of motion   Mouth opening: Normal     Cardiovascular  Regular rate and rhythm,  S1 and S2 normal,  no murmur, click, rub, or gallop             Dental  No notable dental hx       Pulmonary  Breath sounds clear to auscultation               Abdominal  GI exam deferred       Other Findings            Anesthetic Plan    ASA: 2  Anesthesia type: general          Induction: Intravenous  Anesthetic plan and risks discussed with: Patient

## 2020-09-16 NOTE — BRIEF OP NOTE
Brief Postoperative Note    Patient: Daria Cheung  YOB: 1945  MRN: [id number]    Date of Procedure: 9/16/2020     Pre-Op Diagnosis: COSMETIC    Post-Op Diagnosis: Same as preoperative diagnosis.       Procedure(s):  FACE/NECK LIFT, BROWLIFT    Surgeon(s):  Redd Izaguirre MD    Surgical Assistant: None    Anesthesia: General     Estimated Blood Loss (mL): less than 50     Complications: None    Specimens: * No specimens in log *     Implants: * No implants in log *    Drains:   Devon-Ram Drain 09/16/20 Right Head (Active)   Site Assessment Clean, dry, & intact 09/16/20 1445   Dressing Status Clean, dry, & intact 09/16/20 1445   Status Draining 09/16/20 1445   Drainage Color Sanguinous 09/16/20 1445       Devon-Ram Drain 09/16/20 Left Head (Active)   Site Assessment Clean, dry, & intact 09/16/20 1506   Dressing Status Clean, dry, & intact 09/16/20 1506   Status Patent;Draining 09/16/20 1506   Drainage Color Sanguinous 09/16/20 1506       TLS Drain 09/16/20 Left Head (Active)   Site Assessment Clean, dry, & intact 09/16/20 1445   Dressing Status Clean, dry, & intact 09/16/20 1445   Status Patent;Draining 09/16/20 1445       Findings: skin laxity, platysmal laxity, brow ptosis    Electronically Signed by Francisco Jennings MD on 9/16/2020 at 4:26 PM

## 2020-09-16 NOTE — H&P
Pre-op History and Physical    CC: COSMETIC   HPI: 70y.o. year old female with COSMETIC deformity presents  for FACE/NECK LIFT, BROWLIFT. Please see clinic HP for full details. Past medical history:   Past Medical History:   Diagnosis Date    Arthritis     Osteoarthritis    Excessive sweating     Occurs in summers; TSH/CMP/CBC normal    GERD (gastroesophageal reflux disease)     WITH HAITEL HERNIA    MRSA (methicillin resistant Staphylococcus aureus) infection     MRSA + abd abscess; nasal swab negative after treatment    Psychiatric disorder     DEPRESSION. Past surgical history:   Past Surgical History:   Procedure Laterality Date    BREAST SURGERY PROCEDURE UNLISTED Bilateral 1974    AUGMENTATION    HX  SECTION      HX COLONOSCOPY      HX ENDOSCOPY  05/10/2018    Dr. Daksha Grimm; gastritis and esophagitis    HX HYSTERECTOMY      Age 29 for endometriosis    IMPLANT BREAST SILICONE/EQ Bilateral 6188    1st surgery , replaced bilaterally in .  PA REVISE BREAST RECONSTRUCTION  2014    Bilateral breast implant exchange due to  cosmetic defect of previous implants      Family history:   Family History   Problem Relation Age of Onset    Diabetes Father     Cancer Father         PROSTATE CANCER    Arthritis-osteo Mother     Arthritis-osteo Sister     Anesth Problems Sister         HAS DIFFICULTY WAKING UP-TAKES VERY LONG.       Social history:   Social History     Socioeconomic History    Marital status:      Spouse name: Not on file    Number of children: Not on file    Years of education: Not on file    Highest education level: Not on file   Occupational History    Not on file   Social Needs    Financial resource strain: Not on file    Food insecurity     Worry: Not on file     Inability: Not on file    Transportation needs     Medical: Not on file     Non-medical: Not on file   Tobacco Use    Smoking status: Never Smoker    Smokeless tobacco: Never Used   Substance and Sexual Activity    Alcohol use: No    Drug use: No    Sexual activity: Not on file   Lifestyle    Physical activity     Days per week: Not on file     Minutes per session: Not on file    Stress: Not on file   Relationships    Social connections     Talks on phone: Not on file     Gets together: Not on file     Attends [employer] service: Not on file     Active member of club or organization: Not on file     Attends meetings of clubs or organizations: Not on file     Relationship status: Not on file    Intimate partner violence     Fear of current or ex partner: Not on file     Emotionally abused: Not on file     Physically abused: Not on file     Forced sexual activity: Not on file   Other Topics Concern    Not on file   Social History Narrative    Not on file      Home Medications:   Prior to Admission medications    Medication Sig Start Date End Date Taking? Authorizing Provider   acetaminophen (Tylenol Arthritis Pain) 650 mg TbER Take 650-1,300 mg by mouth as needed for Pain. Provider, Historical   sertraline (ZOLOFT) 100 mg tablet 200 mg nightly. 10/30/19   Provider, Historical   topiramate (TOPAMAX) 25 mg tablet 25 mg nightly. 6/11/19   Provider, Historical      Allergies: No Known Allergies   Review of systems:  Denies headache, fever, chills, weight change, congestion, sore throat, chest pain, shortness of breath, nausea, vomiting, diarrhea, constipation, abdominal pain, generalized weakness, muscle or joint pain, and rash. Physical Exam:  Vitals: There were no vitals taken for this visit.    General: awake and alert, NAD  Neck: supple  Cor: RRR  Lungs: clear  Abdomen: soft, non-tender, non-distended  Extremities: no edema  Breast:  Skin: skin laxity face and neck, brow ptosis  HEENT:      Impression: COSMETIC    Plan:  FACE AND NECK LIFT, BROWLIFT    The patient was counseled at length about the risks of unique Covid-19 during their perioperative period and any recovery window from their procedure. The patient was made aware that unique Covid-19  may worsen their prognosis for recovering from their procedure and lend to a higher morbidity and/or mortality risk. All material risks, benefits, and reasonable alternatives including postponing the procedure were discussed. The patient does  wish to proceed with the procedure at this time.

## 2020-09-16 NOTE — DISCHARGE INSTRUCTIONS
Instructions After Cosmetic Plastic Surgery Procedures  Facelift-Necklift/ Kanchan Reilly MD  [phone]      Please follow these simple instructions to help ensure a safe and comfortable recovery. Any questions can be directed to the office at [phone]. Dr. Lupillo Reilly can also be reached through the answering service at [phone]                                                     1.  Leave all dressings in place tonight. Empty and recharge drains as instructed. The larger drains should be emptied and recharged every 4 hours. The smaller drain may not require this. 2. It is normal to have some blood drainage on the first night. This will present as some staining of the dressings. Excessive bleeding should be reported to Dr. Lupillo Reilly    3. You should shower daily and wash hair with gentle shampoo. Apply Bactroban (Mupirocin) ointment to your incisions daily after shower. Also apply nightly to incisions. 4. Wear your postop neck garment as instructed by . He will give this to you tommorow. Remove for shower. 5. Expect a modest amount of redness (inflammation) and possibly some bruising to appear in the days following your surgery. This is normal and will resolve as the wound heals, but may persist until the stitches have been removed. 6. NO HEAVY LIFTING , NO HEAVY EXERCISE, NO YARD WORK, NO HEAVY WORK. NO HOT TUBS, NO POOLS, NO SAUNAS, NO SMOKING, NO SEXUAL ACTIVITY.                                7. STAY COOL AND REST QUIETLY                                                                                                                                                                                                                                                        8. WALK INSIDE YOUR HOUSE EVERY 2-3 HOURS  TO PREVENT BLOOD CLOT FORMATION IN THE LEGS                                                                                                                           9. NO NAPROSYN (ALLEVE), NO IBUPROFEN (ADVIL,MOTRIN), NO ASPIRIN    10. The appearance of excessive wound redness, pus or fever or chills is not normal and should be reported to the office. Please also call us for excessive swelling, bleeding, or pain out of control. Also contact us for any difficulty breathing, swelling of the calf area, chest pain, or shortness of breath. 11. Mild to moderate pain is to be expected. You have been given a prescription for ___DILAUDID__________________. 12. Keep your head elevated. Swelling or discomfort will be improved by elevating the surgical site above the level of the heart, especially the face, scalp or arms, which can be elevated in bed by extra pillows. 13. Swelling may also be reduced with ice packs but take care that these are not used for prolonged periods and that ice does not directly touch the skin. 14. Call the office at [phone] if you have any questions.     FOLLOW UP AT DR Karley Panchal OFFICE TOMORROW MORNING AT 900AM       DISCHARGE SUMMARY from Nurse    PATIENT INSTRUCTIONS:    After general anesthesia or intravenous sedation, for 24 hours or while taking prescription Narcotics:  · Limit your activities  · Do not drive and operate hazardous machinery  · Do not make important personal or business decisions  · Do  not drink alcoholic beverages  · If you have not urinated within 8 hours after discharge, please contact your surgeon on call. Report the following to your surgeon:  · Excessive pain, swelling, redness or odor of or around the surgical area  · Temperature over 100.5  · Nausea and vomiting lasting longer than 4 hours or if unable to take medications  · Any signs of decreased circulation or nerve impairment to extremity: change in color, persistent  numbness, tingling, coldness or increase pain  · Any questions  If you experience any of the following symptoms as noted above, please follow up with Dr. Lelo Lee. What to do at Home:  Recommended activity: See surgical instructions above from Dr. Lelo Lee  Recommended Diet: Resume regular diet as tolerated. Nothing heavy, greasy, fatty, or fried. Please make sure you have food on your stomach prior to taking narcotic pain medication. *  Please give a list of your current medications to your Primary Care Provider. *  Please update this list whenever your medications are discontinued, doses are  changed, or new medications (including over-the-counter products) are added. *  Please carry medication information at all times in case of emergency situations. Community Education:    These are general instructions for a healthy lifestyle:    No smoking/ No tobacco products/ Avoid exposure to second hand smoke  Surgeon General's Warning:  Quitting smoking now greatly reduces serious risk to your health. Obesity, smoking, and sedentary lifestyle greatly increases your risk for illness    A healthy diet, regular physical exercise & weight monitoring are important for maintaining a healthy lifestyle    You may be retaining fluid if you have a history of heart failure or if you experience any of the following symptoms:  Weight gain of 3 pounds or more overnight or 5 pounds in a week, increased swelling in our hands or feet or shortness of breath while lying flat in bed.   Please call your doctor as soon as you notice any of these symptoms; do not wait until your next office visit.        The discharge information has been reviewed with the patient and caregiver. The patient and caregiver verbalized understanding. Discharge medications reviewed with the patient and caregiver and appropriate educational materials and side effects teaching were provided.   ___________________________________________________________________________________________________________________________________

## 2020-09-16 NOTE — PERIOP NOTES
Discharge instructions reviewed with patient and caregiver. Opportunity for questions and clarification provided. Patient and caregiver verbalized understanding of discharge instructions and had no additional questions or concerns. DAMIR drain/TLD drain management upon discharge education/teachback provided. Patient/caregiver demonstrated competency and had no additional questions or concerns. Patient's vital signs within normal limits. Patient denies post-operative pain. Patient tolerating PO intake, denies nausea. Peripheral IV removed with no signs of infiltration. Reis catheter removed prior to discharge    Patient discharged by RN via wheelchair to caregiver's care/car and prior home environment from Phase 1 area.

## 2020-09-16 NOTE — ANESTHESIA POSTPROCEDURE EVALUATION
Post-Anesthesia Evaluation and Assessment    Patient: Isi Messina MRN: [id number]  SSN: [id number]    YOB: 1945  Age: 70 y.o. Sex: female      I have evaluated the patient and they are stable and ready for discharge from the PACU. Cardiovascular Function/Vital Signs  Visit Vitals  /70   Pulse 94   Temp 36.9 °C (98.4 °F)   Resp 12   Ht 5' (1.524 m)   Wt 46.7 kg (103 lb)   SpO2 95%   BMI 20.12 kg/m²       Patient is status post General anesthesia for Procedure(s):  FACELIFT W/BROWLIFT. Nausea/Vomiting: None    Postoperative hydration reviewed and adequate. Pain:  Pain Scale 1: (P) Visual (09/16/20 1610)   Managed    Neurological Status:   Neuro (WDL): Within Defined Limits (09/16/20 0831)   At baseline    Mental Status, Level of Consciousness: Alert and  oriented to person, place, and time    Pulmonary Status:   O2 Device: (P) Room air (09/16/20 1615)   Adequate oxygenation and airway patent    Complications related to anesthesia: None    Post-anesthesia assessment completed.  No concerns    Signed By: Musa Donald MD     September 16, 2020

## 2020-09-16 NOTE — PERIOP NOTES
MD Rivera at bedside, notified TLS drain does not have clamp. Per MD ok for patient/caregiver to fold tube and change drain upon discharge.

## 2020-09-17 NOTE — OP NOTES
[address]  OPERATIVE REPORT    Name:  Nash Rene  MR#:  [id number]  :  1945  ACCOUNT #:  [de-identified]  DATE OF SERVICE:  2020      PREOPERATIVE DIAGNOSIS:  Cosmetic. POSTOPERATIVE DIAGNOSIS:  Cosmetic. PROCEDURES PERFORMED:  1. Brow lift. 2.  Face and neck lift. SURGEON:  Monique Haile MD    ASSISTANT:  None. ANESTHESIA:  General.    COMPLICATIONS:  None. SPECIMENS REMOVED:  None. IMPLANTS:  None. ESTIMATED BLOOD LOSS:  40 mL. INDICATIONS FOR PROCEDURE:  The patient is a 79-year-old female who presents with skin laxity in the face and neck as well as some muscular laxity of the platysmal area as well as platysmal banding. She also has ptosis of the brow. She presents for brow lift as well as face and neck lift. All risks have been discussed with her to include bleeding, infection, allergic reaction, anesthesia, wound healing problems, scarring, numbness, DVT, pulmonary embolism, myocardial infarction, stroke, death, necrosis of skin, facial nerve injury or irritation, and possible need for re-operation, re-hospitalization, or revisionary surgery. Risk of COVID-19 infection in the perioperative period has been discussed with the patient. She wished to proceed with this elective procedure. SCDs were placed and activated prior to induction of anesthesia. Antibiotics were given prior to incision. PROCEDURE:  The patient was taken to the operating room and after adequate anesthesia had been provided, the patient's head and neck were prepped and draped in the usual sterile fashion. Attention was first turned the brow lift. The brow was infiltrated with local anesthetic solution. An incision line had been delineated in the pretragal position approximately 5 mm behind the hairline. After allowing time for local anesthetic to take effect, an incision was made using a 15 blade scalpel.   This incision was beveled so as to hide this in the hairline. Once the initial incision had been made, the skin of the brow was then elevated over the frontalis muscle carefully using both sharp dissection with scalpel as well as cauterized safely with electrocautery. Care was taken to maintain this dissection in deep subcutaneous plane. Dissection was continued caudally until good mobilization of the skin was obtained over the frontalis muscle. Some careful blunt dissection was performed on the inferior aspect of the brow in order to further mobilize this down to the orbital rim. Once this was performed, the wound was thoroughly irrigated and then irrigated with tranexamic acid solution. Hemostasis was achieved using electrocautery. Trimming and insetting of the skin were then performed. Careful traction was placed on the skin flap and this was inset at key locations by sizing the skin and insetting with skin stapler. Following this, the skin was trimmed. Further hemostasis was achieved. Closure was then performed using interrupted 4-0 Monocryl placed at the dermis with the knots buried. Interrupted and running 5-0 nylon suture was then used to close the skin. Staples were removed. It should be noted that the closure was performed over TLS drain. Prior to initiating face and neck lift dissection, a lysis of the platysmal bands in the anterior neck was performed. This was performed using a lassoing technique using 0 Ethibond sutures. This suture was passed beneath the platysmal band and then superficial to it. The suture was passed through the same entry and exit points so that a lassoing and sawing technique could be utilized to lyse the bands This  was performed twice time each side for lysis of the right and left platysmal bands. Attention was then turned to the face and neck lift portion of procedure. The face and neck were then infiltrated with local anesthetic solution.   This was performed using tumescent 20-gauge spinal needle as well as a larger tumescent needle. This was performed in the lateral and anterior neck bilaterally as well as both cheeks. Attention was first turned to the left side. The incision lines were delineated using a marking pen in the preauricular and posterior auricular positions. Small incisions were then made using a 15 blade scalpel along these demarcated lines and through these small incisions, undermining was performed using a 3-mm liposuction cannula. This was performed in the cheek area as well as the neck area to be dissected. A small incision was also made in the submental area and through this incision, additional undermining with the liposuction cannula was performed. Attention was then turned to the formal dissection. The incisions were then completed along the demarcated lines using a 15 blade scalpel. This was performed in the preauricular and posterior auricular area. The preauricular incision was extended into the sideburn. The posterior auricular incision was extended into the hairline. The anterior skin flap along the cheek was elevated first.  This was performed using careful and sharp dissection with #15 blade scalpel and then the dissection was continued carefully with facelift scissors to elevate the entire flap over the left cheek. The dissection was continued into the lateral anterior neck to elevate the skin in this area. The posterior auricular area was then dressed. The skin of the posterior auricular scalp was then elevated using careful dissection with #15 blade scalpel as well as needle tip electrocautery. Sharp dissection with scissors was also carefully performed in order to elevate the neck skin in this area. Care was taken to avoid any injury to the greater auricular nerve and to maintain this dissection to the deep subcutaneous plane. Further dissection was then performed bluntly with blunt-tip facelift scissors to further mobilize the neck skin.   Once this was complete, thorough hemostasis was achieved using electrocautery and the wound was packed with Ray-Sonia soaked in tranexamic acid solution. The submental area was then further dissected with blunt facelift scissors to mobilize the skin in this area. Following this, further hemostasis was achieved and this area was irrigated. The right side was then addressed. An identical procedure was performed on the right side to elevate the skin of the face and neck. As stated above, additional dissection was performed using 3-mm liposuction cannula and then dissection was continued using #15 blade scalpel as well as facelift scissors to elevate the skin of the right cheek and right neck. Once this was complete, thorough hemostasis was achieved and the wound was thoroughly irrigated with tranexamic acid solution. Wound was packed with Ray-Sonia soaked in tranexamic acid solution. Attention was again turned to the left side. Attention was now turned to plication of the SMAS layer and platysma. The SMAS on the left was marked for plication with a superior lateral vector. 4-0 PDS suture was then used from a running SMAS plication which extended from the zygomatic arch down the left sheath into the anterior neck area. Interrupted figure-of-eight 3-0 PDS was then used to ligate the platysma posteriorly. Two of such sutures were placed and these were anchored to the mastoid fascia. Attention was then turned to the right side. An identical plication was performed of the SMAS and platysma on the right side. All wounds were thoroughly irrigated. Thorough hemostasis was achieved using electrocautery as well as bipolar electrocautery. Attention was now turned to formal closure after plication of the SMAS and platysma. Left side was addressed first.  The skin flaps were re-draped into position using Allis clamps.   The initial insetting was performed using #1 blade scalpel as well as a skin stapler to place the flaps into correct position. Trimming was then performed using scissors of the posterior scalp flap as well as the anterior cheek flap. The ear was then inset by trending the flap inferiorly with scissors and care was taken to inset this without any undue tension so as to avoid any future deformity. The skin was then trimmed posteriorly and anteriorly. Closure was then performed anteriorly and posteriorly using interrupted 4-0 Monocryl. Closure was performed along the hairline using staples and 3-0 nylon. Closure was performed along the posterior auricular incision using a running 4-0 plain gut. Closure was performed along the sideburn using a 5-0 nylon suture placed in a running fashion. Closure was performed along the anterior incision using a running 6-0 nylon suture. An identical procedure was then performed on the right side to close the right side of the face. Once this was performed, the submental area was closed using interrupted 4-0 Monocryl placed at the dermis with the knots buried and a running 5-0 nylon suture. It should be noted that the closure was performed on both sides over a 7-mm flat drain which was brought out through a stab incision posteriorly and secured with 2-0 silk suture. These drains were across the midline. Sterile dressing and head wrap were applied. All sponge, needle, and instrument counts were correct at the end of the case. The patient tolerated the procedure well and was transported in stable condition to the recovery room.       MD DESIRE [email or web address]  D:  09/16/2020 16:54  T:  09/17/2020 1:47  JOB #:  [id number]

## 2020-10-12 ENCOUNTER — OFFICE VISIT (OUTPATIENT)
Dept: URGENT CARE | Age: 75
End: 2020-10-12
Payer: MEDICARE

## 2020-10-12 ENCOUNTER — OFFICE VISIT (OUTPATIENT)
Dept: INTERNAL MEDICINE CLINIC | Age: 75
End: 2020-10-12

## 2020-10-12 VITALS
SYSTOLIC BLOOD PRESSURE: 114 MMHG | RESPIRATION RATE: 16 BRPM | WEIGHT: 107.4 LBS | OXYGEN SATURATION: 97 % | BODY MASS INDEX: 21.09 KG/M2 | HEIGHT: 60 IN | TEMPERATURE: 100.8 F | DIASTOLIC BLOOD PRESSURE: 67 MMHG | HEART RATE: 76 BPM

## 2020-10-12 VITALS — OXYGEN SATURATION: 99 % | HEART RATE: 77 BPM | RESPIRATION RATE: 14 BRPM | TEMPERATURE: 99 F

## 2020-10-12 DIAGNOSIS — Z20.822 SUSPECTED COVID-19 VIRUS INFECTION: Primary | ICD-10-CM

## 2020-10-12 DIAGNOSIS — Z53.21 PATIENT LEFT BEFORE EVALUATION BY PHYSICIAN: Primary | ICD-10-CM

## 2020-10-12 PROCEDURE — 3017F COLORECTAL CA SCREEN DOC REV: CPT | Performed by: FAMILY MEDICINE

## 2020-10-12 PROCEDURE — G8427 DOCREV CUR MEDS BY ELIG CLIN: HCPCS | Performed by: FAMILY MEDICINE

## 2020-10-12 PROCEDURE — G9899 SCRN MAM PERF RSLTS DOC: HCPCS | Performed by: FAMILY MEDICINE

## 2020-10-12 PROCEDURE — 3288F FALL RISK ASSESSMENT DOCD: CPT | Performed by: FAMILY MEDICINE

## 2020-10-12 PROCEDURE — 1090F PRES/ABSN URINE INCON ASSESS: CPT | Performed by: FAMILY MEDICINE

## 2020-10-12 PROCEDURE — 99202 OFFICE O/P NEW SF 15 MIN: CPT | Performed by: FAMILY MEDICINE

## 2020-10-12 PROCEDURE — G9717 DOC PT DX DEP/BP F/U NT REQ: HCPCS | Performed by: FAMILY MEDICINE

## 2020-10-12 PROCEDURE — 1100F PTFALLS ASSESS-DOCD GE2>/YR: CPT | Performed by: FAMILY MEDICINE

## 2020-10-12 PROCEDURE — G8420 CALC BMI NORM PARAMETERS: HCPCS | Performed by: FAMILY MEDICINE

## 2020-10-12 PROCEDURE — G8536 NO DOC ELDER MAL SCRN: HCPCS | Performed by: FAMILY MEDICINE

## 2020-10-12 NOTE — PROGRESS NOTES
Kisha Torres  Identified pt with two pt identifiers(name and ). Chief Complaint   Patient presents with    Weight Loss     Room 3C // Not now //    [facility] Visit       Reviewed record In preparation for visit and have obtained necessary documentation. 1. Have you been to the ER, urgent care clinic or hospitalized since your last visit? No     2. Have you seen or consulted any other health care providers outside of the [address] [place] [address] since your last visit? Include any pap smears or colon screening. No    Patient does not have an advance directive. Vitals reviewed with provider.     Health Maintenance reviewed:     Health Maintenance Due   Topic    Medicare Yearly Exam     A1C test (Diabetic or Prediabetic)     Flu Vaccine (1)          Wt Readings from Last 3 Encounters:   10/12/20 107 lb 6.4 oz (48.7 kg)   20 103 lb (46.7 kg)   20 102 lb (46.3 kg)        Temp Readings from Last 3 Encounters:   10/12/20 (!) 100.8 °F (38.2 °C) (Oral)   20 98.4 °F (36.9 °C)   20 98.2 °F (36.8 °C)        BP Readings from Last 3 Encounters:   10/12/20 114/67   20 131/62   20 121/67        Pulse Readings from Last 3 Encounters:   10/12/20 76   20 73   20 (!) 58        Vitals:    10/12/20 1350   BP: 114/67   Pulse: 76   Resp: 16   Temp: (!) 100.8 °F (38.2 °C)   TempSrc: Oral   SpO2: 97%   Weight: 107 lb 6.4 oz (48.7 kg)   Height: 5' (1.524 m)   PainSc:   0 - No pain          Learning Assessment:   :       Learning Assessment 2015   PRIMARY LEARNER Patient   BARRIERS PRIMARY LEARNER NONE   CO-LEARNER CAREGIVER No   CO-LEARNER HIGHEST LEVEL OF EDUCATION GRADUATED HIGH SCHOOL OR GED   PRIMARY LANGUAGE ENGLISH   LEARNER PREFERENCE PRIMARY LISTENING   ANSWERED BY patient   RELATIONSHIP SELF        Depression Screening:   :       3 most recent PHQ Screens 1/15/2019   Little interest or pleasure in doing things Not at all   Feeling down, depressed, irritable, or hopeless Not at all   Total Score PHQ 2 0   Trouble falling or staying asleep, or sleeping too much -   Feeling tired or having little energy -   Poor appetite, weight loss, or overeating -   Feeling bad about yourself - or that you are a failure or have let yourself or your family down -   Trouble concentrating on things such as school, work, reading, or watching TV -   Moving or speaking so slowly that other people could have noticed; or the opposite being so fidgety that others notice -   Thoughts of being better off dead, or hurting yourself in some way -   How difficult have these problems made it for you to do your work, take care of your home and get along with others -        Fall Risk Assessment:   :       Fall Risk Assessment, last 12 mths 2/28/2020   Able to walk? Yes   Fall in past 12 months? Yes   Fall with injury? Yes   Number of falls in past 12 months -   Fall Risk Score -        Abuse Screening:   :       Abuse Screening Questionnaire 2/28/2020 11/1/2019 6/29/2018 11/23/2015   Do you ever feel afraid of your partner? N N N N   Are you in a relationship with someone who physically or mentally threatens you? N N N N   Is it safe for you to go home?  Y Y Y Y        ADL Screening:   :       ADL Assessment 6/29/2018   Feeding yourself No Help Needed   Getting from bed to chair No Help Needed   Getting dressed No Help Needed   Bathing or showering No Help Needed   Walk across the room (includes cane/walker) No Help Needed   Using the telphone No Help Needed   Taking your medications No Help Needed   Preparing meals No Help Needed   Managing money (expenses/bills) No Help Needed   Moderately strenuous housework (laundry) No Help Needed   Shopping for personal items (toiletries/medicines) No Help Needed   Shopping for groceries No Help Needed   Driving No Help Needed   Climbing a flight of stairs No Help Needed   Getting to places beyond walking distances No Help Needed

## 2020-10-12 NOTE — PROGRESS NOTES
This patient was seen in [facility] at [address] [place] [address] Urgent Care while in their vehicle due to COVID-19 pandemic with PPE and focused examination in order to decrease community viral transmission. The patient/guardian gave verbal consent to treat. The history is provided by the patient. Fever    The history is provided by the patient. This is a new problem. Episode onset: today temp was 100.4 when went to see PCP  Associated symptoms comments: none. She has tried nothing for the symptoms. No known exposure to covid    Past Medical History:   Diagnosis Date    Arthritis     Osteoarthritis    Excessive sweating     Occurs in summers; TSH/CMP/CBC normal    GERD (gastroesophageal reflux disease)     WITH HAITEL HERNIA    MRSA (methicillin resistant Staphylococcus aureus) infection     MRSA + abd abscess; nasal swab negative after treatment    Psychiatric disorder     DEPRESSION. Past Surgical History:   Procedure Laterality Date    BREAST SURGERY PROCEDURE UNLISTED Bilateral     AUGMENTATION    HX  SECTION      HX COLONOSCOPY      HX ENDOSCOPY  05/10/2018    Dr. Ehsan Neal; gastritis and esophagitis    HX HYSTERECTOMY      Age 29 for endometriosis    IMPLANT BREAST SILICONE/EQ Bilateral 9319    1st surgery , replaced bilaterally in .  OR REVISE BREAST RECONSTRUCTION  2014    Bilateral breast implant exchange due to  cosmetic defect of previous implants         Family History   Problem Relation Age of Onset    Diabetes Father     Cancer Father         PROSTATE CANCER    Arthritis-osteo Mother     Arthritis-osteo Sister     Anesth Problems Sister         HAS DIFFICULTY WAKING UP-TAKES VERY LONG.         Social History     Socioeconomic History    Marital status:      Spouse name: Not on file    Number of children: Not on file    Years of education: Not on file    Highest education level: Not on file   Occupational History    Not on file   Social Needs    Financial resource strain: Not on file    Food insecurity     Worry: Not on file     Inability: Not on file    Transportation needs     Medical: Not on file     Non-medical: Not on file   Tobacco Use    Smoking status: Never Smoker    Smokeless tobacco: Never Used   Substance and Sexual Activity    Alcohol use: No    Drug use: No    Sexual activity: Not on file   Lifestyle    Physical activity     Days per week: Not on file     Minutes per session: Not on file    Stress: Not on file   Relationships    Social connections     Talks on phone: Not on file     Gets together: Not on file     Attends [employer] service: Not on file     Active member of club or organization: Not on file     Attends meetings of clubs or organizations: Not on file     Relationship status: Not on file    Intimate partner violence     Fear of current or ex partner: Not on file     Emotionally abused: Not on file     Physically abused: Not on file     Forced sexual activity: Not on file   Other Topics Concern    Not on file   Social History Narrative    Not on file                ALLERGIES: Patient has no known allergies. Review of Systems   Constitutional: Positive for fever. All other systems reviewed and are negative. Vitals:    10/12/20 1510   Pulse: 77   Resp: 14   Temp: 99 °F (37.2 °C)   SpO2: 99%       Physical Exam  Vitals signs and nursing note reviewed. Constitutional:       General: She is not in acute distress. Appearance: She is not ill-appearing. Pulmonary:      Effort: Pulmonary effort is normal. No respiratory distress. Breath sounds: No wheezing. MDM    Procedures             ICD-10-CM ICD-9-CM    1. Suspected COVID-19 virus infection  Z20.828 V01.79 NOVEL CORONAVIRUS (COVID-19)      Tested for Covid-19 and advised to quarantine until result comes back. No orders of the defined types were placed in this encounter.     No results found for any visits on 10/12/20. The patients condition was discussed with the patient and they understand. The patient is to follow up with primary care doctor. If signs and symptoms become worse the pt is to go to the ER. The patient is to take medications as prescribed.

## 2020-10-14 LAB — SARS-COV-2, NAA: NOT DETECTED

## 2020-10-15 ENCOUNTER — VIRTUAL VISIT (OUTPATIENT)
Dept: INTERNAL MEDICINE CLINIC | Age: 75
End: 2020-10-15
Payer: MEDICARE

## 2020-10-15 DIAGNOSIS — Z13.5 SCREENING FOR GLAUCOMA: ICD-10-CM

## 2020-10-15 DIAGNOSIS — N39.41 URGE URINARY INCONTINENCE: ICD-10-CM

## 2020-10-15 DIAGNOSIS — E78.00 HYPERCHOLESTEROLEMIA: ICD-10-CM

## 2020-10-15 DIAGNOSIS — Z00.00 MEDICARE ANNUAL WELLNESS VISIT, SUBSEQUENT: Primary | ICD-10-CM

## 2020-10-15 DIAGNOSIS — M81.0 AGE-RELATED OSTEOPOROSIS WITHOUT CURRENT PATHOLOGICAL FRACTURE: ICD-10-CM

## 2020-10-15 DIAGNOSIS — F31.62 BIPOLAR DISORDER, CURRENT EPISODE MIXED, MODERATE (HCC): ICD-10-CM

## 2020-10-15 DIAGNOSIS — E55.9 VITAMIN D DEFICIENCY: ICD-10-CM

## 2020-10-15 DIAGNOSIS — Z71.89 ADVANCE CARE PLANNING: ICD-10-CM

## 2020-10-15 DIAGNOSIS — R73.03 PREDIABETES: ICD-10-CM

## 2020-10-15 PROCEDURE — G8427 DOCREV CUR MEDS BY ELIG CLIN: HCPCS | Performed by: INTERNAL MEDICINE

## 2020-10-15 PROCEDURE — 1100F PTFALLS ASSESS-DOCD GE2>/YR: CPT | Performed by: INTERNAL MEDICINE

## 2020-10-15 PROCEDURE — 99213 OFFICE O/P EST LOW 20 MIN: CPT | Performed by: INTERNAL MEDICINE

## 2020-10-15 PROCEDURE — 3288F FALL RISK ASSESSMENT DOCD: CPT | Performed by: INTERNAL MEDICINE

## 2020-10-15 PROCEDURE — G0439 PPPS, SUBSEQ VISIT: HCPCS | Performed by: INTERNAL MEDICINE

## 2020-10-15 PROCEDURE — G9899 SCRN MAM PERF RSLTS DOC: HCPCS | Performed by: INTERNAL MEDICINE

## 2020-10-15 PROCEDURE — G8420 CALC BMI NORM PARAMETERS: HCPCS | Performed by: INTERNAL MEDICINE

## 2020-10-15 PROCEDURE — G8536 NO DOC ELDER MAL SCRN: HCPCS | Performed by: INTERNAL MEDICINE

## 2020-10-15 PROCEDURE — 3017F COLORECTAL CA SCREEN DOC REV: CPT | Performed by: INTERNAL MEDICINE

## 2020-10-15 PROCEDURE — G9717 DOC PT DX DEP/BP F/U NT REQ: HCPCS | Performed by: INTERNAL MEDICINE

## 2020-10-15 NOTE — PROGRESS NOTES
This is the Subsequent Medicare Annual Wellness Exam, performed 12 months or more after the Initial AWV or the last Subsequent AWV    I have reviewed the patient's medical history in detail and updated the computerized patient record. History     Sergo Kearney is a 70 y.o. female. Presents for Medicare AWV and follow up evaluation. She has hypercholesterolemia, vitamin D deficiency, osteoporosis, OA of hands, bipolar disorder, and anxiety. Complains of problems with her eyes feeling odd. Also complains of increased urinary urgency over the past 6 months. Sometimes urinates by the side of her car in parking lots as soon as she gets out of her car. Reports sometimes overworking herself. Can stay outdoors working in the yard for hours. Psychiatrist: George Silva" on [facility]. Took Fosamax for about 4-5 months but stopped because it caused severe heartburn. Tries to eat foods high in calcium and vitamin  D. Health Maintenance  Flu vaccine: declined  Pneumonia vaccine: PPSV-23 2/9/16, PCV-13 2/17/17  Tetanus vaccine: Tdap 7/22/15   Shingles vaccine: declined                                                               Colonoscopy: 3/31/15  Mammogram: 12/5/19  Bone density: DEXA 12/5/19, osteoporosis  Eye exam: 3/25/19, Dr. Dolores Nance  Lipids: 6/14/19 (tot chol 232, )  A1c: 6/14/19 (5.7%)  Advanced Directives: given information   End of Life: given information             ROS  A complete review of systems was performed and is negative except for those mentioned in the HPI.     Patient Active Problem List   Diagnosis Code    Bipolar disorder ([facility] [address] F31.9    Anxiety disorder F41.9    Primary osteoarthritis of both hands M19.041, M19.042    Hypercholesterolemia E78.00    Vitamin D deficiency E55.9    Age-related osteoporosis without current pathological fracture M81.0    Prediabetes R73.03    Gastroesophageal reflux disease without esophagitis K21.9     Past Medical History:   Diagnosis Date    Arthritis     Osteoarthritis    Excessive sweating     Occurs in summers; TSH/CMP/CBC normal    GERD (gastroesophageal reflux disease)     WITH HAITEL HERNIA    MRSA (methicillin resistant Staphylococcus aureus) infection     MRSA + abd abscess; nasal swab negative after treatment    Psychiatric disorder     DEPRESSION. Past Surgical History:   Procedure Laterality Date    BREAST SURGERY PROCEDURE UNLISTED Bilateral 1974    AUGMENTATION    HX  SECTION      HX COLONOSCOPY  2016    HX ENDOSCOPY  05/10/2018    Dr. Vannessa Carnes; gastritis and esophagitis    HX HYSTERECTOMY      Age 29 for endometriosis    IMPLANT BREAST SILICONE/EQ Bilateral 7335    1st surgery , replaced bilaterally in .  DC REVISE BREAST RECONSTRUCTION  2014    Bilateral breast implant exchange due to  cosmetic defect of previous implants     Current Outpatient Medications   Medication Sig Dispense Refill    acetaminophen (Tylenol Arthritis Pain) 650 mg TbER Take 650-1,300 mg by mouth as needed for Pain.  sertraline (ZOLOFT) 100 mg tablet 200 mg nightly.  topiramate (TOPAMAX) 25 mg tablet 25 mg nightly. No Known Allergies    Family History   Problem Relation Age of Onset    Diabetes Father     Cancer Father         PROSTATE CANCER    Arthritis-osteo Mother     Arthritis-osteo Sister     Anesth Problems Sister         HAS DIFFICULTY WAKING UP-TAKES VERY LONG.      Social History     Tobacco Use    Smoking status: Never Smoker    Smokeless tobacco: Never Used   Substance Use Topics    Alcohol use: No       Depression Risk Factor Screening:     3 most recent PHQ Screens 10/15/2020   PHQ Not Done Active Diagnosis of Depression or Bipolar Disorder   Little interest or pleasure in doing things -   Feeling down, depressed, irritable, or hopeless -   Total Score PHQ 2 -   Trouble falling or staying asleep, or sleeping too much -   Feeling tired or having little energy -   Poor appetite, weight loss, or overeating -   Feeling bad about yourself - or that you are a failure or have let yourself or your family down -   Trouble concentrating on things such as school, work, reading, or watching TV -   Moving or speaking so slowly that other people could have noticed; or the opposite being so fidgety that others notice -   Thoughts of being better off dead, or hurting yourself in some way -   How difficult have these problems made it for you to do your work, take care of your home and get along with others -       Alcohol Risk Screen   Do you average more than 1 drink per night or more than 7 drinks a week:  No    On any one occasion in the past three months have you have had more than 3 drinks containing alcohol:  No        Functional Ability and Level of Safety:   Hearing: Hearing is good. Activities of Daily Living: The home contains: no safety equipment. Patient does total self care     Ambulation: with no difficulty     Fall Risk:  Fall Risk Assessment, last 12 mths 2/28/2020   Able to walk? Yes   Fall in past 12 months? Yes   Fall with injury? Yes   Number of falls in past 12 months -   Fall Risk Score -     Abuse Screen:  Patient is not abused       Cognitive Screening   Has your family/caregiver stated any concerns about your memory: no    Cognitive Screening: normal by exam    There were no vitals taken for this visit. General: Well-developed and well-nourished, no distress. HEENT:  Head normocephalic/atraumatic, no scleral icterus  Lungs:  Clear to auscultation bilaterally. Good air movement. Heart:  Regular rate and rhythm, normal S1 and S2, no murmur, gallop, or rub  Extremities: No clubbing, cyanosis, or edema. Neurological: Alert and oriented. Psychiatric: Normal mood and affect.  Behavior is normal.       Patient Care Team   Patient Care Team:  Sadia Olson MD as PCP - General (Internal Medicine)  Sadia Olson MD as PCP - [facility] Empaneled Provider  Key Cabrera MD (Psychiatry)  Myrtle Hooks MD (Gastroenterology)    Assessment/Plan   Education and counseling provided:  Are appropriate based on today's review and evaluation    Diagnoses and all orders for this visit:    1. Medicare annual wellness visit, subsequent    2. Urge urinary incontinence  -     REFERRAL TO UROLOGY (Dr. Pam Sarkar)    3. Prediabetes  -     HEMOGLOBIN A1C WITH EAG    4. Hypercholesterolemia  -     LIPID PANEL    5. Osteoporosis  Unable to tolerate Fosamax due to severe heartburn. 6. Vitamin D deficiency  -     VITAMIN D, 25 HYDROXY    7. Bipolar disorder, current episode mixed, moderate (HCC)  Continue Topamax and sertraline. Follow up with psychiatrist.    8. Screening for glaucoma  -     REFERRAL TO OPHTHALMOLOGY (Dr. Graeme Fonseca)    9. Advance care planning      Follow-up and Dispositions    · Return in about 6 months (around 4/15/2021), or if symptoms worsen or fail to improve, for OAB, chol, vit D; have fasting labs done at [facility] within the next 2 weeks. Health Maintenance Due   Topic Date Due    Medicare Yearly Exam  06/14/2020    A1C test (Diabetic or Prediabetic)  06/14/2020       Justyna Brumfield, who was evaluated through a synchronous (real-time) audio-video encounter, and/or her healthcare decision maker, is aware that it is a billable service, with coverage as determined by her insurance carrier. She provided verbal consent to proceed: Yes, and patient identification was verified. It was conducted pursuant to the emergency declaration under the [address] [place] [address], [address] [place] [address] authority and the [employer] Act. A caregiver was present when appropriate. Ability to conduct physical exam was limited. I was at home. The patient was at home.     Rosy Zurita MD

## 2020-10-15 NOTE — PATIENT INSTRUCTIONS
Medicare Wellness Visit, Female The best way to live healthy is to have a lifestyle where you eat a well-balanced diet, exercise regularly, limit alcohol use, and quit all forms of tobacco/nicotine, if applicable. Regular preventive services are another way to keep healthy. Preventive services (vaccines, screening tests, monitoring & exams) can help personalize your care plan, which helps you manage your own care. Screening tests can find health problems at the earliest stages, when they are easiest to treat. [facility] follows the current, evidence-based guidelines published by the [facility] Lee ([facility]) when recommending preventive services for our patients. Because we follow these guidelines, sometimes recommendations change over time as research supports it. (For example, mammograms used to be recommended annually. Even though Medicare will still pay for an annual mammogram, the newer guidelines recommend a mammogram every two years for women of average risk). Of course, you and your doctor may decide to screen more often for some diseases, based on your risk and your co-morbidities (chronic disease you are already diagnosed with). Preventive services for you include: - Medicare offers their members a free annual wellness visit, which is time for you and your primary care provider to discuss and plan for your preventive service needs. Take advantage of this benefit every year! 
-All adults over the age of 72 should receive the recommended pneumonia vaccines. Current USPSTF guidelines recommend a series of two vaccines for the best pneumonia protection.  
-All adults should have a flu vaccine yearly and a tetanus vaccine every 10 years.  
-All adults age 48 and older should receive the shingles vaccines (series of two vaccines). -All adults age 38-68 who are overweight should have a diabetes screening test once every three years. -All adults born between [birth year] and 1965 should be screened once for Hepatitis C. 
-Other screening tests and preventive services for persons with diabetes include: an eye exam to screen for diabetic retinopathy, a kidney function test, a foot exam, and stricter control over your cholesterol.  
-Cardiovascular screening for adults with routine risk involves an electrocardiogram (ECG) at intervals determined by your doctor.  
-Colorectal cancer screenings should be done for adults age 54-65 with no increased risk factors for colorectal cancer. There are a number of acceptable methods of screening for this type of cancer. Each test has its own benefits and drawbacks. Discuss with your doctor what is most appropriate for you during your annual wellness visit. The different tests include: colonoscopy (considered the best screening method), a fecal occult blood test, a fecal DNA test, and sigmoidoscopy. 
 
-A bone mass density test is recommended when a woman turns 65 to screen for osteoporosis. This test is only recommended one time, as a screening. Some providers will use this same test as a disease monitoring tool if you already have osteoporosis. -Breast cancer screenings are recommended every other year for women of normal risk, age 54-69. 
-Cervical cancer screenings for women over age 72 are only recommended with certain risk factors. Here is a list of your current Health Maintenance items (your personalized list of preventive services) with a due date: 
Health Maintenance Due Topic Date Due  
 Hemoglobin A1C    06/14/2020

## 2020-10-15 NOTE — PROGRESS NOTES
Kisha Torres  Identified pt with two pt identifiers(name and ). Chief Complaint   Patient presents with    Annual Wellness Visit    Weight Loss       Reviewed record In preparation for visit and have obtained necessary documentation. 1. Have you been to the ER, urgent care clinic or hospitalized since your last visit? [facility] 10/12/2020    2. Have you seen or consulted any other health care providers outside of the [address] [place] [address] since your last visit? Include any pap smears or colon screening. No    Vitals reviewed with provider.     Health Maintenance reviewed:     Health Maintenance Due   Topic    Medicare Yearly Exam     A1C test (Diabetic or Prediabetic)           Wt Readings from Last 3 Encounters:   10/12/20 107 lb 6.4 oz (48.7 kg)   20 103 lb (46.7 kg)   20 102 lb (46.3 kg)        Temp Readings from Last 3 Encounters:   10/12/20 99 °F (37.2 °C)   10/12/20 (!) 100.8 °F (38.2 °C) (Oral)   20 98.4 °F (36.9 °C)        BP Readings from Last 3 Encounters:   10/12/20 114/67   20 131/62   20 121/67        Pulse Readings from Last 3 Encounters:   10/12/20 77   10/12/20 76   20 73        Vitals:    10/15/20 1100   PainSc:   0 - No pain          Learning Assessment:   :       Learning Assessment 2015   PRIMARY LEARNER Patient   BARRIERS PRIMARY LEARNER NONE   CO-LEARNER CAREGIVER No   CO-LEARNER HIGHEST LEVEL OF EDUCATION GRADUATED HIGH SCHOOL OR GED   PRIMARY LANGUAGE ENGLISH   LEARNER PREFERENCE PRIMARY LISTENING   ANSWERED BY patient   RELATIONSHIP SELF        Depression Screening:   :       3 most recent PHQ Screens 10/15/2020   PHQ Not Done Active Diagnosis of Depression or Bipolar Disorder   Little interest or pleasure in doing things -   Feeling down, depressed, irritable, or hopeless -   Total Score PHQ 2 -   Trouble falling or staying asleep, or sleeping too much -   Feeling tired or having little energy -   Poor appetite, weight loss, or overeating -   Feeling bad about yourself - or that you are a failure or have let yourself or your family down -   Trouble concentrating on things such as school, work, reading, or watching TV -   Moving or speaking so slowly that other people could have noticed; or the opposite being so fidgety that others notice -   Thoughts of being better off dead, or hurting yourself in some way -   How difficult have these problems made it for you to do your work, take care of your home and get along with others -        Fall Risk Assessment:   :       Fall Risk Assessment, last 12 mths 2/28/2020   Able to walk? Yes   Fall in past 12 months? Yes   Fall with injury? Yes   Number of falls in past 12 months -   Fall Risk Score -        Abuse Screening:   :       Abuse Screening Questionnaire 2/28/2020 11/1/2019 6/29/2018 11/23/2015   Do you ever feel afraid of your partner? N N N N   Are you in a relationship with someone who physically or mentally threatens you? N N N N   Is it safe for you to go home?  Y Y Y Y        ADL Screening:   :       ADL Assessment 6/29/2018   Feeding yourself No Help Needed   Getting from bed to chair No Help Needed   Getting dressed No Help Needed   Bathing or showering No Help Needed   Walk across the room (includes cane/walker) No Help Needed   Using the telphone No Help Needed   Taking your medications No Help Needed   Preparing meals No Help Needed   Managing money (expenses/bills) No Help Needed   Moderately strenuous housework (laundry) No Help Needed   Shopping for personal items (toiletries/medicines) No Help Needed   Shopping for groceries No Help Needed   Driving No Help Needed   Climbing a flight of stairs No Help Needed   Getting to places beyond walking distances No Help Needed

## 2020-10-16 NOTE — ACP (ADVANCE CARE PLANNING)
Advance Care Planning       Advance Care Planning (ACP) Physician/NP/PA (Provider) Conversation        Date of ACP Conversation: 10/15/2020    Conversation Conducted with:   Patient with Decision Making Ozzy Capps Maker:    Current Designated Health Care Decision Maker:   (If there is a valid [place] named in the [address]" box in the ACP activity, but it is not visible above, be sure to open that field and then select the health care decision maker relationship (ie \"primary\") in the blank space to the right of the name.)    Note: Assess and validate information in current ACP documents, as indicated. If no Authorized Decision Maker has previously been identified, then patient chooses [place]:  \"Who would you like to name as your primary health care decision-maker? \"    Name: Phill Suero   Relationship: Friend Phone number: [phone]  \"Can this person be reached easily? \" YES  \"Who would you like to name as your back-up decision maker? \"   Name: [place]  Relationship: Friend Phone number: [phone]  Cassandrageovany Villanuevacarloz this person be reached easily? \" YES    Note: If the relationship of these Decision-Makers to the patient does NOT follow your state's Next of Kin hierarchy, recommend that patient complete ACP document that meets state-specific requirements to allow them to act on the patient's behalf when appropriate. Care Preferences:    Hospitalization: \"If your health worsens and it becomes clear that your chance of recovery is unlikely, what would your preference be regarding hospitalization? \"  If the patient would want hospitalization, answer \"yes\". If the patient would prefer comfort-focused treatment without hospitalization, answer \"no\". yes      Ventilation:   \"If you were in your present state of health and suddenly became very ill and were unable to breathe on your own, what would your preference be about the use of a ventilator (breathing machine) if it was available to you? \"    If patient would desire the use of a ventilator (breathing machine), answer \"yes\", if not answer \"no\":yes    \"If your health worsens and it becomes clear that your chance of recovery is unlikely, what would your preference be about the use of a ventilator (breathing machine) if it was available to you? \"   no      Resuscitation:  \"CPR works best to restart the heart when there is a sudden event, like a heart attack, in someone who is otherwise healthy. Unfortunately, CPR does not typically restart the heart for people who have serious health conditions or who are very sick. \"    \"In the event your heart stopped as a result of an underlying serious health condition, would you want attempts to be made to restart your heart (answer \"yes\" for attempt to resuscitate) or would you prefer a natural death (answer \"no\" for do not attempt to resuscitate)? \"   yes    NOTE: If the patient has a valid advance directive AND provides care preference(s) that are inconsistent with that prior directive, advise the patient to consider either: creating a new advance directive that complies with state-specific requirements; or, if that is not possible, orally revoking that prior directive in accordance with state-specific requirements, which must be documented in the EHR.     Conversation Outcomes / Follow-Up Plan:   Recommended completion of Advance Directive      Length of Voluntary ACP Conversation in minutes:  <16 minutes (Non-Billable)      Sebastian Leon MD

## 2020-11-18 ENCOUNTER — HOSPITAL ENCOUNTER (EMERGENCY)
Age: 75
Discharge: HOME | End: 2020-11-18
Attending: EMERGENCY MEDICINE
Payer: MEDICARE

## 2020-11-18 ENCOUNTER — APPOINTMENT (OUTPATIENT)
Dept: GENERAL RADIOLOGY | Age: 75
End: 2020-11-18
Attending: EMERGENCY MEDICINE
Payer: MEDICARE

## 2020-11-18 VITALS
RESPIRATION RATE: 16 BRPM | HEART RATE: 70 BPM | SYSTOLIC BLOOD PRESSURE: 132 MMHG | OXYGEN SATURATION: 98 % | TEMPERATURE: 98 F | DIASTOLIC BLOOD PRESSURE: 70 MMHG

## 2020-11-18 DIAGNOSIS — S61.210A LACERATION OF RIGHT INDEX FINGER WITHOUT DAMAGE TO NAIL, FOREIGN BODY PRESENCE UNSPECIFIED, INITIAL ENCOUNTER: ICD-10-CM

## 2020-11-18 DIAGNOSIS — S67.10XA CRUSHING INJURY OF FINGER, INITIAL ENCOUNTER: Primary | ICD-10-CM

## 2020-11-18 PROCEDURE — 75810000293 HC SIMP/SUPERF WND  RPR

## 2020-11-18 PROCEDURE — 99283 EMERGENCY DEPT VISIT LOW MDM: CPT

## 2020-11-18 PROCEDURE — 73140 X-RAY EXAM OF FINGER(S): CPT

## 2020-11-18 PROCEDURE — 74011000250 HC RX REV CODE- 250: Performed by: NURSE PRACTITIONER

## 2020-11-18 RX ORDER — BACITRACIN 500 UNIT/G
1 PACKET (EA) TOPICAL
Status: COMPLETED | OUTPATIENT
Start: 2020-11-18 | End: 2020-11-18

## 2020-11-18 RX ORDER — CEPHALEXIN 500 MG/1
500 CAPSULE ORAL 3 TIMES DAILY
Qty: 21 CAP | Refills: 0 | Status: SHIPPED | OUTPATIENT
Start: 2020-11-18 | End: 2020-11-25

## 2020-11-18 RX ORDER — LIDOCAINE HYDROCHLORIDE 10 MG/ML
5 INJECTION INFILTRATION; PERINEURAL ONCE
Status: COMPLETED | OUTPATIENT
Start: 2020-11-18 | End: 2020-11-18

## 2020-11-18 RX ORDER — BACITRACIN 500 [USP'U]/G
OINTMENT TOPICAL 3 TIMES DAILY
Qty: 1 TUBE | Refills: 0 | Status: SHIPPED | OUTPATIENT
Start: 2020-11-18 | End: 2020-11-28

## 2020-11-18 RX ADMIN — BACITRACIN 1 PACKET: 500 OINTMENT TOPICAL at 16:42

## 2020-11-18 RX ADMIN — LIDOCAINE HYDROCHLORIDE 5 ML: 10 INJECTION, SOLUTION INFILTRATION; PERINEURAL at 14:48

## 2020-11-18 NOTE — DISCHARGE INSTRUCTIONS
Try to leave your bulky dressing in place for 1 to 2 days. If the dressing becomes soiled remove the dressing gently with mild soap and water, pat dry apply bacitracin and nonstick dressing. Elevate your hand to help relieve discomfort, gentle range of motion. You may take Tylenol or ibuprofen as needed for pain control. Call Dr. Ion Reyes office tomorrow morning to make a follow-up appointment, your sutures will need to be removed in 7 days, if you have an appointment with the hand surgeon you can wait to have him remove the sutures. As you are laceration starts to heal after 3 to 4 days you may leave the dressing off and just apply bacitracin 3-4 times a day. To the ER with worsening of symptoms, to include fever, chills, purulent drainage, redness or swelling. Thank you for trusting me with your care! IT was syd speaking with you.   TURNER Zaragoza

## 2020-11-18 NOTE — ED PROVIDER NOTES
71 y/o female HX MRSA, osteoarthritis, GERD, presents with c/o right hand index finger pain. Patient with crush injury to the right hand index finger after moving rocks in her yard this am, accident occurred at approximately 1000. Tetanus given 19. Patient is RIGHT hand dominant, skin tear noted proximal phalanx, throbbing/burning pain. Denies need for medication, states that she did not take anything prior to arrival.   Patient withe no other complaints, denies hand or wrist pain, denies fever, chills, cough, numbness or tingling to the right hand. Past Medical History:   Diagnosis Date    Arthritis     Osteoarthritis    Excessive sweating     Occurs in summers; TSH/CMP/CBC normal    GERD (gastroesophageal reflux disease)     WITH HAITEL HERNIA    MRSA (methicillin resistant Staphylococcus aureus) infection     MRSA + abd abscess; nasal swab negative after treatment    Psychiatric disorder     DEPRESSION. Past Surgical History:   Procedure Laterality Date    BREAST SURGERY PROCEDURE UNLISTED Bilateral 1974    AUGMENTATION    HX  SECTION  1971    HX COLONOSCOPY      HX ENDOSCOPY  05/10/2018    Dr. Viviane Beach; gastritis and esophagitis    HX HYSTERECTOMY      Age 29 for endometriosis    IMPLANT BREAST SILICONE/EQ Bilateral 1411    1st surgery , replaced bilaterally in .  IA REVISE BREAST RECONSTRUCTION  2014    Bilateral breast implant exchange due to  cosmetic defect of previous implants         Family History:   Problem Relation Age of Onset    Diabetes Father     Cancer Father         PROSTATE CANCER    Arthritis-osteo Mother     Arthritis-osteo Sister     Anesth Problems Sister         HAS DIFFICULTY WAKING UP-TAKES VERY LONG.        Social History     Socioeconomic History    Marital status:      Spouse name: Not on file    Number of children: Not on file    Years of education: Not on file    Highest education level: Not on file Occupational History    Not on file   Social Needs    Financial resource strain: Not on file    Food insecurity     Worry: Not on file     Inability: Not on file    Transportation needs     Medical: Not on file     Non-medical: Not on file   Tobacco Use    Smoking status: Never Smoker    Smokeless tobacco: Never Used   Substance and Sexual Activity    Alcohol use: No    Drug use: No    Sexual activity: Not on file   Lifestyle    Physical activity     Days per week: Not on file     Minutes per session: Not on file    Stress: Not on file   Relationships    Social connections     Talks on phone: Not on file     Gets together: Not on file     Attends [employer] service: Not on file     Active member of club or organization: Not on file     Attends meetings of clubs or organizations: Not on file     Relationship status: Not on file    Intimate partner violence     Fear of current or ex partner: Not on file     Emotionally abused: Not on file     Physically abused: Not on file     Forced sexual activity: Not on file   Other Topics Concern    Not on file   Social History Narrative    Not on file         ALLERGIES: Fosamax [alendronate]    Review of Systems   Constitutional: Negative for chills and fever. Musculoskeletal: Positive for arthralgias and myalgias. Right hand index finger pain. Skin: Positive for wound. Skin tear to the middle/proximal phalanx. Neurological: Negative for weakness and numbness. Vitals:    11/18/20 1410   BP: (!) 148/73   Pulse: 75   Resp: 16   Temp: 97.8 °F (36.6 °C)   SpO2: 97%            Physical Exam  Vitals signs and nursing note reviewed. Constitutional:       Appearance: Normal appearance. HENT:      Head: Normocephalic. Nose: Nose normal.   Neck:      Musculoskeletal: Normal range of motion. Cardiovascular:      Rate and Rhythm: Normal rate. Pulmonary:      Effort: Pulmonary effort is normal. No respiratory distress.    Abdominal: General: There is no distension. Musculoskeletal:      Right hand: She exhibits decreased range of motion, tenderness, bony tenderness, disruption of two-point discrimination, laceration and swelling. She exhibits normal capillary refill and no deformity. Hands:       Comments: Skin tear to the noted location. Capillary refill less than 2 seconds, limited ROM to the right index finger, PIP intact, difficulty with ROM to the DIP. Skin:     General: Skin is dry. Neurological:      Mental Status: She is alert and oriented to person, place, and time. Psychiatric:         Mood and Affect: Mood normal.          MDM  Number of Diagnoses or Management Options  Diagnosis management comments: Possible: contusion vs crush injury vs laceration vs fracture-open  Plan: xray right hand 2nd phalanx, digital block, bacitracin, wound repair. Amount and/or Complexity of Data Reviewed  Tests in the radiology section of CPT®: ordered  Discuss the patient with other providers: yes      VITAL SIGNS:  Patient Vitals for the past 4 hrs:   Temp Pulse Resp BP SpO2   11/18/20 1410 97.8 °F (36.6 °C) 75 16 (!) 148/73 97 %         LABS:  No results found for this or any previous visit (from the past 6 hour(s)). IMAGING:  XR 2ND FINGER RT MIN 2 V   Final Result   IMPRESSION: There are degenerative changes right second DIP joint. No definite   acute fracture identified. If symptoms persist follow-up study is recommended. .             Medications During Visit:  Medications   bacitracin 500 unit/gram packet 1 Packet (has no administration in time range)   lidocaine (XYLOCAINE) 10 mg/mL (1 %) injection 5 mL (has no administration in time range)         DECISION MAKING:  Dajuan Fung is a 70 y.o. female who comes in as above.   Otherwise healthy 77-year-old female with complaints of pain to the right hand index finger, patient ambulatory states that she was carrying rocks around in her yard at approximately 10 AM today, when she dropped a stone in her right index finger. Patient is right-hand dominant. Appears to have a skin tear/proximal region on the palmar side of the right hand, tetanus last updated in 2019. Patient endorses burning pain, and extensive osteoarthritis history. Patient tolerated digital block and wound repair without incident. Patient able to flex and extend, does not appear that the laceration 2.5 cm in the shape of the you was deep enough to injure the tendon. Possible nerve damage. 5 sutures loosely approximated laceration after extensive wound cleansing, capillary refill less than 2 seconds strong right 2+ radial pulse. Extensive wound provided to patient during procedure, patient verbalized understanding agrees with plan. Patient advised to follow-up with Dr. Varun Stanford hand surgeon in the next 7 days to have the sutures removed and reevaluation. Patient verbalized understanding agrees with plan. Discussed with patient to send home with Rx for bacitracin and Keflex due to dirty nature of the wound. Hansa ABEBE with orthopedics did stop by and evaluate patient, however not consulted, stating that he did not see any type of emergent injury to include any tendon damage. IMPRESSION:  1. Crushing injury of finger, initial encounter    2. Laceration of right index finger without damage to nail, foreign body presence unspecified, initial encounter        DISPOSITION:  Discharged      Current Discharge Medication List      START taking these medications    Details   cephALEXin (Keflex) 500 mg capsule Take 1 Cap by mouth three (3) times daily for 7 days. Qty: 21 Cap, Refills: 0      bacitracin (BACITRACIN) 500 unit/gram oint Apply  to affected area three (3) times daily for 10 days.  Apply to affected area  Qty: 1 Tube, Refills: 0              Follow-up Information     Follow up With Specialties Details Why Contact Info    Giancarlo Capone MD Internal Medicine  As needed Veronika Heaton [address]      Jean Cardoso MD Orthopedic Surgery Schedule an appointment as soon as possible for a visit in 1 day Call to schedule a follow-up appt tomorrow morning. [facility] [employer] [place] [address] [place] [address] EMERGENCY DEP Emergency Medicine  For suture removal in 7 days or try to schedule follow-up with Hand surgeon in 7 days. [address] [facility]  [phone]            The patient is asked to follow-up with hand surgeon Dr Declan Hinkle in 7 days and their primary care provider in the next several days. They are to call tomorrow for an appointment. The patient is asked to return promptly for any increased concerns or worsening of symptoms. They can return to this emergency department or any other emergency department. Wound Repair    Date/Time: 11/18/2020 4:20 PM  Performed by: NPSupervising provider: Eulalio Díaz MD  Preparation: sterile field established and skin prepped with Betadine  Pre-procedure re-eval: Immediately prior to the procedure, the patient was reevaluated and found suitable for the planned procedure and any planned medications. Time out: Immediately prior to the procedure a time out was called to verify the correct patient, procedure, equipment, staff and marking as appropriate. .  Location details: right index finger  Wound length:2.5 cm or less  Anesthesia: digital block    Anesthesia:  Local Anesthetic: lidocaine 1% without epinephrine  Anesthetic total: 3 mL  Foreign body present: dirt. Irrigation solution: tap water and saline  Irrigation method: jet lavage and tap  Debridement: extensive  Skin closure: 4-0 nylon  Number of sutures: 5  Technique: simple and interrupted  Approximation: loose  Dressing: antibiotic ointment and tube gauze (bacitracin, tefla and tube gauze)  My total time at bedside, performing this procedure was 16-30 minutes. Comments: Patient with 2.5 cm jagged laceration in the shape of a U.  Extensive wound irrigation occurred with both tap water and soap and saline prior to suturing. Patient with extensive HX osteoarthritis, able to flex and extend after digit block. Laceration not deep enough for tendon, however may have nerve damage. Capillary refill less than 2 seconds, strong right radial 2+ pulse. Discussed patient care with Stephanie Hatch MD. Attending was given the opportunity to see and evaluate the patient. Agrees with care plan as discussed.   Mila Britton NP  2:53 PM

## 2020-11-18 NOTE — ED NOTES
Bacitracin applied to R index finger. tefla applied over bacitracin. Bulky dressing applied to RIGHT index finger. Pt tolerated well.

## 2020-11-18 NOTE — ED TRIAGE NOTES
Pt crushed her right 2nd finger between 2 rocks pta, +bleeding noted to right finger, +laceration/abrasion to finger

## 2020-11-18 NOTE — ED NOTES
Discharge instructions given to pt by RN. Pt educated on prescribed medications in teach back method, with instructions to care of stitches and verbalizes understanding. Opportunity for questions provided. Pt ambulatory out of unit, in no acute distress and taken home by self.

## 2020-12-07 ENCOUNTER — OFFICE VISIT (OUTPATIENT)
Dept: INTERNAL MEDICINE CLINIC | Age: 75
End: 2020-12-07
Payer: MEDICARE

## 2020-12-07 VITALS
TEMPERATURE: 98.7 F | BODY MASS INDEX: 21.6 KG/M2 | HEIGHT: 60 IN | DIASTOLIC BLOOD PRESSURE: 61 MMHG | HEART RATE: 72 BPM | SYSTOLIC BLOOD PRESSURE: 98 MMHG | WEIGHT: 110 LBS | OXYGEN SATURATION: 98 % | RESPIRATION RATE: 16 BRPM

## 2020-12-07 DIAGNOSIS — S61.210A LACERATION OF RIGHT INDEX FINGER WITHOUT FOREIGN BODY WITHOUT DAMAGE TO NAIL, INITIAL ENCOUNTER: Primary | ICD-10-CM

## 2020-12-07 PROCEDURE — G9717 DOC PT DX DEP/BP F/U NT REQ: HCPCS | Performed by: NURSE PRACTITIONER

## 2020-12-07 PROCEDURE — 1100F PTFALLS ASSESS-DOCD GE2>/YR: CPT | Performed by: NURSE PRACTITIONER

## 2020-12-07 PROCEDURE — G9899 SCRN MAM PERF RSLTS DOC: HCPCS | Performed by: NURSE PRACTITIONER

## 2020-12-07 PROCEDURE — 3017F COLORECTAL CA SCREEN DOC REV: CPT | Performed by: NURSE PRACTITIONER

## 2020-12-07 PROCEDURE — G8427 DOCREV CUR MEDS BY ELIG CLIN: HCPCS | Performed by: NURSE PRACTITIONER

## 2020-12-07 PROCEDURE — G8420 CALC BMI NORM PARAMETERS: HCPCS | Performed by: NURSE PRACTITIONER

## 2020-12-07 PROCEDURE — 3288F FALL RISK ASSESSMENT DOCD: CPT | Performed by: NURSE PRACTITIONER

## 2020-12-07 PROCEDURE — G8536 NO DOC ELDER MAL SCRN: HCPCS | Performed by: NURSE PRACTITIONER

## 2020-12-07 PROCEDURE — 1090F PRES/ABSN URINE INCON ASSESS: CPT | Performed by: NURSE PRACTITIONER

## 2020-12-07 PROCEDURE — 99213 OFFICE O/P EST LOW 20 MIN: CPT | Performed by: NURSE PRACTITIONER

## 2020-12-07 RX ORDER — BACITRACIN ZINC 500 UNIT/G
OINTMENT (GRAM) TOPICAL
COMMUNITY
Start: 2020-11-18 | End: 2021-04-19 | Stop reason: ALTCHOICE

## 2020-12-07 NOTE — PATIENT INSTRUCTIONS
Wound Check: Care Instructions  Your Care Instructions  People have wounds that need care for many reasons. You may have a cut that needs care after surgery. You may have a cut or puncture wound from an accident. Or you may have a wound because of a condition like diabetes. Whatever the cause of your wound, there are things you can do to care for it at home. Your doctor may also want you to come back for a wound check. The wound check lets the doctor know how your wound is healing and if you need more treatment. Follow-up care is a key part of your treatment and safety. Be sure to make and go to all appointments, and call your doctor if you are having problems. It's also a good idea to know your test results and keep a list of the medicines you take. How can you care for yourself at home? · If your doctor told you how to care for your wound, follow your doctor's instructions. If you did not get instructions, follow this general advice:  ? You may cover the wound with a thin layer of petroleum jelly, such as Vaseline, and a nonstick bandage. ? Apply more petroleum jelly and replace the bandage as needed. · Keep the wound dry for the first 24 to 48 hours. After this, you can shower if your doctor okays it. Pat the wound dry. · Be safe with medicines. Read and follow all instructions on the label. ? If the doctor gave you a prescription medicine for pain, take it as prescribed. ? If you are not taking a prescription pain medicine, ask your doctor if you can take an over-the-counter medicine. · If your doctor prescribed antibiotics, take them as directed. Do not stop taking them just because you feel better. You need to take the full course of antibiotics. · If you have stitches, do not remove them on your own. Your doctor will tell you when to come back to have them removed. · If you have Steri-Strips, leave them on until they fall off.   · If possible, prop up the injured area on a pillow anytime you sit or lie down during the next 3 days. Try to keep it above the level of your heart. This will help reduce swelling. When should you call for help? Call your doctor now or seek immediate medical care if:    · You have new pain, or the pain gets worse.     · The skin near the wound is cold or pale or changes color.     · You have tingling, weakness, or numbness near the wound.     · The wound starts to bleed, and blood soaks through the bandage. Oozing small amounts of blood is normal.     · You have symptoms of infection, such as:  ? Increased pain, swelling, warmth, or redness. ? Red streaks leading from the wound. ? Pus draining from the wound. ? A fever. Watch closely for changes in your health, and be sure to contact your doctor if:    · You do not get better as expected. Where can you learn more? Go to http://www.gray.com/  Enter P342 in the search box to learn more about \"Wound Check: Care Instructions. \"  Current as of: June 26, 2019               Content Version: 12.6  © 7984-3252 [employer]. Care instructions adapted under license by [employer] (which disclaims liability or warranty for this information). If you have questions about a medical condition or this instruction, always ask your healthcare professional. [address] any warranty or liability for your use of this information. Cuts on the Hand Closed With Stitches: Care Instructions  Your Care Instructions     A cut on your hand can be on your fingers, your thumb, or the front or back of your hand. Sometimes a cut can injure the tendons, blood vessels, or nerves of your hand. The doctor used stitches to close the cut. Using stitches also helps the cut heal and reduces scarring. The doctor may have given you a splint to help prevent you from moving your hand, fingers, or thumb.   If the cut went deep and through the skin, the doctor put in two layers of stitches. The deeper layer brings the deep part of the cut together. These stitches will dissolve and don't need to be removed. The stitches in the upper layer are the ones you see on the cut. You will probably have a bandage. You will need to have the stitches removed, usually in 7 to 14 days. The doctor may suggest that you see a hand specialist if the cut is very deep or if you have trouble moving your fingers or have less feeling in your hand. The doctor has checked you carefully, but problems can develop later. If you notice any problems or new symptoms, get medical treatment right away. Follow-up care is a key part of your treatment and safety. Be sure to make and go to all appointments, and call your doctor if you are having problems. It's also a good idea to know your test results and keep a list of the medicines you take. How can you care for yourself at home? · Keep the cut dry for the first 24 to 48 hours. After this, you can shower if your doctor okays it. Pat the cut dry. · Don't soak the cut, such as in a bathtub. Your doctor will tell you when it's safe to get the cut wet. · If your doctor told you how to care for your cut, follow your doctor's instructions. If you did not get instructions, follow this general advice:  ? After the first 24 to 48 hours, wash around the cut with clean water 2 times a day. Don't use hydrogen peroxide or alcohol, which can slow healing. ? You may cover the cut with a thin layer of petroleum jelly, such as Vaseline, and a nonstick bandage. ? Apply more petroleum jelly and replace the bandage as needed. · Prop up the sore hand on a pillow anytime you sit or lie down during the next 3 days. Try to keep it above the level of your heart. This will help reduce swelling. · Avoid any activity that could cause your cut to reopen. · Do not remove the stitches on your own. Your doctor will tell you when to come back to have the stitches removed.   · Be safe with medicines. Take pain medicines exactly as directed. ? If the doctor gave you a prescription medicine for pain, take it as prescribed. ? If you are not taking a prescription pain medicine, ask your doctor if you can take an over-the-counter medicine. When should you call for help? Call your doctor now or seek immediate medical care if:    · You have new pain, or your pain gets worse.     · The skin near the cut is cold or pale or changes color.     · You have tingling, weakness, or numbness near the cut.     · The cut starts to bleed, and blood soaks through the bandage. Oozing small amounts of blood is normal.     · You have trouble moving the area of the hand near the cut.     · You have symptoms of infection, such as:  ? Increased pain, swelling, warmth, or redness around the cut.  ? Red streaks leading from the cut.  ? Pus draining from the cut.  ? A fever. Watch closely for changes in your health, and be sure to contact your doctor if:    · You do not get better as expected. Where can you learn more? Go to http://www.gray.com/  Enter T250 in the search box to learn more about \"Cuts on the Hand Closed With Stitches: Care Instructions. \"  Current as of: June 26, 2019               Content Version: 12.6  © 0445-8592 [employer]. Care instructions adapted under license by [employer] (which disclaims liability or warranty for this information). If you have questions about a medical condition or this instruction, always ask your healthcare professional. [place] [address] any warranty or liability for your use of this information.

## 2020-12-07 NOTE — PROGRESS NOTES
Arden Navarro is a 70 y.o. female  Chief Complaint   Patient presents with    ED Follow-up     smashed finger     Health Maintenance Due   Topic Date Due    A1C test (Diabetic or Prediabetic)  06/14/2020     Visit Vitals  BP 98/61 (BP 1 Location: Right arm, BP Patient Position: Sitting)   Pulse 72   Temp 98.7 °F (37.1 °C) (Oral)   Resp 16   Ht 5' (1.524 m)   Wt 110 lb (49.9 kg)   SpO2 98%   BMI 21.48 kg/m²     1. Have you been to the ER, urgent care clinic since your last visit? Hospitalized since your last visit? Yes Reason for visit: Smashed finger/ stitches    2. Have you seen or consulted any other health care providers outside of the [address] [place] [address] since your last visit? Include any pap smears or colon screening.  No

## 2021-01-22 ENCOUNTER — TELEPHONE (OUTPATIENT)
Dept: INTERNAL MEDICINE CLINIC | Age: 76
End: 2021-01-22

## 2021-01-22 DIAGNOSIS — Z13.5 SCREENING FOR GLAUCOMA: Primary | ICD-10-CM

## 2021-02-01 ENCOUNTER — TELEPHONE (OUTPATIENT)
Dept: INTERNAL MEDICINE CLINIC | Age: 76
End: 2021-02-01

## 2021-02-01 NOTE — TELEPHONE ENCOUNTER
Pt called and requested a callback at [phone] to discuss a referral letter to the eye doctor. Referral in chart for Dr. Lakhwinder Ovalle dated 1/22/21. Confirmed w/ pt that we would fax to Dr. Humble Colin office.

## 2021-02-26 ENCOUNTER — TRANSCRIBE ORDER (OUTPATIENT)
Dept: SCHEDULING | Age: 76
End: 2021-02-26

## 2021-02-26 DIAGNOSIS — Z12.31 VISIT FOR SCREENING MAMMOGRAM: Primary | ICD-10-CM

## 2021-03-01 ENCOUNTER — HOSPITAL ENCOUNTER (OUTPATIENT)
Dept: MAMMOGRAPHY | Age: 76
Discharge: HOME | End: 2021-03-01
Attending: INTERNAL MEDICINE
Payer: MEDICARE

## 2021-03-01 DIAGNOSIS — Z12.31 VISIT FOR SCREENING MAMMOGRAM: ICD-10-CM

## 2021-03-01 PROCEDURE — 77063 BREAST TOMOSYNTHESIS BI: CPT

## 2021-04-19 ENCOUNTER — OFFICE VISIT (OUTPATIENT)
Dept: INTERNAL MEDICINE CLINIC | Age: 76
End: 2021-04-19
Payer: MEDICARE

## 2021-04-19 VITALS
RESPIRATION RATE: 12 BRPM | DIASTOLIC BLOOD PRESSURE: 68 MMHG | WEIGHT: 113.5 LBS | OXYGEN SATURATION: 95 % | SYSTOLIC BLOOD PRESSURE: 136 MMHG | HEART RATE: 61 BPM | HEIGHT: 60 IN | BODY MASS INDEX: 22.28 KG/M2 | TEMPERATURE: 98.3 F

## 2021-04-19 DIAGNOSIS — F31.62 BIPOLAR DISORDER, CURRENT EPISODE MIXED, MODERATE (HCC): ICD-10-CM

## 2021-04-19 DIAGNOSIS — M19.042 PRIMARY OSTEOARTHRITIS OF BOTH HANDS: ICD-10-CM

## 2021-04-19 DIAGNOSIS — M19.041 PRIMARY OSTEOARTHRITIS OF BOTH HANDS: ICD-10-CM

## 2021-04-19 DIAGNOSIS — M81.0 AGE-RELATED OSTEOPOROSIS WITHOUT CURRENT PATHOLOGICAL FRACTURE: ICD-10-CM

## 2021-04-19 DIAGNOSIS — R73.03 PREDIABETES: Primary | ICD-10-CM

## 2021-04-19 LAB — HBA1C MFR BLD HPLC: 5.3 %

## 2021-04-19 PROCEDURE — 1090F PRES/ABSN URINE INCON ASSESS: CPT | Performed by: INTERNAL MEDICINE

## 2021-04-19 PROCEDURE — G9899 SCRN MAM PERF RSLTS DOC: HCPCS | Performed by: INTERNAL MEDICINE

## 2021-04-19 PROCEDURE — 83036 HEMOGLOBIN GLYCOSYLATED A1C: CPT | Performed by: INTERNAL MEDICINE

## 2021-04-19 PROCEDURE — 1101F PT FALLS ASSESS-DOCD LE1/YR: CPT | Performed by: INTERNAL MEDICINE

## 2021-04-19 PROCEDURE — G9717 DOC PT DX DEP/BP F/U NT REQ: HCPCS | Performed by: INTERNAL MEDICINE

## 2021-04-19 PROCEDURE — G8420 CALC BMI NORM PARAMETERS: HCPCS | Performed by: INTERNAL MEDICINE

## 2021-04-19 PROCEDURE — G8427 DOCREV CUR MEDS BY ELIG CLIN: HCPCS | Performed by: INTERNAL MEDICINE

## 2021-04-19 PROCEDURE — 99214 OFFICE O/P EST MOD 30 MIN: CPT | Performed by: INTERNAL MEDICINE

## 2021-04-19 PROCEDURE — G8536 NO DOC ELDER MAL SCRN: HCPCS | Performed by: INTERNAL MEDICINE

## 2021-04-19 PROCEDURE — 3017F COLORECTAL CA SCREEN DOC REV: CPT | Performed by: INTERNAL MEDICINE

## 2021-04-19 NOTE — PROGRESS NOTES
Kisha Torres  Identified pt with two pt identifiers(name and ). Chief Complaint   Patient presents with    Cholesterol Problem    Blood sugar problem    Fall     last week       Reviewed record In preparation for visit and have obtained necessary documentation. 1. Have you been to the ER, urgent care clinic or hospitalized since your last visit? No     2. Have you seen or consulted any other health care providers outside of the [address] [place] [address] since your last visit? Include any pap smears or colon screening. No    Vitals reviewed with provider.     Health Maintenance reviewed:     Health Maintenance Due   Topic    COVID-19 Vaccine (1)    A1C test (Diabetic or Prediabetic)           Wt Readings from Last 3 Encounters:   21 113 lb 8 oz (51.5 kg)   20 110 lb (49.9 kg)   10/12/20 107 lb 6.4 oz (48.7 kg)        Temp Readings from Last 3 Encounters:   21 98.3 °F (36.8 °C) (Oral)   20 98.7 °F (37.1 °C) (Oral)   20 98 °F (36.7 °C)        BP Readings from Last 3 Encounters:   21 136/68   20 98/61   20 132/70        Pulse Readings from Last 3 Encounters:   21 61   20 72   20 70        Vitals:    21 1135   BP: 136/68   Pulse: 61   Resp: 12   Temp: 98.3 °F (36.8 °C)   TempSrc: Oral   SpO2: 95%   Weight: 113 lb 8 oz (51.5 kg)   Height: 5' (1.524 m)   PainSc:   0 - No pain          Learning Assessment:   :       Learning Assessment 2015   PRIMARY LEARNER Patient   BARRIERS PRIMARY LEARNER NONE   CO-LEARNER CAREGIVER No   CO-LEARNER HIGHEST LEVEL OF EDUCATION GRADUATED HIGH SCHOOL OR GED   PRIMARY LANGUAGE ENGLISH   LEARNER PREFERENCE PRIMARY LISTENING   ANSWERED BY patient   RELATIONSHIP SELF        Depression Screening:   :       3 most recent PHQ Screens 2021   PHQ Not Done Active Diagnosis of Depression or Bipolar Disorder   Little interest or pleasure in doing things -   Feeling down, depressed, irritable, or hopeless - Total Score PHQ 2 -   Trouble falling or staying asleep, or sleeping too much -   Feeling tired or having little energy -   Poor appetite, weight loss, or overeating -   Feeling bad about yourself - or that you are a failure or have let yourself or your family down -   Trouble concentrating on things such as school, work, reading, or watching TV -   Moving or speaking so slowly that other people could have noticed; or the opposite being so fidgety that others notice -   Thoughts of being better off dead, or hurting yourself in some way -   How difficult have these problems made it for you to do your work, take care of your home and get along with others -        Fall Risk Assessment:   :       Fall Risk Assessment, last 12 mths 4/19/2021   Able to walk? Yes   Fall in past 12 months? 1   Do you feel unsteady? 0   Are you worried about falling 0   Number of falls in past 12 months 1   Fall with injury? 1        Abuse Screening:   :       Abuse Screening Questionnaire 4/19/2021 2/28/2020 11/1/2019 6/29/2018 11/23/2015   Do you ever feel afraid of your partner? N N N N N   Are you in a relationship with someone who physically or mentally threatens you? N N N N N   Is it safe for you to go home?  Y Y Y Y Y        ADL Screening:   :       ADL Assessment 6/29/2018   Feeding yourself No Help Needed   Getting from bed to chair No Help Needed   Getting dressed No Help Needed   Bathing or showering No Help Needed   Walk across the room (includes cane/walker) No Help Needed   Using the telphone No Help Needed   Taking your medications No Help Needed   Preparing meals No Help Needed   Managing money (expenses/bills) No Help Needed   Moderately strenuous housework (laundry) No Help Needed   Shopping for personal items (toiletries/medicines) No Help Needed   Shopping for groceries No Help Needed   Driving No Help Needed   Climbing a flight of stairs No Help Needed   Getting to places beyond walking distances No Help Needed

## 2021-04-19 NOTE — PROGRESS NOTES
CC:   Chief Complaint   Patient presents with    Cholesterol Problem    Blood sugar problem    Fall     last week       HISTORY OF PRESENT ILLNESS  Tin Rivera is a 70 y.o. female. Presents for 6 month follow up evaluation. She has osteoporosis, OA of hands, bipolar disorder, and anxiety. Accidentally tripped over the hose in her yard and fell. Landed on knee and head hit the concrete. No LOC or headache. Did not go to ED. Has bruising at left eye. Complains of arthritic nodules at fingers and fingernail changes. Uses her bare hands to go through soil when she does yard work. Psychiatrist: Carri Encarnacion" on [facility]. Patient Active Problem List   Diagnosis Code    Bipolar disorder ([facility] [address] [facility] F31.9    Anxiety disorder F41.9    Primary osteoarthritis of both hands M19.041, M19.042    Hypercholesterolemia E78.00    Vitamin D deficiency E55.9    Age-related osteoporosis without current pathological fracture M81.0    Prediabetes R73.03    Gastroesophageal reflux disease without esophagitis K21.9    Urge urinary incontinence N39.41     Past Medical History:   Diagnosis Date    Arthritis     Osteoarthritis    Excessive sweating     Occurs in summers; TSH/CMP/CBC normal    GERD (gastroesophageal reflux disease)     WITH HAITEL HERNIA    MRSA (methicillin resistant Staphylococcus aureus) infection 2007    MRSA + abd abscess; nasal swab negative after treatment    Psychiatric disorder     DEPRESSION. Allergies   Allergen Reactions    Fosamax [Alendronate] Other (comments)     Severe heartburn       Current Outpatient Medications   Medication Sig Dispense Refill    acetaminophen (Tylenol Arthritis Pain) 650 mg TbER Take 650-1,300 mg by mouth as needed for Pain.  sertraline (ZOLOFT) 100 mg tablet 200 mg nightly.  topiramate (TOPAMAX) 25 mg tablet 25 mg nightly.            PHYSICAL EXAM  Visit Vitals  /68 (BP 1 Location: Right arm, BP Patient Position: Sitting)   Pulse 61 Temp 98.3 °F (36.8 °C) (Oral)   Resp 12   Ht 5' (1.524 m)   Wt 113 lb 8 oz (51.5 kg)   SpO2 95%   BMI 22.17 kg/m²       General: Well-developed and well-nourished, no distress. HEENT:  Head normocephalic/atraumatic, no scleral icterus. Ecchymosis surrounding left eye  Lungs:  Clear to ausculation bilaterally. Good air movement. Heart:  Regular rate and rhythm, normal S1 and S2, no murmur, gallop, or rub  Extremities: No clubbing, cyanosis, or edema. Arthritic changes at DIP joints of hands bilaterally. White horizontal lines at fingernail of left middle finger  Neurological: Alert and oriented. Psychiatric: Normal mood and affect. Behavior is normal.     Results for orders placed or performed in visit on 04/19/21   AMB POC HEMOGLOBIN A1C   Result Value Ref Range    Hemoglobin A1c (POC) 5.3 %         ASSESSMENT AND PLAN    ICD-10-CM ICD-9-CM    1. Prediabetes  R73.03 790.29 AMB POC HEMOGLOBIN A1C   2. B[facility] disorder, current episode mixed, moderate [facility]  F31.62 296.62      Diagnoses and all orders for this visit:    1. Prediabetes  Resolved. -     AMB POC HEMOGLOBIN A1C    2. Primary osteoarthritis of both hands  Reassured her that arthritic changes to hands did not indicate a harmful condition. Fingernail changes most likely due to trauma. Advised wearing gloves when working. 3. Bipolar disorder, current episode mixed, moderate ([facility])  Controlled. Continue present management and follow up with psychiatrist.    4. Age-related osteoporosis without current pathological fracture  She stopped Fosamax after taking for 4-5 months due to severe heartburn. Follow-up and Dispositions    · Return in about 6 months (around 10/19/2021), or if symptoms worsen or fail to improve, for Medicare AW. I have discussed the diagnosis with the patient and the intended plan as seen in the above orders. Patient is in agreement.   The patient has received an after-visit summary and questions were answered concerning future plans. I have discussed medication side effects and warnings with the patient as well.

## 2021-04-24 ENCOUNTER — HOSPITAL ENCOUNTER (OUTPATIENT)
Age: 76
Setting detail: OBSERVATION
Discharge: HOME | End: 2021-04-26
Attending: STUDENT IN AN ORGANIZED HEALTH CARE EDUCATION/TRAINING PROGRAM | Admitting: FAMILY MEDICINE
Payer: MEDICARE

## 2021-04-24 ENCOUNTER — APPOINTMENT (OUTPATIENT)
Dept: CT IMAGING | Age: 76
End: 2021-04-24
Attending: STUDENT IN AN ORGANIZED HEALTH CARE EDUCATION/TRAINING PROGRAM
Payer: MEDICARE

## 2021-04-24 DIAGNOSIS — R10.813 RIGHT LOWER QUADRANT ABDOMINAL TENDERNESS WITHOUT REBOUND TENDERNESS: Primary | ICD-10-CM

## 2021-04-24 DIAGNOSIS — R11.2 NON-INTRACTABLE VOMITING WITH NAUSEA, UNSPECIFIED VOMITING TYPE: ICD-10-CM

## 2021-04-24 LAB
ALBUMIN SERPL-MCNC: 4.2 G/DL (ref 3.5–5)
ALBUMIN/GLOB SERPL: 1.2 {RATIO} (ref 1.1–2.2)
ALP SERPL-CCNC: 160 U/L (ref 45–117)
ALT SERPL-CCNC: 25 U/L (ref 12–78)
ANION GAP SERPL CALC-SCNC: 5 MMOL/L (ref 5–15)
APPEARANCE UR: CLEAR
AST SERPL-CCNC: 20 U/L (ref 15–37)
BACTERIA URNS QL MICRO: NEGATIVE /HPF
BASOPHILS # BLD: 0 K/UL (ref 0–0.1)
BASOPHILS NFR BLD: 1 % (ref 0–1)
BILIRUB SERPL-MCNC: 0.3 MG/DL (ref 0.2–1)
BILIRUB UR QL: NEGATIVE
BUN SERPL-MCNC: 31 MG/DL (ref 6–20)
BUN/CREAT SERPL: 35 (ref 12–20)
CALCIUM SERPL-MCNC: 9 MG/DL (ref 8.5–10.1)
CHLORIDE SERPL-SCNC: 111 MMOL/L (ref 97–108)
CO2 SERPL-SCNC: 24 MMOL/L (ref 21–32)
COLOR UR: ABNORMAL
COMMENT, HOLDF: NORMAL
CREAT SERPL-MCNC: 0.89 MG/DL (ref 0.55–1.02)
DIFFERENTIAL METHOD BLD: NORMAL
EOSINOPHIL # BLD: 0.1 K/UL (ref 0–0.4)
EOSINOPHIL NFR BLD: 1 % (ref 0–7)
EPITH CASTS URNS QL MICRO: ABNORMAL /LPF
ERYTHROCYTE [DISTWIDTH] IN BLOOD BY AUTOMATED COUNT: 13.2 % (ref 11.5–14.5)
GLOBULIN SER CALC-MCNC: 3.4 G/DL (ref 2–4)
GLUCOSE SERPL-MCNC: 125 MG/DL (ref 65–100)
GLUCOSE UR STRIP.AUTO-MCNC: NEGATIVE MG/DL
HCT VFR BLD AUTO: 41.9 % (ref 35–47)
HGB BLD-MCNC: 13.3 G/DL (ref 11.5–16)
HGB UR QL STRIP: NEGATIVE
HYALINE CASTS URNS QL MICRO: ABNORMAL /LPF (ref 0–5)
IMM GRANULOCYTES # BLD AUTO: 0 K/UL (ref 0–0.04)
IMM GRANULOCYTES NFR BLD AUTO: 0 % (ref 0–0.5)
KETONES UR QL STRIP.AUTO: NEGATIVE MG/DL
LACTATE SERPL-SCNC: 1.4 MMOL/L (ref 0.4–2)
LEUKOCYTE ESTERASE UR QL STRIP.AUTO: ABNORMAL
LIPASE SERPL-CCNC: 238 U/L (ref 73–393)
LYMPHOCYTES # BLD: 3.5 K/UL (ref 0.8–3.5)
LYMPHOCYTES NFR BLD: 45 % (ref 12–49)
MCH RBC QN AUTO: 29.6 PG (ref 26–34)
MCHC RBC AUTO-ENTMCNC: 31.7 G/DL (ref 30–36.5)
MCV RBC AUTO: 93.1 FL (ref 80–99)
MONOCYTES # BLD: 0.6 K/UL (ref 0–1)
MONOCYTES NFR BLD: 7 % (ref 5–13)
NEUTS SEG # BLD: 3.5 K/UL (ref 1.8–8)
NEUTS SEG NFR BLD: 46 % (ref 32–75)
NITRITE UR QL STRIP.AUTO: NEGATIVE
NRBC # BLD: 0 K/UL (ref 0–0.01)
NRBC BLD-RTO: 0 PER 100 WBC
PH UR STRIP: 6.5 [PH] (ref 5–8)
PLATELET # BLD AUTO: 240 K/UL (ref 150–400)
PMV BLD AUTO: 9.5 FL (ref 8.9–12.9)
POTASSIUM SERPL-SCNC: 3.5 MMOL/L (ref 3.5–5.1)
PROT SERPL-MCNC: 7.6 G/DL (ref 6.4–8.2)
PROT UR STRIP-MCNC: NEGATIVE MG/DL
RBC # BLD AUTO: 4.5 M/UL (ref 3.8–5.2)
RBC #/AREA URNS HPF: ABNORMAL /HPF (ref 0–5)
SAMPLES BEING HELD,HOLD: NORMAL
SODIUM SERPL-SCNC: 140 MMOL/L (ref 136–145)
SP GR UR REFRACTOMETRY: 1.02 (ref 1–1.03)
TROPONIN I SERPL-MCNC: <0.05 NG/ML
UR CULT HOLD, URHOLD: NORMAL
UROBILINOGEN UR QL STRIP.AUTO: 0.2 EU/DL (ref 0.2–1)
WBC # BLD AUTO: 7.8 K/UL (ref 3.6–11)
WBC URNS QL MICRO: ABNORMAL /HPF (ref 0–4)

## 2021-04-24 PROCEDURE — 83605 ASSAY OF LACTIC ACID: CPT

## 2021-04-24 PROCEDURE — 74011250636 HC RX REV CODE- 250/636: Performed by: STUDENT IN AN ORGANIZED HEALTH CARE EDUCATION/TRAINING PROGRAM

## 2021-04-24 PROCEDURE — 80053 COMPREHEN METABOLIC PANEL: CPT

## 2021-04-24 PROCEDURE — 96375 TX/PRO/DX INJ NEW DRUG ADDON: CPT

## 2021-04-24 PROCEDURE — 81001 URINALYSIS AUTO W/SCOPE: CPT

## 2021-04-24 PROCEDURE — 83690 ASSAY OF LIPASE: CPT

## 2021-04-24 PROCEDURE — 85025 COMPLETE CBC W/AUTO DIFF WBC: CPT

## 2021-04-24 PROCEDURE — 74176 CT ABD & PELVIS W/O CONTRAST: CPT

## 2021-04-24 PROCEDURE — 99285 EMERGENCY DEPT VISIT HI MDM: CPT

## 2021-04-24 PROCEDURE — 36415 COLL VENOUS BLD VENIPUNCTURE: CPT

## 2021-04-24 PROCEDURE — 93005 ELECTROCARDIOGRAM TRACING: CPT

## 2021-04-24 PROCEDURE — 96374 THER/PROPH/DIAG INJ IV PUSH: CPT

## 2021-04-24 PROCEDURE — 84484 ASSAY OF TROPONIN QUANT: CPT

## 2021-04-24 RX ORDER — MORPHINE SULFATE 4 MG/ML
4 INJECTION INTRAVENOUS
Status: COMPLETED | OUTPATIENT
Start: 2021-04-24 | End: 2021-04-24

## 2021-04-24 RX ORDER — ONDANSETRON 2 MG/ML
4 INJECTION INTRAMUSCULAR; INTRAVENOUS
Status: COMPLETED | OUTPATIENT
Start: 2021-04-24 | End: 2021-04-24

## 2021-04-24 RX ORDER — SODIUM CHLORIDE 9 MG/ML
1000 INJECTION, SOLUTION INTRAVENOUS ONCE
Status: COMPLETED | OUTPATIENT
Start: 2021-04-24 | End: 2021-04-25

## 2021-04-24 RX ADMIN — ONDANSETRON 4 MG: 2 INJECTION INTRAMUSCULAR; INTRAVENOUS at 20:57

## 2021-04-24 RX ADMIN — MORPHINE SULFATE 4 MG: 4 INJECTION, SOLUTION INTRAMUSCULAR; INTRAVENOUS at 20:58

## 2021-04-24 RX ADMIN — SODIUM CHLORIDE 1000 ML: 9 INJECTION, SOLUTION INTRAVENOUS at 20:57

## 2021-04-25 PROBLEM — R10.9 ABDOMINAL PAIN: Status: ACTIVE | Noted: 2021-04-25

## 2021-04-25 LAB
ATRIAL RATE: 65 BPM
CALCULATED R AXIS, ECG10: 75 DEGREES
CALCULATED T AXIS, ECG11: 45 DEGREES
DIAGNOSIS, 93000: NORMAL
P-R INTERVAL, ECG05: 96 MS
Q-T INTERVAL, ECG07: 414 MS
QRS DURATION, ECG06: 66 MS
QTC CALCULATION (BEZET), ECG08: 430 MS
VENTRICULAR RATE, ECG03: 65 BPM

## 2021-04-25 PROCEDURE — 74011000258 HC RX REV CODE- 258: Performed by: FAMILY MEDICINE

## 2021-04-25 PROCEDURE — 99218 HC RM OBSERVATION: CPT

## 2021-04-25 PROCEDURE — 99218 PR INITIAL OBSERVATION CARE/DAY 30 MINUTES: CPT | Performed by: SURGERY

## 2021-04-25 PROCEDURE — 74011250636 HC RX REV CODE- 250/636: Performed by: FAMILY MEDICINE

## 2021-04-25 PROCEDURE — 96376 TX/PRO/DX INJ SAME DRUG ADON: CPT

## 2021-04-25 PROCEDURE — 74011250637 HC RX REV CODE- 250/637: Performed by: FAMILY MEDICINE

## 2021-04-25 PROCEDURE — 96375 TX/PRO/DX INJ NEW DRUG ADDON: CPT

## 2021-04-25 RX ORDER — MORPHINE SULFATE 2 MG/ML
2 INJECTION, SOLUTION INTRAMUSCULAR; INTRAVENOUS
Status: DISCONTINUED | OUTPATIENT
Start: 2021-04-25 | End: 2021-04-26 | Stop reason: HOSPADM

## 2021-04-25 RX ORDER — ACETAMINOPHEN 325 MG/1
650 TABLET ORAL
Status: DISCONTINUED | OUTPATIENT
Start: 2021-04-25 | End: 2021-04-26 | Stop reason: HOSPADM

## 2021-04-25 RX ORDER — SODIUM CHLORIDE 0.9 % (FLUSH) 0.9 %
5-40 SYRINGE (ML) INJECTION AS NEEDED
Status: DISCONTINUED | OUTPATIENT
Start: 2021-04-25 | End: 2021-04-26 | Stop reason: HOSPADM

## 2021-04-25 RX ORDER — TRAMADOL HYDROCHLORIDE 50 MG/1
50 TABLET ORAL
Status: DISCONTINUED | OUTPATIENT
Start: 2021-04-25 | End: 2021-04-25

## 2021-04-25 RX ORDER — ENOXAPARIN SODIUM 100 MG/ML
40 INJECTION SUBCUTANEOUS DAILY
Status: DISCONTINUED | OUTPATIENT
Start: 2021-04-25 | End: 2021-04-25

## 2021-04-25 RX ORDER — POLYETHYLENE GLYCOL 3350 17 G/17G
17 POWDER, FOR SOLUTION ORAL DAILY PRN
Status: DISCONTINUED | OUTPATIENT
Start: 2021-04-25 | End: 2021-04-26 | Stop reason: HOSPADM

## 2021-04-25 RX ORDER — ONDANSETRON 2 MG/ML
4 INJECTION INTRAMUSCULAR; INTRAVENOUS
Status: DISCONTINUED | OUTPATIENT
Start: 2021-04-25 | End: 2021-04-26 | Stop reason: HOSPADM

## 2021-04-25 RX ORDER — SERTRALINE HYDROCHLORIDE 50 MG/1
100 TABLET, FILM COATED ORAL DAILY
Status: CANCELLED | OUTPATIENT
Start: 2021-04-26

## 2021-04-25 RX ORDER — SODIUM CHLORIDE 0.9 % (FLUSH) 0.9 %
5-40 SYRINGE (ML) INJECTION EVERY 8 HOURS
Status: DISCONTINUED | OUTPATIENT
Start: 2021-04-25 | End: 2021-04-26 | Stop reason: HOSPADM

## 2021-04-25 RX ORDER — TOPIRAMATE 25 MG/1
25 TABLET ORAL
Status: CANCELLED | OUTPATIENT
Start: 2021-04-25

## 2021-04-25 RX ORDER — NALOXONE HYDROCHLORIDE 0.4 MG/ML
0.4 INJECTION, SOLUTION INTRAMUSCULAR; INTRAVENOUS; SUBCUTANEOUS
Status: DISCONTINUED | OUTPATIENT
Start: 2021-04-25 | End: 2021-04-26 | Stop reason: HOSPADM

## 2021-04-25 RX ORDER — DEXTROSE, SODIUM CHLORIDE, AND POTASSIUM CHLORIDE 5; .45; .15 G/100ML; G/100ML; G/100ML
75 INJECTION INTRAVENOUS CONTINUOUS
Status: DISCONTINUED | OUTPATIENT
Start: 2021-04-25 | End: 2021-04-26 | Stop reason: HOSPADM

## 2021-04-25 RX ORDER — PROMETHAZINE HYDROCHLORIDE 25 MG/1
12.5 TABLET ORAL
Status: DISCONTINUED | OUTPATIENT
Start: 2021-04-25 | End: 2021-04-26 | Stop reason: HOSPADM

## 2021-04-25 RX ORDER — ACETAMINOPHEN 650 MG/1
650 SUPPOSITORY RECTAL
Status: DISCONTINUED | OUTPATIENT
Start: 2021-04-25 | End: 2021-04-26 | Stop reason: HOSPADM

## 2021-04-25 RX ADMIN — PIPERACILLIN AND TAZOBACTAM 3.38 G: 3; .375 INJECTION, POWDER, LYOPHILIZED, FOR SOLUTION INTRAVENOUS at 15:57

## 2021-04-25 RX ADMIN — POTASSIUM CHLORIDE, DEXTROSE MONOHYDRATE AND SODIUM CHLORIDE 75 ML/HR: 150; 5; 450 INJECTION, SOLUTION INTRAVENOUS at 04:43

## 2021-04-25 RX ADMIN — ACETAMINOPHEN 650 MG: 325 TABLET ORAL at 01:37

## 2021-04-25 RX ADMIN — PIPERACILLIN AND TAZOBACTAM 3.38 G: 3; .375 INJECTION, POWDER, LYOPHILIZED, FOR SOLUTION INTRAVENOUS at 04:42

## 2021-04-25 NOTE — PROGRESS NOTES
Ansley Alcantar TRANSFER - IN REPORT:    Verbal report received from Camacho (name) on Kisha Torres  being received from ED(unit) for routine progression of care      Report consisted of patients Situation, Background, Assessment and   Recommendations(SBAR). Information from the following report(s) SBAR, Kardex, ED Summary, Intake/Output, MAR, Accordion and Recent Results was reviewed with the receiving nurse. Opportunity for questions and clarification was provided. Assessment completed upon patients arrival to unit and care assumed.

## 2021-04-25 NOTE — H&P
[address] [facility] Adult  Hospitalist Group  History and Physical    Primary Care Provider: Vamsi Cruz MD  Date of Service:  2021    Subjective:     Lawyer Pal is a 70 y.o. female with hx GERD, and OA who presents with acute onset b/l LQ abdominal pain with vomiting. She states that she ate ice cream and a cookie, and shortly afterward started to have sharp b/l LQ abdominal pain. Due to worsening pain, her partner drove her to [facility] for eval.  She vomited continuously durign the car ride. She denies fever, chills, dyspnea, chest pain, diarrhea, constipation, or dysuria. She incidentally had a mechanical fall while tripping over a hose several days ago. She did not seek medical treatment and has bruising of left eye. In the ED, VSS. Labs were grossly unremarkable. CT abdomen/pelvis showed mild enteritis. In the ED, surgery was consulted (Dr. Syed Babcock), and concluded that presentation not concerning for acute surgical problem. She rec'd tylenol, morphine, and zofran. Review of Systems:    All other review of systems was negative except for that written in the History of Present Illness. Past Medical History:   Diagnosis Date    Arthritis     Osteoarthritis    Excessive sweating     Occurs in summers; TSH/CMP/CBC normal    GERD (gastroesophageal reflux disease)     WITH HAITEL HERNIA    MRSA (methicillin resistant Staphylococcus aureus) infection     MRSA + abd abscess; nasal swab negative after treatment    Psychiatric disorder     DEPRESSION. Past Surgical History:   Procedure Laterality Date    HX  SECTION      HX COLONOSCOPY      HX ENDOSCOPY  05/10/2018    Dr. Valdemar Baca; gastritis and esophagitis    HX HYSTERECTOMY      Age 29 for endometriosis    IMPLANT BREAST SILICONE/EQ Bilateral 8705    1st surgery , replaced bilaterally in .     [place] BREAST SURGERY PROCEDURE UNLISTED Bilateral     AUGMENTATION    [place] REVISION OF RECONSTRUCTED BREAST  03/12/2014    Bilateral breast implant exchange due to  cosmetic defect of previous implants     Prior to Admission medications    Medication Sig Start Date End Date Taking? Authorizing Provider   acetaminophen (Tylenol Arthritis Pain) 650 mg TbER Take 650-1,300 mg by mouth as needed for Pain. Provider, Historical   sertraline (ZOLOFT) 100 mg tablet 200 mg nightly. 10/30/19   Provider, Historical   topiramate (TOPAMAX) 25 mg tablet 25 mg nightly. 6/11/19   Provider, Historical     Allergies   Allergen Reactions    Fosamax [Alendronate] Other (comments)     Severe heartburn      Family History   Problem Relation Age of Onset    Diabetes Father     Cancer Father         PROSTATE CANCER    Arthritis-osteo Mother     Arthritis-osteo Sister     Anesth Problems Sister         HAS DIFFICULTY WAKING UP-TAKES VERY LONG. SOCIAL HISTORY:  Patient resides at Home. Patient ambulates independently  Smoking history: none  Alcohol history: none    Objective:       Physical Exam:   Visit Vitals  BP (!) 156/78   Pulse 77   Temp 98.1 °F (36.7 °C)   Resp 16   SpO2 100%     General:  Alert, cooperative, no distress, appears stated age. Head:  Normocephalic, without obvious abnormality, atraumatic. Eyes:  Conjunctivae/corneas clear. EOMs intact. Left periorbital ecchymosis           Throat: Lips, mucosa, and tongue normal.    Neck: Supple, symmetrical, trachea midline   Back:   Symmetric, no curvature. ROM normal.    Lungs:   Clear to auscultation bilaterally. Chest wall:  No tenderness or deformity. Heart:  Regular rate and rhythm, S1, S2 normal, no murmur, click, rub or gallop. Abdomen:   Soft, b/l LE tenderness with gaurding, no rebound R>L. Bowel sounds normal. No masses,  No organomegaly. Extremities: Extremities normal, atraumatic, no cyanosis or edema. Pulses: 2+ radial pules   Skin: Skin color, texture, turgor normal. No rashes or lesions.      ECG:  NSR 65     Data Review: All diagnostic labs and studies have been reviewed.     Assessment:     Active Problems:    Abdominal pain (4/25/2021)        Plan:     #Abdominal Pain  -gastroenteritis vs.appendicitis  -CT abdomen/pelvis showed mild enteritis, appendix not visualized, but she dose have gaurding b/l LQ R>L  -clear liquid, advance as tolerated  -IVF  -antimetics  -pain control with tylenol/tramadol/morphine  -zosyn  -surgery consulted, appreciate rec's    #Recent Fall  -left periorbital ecchymosis  -no neuro deficit    FEN: clear liquid  dvt ppx: SCD  MPOA: Rudi Mora (friend)  Code: full    FUNCTIONAL STATUS PRIOR TO HOSPITALIZATION Ambulates Independently (including history of recent falls): fell recently    Signed By: Karl Grijalva MD     April 25, 2021

## 2021-04-25 NOTE — ED PROVIDER NOTES
31-year-old female history of GERD and a prior abdominal abscess presenting today with abdominal pain. It started suddenly about 45 minutes ago. When asked to localize it it seems to be around the umbilicus. She has had vomiting but no diarrhea. No chest pain, shortness of breath or fevers. Nothing particular makes it better or worse. Right now pain is severe. Past Medical History:   Diagnosis Date    Arthritis     Osteoarthritis    Excessive sweating     Occurs in summers; TSH/CMP/CBC normal    GERD (gastroesophageal reflux disease)     WITH HAITEL HERNIA    MRSA (methicillin resistant Staphylococcus aureus) infection     MRSA + abd abscess; nasal swab negative after treatment    Psychiatric disorder     DEPRESSION. Past Surgical History:   Procedure Laterality Date    HX  SECTION      HX COLONOSCOPY      HX ENDOSCOPY  05/10/2018    Dr. Ashlie Moncada; gastritis and esophagitis    HX HYSTERECTOMY      Age 29 for endometriosis    IMPLANT BREAST SILICONE/EQ Bilateral 4046    1st surgery , replaced bilaterally in .  [place] BREAST SURGERY PROCEDURE UNLISTED Bilateral 1974    AUGMENTATION    [place] REVISION OF RECONSTRUCTED BREAST  2014    Bilateral breast implant exchange due to  cosmetic defect of previous implants         Family History:   Problem Relation Age of Onset    Diabetes Father     Cancer Father         PROSTATE CANCER    Arthritis-osteo Mother     Arthritis-osteo Sister     Anesth Problems Sister         HAS DIFFICULTY WAKING UP-TAKES VERY LONG.        Social History     Socioeconomic History    Marital status:      Spouse name: Not on file    Number of children: Not on file    Years of education: Not on file    Highest education level: Not on file   Occupational History    Not on file   Social Needs    Financial resource strain: Not on file    Food insecurity     Worry: Not on file     Inability: Not on file   [employer] needs Medical: Not on file     Non-medical: Not on file   Tobacco Use    Smoking status: Never Smoker    Smokeless tobacco: Never Used   Substance and Sexual Activity    Alcohol use: No    Drug use: No    Sexual activity: Not on file   Lifestyle    Physical activity     Days per week: Not on file     Minutes per session: Not on file    Stress: Not on file   Relationships    Social connections     Talks on phone: Not on file     Gets together: Not on file     Attends Alevism service: Not on file     Active member of club or organization: Not on file     Attends meetings of clubs or organizations: Not on file     Relationship status: Not on file    Intimate partner violence     Fear of current or ex partner: Not on file     Emotionally abused: Not on file     Physically abused: Not on file     Forced sexual activity: Not on file   Other Topics Concern    Not on file   Social History Narrative    Not on file         ALLERGIES: Fosamax [alendronate]    Review of Systems   Constitutional: Negative for chills and fever. HENT: Negative for congestion and rhinorrhea. Eyes: Negative for redness and visual disturbance. Respiratory: Negative for cough and shortness of breath. Cardiovascular: Negative for chest pain and leg swelling. Gastrointestinal: Positive for abdominal pain, nausea and vomiting. Negative for diarrhea. Genitourinary: Negative for dysuria, flank pain, frequency, hematuria and urgency. Musculoskeletal: Negative for arthralgias, back pain, myalgias and neck pain. Skin: Negative for rash and wound. Allergic/Immunologic: Negative for immunocompromised state. Neurological: Negative for dizziness and headaches. Vitals:    04/24/21 2039 04/24/21 2130   BP: (!) 142/68 (!) 156/78   Pulse: (!) 54 77   Resp: 16 16   Temp: 98.1 °F (36.7 °C)    SpO2: 99% 100%            Physical Exam  Vitals signs and nursing note reviewed. Constitutional:       General: She is in acute distress. Appearance: She is well-developed. She is ill-appearing. She is not diaphoretic. HENT:      Head: Normocephalic. Mouth/Throat:      Pharynx: No oropharyngeal exudate. Eyes:      General:         Right eye: No discharge. Left eye: No discharge. Pupils: Pupils are equal, round, and reactive to light. Neck:      Musculoskeletal: Normal range of motion and neck supple. Cardiovascular:      Rate and Rhythm: Normal rate and regular rhythm. Heart sounds: Normal heart sounds. No murmur. No friction rub. No gallop. Pulmonary:      Effort: Pulmonary effort is normal. No respiratory distress. Breath sounds: Normal breath sounds. No stridor. No wheezing or rales. Abdominal:      General: Bowel sounds are increased. There is no distension. Palpations: Abdomen is soft. Tenderness: There is generalized abdominal tenderness. There is guarding. There is no rebound. Musculoskeletal: Normal range of motion. General: No deformity. Skin:     General: Skin is warm and dry. Capillary Refill: Capillary refill takes less than 2 seconds. Findings: No rash. Neurological:      Mental Status: She is alert and oriented to person, place, and time. Psychiatric:         Behavior: Behavior normal.          Labs Reviewed:   UA not consistent with UTI  No leukocytosis or anemia  Hyperchloremic  Elevated BUN  Mild hyperglycemia  LFTs not consistent with biliary obstructive process or acute hepatitis  Lipase not consistent with pancreatitis  Lactic acid negative  Troponin negative    Imaging Reviewed: CT of the abdomen with findings suggestive of mild enteritis, unable to visualize the appendix. Course:    11:03 PM re-evaluated. Has received morphine. Patient still uncomfortable, tenderness localizes to RLQ at this time. Will discuss with surgery--appendix not visualized on CT.     MDM:75 y.o. female presenting today with severe abdominal pain.  Initial concern for perforation given acute nature with significant diffuse tenderness. CT showed no acute process other than moderate possible enteritis. labwork without evidence of pancreatitis, biliary obstructive process or acute hepatitis. No evidence perforation on CT. No evidence of ureteral stone. Appendix unable to be visualized and on repeat exams her pain is now localizing right over McBurney's point. I will ask surgery to see the patient. Discussed with Dr. Kellee Antunez who feels that since appendix not visualized on CT it often goes against appendicitis as you would expect to see an enlarged appendix with secondary signs of appendicitis. 11:46 PM re-evaluated. Still having pain and tenderness in RLQ. Will admit for close observation and if she clinically changes may need repeat scan or further surgical evaluation    Perfect Serve Consult for Admission  11:46 PM    ED Room Number: ER08/08  Patient Name and age:  Savanna Sims 70 y.o.  female  Working Diagnosis:   1. Right lower quadrant abdominal tenderness without rebound tenderness    2. Non-intractable vomiting with nausea, unspecified vomiting type        COVID-19 Suspicion:  no  Sepsis present:  no  Reassessment needed: no  Code Status:  Full Code  Readmission: no  Isolation Requirements:  no  Recommended Level of Care:  med/surg  Department:[facility] Adult ED - 21   Other:  70 y.o. female relatively healthy here with severe abdominal pain. Workup thus far unremarkable. Unable to see appendix on CT and is tender in RLQ. Surgery (Dr. Kellee Antunez) doesn't feel it is appendicitis at this time. I feel she should at very least be observed for changes in clinical condition.      Jeb Lynne, DO

## 2021-04-25 NOTE — ROUTINE PROCESS
TRANSFER - OUT REPORT: 
 
Verbal report given to JESSICA Marie(name) on Kisha Torres  being transferred to (unit) for routine progression of care Report consisted of patients Situation, Background, Assessment and  
Recommendations(SBAR). Information from the following report(s) SBAR, ED Summary, [facility] and Recent Results was reviewed with the receiving nurse. Lines:  
Peripheral IV 04/24/21 Left Antecubital (Active) Peripheral IV 04/24/21 Right Forearm (Active) Site Assessment Clean, dry, & intact 04/24/21 2111 Phlebitis Assessment 0 04/24/21 2111 Infiltration Assessment 0 04/24/21 2111 Dressing Status Clean, dry, & intact 04/24/21 2111 Dressing Type Transparent 04/24/21 2111 Hub Color/Line Status Pink;Flushed;Patent 04/24/21 2111 Action Taken Blood drawn 04/24/21 2111 Opportunity for questions and clarification was provided. Patient transported with: 
 [employer]

## 2021-04-25 NOTE — ED TRIAGE NOTES
Triage: Pt arrives from home with CC of diffuse abdominal pain and vomiting. She endorses 2 episodes of vomiting. She denies fever or chills. No previous abdominal surgeries. Very restless in triage.

## 2021-04-25 NOTE — CONSULTS
Surgery Consult    Subjective:      Alida Farias is a 70 y.o. female who is being seen for evaluation of abdominal pain and possible appendicitis. She states that about 2 nights ago she began having t upper abdominal pain that would not go away. She states she had multiple loose bowel movements but has that often if she eats a lot of nuts. She states though when the pain began she had a scrunching type feeling in her upper abdomen. She does not recall have anything similar in the past.  As the pain continued to progress she decided to come to the hospital.  She says the pain was in her lower abdomen moving towards the right. On her way to the hospital she had several episodes of vomiting. At this point however she is feeling much better. She is tolerating full liquid diet. Patient Active Problem List    Diagnosis Date Noted    Abdominal pain 2021    Urge urinary incontinence 10/15/2020    Gastroesophageal reflux disease without esophagitis 2019    Prediabetes 2019    Age-related osteoporosis without current pathological fracture 01/15/2019    Hypercholesterolemia 2018    Vitamin D deficiency 2018    Primary osteoarthritis of both hands 2015    Bipolar disorder ([facility] [address] 03/10/2015    Anxiety disorder 03/10/2015     Past Medical History:   Diagnosis Date    Arthritis     Osteoarthritis    Excessive sweating     Occurs in summers; TSH/CMP/CBC normal    GERD (gastroesophageal reflux disease)     WITH HAITEL HERNIA    MRSA (methicillin resistant Staphylococcus aureus) infection     MRSA + abd abscess; nasal swab negative after treatment    Psychiatric disorder     DEPRESSION.        Past Surgical History:   Procedure Laterality Date    HX  SECTION  1971    HX COLONOSCOPY      HX ENDOSCOPY  05/10/2018    Dr. Carol Carballo; gastritis and esophagitis    HX HYSTERECTOMY      Age 29 for endometriosis    IMPLANT BREAST SILICONE/EQ Bilateral 7390 1st surgery 1973, replaced bilaterally in 2014.  [place] BREAST SURGERY PROCEDURE UNLISTED Bilateral 1974    AUGMENTATION    [place] REVISION OF RECONSTRUCTED BREAST  03/12/2014    Bilateral breast implant exchange due to  cosmetic defect of previous implants      Social History     Tobacco Use    Smoking status: Never Smoker    Smokeless tobacco: Never Used   Substance Use Topics    Alcohol use: No      Family History   Problem Relation Age of Onset    Diabetes Father     Cancer Father         PROSTATE CANCER    Arthritis-osteo Mother     Arthritis-osteo Sister     Anesth Problems Sister         HAS DIFFICULTY WAKING UP-TAKES VERY LONG. Current Facility-Administered Medications   Medication Dose Route Frequency    sodium chloride (NS) flush 5-40 mL  5-40 mL IntraVENous Q8H    sodium chloride (NS) flush 5-40 mL  5-40 mL IntraVENous PRN    acetaminophen (TYLENOL) tablet 650 mg  650 mg Oral Q6H PRN    Or    acetaminophen (TYLENOL) suppository 650 mg  650 mg Rectal Q6H PRN    polyethylene glycol (MIRALAX) packet 17 g  17 g Oral DAILY PRN    promethazine (PHENERGAN) tablet 12.5 mg  12.5 mg Oral Q6H PRN    Or    ondansetron (ZOFRAN) injection 4 mg  4 mg IntraVENous Q6H PRN    dextrose 5% - 0.45% NaCl with KCl 20 mEq/L infusion  75 mL/hr IntraVENous CONTINUOUS    piperacillin-tazobactam (ZOSYN) 3.375 g in 0.9% sodium chloride (MBP/ADV) 100 mL MBP  3.375 g IntraVENous Q8H    morphine injection 2 mg  2 mg IntraVENous Q4H PRN    naloxone (NARCAN) injection 0.4 mg  0.4 mg IntraVENous EVERY 2 MINUTES AS NEEDED      Allergies   Allergen Reactions    Fosamax [Alendronate] Other (comments)     Severe heartburn       Review of Systems:    Pertinent items are noted in the History of Present Illness.     Objective:        Visit Vitals  /62 (BP 1 Location: Right upper arm, BP Patient Position: At rest)   Pulse 63   Temp 98.3 °F (36.8 °C)   Resp 16   Ht 5' (1.524 m)   Wt 109 lb (49.4 kg)   SpO2 98%   BMI 21.29 kg/m²       Physical Exam:  GENERAL: alert, cooperative, no distress, appears stated age, EYE: positive findings: There is some ecchymosis below the left eye, THROAT & NECK: normal, LUNG: clear to auscultation bilaterally, HEART: regular rate and rhythm, ABDOMEN: Soft nontender nondistended, EXTREMITIES:  no edema, SKIN: Normal., NEUROLOGIC: negative, PSYCH: non focal    Imaging:  images and reports reviewed  CT-nonvisualization of the appendix  Imaging findings suggestive of a mild enteritis. Consider infectious and  inflammatory etiologies. Nonobstructive calculi. Fecal stasis. Moderate hiatal hernia. Lab/Data Review: All lab results for the last 24 hours reviewed. Assessment:Plan     Abdominal pain-most likely from enteritis. I do not believe that the has appendicitis. The patient is asymptomatic and is currently on a diet and her CT scan was not suggestive of this. I do not believe that she will require operative intervention.   Treatment of enteritis per medical team.  We will sign off call if needed

## 2021-04-25 NOTE — PROGRESS NOTES
Progress Note      Pt Name  Patrice Fontaine   Date of Birth 1945   Medical Record Number  [id number]      Age  70 y.o. PCP Francheska Collier MD   Admit date:  4/24/2021    Room Number  305/01  @ [facility] [place]   Date of Service  4/25/2021     Admission Diagnoses:  Enteritis      Admission Summary:  \" Patrice Fontaine is a 70 y.o. female with hx GERD, and OA who presents with acute onset b/l LQ abdominal pain with vomiting. She states that she ate ice cream and a cookie, and shortly afterward started to have sharp b/l LQ abdominal pain. Due to worsening pain, her partner drove her to [address] [place] [address] for eval.  She vomited continuously durign the car ride. She denies fever, chills, dyspnea, chest pain, diarrhea, constipation, or dysuria.      She incidentally had a mechanical fall while tripping over a hose several days ago. She did not seek medical treatment and has bruising of left eye.     In the ED, VSS. Labs were grossly unremarkable. CT abdomen/pelvis showed mild enteritis.     In the ED, surgery was consulted (Dr. Penny Horton), and concluded that presentation not concerning for acute surgical problem. She rec'd tylenol, morphine, and zofran.  \"     Assessment and plan:     Abdominal Pain  -gastroenteritis vs.appendicitis  -CT abdomen/pelvis showed mild enteritis, appendix not visualized, but she dose have gaurding b/l LQ R>L  -clear liquid, advance as tolerated  -IVF  -antimetics  -pain control with tylenol/tramadol/morphine  -zosyn  -surgery consulted, appreciate rec's     Ground level fall  -left periorbital ecchymosis  -no neuro deficit    Osteoporosis   Pt is not on any Rx.            CODE STATUS   Full   Functional Status  Pt lives alone and is completely independent with ADLs   She is an avid  and poet/writer     Surrogate decision maker:  Lorrin Button      Prophylaxis   Hep SQ   Discharge Plan:  Home w/Family,      Misc   Benefit:  [place]or: [place] / Plan: [place] MEDICARE PART A & B / Product Type: Medicare /    Isolation :  There are currently no Active Isolations   ADT status:  OBSERVATION      Query   None noted today    Prognosis   Fair    Social issues  Date  Comment     4/25/21  11:41 AM met with pt's CRIS in the room                   Subjective Data     \"I feel a lot better \"  Review of Systems - History obtained from the patient  Respiratory ROS: no cough, shortness of breath, or wheezing  Cardiovascular ROS: no chest pain or dyspnea on exertion    Objective Data       Comments  Thin built pleasant lady lying in bed in no distress      Patient Vitals for the past 24 hrs:   BP   04/25/21 0916 109/60   04/25/21 0126 134/61   04/25/21 0045 120/63   04/24/21 2130 (!) 156/78   04/24/21 2039 (!) 142/68      Patient Vitals for the past 24 hrs:   Pulse   04/25/21 0916 64   04/25/21 0126 (!) 58   04/25/21 0045 65   04/24/21 2130 77   04/24/21 2039 (!) 54      Patient Vitals for the past 24 hrs:   Resp   04/25/21 0916 16   04/25/21 0126 16   04/25/21 0045 15   04/24/21 2130 16   04/24/21 2039 16      Patient Vitals for the past 24 hrs:   Temp   04/25/21 0916 97.8 °F (36.6 °C)   04/25/21 0126 97.8 °F (36.6 °C)   04/24/21 2039 98.1 °F (36.7 °C)        SpO2 Readings from Last 6 Encounters:   04/25/21 99%   04/19/21 95%   12/07/20 98%   11/18/20 98%   10/12/20 99%   10/12/20 97%          O2 Device: None (Room air) Body mass index is 21.29 kg/m².  -  Wt Readings from Last 10 Encounters:   04/25/21 49.4 kg (109 lb)   04/19/21 51.5 kg (113 lb 8 oz)   12/07/20 49.9 kg (110 lb)   10/12/20 48.7 kg (107 lb 6.4 oz)   09/16/20 46.7 kg (103 lb)   07/01/20 46.3 kg (102 lb)   03/09/20 49 kg (108 lb)   02/28/20 48.9 kg (107 lb 12.8 oz)   11/01/19 50.8 kg (112 lb)   09/03/19 53.1 kg (117 lb)        Intake/Output Summary (Last 24 hours) at 4/25/2021 1104  Last data filed at 4/25/2021 0653  Gross per 24 hour   Intake 1000 ml   Output 250 ml   Net 750 ml         Physical Exam:             General:  Alert, cooperative,   well noursished,   well developed,   appears stated age    Ears/Eyes:  Hearing intact  Sclera anicteric. Pupils equal   Mouth/Throat:  Mucous membranes normal pink and moist     Neck:     Lungs:  Chest excursion symmetrical   Auscultation B/L Symmetrical with   Vesicular breath sounds     No Crepitations noted          CVS:  Regular rate and rhythm   no  murmur,   No click, rub or gallop  S1 normal   S2 normal      Abdomen:    Soft, non-tender  No mass  Bowel sounds normal  No distension   Percussion note tympanitic   Extremities:    No cyanosis, jaundice  No edema noted   No sign of DVT/cord like lesion on palpation  No sign of acute trauma .     Skin:  Ecchymosis left orbit noted   Skin color, texture, turgor normal. no acute rash or lesions    Lymph nodes:     Musculoskeletal Muscle bulk B/L symmetrical   Neuro Cranial nerves are intact,   motor movement b/l symmetrical,   Sensory evaluation b/l symmetrical    Psych:  Alert and oriented,   normal mood & affect          Medications reviewed     Current Facility-Administered Medications   Medication Dose Route Frequency    sodium chloride (NS) flush 5-40 mL  5-40 mL IntraVENous Q8H    sodium chloride (NS) flush 5-40 mL  5-40 mL IntraVENous PRN    acetaminophen (TYLENOL) tablet 650 mg  650 mg Oral Q6H PRN    Or    acetaminophen (TYLENOL) suppository 650 mg  650 mg Rectal Q6H PRN    polyethylene glycol (MIRALAX) packet 17 g  17 g Oral DAILY PRN    promethazine (PHENERGAN) tablet 12.5 mg  12.5 mg Oral Q6H PRN    Or    ondansetron (ZOFRAN) injection 4 mg  4 mg IntraVENous Q6H PRN    dextrose 5% - 0.45% NaCl with KCl 20 mEq/L infusion  75 mL/hr IntraVENous CONTINUOUS    piperacillin-tazobactam (ZOSYN) 3.375 g in 0.9% sodium chloride (MBP/ADV) 100 mL MBP  3.375 g IntraVENous Q8H    traMADoL (ULTRAM) tablet 50 mg  50 mg Oral Q6H PRN    morphine injection 2 mg  2 mg IntraVENous Q4H PRN    naloxone (NARCAN) injection 0.4 mg  0.4 mg IntraVENous EVERY 2 MINUTES AS NEEDED       Relevant other informations: Other medical conditions listed in [facility] problem list section; all of these and other pertinent data were taken into consideration when treatment plan is developed and customized to this patient's unique overall circumstances and needs. We have reviewed available old medical records within the constraints of this admission process. Data Review:   Recent Days:  All Micro Results     Procedure Component Value Units Date/Time    URINE CULTURE HOLD SAMPLE [[id number]] Collected: 04/24/21 2226    Order Status: Completed Specimen: Serum Updated: 04/24/21 2230     Urine culture hold       Urine on hold in Microbiology dept for 2 days. If unpreserved urine is submitted, it cannot be used for addtional testing after 24 hours, recollection will be required. Recent Labs     04/24/21 2057   WBC 7.8   HGB 13.3   HCT 41.9        Recent Labs     04/24/21 2057      K 3.5   *   CO2 24   *   BUN 31*   CREA 0.89   CA 9.0   ALB 4.2   TBILI 0.3   ALT 25      Lab Results   Component Value Date/Time    TSH 1.360 06/14/2019 02:32 PM    TSH 0.961 05/11/2017 12:04 PM            Care Plan discussed with:Patient/Family and Nurse   Other medical conditions are listed in the active hospital problem list section; these and other pertinent data were taken into consideration when the treatment plan was developed and customized to this patient's unique overall circumstances and needs. High complexity decision making was performed for this patient who is at high risk for decompensation with multiple organ involvement. Today total floor/unit time was 35 minutes while caring for this patient and greater than 50% of that time was spent with patient (and/or family) coordinating patients clinical issues; this includes time spent during multidisciplinary rounds.         Alphonso Rodriguez MD MPH FACP    4/25/2021

## 2021-04-26 VITALS
HEART RATE: 58 BPM | OXYGEN SATURATION: 97 % | TEMPERATURE: 98.3 F | HEIGHT: 60 IN | BODY MASS INDEX: 21.4 KG/M2 | SYSTOLIC BLOOD PRESSURE: 133 MMHG | DIASTOLIC BLOOD PRESSURE: 73 MMHG | WEIGHT: 109 LBS | RESPIRATION RATE: 16 BRPM

## 2021-04-26 LAB
ALBUMIN SERPL-MCNC: 3.2 G/DL (ref 3.5–5)
ALBUMIN/GLOB SERPL: 1.1 {RATIO} (ref 1.1–2.2)
ALP SERPL-CCNC: 88 U/L (ref 45–117)
ALT SERPL-CCNC: 18 U/L (ref 12–78)
ANION GAP SERPL CALC-SCNC: 4 MMOL/L (ref 5–15)
AST SERPL-CCNC: 18 U/L (ref 15–37)
BASOPHILS # BLD: 0 K/UL (ref 0–0.1)
BASOPHILS NFR BLD: 0 % (ref 0–1)
BILIRUB SERPL-MCNC: 0.4 MG/DL (ref 0.2–1)
BUN SERPL-MCNC: 13 MG/DL (ref 6–20)
BUN/CREAT SERPL: 17 (ref 12–20)
CALCIUM SERPL-MCNC: 8.6 MG/DL (ref 8.5–10.1)
CHLORIDE SERPL-SCNC: 113 MMOL/L (ref 97–108)
CO2 SERPL-SCNC: 24 MMOL/L (ref 21–32)
CREAT SERPL-MCNC: 0.78 MG/DL (ref 0.55–1.02)
DIFFERENTIAL METHOD BLD: NORMAL
EOSINOPHIL # BLD: 0.1 K/UL (ref 0–0.4)
EOSINOPHIL NFR BLD: 2 % (ref 0–7)
ERYTHROCYTE [DISTWIDTH] IN BLOOD BY AUTOMATED COUNT: 13.2 % (ref 11.5–14.5)
GLOBULIN SER CALC-MCNC: 2.8 G/DL (ref 2–4)
GLUCOSE SERPL-MCNC: 94 MG/DL (ref 65–100)
HCT VFR BLD AUTO: 36.8 % (ref 35–47)
HGB BLD-MCNC: 11.9 G/DL (ref 11.5–16)
IMM GRANULOCYTES # BLD AUTO: 0 K/UL (ref 0–0.04)
IMM GRANULOCYTES NFR BLD AUTO: 0 % (ref 0–0.5)
LYMPHOCYTES # BLD: 1.6 K/UL (ref 0.8–3.5)
LYMPHOCYTES NFR BLD: 39 % (ref 12–49)
MAGNESIUM SERPL-MCNC: 2.4 MG/DL (ref 1.6–2.4)
MCH RBC QN AUTO: 30.2 PG (ref 26–34)
MCHC RBC AUTO-ENTMCNC: 32.3 G/DL (ref 30–36.5)
MCV RBC AUTO: 93.4 FL (ref 80–99)
MONOCYTES # BLD: 0.3 K/UL (ref 0–1)
MONOCYTES NFR BLD: 8 % (ref 5–13)
NEUTS SEG # BLD: 2 K/UL (ref 1.8–8)
NEUTS SEG NFR BLD: 51 % (ref 32–75)
NRBC # BLD: 0 K/UL (ref 0–0.01)
NRBC BLD-RTO: 0 PER 100 WBC
PLATELET # BLD AUTO: 162 K/UL (ref 150–400)
PMV BLD AUTO: 9.5 FL (ref 8.9–12.9)
POTASSIUM SERPL-SCNC: 4.1 MMOL/L (ref 3.5–5.1)
PROT SERPL-MCNC: 6 G/DL (ref 6.4–8.2)
RBC # BLD AUTO: 3.94 M/UL (ref 3.8–5.2)
SODIUM SERPL-SCNC: 141 MMOL/L (ref 136–145)
WBC # BLD AUTO: 4.1 K/UL (ref 3.6–11)

## 2021-04-26 PROCEDURE — 74011250636 HC RX REV CODE- 250/636: Performed by: FAMILY MEDICINE

## 2021-04-26 PROCEDURE — 96376 TX/PRO/DX INJ SAME DRUG ADON: CPT

## 2021-04-26 PROCEDURE — 97165 OT EVAL LOW COMPLEX 30 MIN: CPT

## 2021-04-26 PROCEDURE — 83735 ASSAY OF MAGNESIUM: CPT

## 2021-04-26 PROCEDURE — 74011000258 HC RX REV CODE- 258: Performed by: FAMILY MEDICINE

## 2021-04-26 PROCEDURE — 36415 COLL VENOUS BLD VENIPUNCTURE: CPT

## 2021-04-26 PROCEDURE — 85025 COMPLETE CBC W/AUTO DIFF WBC: CPT

## 2021-04-26 PROCEDURE — 74011250637 HC RX REV CODE- 250/637: Performed by: INTERNAL MEDICINE

## 2021-04-26 PROCEDURE — 80053 COMPREHEN METABOLIC PANEL: CPT

## 2021-04-26 PROCEDURE — 99218 HC RM OBSERVATION: CPT

## 2021-04-26 RX ORDER — DEXTROMETHORPHAN POLISTIREX 30 MG/5 ML
SUSPENSION, EXTENDED RELEASE 12 HR ORAL AS NEEDED
Status: DISCONTINUED | OUTPATIENT
Start: 2021-04-26 | End: 2021-04-26 | Stop reason: HOSPADM

## 2021-04-26 RX ORDER — AMOXICILLIN AND CLAVULANATE POTASSIUM 875; 125 MG/1; MG/1
1 TABLET, FILM COATED ORAL 2 TIMES DAILY
Qty: 14 TAB | Refills: 0 | Status: SHIPPED | OUTPATIENT
Start: 2021-04-26 | End: 2021-05-03

## 2021-04-26 RX ADMIN — POTASSIUM CHLORIDE, DEXTROSE MONOHYDRATE AND SODIUM CHLORIDE 75 ML/HR: 150; 5; 450 INJECTION, SOLUTION INTRAVENOUS at 07:49

## 2021-04-26 RX ADMIN — PIPERACILLIN AND TAZOBACTAM 3.38 G: 3; .375 INJECTION, POWDER, LYOPHILIZED, FOR SOLUTION INTRAVENOUS at 00:10

## 2021-04-26 RX ADMIN — PIPERACILLIN AND TAZOBACTAM 3.38 G: 3; .375 INJECTION, POWDER, LYOPHILIZED, FOR SOLUTION INTRAVENOUS at 08:45

## 2021-04-26 RX ADMIN — Medication: at 13:31

## 2021-04-26 NOTE — DISCHARGE INSTRUCTIONS
Discharge Instructions       PATIENT ID: Patrice Fontaine  MRN: [id number]   YOB: 1945    DATE OF ADMISSION: 4/24/2021  8:50 PM    DATE OF DISCHARGE: 4/26/2021    PRIMARY CARE PROVIDER: Francheska Collier MD     ATTENDING PHYSICIAN: Ester Denver, MD  DISCHARGING PROVIDER: Ama Douglas MD    To contact this individual call [phone] and ask the  to page. If unavailable ask to be transferred the Adult Hospitalist Department. DISCHARGE DIAGNOSES Enteritis- likely infectious    CONSULTATIONS: IP CONSULT TO GENERAL SURGERY    PROCEDURES/SURGERIES: * No surgery found *    FOLLOW UP APPOINTMENTS:   Follow-up Information     Follow up With Specialties Details Why Contact Info    Francheska Collier MD Internal Medicine In 1 week  [place] [phone]          if symptoms recurrent- follow up with gastroenterology outpatient. ADDITIONAL CARE RECOMMENDATIONS: follow up with PCP, and GI if symptoms recurrent. DIET: Resume previous diet    DISCHARGE MEDICATIONS:  Finish abx course    · It is important that you take the medication exactly as they are prescribed. · Keep your medication in the bottles provided by the pharmacist and keep a list of the medication names, dosages, and times to be taken in your wallet. · Do not take other medications without consulting your doctor. NOTIFY YOUR PHYSICIAN FOR ANY OF THE FOLLOWING:   Fever over 101 degrees for 24 hours. Chest pain, shortness of breath, fever, chills, nausea, vomiting, diarrhea, change in mentation, falling, weakness, bleeding. Severe pain or pain not relieved by medications. Or, any other signs or symptoms that you may have questions about. It was our pleasure to help take care of you and we hope you get well very soon.     DISPOSITION:    Home With:   OT  PT  HH  RN       SNF/Inpatient Rehab/LTAC   x Independent/assisted living    Hospice    Other:     CDMP Checked:   Yes x     PROBLEM LIST Updated:  Yes x     Signed:   Deborah Hernandes MD  4/26/2021  10:52 AM

## 2021-04-26 NOTE — PROGRESS NOTES
56  -  State Observation notice (OBS) provided in writing to patient and placed on chart of  Natali vasques  as well as verbal explanation of the policy. Patients who are in outpatient status also receive the Observation notice. Evita Cook RN, [facility]    4807 - Reason for Admission:                      RUR Score:  N/A                DME: N/A    ADLs: Patient is independent. Previous HH/SNF/rehab: N/A    ER Contacts: Beck De Santiago - friend - [phone]    Plan for utilizing home health:          PCP: First and Last name:  Edyta Walker MD   Name of Practice:    Are you a current patient: Yes/No:    Approximate date of last visit:    Can you participate in a virtual visit with your PCP:                     Current Advanced Directive/Advance Care Plan: Full Code                         Transition of Care :  Home with friend    Care Management Interventions  PCP Verified by CM: Yes  Mode of Transport at Discharge:  Other (see comment)  MyChart Signup: No  Discharge Durable Medical Equipment: No  Health Maintenance Reviewed: Yes  Physical Therapy Consult: No  Occupational Therapy Consult: No  Speech Therapy Consult: No  Current Support Network: Lives Alone  Confirm Follow Up Transport: Family  Discharge Location  Discharge Placement: Home

## 2021-04-26 NOTE — PROGRESS NOTES
3631 Bedside shift change report given to MARCELA Chavez (oncoming nurse) by Cleotha Denver (offgoing nurse). Report included the following information SBAR, Kardex, Intake/Output, MAR, Accordion and Recent Results. 1331: Discharge pending on if pt has BM. PRN fleet enema given at 1331.    1345: Pt stated she had a very small BM. Awaiting to see if pt has a bigger result    1445: Pt is very anxious to be discharged and requested that she is to be discharged now. Notified hospitalist.     1500 I have reviewed discharge instructions with the patient. The patient verbalized understanding. Discharge instructions given to pt. Discharge meds reviewed with pt.

## 2021-04-26 NOTE — PROGRESS NOTES
Chart reviewed, per OT note pt is independent with no device for functional mobility - noted discharge summary in place - per OT no skilled rehab needs identified and has supportive significant other to assist at discharge.  Will defer PT eval.  Stephanie Weathers, PT

## 2021-04-26 NOTE — DISCHARGE SUMMARY
Discharge Summary       PATIENT ID: Srikanth Gallegos  MRN: [id number]   YOB: 1945    DATE OF ADMISSION: 4/24/2021  8:50 PM    DATE OF DISCHARGE: 4/26/2021   PRIMARY CARE PROVIDER: Rohan Culver MD     ATTENDING PHYSICIAN: Rolando Melendez MD  DISCHARGING PROVIDER: Rolando Melendez MD    To contact this individual call [phone] and ask the  to page. If unavailable ask to be transferred the Adult Hospitalist Department. CONSULTATIONS: IP CONSULT TO GENERAL SURGERY    PROCEDURES/SURGERIES: * No surgery found *    ADMITTING DIAGNOSES & HOSPITAL COURSE:   Evaluated and treated for acute abdominal pain, enteritis on imaging, likely infectious. Evaluated by surgery, no surgical intervention indicated. Very eager to get out, has small BM prior to dc. Risk of Re-Admission: low  DISCHARGE DIAGNOSES / PLAN:      Abdominal Pain: -gastroenteritis vs.appendicitis  -CT a/p: mild enteritis, appendix not visualized, but she dose have gaurding b/l LQ R>L  -clear liquid diet- advance as tolerated  -IVF-antimetics-pain control with tylenol/tramadol/morphine-zosyn  -surgery evaluated- no surgical intervention needed. Clinically symptoms resolved, very keen on dc. rec f/u with GI if symptoms recurrent.     Ground level fall- left periorbital ecchymosis-no neuro deficit  Osteoporosis - Pt is not on any Rx.       FOLLOW UP APPOINTMENTS:    Follow-up Information    None        ADDITIONAL CARE RECOMMENDATIONS:  Follow up with PCP, and GI if needed. DIET: Resume previous diet    ACTIVITY: Activity as tolerated    DISCHARGE MEDICATIONS:  Current Discharge Medication List        NOTIFY YOUR PHYSICIAN FOR ANY OF THE FOLLOWING:   Fever over 101 degrees for 24 hours. Chest pain, shortness of breath, fever, chills, nausea, vomiting, diarrhea, change in mentation, falling, weakness, bleeding. Severe pain or pain not relieved by medications.   Or, any other signs or symptoms that you may have questions about.    DISPOSITION:    Home With:   OT  PT  HH  RN       Long term SNF/Inpatient Rehab   x Independent/assisted living    Hospice    Other:     PATIENT CONDITION AT DISCHARGE:     Functional status    Poor     Deconditioned     Independent      Cognition     Lucid     Forgetful     Dementia      Catheters/lines (plus indication)    Reis     PICC     PEG     None      Code status     Full code     DNR      PHYSICAL EXAMINATION AT DISCHARGE:  Patient seen and examined at bedside, Condition stable, explained discharge and follow up plans. /73 (BP 1 Location: Right upper arm, BP Patient Position: Lying)   Pulse (!) 58   Temp 98.3 °F (36.8 °C)   Resp 16   Ht 5' (1.524 m)   Wt 49.4 kg (109 lb)   SpO2 97%   BMI 21.29 kg/m²   General:  Alert, oriented, No acute distress. Very keen on dc, looks younger than stated age. Resp:  No accessory muscle use, Good AE. Neuro:  Grossly normal, no focal neuro deficits, follows commands   Abdomen: soft, nontender, BS+. S/p b/l breast augmentation. Anxious WW.    CHRONIC MEDICAL DIAGNOSES:  Problem List as of 4/26/2021 Date Reviewed: 4/19/2021          Codes Class Noted - Resolved    Abdominal pain ICD-10-CM: R10.9  ICD-9-CM: 789.00  4/25/2021 - Present        Urge urinary incontinence ICD-10-CM: N39.41  ICD-9-CM: 788.31  10/15/2020 - Present        Gastroesophageal reflux disease without esophagitis ICD-10-CM: K21.9  ICD-9-CM: 530.81  9/26/2019 - Present    Overview Signed 9/26/2019 11:05 AM by MD Dr. Lisa Rose. On omeprazole.               Prediabetes ICD-10-CM: R73.03  ICD-9-CM: 790.29  6/18/2019 - Present        Age-related osteoporosis without current pathological fracture ICD-10-CM: M81.0  ICD-9-CM: 733.01  1/15/2019 - Present        Hypercholesterolemia ICD-10-CM: E78.00  ICD-9-CM: 272.0  6/29/2018 - Present        Vitamin D deficiency ICD-10-CM: E55.9  ICD-9-CM: 268.9  6/29/2018 - Present        Primary osteoarthritis of both hands ICD-10-CM: M19.041, [id number]  ICD-9-CM: 715.14  7/22/2015 - Present        Bipolar disorder ([facility] [address] ICD-10-CM: F31.9  ICD-9-CM: 296.80  3/10/2015 - Present        Anxiety disorder ICD-10-CM: F41.9  ICD-9-CM: 300.00  3/10/2015 - Present        RESOLVED: Mixed hyperlipidemia ICD-10-CM: E78.2  ICD-9-CM: 272.2  6/18/2019 - 6/18/2019        RESOLVED: Cough ICD-10-CM: R05  ICD-9-CM: 786.2  3/17/2017 - 2/20/2018        RESOLVED: Advance care planning ICD-10-CM: Z71.89  ICD-9-CM: V65.49  2/11/2016 - 1/15/2019    Overview Signed 2/11/2016  9:46 PM by Nilda Wills MD     2/9/16: Discussed with patient. She was previously given booklet and form. Asked her to review and return upon completion. RESOLVED: Excessive sweating ICD-10-CM: R61  ICD-9-CM: 780.8  7/22/2015 - 11/24/2015              37 minutes were spent with the patient on counseling and coordination of care.     Signed:   Dillon Campa MD  4/26/2021  10:48 AM

## 2021-04-26 NOTE — PROGRESS NOTES
OCCUPATIONAL THERAPY EVALUATION/DISCHARGE  Patient: Yao Gupta (89 y.o. female)  Date: 2021  Primary Diagnosis: Abdominal pain [R10.9]       Precautions:   Fall    ASSESSMENT  Based on the objective data described below, the patient presents with I self care and functional mobility without VSS and no pain reported. Current Level of Function (ADLs/self-care): I with no device; occasional cues for safe behavior as she is very anxious to demonstrate how safe and Independent she is, including jumping around unexpectedly in room. Safety training provided, home fall prevention training provided, pain management with SI OA provided. Functional Outcome Measure: The patient scored 100/100 on the Barthel Index outcome measure which is indicative of Independent self care . Other factors to consider for discharge: lives with supportive SO, present for part of training     PLAN :  Recommend with staff: Mod I in room once IV removed    Recommendation for discharge: (in order for the patient to meet his/her long term goals)  No skilled occupational therapy/ follow up rehabilitation needs identified at this time. This discharge recommendation:  Has been made in collaboration with the attending provider and/or case management    IF patient discharges home will need the following DME: none       SUBJECTIVE:   Patient stated I take no meds and never get sick.     OBJECTIVE DATA SUMMARY:   HISTORY:   Past Medical History:   Diagnosis Date    Arthritis     Osteoarthritis    Excessive sweating     Occurs in summers; TSH/CMP/CBC normal    GERD (gastroesophageal reflux disease)     WITH HAITEL HERNIA    MRSA (methicillin resistant Staphylococcus aureus) infection     MRSA + abd abscess; nasal swab negative after treatment    Psychiatric disorder     DEPRESSION.       Past Surgical History:   Procedure Laterality Date    HX  SECTION      HX COLONOSCOPY      HX ENDOSCOPY  05/10/2018     Chan Martinezh; gastritis and esophagitis    HX HYSTERECTOMY      Age 29 for endometriosis    IMPLANT BREAST SILICONE/EQ Bilateral 8122    1st surgery 1973, replaced bilaterally in 2014.  [place] BREAST SURGERY PROCEDURE UNLISTED Bilateral 1974    AUGMENTATION    [place] REVISION OF RECONSTRUCTED BREAST  03/12/2014    Bilateral breast implant exchange due to  cosmetic defect of previous implants       Prior Level of Function/Environment/Context: I PTA, no device  Expanded or extensive additional review of patient history:   Home Situation  Home Environment: Private residence  # Steps to Enter: 4  Rails to Enter: Yes  Hand Rails : Right  One/Two Story Residence: Two story  Patient Expects to be Discharged to[de-identified] Private residence  Tub or Shower Type: Tub/Shower combination    Hand dominance: Right    EXAMINATION OF PERFORMANCE DEFICITS:  Cognitive/Behavioral Status:  Neurologic State: Alert; Appropriate for age  Orientation Level: Oriented X4  Cognition: Appropriate decision making; Appropriate for age attention/concentration; Appropriate safety awareness; Follows commands  Perception: Appears intact  Perseveration: No perseveration noted  Safety/Judgement: Awareness of environment; Fall prevention    Skin: intact    Edema: none    Hearing: Auditory  Auditory Impairment: None    Vision/Perceptual:                           Acuity: Within Defined Limits    Corrective Lenses: Reading glasses    Range of Motion:    AROM: Within functional limits(pain in SI joint with \"OA\")                         Strength:    Strength: Within functional limits                Coordination:  Coordination: Within functional limits  Fine Motor Skills-Upper: Left Intact; Right Intact    Gross Motor Skills-Upper: Left Intact; Right Intact    Tone & Sensation:    Tone: Normal  Sensation: Intact                      Balance:  Sitting: Intact  Standing: Intact    Functional Mobility and Transfers for ADLs:  Bed Mobility:  Rolling: Independent  Supine to Sit: Independent  Sit to Supine: Independent  Scooting: Independent    Transfers:  Sit to Stand: Independent  Stand to Sit: Independent  Bed to Chair: Independent  Toilet Transfer : Independent    ADL Assessment:  Feeding: Independent    Oral Facial Hygiene/Grooming: Independent    Bathing: Modified independent    Upper Body Dressing: Independent    Lower Body Dressing: Independent    Toileting: Independent                ADL Intervention and task modifications:       Fall prevention, energy conservation training initiated  Cognitive Retraining  Safety/Judgement: Awareness of environment; Fall prevention       Functional Measure:  Barthel Index:    Bathin  Bladder: 10  Bowels: 10  Groomin  Dressing: 10  Feeding: 10  Mobility: 15  Stairs: 10  Toilet Use: 10  Transfer (Bed to Chair and Back): 15  Total: 100/100        The Barthel ADL Index: Guidelines  1. The index should be used as a record of what a patient does, not as a record of what a patient could do. 2. The main aim is to establish degree of independence from any help, physical or verbal, however minor and for whatever reason. 3. The need for supervision renders the patient not independent. 4. A patient's performance should be established using the best available evidence. Asking the patient, friends/relatives and nurses are the usual sources, but direct observation and common sense are also important. However direct testing is not needed. 5. Usually the patient's performance over the preceding 24-48 hours is important, but occasionally longer periods will be relevant. 6. Middle categories imply that the patient supplies over 50 per cent of the effort. 7. Use of aids to be independent is allowed. Hernan Romero., Barthel, D.W. (0337). Functional evaluation: the Barthel Index. [facility] (14)2. MIAN Ramirez, Lexi Cheema, Jeff Vaughan, Melissa, [facility] [facility] ().  Measuring the change indisability after inpatient rehabilitation; comparison of the responsiveness of the Barthel Index and Functional [place] Measure. Journal of Neurology, Neurosurgery, and Psychiatry, 664), 356-308. NANO Reyna, ERROL Cool, & Paige Fox M.A. (2004.) Assessment of post-stroke quality of life in cost-effectiveness studies: The usefulness of the Barthel Index and the EuroQoL-5D. Quality of Life Research, 15, 241-84     Occupational Therapy Evaluation Charge Determination   History Examination Decision-Making   LOW Complexity : Brief history review  LOW Complexity : 1-3 performance deficits relating to physical, cognitive , or psychosocial skils that result in activity limitations and / or participation restrictions  LOW Complexity : No comorbidities that affect functional and no verbal or physical assistance needed to complete eval tasks       Based on the above components, the patient evaluation is determined to be of the following complexity level: LOW   Pain Ratin/10    Activity Tolerance:   Good and SpO2 stable on RA    After treatment patient left in no apparent distress:    Supine in bed and Call bell within reach    COMMUNICATION/EDUCATION:   The patients plan of care was discussed with: Registered nurse and Physician.      Thank you for this referral.  Tye Hogan  OTR/L  Time Calculation: 20 mins

## 2021-07-02 ENCOUNTER — HOSPITAL ENCOUNTER (EMERGENCY)
Age: 76
Discharge: HOME | End: 2021-07-02
Attending: STUDENT IN AN ORGANIZED HEALTH CARE EDUCATION/TRAINING PROGRAM
Payer: MEDICARE

## 2021-07-02 ENCOUNTER — APPOINTMENT (OUTPATIENT)
Dept: CT IMAGING | Age: 76
End: 2021-07-02
Attending: STUDENT IN AN ORGANIZED HEALTH CARE EDUCATION/TRAINING PROGRAM
Payer: MEDICARE

## 2021-07-02 ENCOUNTER — OFFICE VISIT (OUTPATIENT)
Dept: INTERNAL MEDICINE CLINIC | Age: 76
End: 2021-07-02
Payer: MEDICARE

## 2021-07-02 VITALS
OXYGEN SATURATION: 96 % | BODY MASS INDEX: 21.01 KG/M2 | WEIGHT: 107 LBS | HEIGHT: 60 IN | HEART RATE: 64 BPM | TEMPERATURE: 98.3 F | DIASTOLIC BLOOD PRESSURE: 74 MMHG | SYSTOLIC BLOOD PRESSURE: 112 MMHG | RESPIRATION RATE: 14 BRPM

## 2021-07-02 VITALS
TEMPERATURE: 97.2 F | DIASTOLIC BLOOD PRESSURE: 54 MMHG | HEART RATE: 58 BPM | RESPIRATION RATE: 16 BRPM | OXYGEN SATURATION: 100 % | SYSTOLIC BLOOD PRESSURE: 122 MMHG

## 2021-07-02 DIAGNOSIS — R10.9 RIGHT SIDED ABDOMINAL PAIN: Primary | ICD-10-CM

## 2021-07-02 DIAGNOSIS — R10.9 ACUTE ABDOMINAL PAIN: Primary | ICD-10-CM

## 2021-07-02 LAB
ALBUMIN SERPL-MCNC: 3.9 G/DL (ref 3.5–5)
ALBUMIN/GLOB SERPL: 1.3 {RATIO} (ref 1.1–2.2)
ALP SERPL-CCNC: 102 U/L (ref 45–117)
ALT SERPL-CCNC: 21 U/L (ref 12–78)
ANION GAP SERPL CALC-SCNC: 7 MMOL/L (ref 5–15)
AST SERPL-CCNC: 19 U/L (ref 15–37)
BASOPHILS # BLD: 0 K/UL (ref 0–0.1)
BASOPHILS NFR BLD: 1 % (ref 0–1)
BILIRUB SERPL-MCNC: 0.4 MG/DL (ref 0.2–1)
BUN SERPL-MCNC: 18 MG/DL (ref 6–20)
BUN/CREAT SERPL: 23 (ref 12–20)
CALCIUM SERPL-MCNC: 9 MG/DL (ref 8.5–10.1)
CHLORIDE SERPL-SCNC: 107 MMOL/L (ref 97–108)
CO2 SERPL-SCNC: 26 MMOL/L (ref 21–32)
COMMENT, HOLDF: NORMAL
CREAT SERPL-MCNC: 0.78 MG/DL (ref 0.55–1.02)
DIFFERENTIAL METHOD BLD: NORMAL
EOSINOPHIL # BLD: 0.1 K/UL (ref 0–0.4)
EOSINOPHIL NFR BLD: 1 % (ref 0–7)
ERYTHROCYTE [DISTWIDTH] IN BLOOD BY AUTOMATED COUNT: 13.2 % (ref 11.5–14.5)
GLOBULIN SER CALC-MCNC: 3 G/DL (ref 2–4)
GLUCOSE SERPL-MCNC: 90 MG/DL (ref 65–100)
HCT VFR BLD AUTO: 39.9 % (ref 35–47)
HGB BLD-MCNC: 12.7 G/DL (ref 11.5–16)
IMM GRANULOCYTES # BLD AUTO: 0 K/UL (ref 0–0.04)
IMM GRANULOCYTES NFR BLD AUTO: 0 % (ref 0–0.5)
LIPASE SERPL-CCNC: 136 U/L (ref 73–393)
LYMPHOCYTES # BLD: 1.6 K/UL (ref 0.8–3.5)
LYMPHOCYTES NFR BLD: 32 % (ref 12–49)
MCH RBC QN AUTO: 30.2 PG (ref 26–34)
MCHC RBC AUTO-ENTMCNC: 31.8 G/DL (ref 30–36.5)
MCV RBC AUTO: 95 FL (ref 80–99)
MONOCYTES # BLD: 0.4 K/UL (ref 0–1)
MONOCYTES NFR BLD: 7 % (ref 5–13)
NEUTS SEG # BLD: 3 K/UL (ref 1.8–8)
NEUTS SEG NFR BLD: 59 % (ref 32–75)
NRBC # BLD: 0 K/UL (ref 0–0.01)
NRBC BLD-RTO: 0 PER 100 WBC
PLATELET # BLD AUTO: 181 K/UL (ref 150–400)
PMV BLD AUTO: 9.7 FL (ref 8.9–12.9)
POTASSIUM SERPL-SCNC: 4.1 MMOL/L (ref 3.5–5.1)
PROT SERPL-MCNC: 6.9 G/DL (ref 6.4–8.2)
RBC # BLD AUTO: 4.2 M/UL (ref 3.8–5.2)
SAMPLES BEING HELD,HOLD: NORMAL
SODIUM SERPL-SCNC: 140 MMOL/L (ref 136–145)
WBC # BLD AUTO: 5.1 K/UL (ref 3.6–11)

## 2021-07-02 PROCEDURE — G8420 CALC BMI NORM PARAMETERS: HCPCS | Performed by: INTERNAL MEDICINE

## 2021-07-02 PROCEDURE — 1101F PT FALLS ASSESS-DOCD LE1/YR: CPT | Performed by: INTERNAL MEDICINE

## 2021-07-02 PROCEDURE — 1090F PRES/ABSN URINE INCON ASSESS: CPT | Performed by: INTERNAL MEDICINE

## 2021-07-02 PROCEDURE — G8427 DOCREV CUR MEDS BY ELIG CLIN: HCPCS | Performed by: INTERNAL MEDICINE

## 2021-07-02 PROCEDURE — G9717 DOC PT DX DEP/BP F/U NT REQ: HCPCS | Performed by: INTERNAL MEDICINE

## 2021-07-02 PROCEDURE — G8536 NO DOC ELDER MAL SCRN: HCPCS | Performed by: INTERNAL MEDICINE

## 2021-07-02 PROCEDURE — G9899 SCRN MAM PERF RSLTS DOC: HCPCS | Performed by: INTERNAL MEDICINE

## 2021-07-02 PROCEDURE — 99213 OFFICE O/P EST LOW 20 MIN: CPT | Performed by: INTERNAL MEDICINE

## 2021-07-02 PROCEDURE — 80053 COMPREHEN METABOLIC PANEL: CPT

## 2021-07-02 PROCEDURE — 36415 COLL VENOUS BLD VENIPUNCTURE: CPT

## 2021-07-02 PROCEDURE — 74176 CT ABD & PELVIS W/O CONTRAST: CPT

## 2021-07-02 PROCEDURE — 99283 EMERGENCY DEPT VISIT LOW MDM: CPT

## 2021-07-02 PROCEDURE — 85025 COMPLETE CBC W/AUTO DIFF WBC: CPT

## 2021-07-02 PROCEDURE — 83690 ASSAY OF LIPASE: CPT

## 2021-07-02 PROCEDURE — 3017F COLORECTAL CA SCREEN DOC REV: CPT | Performed by: INTERNAL MEDICINE

## 2021-07-02 NOTE — PROGRESS NOTES
Identified pt with two pt identifiers. Reviewed record in preparation for visit and have obtained necessary documentation. All patient medications has been reviewed. Chief Complaint   Patient presents with    Abdominal Pain     Additional information about chief complaint:    Visit Vitals  /74 (BP 1 Location: Right arm, BP Patient Position: Sitting, BP Cuff Size: Large adult)   Pulse 64   Temp 98.3 °F (36.8 °C) (Oral)   Resp 14   Ht 5' (1.524 m)   Wt 107 lb (48.5 kg)   SpO2 96%   BMI 20.90 kg/m²       Health Maintenance Due   Topic    COVID-19 Vaccine (2 - Moderna 2-dose series)       1. Have you been to the ER, urgent care clinic since your last visit? Hospitalized since your last visit? No    2. Have you seen or consulted any other health care providers outside of the [address] [place] [address] since your last visit? Include any pap smears or colon screening. No      bdg

## 2021-07-02 NOTE — DISCHARGE INSTRUCTIONS
CT ABD PELV WO CONT   Final Result   No acute process on noncontrast CT. There is no obvious source of your pain based on our testing here however please follow-up with your primary care physician. We recommend that you see GI and possibly orthopedics to see if the pain is from a functional abdominal source or possibly even a radicular or nerve source from the thoracic spine.

## 2021-07-02 NOTE — PROGRESS NOTES
CC:   Chief Complaint   Patient presents with    Abdominal Pain       HISTORY OF PRESENT ILLNESS  Nolvia Velasco is a 78 y.o. female. Complains of right-sided abdominal pain for 3 days that is worsening. Feels like a hot sword going into right abdomen. Pain is 9/10 in severity, chronic, worse with walking. No association with meals. Has chronic loose stools not worse than usual.  Denies fevers, chills, nausea, vomiting, constipation, melena, or hematochezia. Eats much yogurt, cottage cheese, and pecans. Drinking more carbonated drinks than usual.    Hospitalized at [facility] from 4/24-4/26/21 for similar acute right-sided abdominal pain but was associated with vomiting then. Seen by Surgery for possible appendicitis but determined no surgery needed. CT abd/pelvis showed mild enteritis. Treated with Zosyn, pain medications, and clear liquid diet that was advanced. Pain resolved and she did not up follow up with GI. Patient Active Problem List   Diagnosis Code    Bipolar disorder ([facility] [address] F31.9    Anxiety disorder F41.9    Primary osteoarthritis of both hands M19.041, M19.042    Hypercholesterolemia E78.00    Vitamin D deficiency E55.9    Age-related osteoporosis without current pathological fracture M81.0    Prediabetes R73.03    Gastroesophageal reflux disease without esophagitis K21.9    Urge urinary incontinence N39.41    Abdominal pain R10.9     Past Medical History:   Diagnosis Date    Arthritis     Osteoarthritis    Excessive sweating     Occurs in summers; TSH/CMP/CBC normal    GERD (gastroesophageal reflux disease)     WITH HAITEL HERNIA    MRSA (methicillin resistant Staphylococcus aureus) infection 2007    MRSA + abd abscess; nasal swab negative after treatment    Psychiatric disorder     DEPRESSION.       Allergies   Allergen Reactions    Fosamax [Alendronate] Other (comments)     Severe heartburn       Current Outpatient Medications   Medication Sig Dispense Refill    acetaminophen (Tylenol Arthritis Pain) 650 mg TbER Take 650-1,300 mg by mouth as needed for Pain.  sertraline (ZOLOFT) 100 mg tablet 200 mg nightly.  topiramate (TOPAMAX) 25 mg tablet 25 mg nightly. PHYSICAL EXAM  Visit Vitals  /74 (BP 1 Location: Right arm, BP Patient Position: Sitting, BP Cuff Size: Large adult)   Pulse 64   Temp 98.3 °F (36.8 °C) (Oral)   Resp 14   Ht 5' (1.524 m)   Wt 107 lb (48.5 kg)   SpO2 96%   BMI 20.90 kg/m²       General: Well-developed and well-nourished, no distress. HEENT:  Head normocephalic/atraumatic, no scleral icterus  Lungs:  Clear to ausculation bilaterally. Good air movement. Heart:  Regular rate and rhythm, normal S1 and S2, no murmur, gallop, or rub  Abdomen: Soft, non-distended, normal bowel sounds, R-mid and RLQ abd pain, mild guarding; no masses or rebound tenderness. Extremities: No clubbing, cyanosis, or edema. Neurological: Alert and oriented. Psychiatric: Normal mood and affect. Behavior is normal.         ASSESSMENT AND PLAN    ICD-10-CM ICD-9-CM    1. Acute abdominal pain  R10.9 789.00      338.19      Diagnoses and all orders for this visit:    1. Acute abdominal pain  Patient Instructions  Go to [facility] Emergency Department for further evaluation of your right-sided abdominal pain. You will likely have blood work and a CT scan done to check for infection due to appendicitis, diverticulitis, or gastroenteritis. Follow-up and Dispositions    · Return if symptoms worsen or fail to improve, for Scheduled appointment 10/19/21. I have discussed the diagnosis with the patient and the intended plan as seen in the above orders. Patient is in agreement. The patient has received an after-visit summary and questions were answered concerning future plans. I have discussed medication side effects and warnings with the patient as well.

## 2021-07-02 NOTE — ED NOTES
Discharge instructions given to patient by MD and nurse. Pt has been given counseling  and verbalizes understanding. IV d/c. Pt ambulated off of unit in no signs of distress.

## 2021-07-02 NOTE — ED TRIAGE NOTES
Pt presents to department with a CC of pain on her right side that she reports she has been dealing with for months. Pt reports the pain occurs when she is outside working in the yard.

## 2021-07-02 NOTE — PATIENT INSTRUCTIONS
Patient Instructions  Go to [address] [place] [address] Emergency Department for further evaluation of your right-sided abdominal pain. You will likely have blood work and a CT scan done to check for infection due to appendicitis, diverticulitis, or gastroenteritis.

## 2021-07-02 NOTE — ED PROVIDER NOTES
Hossein Shine is a 78 y.o. female with past medical history notable for osteoarthritis presenting with recurrent abdominal pain. States that similar pain has recurred intermittently for years. It is always right-sided. Not associated with hematuria, dysuria, has had no vomiting or GI bleeding. No changes in her activity. She is a frequent  and notices pain sometimes is triggered by working outside. Her appetite has not been affected by the symptoms recently in fact she is working to eat something after she has her blood taken for testing. Abdominal Pain   This is a recurrent problem. The current episode started more than 2 days ago. The problem occurs constantly. The pain is severe. Pertinent negatives include no fever, no dysuria, no headaches, no chest pain and no back pain. Exacerbated by: Certain movements, noticed this pain frequently after gardening. Past workup includes CT scan, colonoscopy. Flank Pain   Associated symptoms include abdominal pain. Pertinent negatives include no chest pain, no fever, no headaches and no dysuria. Past Medical History:   Diagnosis Date    Arthritis     Osteoarthritis    Excessive sweating     Occurs in summers; TSH/CMP/CBC normal    GERD (gastroesophageal reflux disease)     WITH HAITEL HERNIA    MRSA (methicillin resistant Staphylococcus aureus) infection     MRSA + abd abscess; nasal swab negative after treatment    Psychiatric disorder     DEPRESSION. Past Surgical History:   Procedure Laterality Date    HX  SECTION      HX COLONOSCOPY      HX ENDOSCOPY  05/10/2018    Dr. Junior Patel; gastritis and esophagitis    HX HYSTERECTOMY      Age 29 for endometriosis    IMPLANT BREAST SILICONE/EQ Bilateral 7611    1st surgery , replaced bilaterally in .     [place] BREAST SURGERY PROCEDURE UNLISTED Bilateral 1974    AUGMENTATION    [place] REVISION OF RECONSTRUCTED BREAST  2014    Bilateral breast implant exchange due to cosmetic defect of previous implants         Family History:   Problem Relation Age of Onset    Diabetes Father     Cancer Father         PROSTATE CANCER    Arthritis-osteo Mother     Arthritis-osteo Sister     Anesth Problems Sister         HAS DIFFICULTY WAKING UP-TAKES VERY LONG. Social History     Socioeconomic History    Marital status:      Spouse name: Not on file    Number of children: Not on file    Years of education: Not on file    Highest education level: Not on file   Occupational History    Not on file   Tobacco Use    Smoking status: Never Smoker    Smokeless tobacco: Never Used   Vaping Use    Vaping Use: Never used   Substance and Sexual Activity    Alcohol use: No    Drug use: No    Sexual activity: Not on file   Other Topics Concern    Not on file   Social History Narrative    Not on file     Social Determinants of Health     Financial Resource Strain:     Difficulty of Paying Living Expenses:    Food Insecurity:     Worried About [address] in the Last Year:     [address] [employer] in the Last Year:    Transportation Needs:     Lack of Transportation (Medical):  Lack of Transportation (Non-Medical):    Physical Activity:     Days of Exercise per Week:     Minutes of Exercise per Session:    Stress:     Feeling of Stress :    Social Connections:     Frequency of Communication with Friends and Family:     Frequency of Social Gatherings with Friends and Family:     Attends Religion Services:     Active Member of Clubs or Organizations:     Attends Club or Organization Meetings:     Marital Status:    Intimate Partner Violence:     Fear of Current or Ex-Partner:     Emotionally Abused:     Physically Abused:     Sexually Abused: ALLERGIES: Fosamax [alendronate]    Review of Systems   Constitutional: Negative for chills and fever. Eyes: Negative for photophobia. Respiratory: Negative for shortness of breath.     Cardiovascular: Negative for chest pain. Gastrointestinal: Positive for abdominal pain. Genitourinary: Positive for flank pain. Negative for dysuria. Musculoskeletal: Negative for back pain. Neurological: Negative for headaches. Psychiatric/Behavioral: Negative for confusion. All other systems reviewed and are negative. Vitals:    21 1451   BP: (!) 122/54   Pulse: (!) 58   Resp: 16   Temp: 97.2 °F (36.2 °C)   SpO2: 100%            Physical Exam  Constitutional:       General: She is not in acute distress. Appearance: She is not toxic-appearing. HENT:      Head: Normocephalic and atraumatic. Mouth/Throat:      Mouth: Mucous membranes are moist.   Eyes:      Extraocular Movements: Extraocular movements intact. Cardiovascular:      Rate and Rhythm: Normal rate and regular rhythm. Heart sounds: Normal heart sounds. Pulmonary:      Effort: Pulmonary effort is normal. No respiratory distress. Breath sounds: Normal breath sounds. Abdominal:      Palpations: Abdomen is soft. Tenderness: There is no abdominal tenderness. Musculoskeletal:      Cervical back: Normal range of motion. Right lower leg: No edema. Left lower leg: No edema. Skin:     Capillary Refill: Capillary refill takes less than 2 seconds. Neurological:      General: No focal deficit present. Mental Status: She is alert and oriented to person, place, and time.    Psychiatric:         Mood and Affect: Mood normal.          [facility]     MEDICAL DECISION MAKIN y.o. female presents with Abdominal Pain and Flank Pain    Differential diagnosis includes but not limited to: Cholelithiasis, low-grade obstruction, pancreatitis, GERD, esophagitis, peptic ulcer, porphyria, abdominal muscle cramping or spasticity    LABORATORY TESTS:  Labs Reviewed   URINE CULTURE HOLD SAMPLE   CBC WITH AUTOMATED DIFF   METABOLIC PANEL, COMPREHENSIVE   SAMPLES BEING HELD   LIPASE   URINALYSIS W/MICROSCOPIC       IMAGING RESULTS:  CT ABD PELV WO CONT    (Results Pending)       MEDICATIONS GIVEN:  Medications   sodium chloride 0.9 % bolus infusion 1,000 mL (has no administration in time range)       PROGRESS NOTE:   6:10 PM Patient has remained stable and is ready for discharge  The patient's ED course has been uncomplicated         EKG:  none completed      IMPRESSION:  No diagnosis found. PLAN:  -   Discharge  Current Discharge Medication List        Follow-up Information     Follow up With Specialties Details Why Contact Info    Digna De Santiago MD Internal Medicine Schedule an appointment as soon as possible for a visit in 3 days  [address] [place] [address] Avenue  [phone]          Return precautions given    Overall the cause of her pain is obscure but patient has not had new or worrisome findings on imaging or lab work. She is very well-appearing, appears younger than stated age and has benign exam, is very mobile throughout the treatment area and is likely okay for outpatient follow-up however if she has progression of her symptoms advised to return.     Wilver Bauer MD        Procedures

## 2021-07-06 ENCOUNTER — TELEPHONE (OUTPATIENT)
Dept: INTERNAL MEDICINE CLINIC | Age: 76
End: 2021-07-06

## 2021-07-06 ENCOUNTER — PATIENT OUTREACH (OUTPATIENT)
Dept: CASE MANAGEMENT | Age: 76
End: 2021-07-06

## 2021-07-06 DIAGNOSIS — R10.9 RIGHT FLANK PAIN: Primary | ICD-10-CM

## 2021-07-06 NOTE — TELEPHONE ENCOUNTER
Yes, I recommend she still keep the appointment tomorrow. We will plan on checking a urine test on her since it was not done in the ER.

## 2021-07-06 NOTE — TELEPHONE ENCOUNTER
Pt called today following up on a call from last week. Caller stated that Dr. Sathya Dominique sent her to the ER last week with an intense pain on her side. She stated they did blood work and [place]' but no urine test. She stated she left the ER still in pain and will not be going back to [address] [place] [address]. Pt stated she would like a referral to urology and requested a callback at [phone].

## 2021-07-06 NOTE — TELEPHONE ENCOUNTER
Attempted to reach patient by phone using phone numbers on file. Unable to leave message on patients voice mail to call office.

## 2021-07-06 NOTE — TELEPHONE ENCOUNTER
A referral order has been placed for Urology. She can call [phone] to schedule an appointment with Dr. Jluis Funes [place] [address] [facility].

## 2021-07-06 NOTE — TELEPHONE ENCOUNTER
Verified patients name and date of birth. Advised patient per PCP and gave name/number to urologist. Patient wants to know if she still needs to keep appt for 7/7/21.

## 2021-07-06 NOTE — PROGRESS NOTES
ACM contacted patient for ER follow up. Patient verified identity using name/ . Patient reports RLQ pain is persistent and worsening. Patient reports this pain has been ongoing intermittently for months. Patient reports that pain seems worse when pushing on area and pressure is released. Patient contacted PCP requesting info for a urologist/ provided info for DR Yoanna Kunz and assisted to schedule with PCP for evaluation tomorrow. All questions answered. Patient denies any urinary sx's. Acm provided ACM's contact info for any questions/ concerns. ACM will follow up in 1-2 weeks for further CM follow up. Ambulatory Care Management Note    Date/Time:  2021 11:29 AM    This patient was received as a referral from Daily assignment. Ambulatory Care Manager outreached to patient today to offer care management services. Introduction to self and role of care manager provided. Patient accepted care management services at this time. Follow up call scheduled at this time. Patient has Ambulatory Care Manager's contact number for for any questions or concerns.

## 2021-07-07 ENCOUNTER — OFFICE VISIT (OUTPATIENT)
Dept: INTERNAL MEDICINE CLINIC | Age: 76
End: 2021-07-07
Payer: MEDICARE

## 2021-07-07 VITALS
HEART RATE: 68 BPM | BODY MASS INDEX: 20.81 KG/M2 | DIASTOLIC BLOOD PRESSURE: 52 MMHG | RESPIRATION RATE: 20 BRPM | SYSTOLIC BLOOD PRESSURE: 108 MMHG | TEMPERATURE: 98.7 F | WEIGHT: 106 LBS | HEIGHT: 60 IN | OXYGEN SATURATION: 98 %

## 2021-07-07 DIAGNOSIS — R10.9 RIGHT SIDED ABDOMINAL PAIN: Primary | ICD-10-CM

## 2021-07-07 LAB
BILIRUB UR QL STRIP: NEGATIVE
GLUCOSE UR-MCNC: NEGATIVE MG/DL
KETONES P FAST UR STRIP-MCNC: NEGATIVE MG/DL
PH UR STRIP: 5.5 [PH] (ref 4.6–8)
PROT UR QL STRIP: NEGATIVE
SP GR UR STRIP: 1.02 (ref 1–1.03)
UA UROBILINOGEN AMB POC: NORMAL (ref 0.2–1)
URINALYSIS CLARITY POC: CLEAR
URINALYSIS COLOR POC: YELLOW
URINE BLOOD POC: NEGATIVE
URINE LEUKOCYTES POC: NEGATIVE
URINE NITRITES POC: NEGATIVE

## 2021-07-07 PROCEDURE — 1101F PT FALLS ASSESS-DOCD LE1/YR: CPT | Performed by: INTERNAL MEDICINE

## 2021-07-07 PROCEDURE — G9717 DOC PT DX DEP/BP F/U NT REQ: HCPCS | Performed by: INTERNAL MEDICINE

## 2021-07-07 PROCEDURE — G8427 DOCREV CUR MEDS BY ELIG CLIN: HCPCS | Performed by: INTERNAL MEDICINE

## 2021-07-07 PROCEDURE — G9899 SCRN MAM PERF RSLTS DOC: HCPCS | Performed by: INTERNAL MEDICINE

## 2021-07-07 PROCEDURE — 3017F COLORECTAL CA SCREEN DOC REV: CPT | Performed by: INTERNAL MEDICINE

## 2021-07-07 PROCEDURE — G8536 NO DOC ELDER MAL SCRN: HCPCS | Performed by: INTERNAL MEDICINE

## 2021-07-07 PROCEDURE — 81003 URINALYSIS AUTO W/O SCOPE: CPT | Performed by: INTERNAL MEDICINE

## 2021-07-07 PROCEDURE — 99214 OFFICE O/P EST MOD 30 MIN: CPT | Performed by: INTERNAL MEDICINE

## 2021-07-07 PROCEDURE — G8420 CALC BMI NORM PARAMETERS: HCPCS | Performed by: INTERNAL MEDICINE

## 2021-07-07 PROCEDURE — 1090F PRES/ABSN URINE INCON ASSESS: CPT | Performed by: INTERNAL MEDICINE

## 2021-07-07 NOTE — PROGRESS NOTES
CC:   Chief Complaint   Patient presents with   [facility] Follow Up     Right lower quadrant pain, 7/2/21       HISTORY OF PRESENT ILLNESS  Zenon Lovelace is a 78 y.o. female. Presents for ED follow up evaluation of R-sided abd pain. Seen at [facility] on 7/2/21. Labs: normal CMP, CBC, and lipase. CT abd pelv wo contrast: no acute process. Pain thought to be functional or due to thoracic radiculopathy. Complains of persisting right-sided abdominal pain for the past 8 days. Stabbing 9/10 pain, chronic but better when she is outdoors doing yard work and not thinking about the pain. Worse after doing yard work. No association with meals. Has chronic loose stools not worse than usual.  Denies fevers, chills, nausea, vomiting, constipation, melena, hematochezia, dysuria, urinary urgency, urinary frequency, or hematuria. Eats much yogurt, nuts, peaches, and blueberries. Reports previous similar pain but typically lasts about 2-3 days then resolves. Has appointment today with Urology. Denies history of kidney stones. ROS  A complete review of systems was performed and is negative except for those mentioned in the HPI.     Patient Active Problem List   Diagnosis Code    Bipolar disorder ([facility] [address] F31.9    Anxiety disorder F41.9    Primary osteoarthritis of both hands M19.041, M19.042    Hypercholesterolemia E78.00    Vitamin D deficiency E55.9    Age-related osteoporosis without current pathological fracture M81.0    Prediabetes R73.03    Gastroesophageal reflux disease without esophagitis K21.9    Urge urinary incontinence N39.41    Abdominal pain R10.9     Past Medical History:   Diagnosis Date    Arthritis     Osteoarthritis    Excessive sweating     Occurs in summers; TSH/CMP/CBC normal    GERD (gastroesophageal reflux disease)     WITH HAITEL HERNIA    MRSA (methicillin resistant Staphylococcus aureus) infection 2007    MRSA + abd abscess; nasal swab negative after treatment    Psychiatric disorder     DEPRESSION. Allergies   Allergen Reactions    Fosamax [Alendronate] Other (comments)     Severe heartburn       Current Outpatient Medications   Medication Sig Dispense Refill    acetaminophen (Tylenol Arthritis Pain) 650 mg TbER Take 650-1,300 mg by mouth as needed for Pain.  sertraline (ZOLOFT) 100 mg tablet 200 mg nightly.  topiramate (TOPAMAX) 25 mg tablet 25 mg nightly. PHYSICAL EXAM  Visit Vitals  BP (!) 108/52 (BP 1 Location: Left upper arm, BP Patient Position: Sitting, BP Cuff Size: Adult)   Pulse 68   Temp 98.7 °F (37.1 °C) (Oral)   Resp 20   Ht 5' (1.524 m)   Wt 106 lb (48.1 kg)   SpO2 98%   BMI 20.70 kg/m²       General: Well-developed and well-nourished, no distress. HEENT:  Head normocephalic/atraumatic, no scleral icterus  Lungs:  Clear to ausculation bilaterally. Good air movement. Heart:  Regular rate and rhythm, normal S1 and S2, no murmur, gallop, or rub  Abdomen: Soft, non-distended, normal bowel sounds, no tenderness (but area of pain shown below), no guarding, masses, rebound tenderness, or HSM. Extremities: No clubbing, cyanosis, or edema. Neurological: Alert and oriented. Psychiatric: Normal mood and affect. Behavior is normal.            Results for orders placed or performed in visit on 07/07/21   AMB POC URINALYSIS DIP STICK AUTO W/O MICRO   Result Value Ref Range    Color (UA POC) Yellow     Clarity (UA POC) Clear     Glucose (UA POC) Negative Negative    Bilirubin (UA POC) Negative Negative    Ketones (UA POC) Negative Negative    Specific gravity (UA POC) 1.025 1.001 - 1.035    Blood (UA POC) Negative Negative    pH (UA POC) 5.5 4.6 - 8.0    Protein (UA POC) Negative Negative    Urobilinogen (UA POC) 0.2 mg/dL 0.2 - 1    Nitrites (UA POC) Negative Negative    Leukocyte esterase (UA POC) Negative Negative         ASSESSMENT AND PLAN    ICD-10-CM ICD-9-CM    1.  Right sided abdominal pain  R10.9 789.09 AMB POC URINALYSIS DIP STICK AUTO W/O MICRO      REFERRAL TO GASTROENTEROLOGY     Diagnoses and all orders for this visit:    1. Right sided abdominal pain  Normal UA. Possible etiologies include IBS or functional abdominal pain. If Urology appointment unrevealing, consider trial of dicyclomine for pain. -     AMB POC URINALYSIS DIP STICK AUTO W/O MICRO  -     REFERRAL TO GASTROENTEROLOGY      Follow-up and Dispositions    · Return if symptoms worsen or fail to improve. I have discussed the diagnosis with the patient and the intended plan as seen in the above orders. Patient is in agreement. The patient has received an after-visit summary and questions were answered concerning future plans. I have discussed medication side effects and warnings with the patient as well.

## 2021-07-07 NOTE — PROGRESS NOTES
Identified pt with two pt identifiers(name and ). Reviewed record in preparation for visit and have obtained necessary documentation. Chief Complaint   Patient presents with   [facility] Follow Up     Right lower quadrant pain, 21        There are no preventive care reminders to display for this patient. Visit Vitals  BP (!) 108/52 (BP 1 Location: Left upper arm, BP Patient Position: Sitting, BP Cuff Size: Adult)   Pulse 68   Temp 98.7 °F (37.1 °C) (Oral)   Resp 20   Ht 5' (1.524 m)   Wt 106 lb (48.1 kg)   SpO2 98%   BMI 20.70 kg/m²     Pain Scale: 10 - Worst pain ever/10    Coordination of Care Questionnaire:  :   1. Have you been to the ER, urgent care clinic since your last visit? Hospitalized since your last visit? Yes Reason for visit: Abdominal Pain    2. Have you seen or consulted any other health care providers outside of the [address] [place] [address] since your last visit? Include any pap smears or colon screening.

## 2021-07-18 NOTE — PROGRESS NOTES
Addendum  Patient saw Dr. Duy Childress (Urology) on 7/7/21. Told her \"pain likely secondary to irritated nerve, muscle, or spine. ..referred to Dr. Robbie Gan for his spine expertise evaluation\".

## 2021-07-21 ENCOUNTER — TELEPHONE (OUTPATIENT)
Dept: INTERNAL MEDICINE CLINIC | Age: 76
End: 2021-07-21

## 2021-07-21 NOTE — TELEPHONE ENCOUNTER
Patient called and wants to know if she has had her Hepatitis C shot.  Please give her a call if no answer leave a message

## 2021-07-23 ENCOUNTER — TELEPHONE (OUTPATIENT)
Dept: INTERNAL MEDICINE CLINIC | Age: 76
End: 2021-07-23

## 2021-07-23 NOTE — TELEPHONE ENCOUNTER
----- Message from August Romero sent at 7/23/2021 10:08 AM EDT -----  Regarding: MD Itzel/Telephone  Patient return call    Caller's first and last name and relationship (if not the patient): Self      Best contact number(s): [phone]      Whose call is being returned: Derik Ventura. Details to clarify the request: Relayed to pt that there is no record of her having Hep C vax.  Please call pt Monday at [phone] [facility]

## 2021-07-27 NOTE — TELEPHONE ENCOUNTER
Pt called back, she saw a pamphlet in another office about hepatitis C. Informed Hepatitis C is a screening blood test, she had the test 7/22/2015. Pt has no further questions at this time.

## 2021-07-29 ENCOUNTER — PATIENT OUTREACH (OUTPATIENT)
Dept: CASE MANAGEMENT | Age: 76
End: 2021-07-29

## 2021-07-29 NOTE — PROGRESS NOTES
Ambulatory Care Management Note    Date/Time:  7/29/2021 9:52 AM    This Ambulatory Care Manager (ACM) reviewed and updated the following screenings during this call; general assessment, self management assessment, SDOH assessments, ACP assessment and note, medication reconciliation. Patient's challenges to self management identified:   none      Medication Management:  good adherence and good understanding    Advance Care Planning:   Does patient have an Advance Directive:  not on file; education provided    [employer], Referrals, and Durable Medical Equipment: none      There are no preventive care reminders to display for this patient. Health Maintenance reviewed - n/a. Patient was asked to consider health care goals that they would like to focus on with this ACM. ACM will follow up with patient to discuss goals and establish care plan in the next 7-14 days. Patient reports ongoing RLQ pain. Patient reports saw urology. Reports seeing GI Dr Nadeem Solis on 8/11/21 and has MRI of the spine ordered at [facility] on 8/11/21. Patient reports pain is not constant but intermittent and when it comes on it is like an intense 8-10/10 stabbing burning pain. Patient sees PCP again for follow up in October.          PCP/Specialist follow up:   Future Appointments   Date Time Provider Zainab Barrera   10/19/2021 11:30 AM MD RAMA AgostoMA BS AMB

## 2021-07-29 NOTE — ACP (ADVANCE CARE PLANNING)
Patient does not have ACP. ACP education provided. Patient not interested in ACP specialist referral at this time.

## 2021-09-16 ENCOUNTER — PATIENT OUTREACH (OUTPATIENT)
Dept: CASE MANAGEMENT | Age: 76
End: 2021-09-16

## 2021-09-16 NOTE — PROGRESS NOTES
ACM contacted patient for CM follow up. EMR reviewed. Patient reports she is dressing to head out to MD appt and now is not a good time. Requests return call next week for CM follow up. ACM will return call next week as requested.

## 2021-10-14 ENCOUNTER — PATIENT OUTREACH (OUTPATIENT)
Dept: CASE MANAGEMENT | Age: 76
End: 2021-10-14

## 2021-10-14 NOTE — PROGRESS NOTES
Patient reports she is doing very well. No further pain. No other CM needs identified at this time. Declines interest in ACP referral ACM closing CM episode at this time. Patient has graduated from the Complex Case Management  program on 10/14/21. Patient/family has the ability to self-manage at this time. Care management goals have been completed. No further Ambulatory Care Manager follow up scheduled. Goals Addressed    None         Patient has Ambulatory Care Manager's contact information for any further questions, concerns, or needs.   Patients upcoming visits:    Future Appointments   Date Time Provider Zainab Barrera   10/19/2021 11:30 AM Leesa Rosas MD [facility] BS AMB

## 2021-11-20 ENCOUNTER — APPOINTMENT (OUTPATIENT)
Dept: GENERAL RADIOLOGY | Age: 76
End: 2021-11-20
Attending: EMERGENCY MEDICINE
Payer: MEDICARE

## 2021-11-20 ENCOUNTER — HOSPITAL ENCOUNTER (EMERGENCY)
Age: 76
Discharge: HOME | End: 2021-11-20
Attending: EMERGENCY MEDICINE | Admitting: EMERGENCY MEDICINE
Payer: MEDICARE

## 2021-11-20 VITALS
SYSTOLIC BLOOD PRESSURE: 134 MMHG | RESPIRATION RATE: 15 BRPM | DIASTOLIC BLOOD PRESSURE: 58 MMHG | OXYGEN SATURATION: 99 % | WEIGHT: 110.89 LBS | TEMPERATURE: 98.1 F | HEIGHT: 60 IN | HEART RATE: 65 BPM | BODY MASS INDEX: 21.77 KG/M2
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
## 2021-11-20 DIAGNOSIS — S52.124A CLOSED NONDISPLACED FRACTURE OF HEAD OF RIGHT RADIUS, INITIAL ENCOUNTER: ICD-10-CM

## 2021-11-20 PROCEDURE — 73080 X-RAY EXAM OF ELBOW: CPT

## 2021-11-20 PROCEDURE — 99282 EMERGENCY DEPT VISIT SF MDM: CPT

## 2021-11-20 PROCEDURE — 75810000053 HC SPLINT APPLICATION

## 2021-11-20 NOTE — ED PROVIDER NOTES
HPI   Patient is a 21-year-old female with past medical history significant for arthritis, excessive sweating, GERD, MRSA and depression who presents to the ED after taking a fall from a ladder while trimming a tree in her yard. She states that she fell onto her right hand with an extended right arm. She denies any head or neck injury. She denies any LOC or dizziness. Hand eye coordination is intact, normal reflexes, normal gait. Patient complains of right elbow pain with active range of motion of the right arm. . She has not had any medications today prior to arrival.  Past Medical History:   Diagnosis Date    Arthritis     Osteoarthritis    Excessive sweating     Occurs in summers; TSH/CMP/CBC normal    GERD (gastroesophageal reflux disease)     WITH HAITEL HERNIA    MRSA (methicillin resistant Staphylococcus aureus) infection     MRSA + abd abscess; nasal swab negative after treatment    Psychiatric disorder     DEPRESSION. Past Surgical History:   Procedure Laterality Date    HX  SECTION      HX COLONOSCOPY      HX ENDOSCOPY  05/10/2018    Dr. Mustapha Mcmillan; gastritis and esophagitis    HX HYSTERECTOMY      Age 29 for endometriosis    IMPLANT BREAST SILICONE/EQ Bilateral 4863    1st surgery , replaced bilaterally in .  NE BREAST SURGERY PROCEDURE UNLISTED Bilateral     AUGMENTATION    NE REVISION OF RECONSTRUCTED BREAST  2014    Bilateral breast implant exchange due to  cosmetic defect of previous implants         Family History:   Problem Relation Age of Onset    Diabetes Father     Cancer Father         PROSTATE CANCER    Arthritis-osteo Mother     Arthritis-osteo Sister     Anesth Problems Sister         HAS DIFFICULTY WAKING UP-TAKES VERY LONG.        Social History     Socioeconomic History    Marital status:      Spouse name: Not on file    Number of children: Not on file    Years of education: Not on file    Highest education level: Not on file   Occupational History    Not on file   Tobacco Use    Smoking status: Never Smoker    Smokeless tobacco: Never Used   Vaping Use    Vaping Use: Never used   Substance and Sexual Activity    Alcohol use: No    Drug use: No    Sexual activity: Not on file   Other Topics Concern    Not on file   Social History Narrative    Not on file     Social Determinants of Health     Financial Resource Strain: Low Risk     Difficulty of Paying Living Expenses: Not hard at all   Food Insecurity: No Food Insecurity    Worried About [address] [place] [address] in the Last Year: Never true    Nuria of Food in the Last Year: Never true   Transportation Needs: No Transportation Needs    Lack of Transportation (Medical): No    Lack of Transportation (Non-Medical): No   Physical Activity:     Days of Exercise per Week: Not on file    Minutes of Exercise per Session: Not on file   Stress:     Feeling of Stress : Not on file   Social Connections:     Frequency of Communication with Friends and Family: Not on file    Frequency of Social Gatherings with Friends and Family: Not on file    Attends Buddhism Services: Not on file    Active Member of [address] [place] [address] or Organizations: Not on file    Attends Club or Organization Meetings: Not on file    Marital Status: Not on file   Intimate Partner Violence:     Fear of Current or Ex-Partner: Not on file    Emotionally Abused: Not on file    Physically Abused: Not on file    Sexually Abused: Not on file   Housing Stability: [address] Unable to Pay for Housing in the Last Year: No    Number of [place] in the Last Year: 1    Unstable Housing in the Last Year: No         ALLERGIES: Fosamax [alendronate]    Review of Systems   Constitutional: Negative for activity change, appetite change, fever and unexpected weight change. HENT: Negative for congestion, rhinorrhea, sore throat and trouble swallowing. Eyes: Negative for visual disturbance.    Respiratory: Negative for cough and shortness of breath. Cardiovascular: Negative for chest pain, palpitations and leg swelling. Gastrointestinal: Negative for abdominal pain, diarrhea, nausea and vomiting. Genitourinary: Negative for dysuria and flank pain. Musculoskeletal: Positive for arthralgias and joint swelling. Negative for back pain, gait problem and myalgias. Skin: Negative for color change and rash. Neurological: Negative for dizziness, light-headedness and headaches. All other systems reviewed and are negative. Vitals:    11/20/21 1617   BP: (!) 134/58   Pulse: 65   Resp: 15   Temp: 98.1 °F (36.7 °C)   SpO2: 99%   Weight: 50.3 kg (110 lb 14.3 oz)   Height: 5' (1.524 m)            Physical Exam  Vitals and nursing note reviewed. Constitutional:       General: She is not in acute distress. Appearance: Normal appearance. She is normal weight. She is not ill-appearing, toxic-appearing or diaphoretic. Comments: Elderly white female, , non-smoker   HENT:      Head: Normocephalic. Mouth/Throat:      Mouth: Mucous membranes are moist.      Pharynx: No posterior oropharyngeal erythema. Eyes:      Extraocular Movements: Extraocular movements intact. Conjunctiva/sclera: Conjunctivae normal.      Pupils: Pupils are equal, round, and reactive to light. Cardiovascular:      Rate and Rhythm: Normal rate and regular rhythm. Pulmonary:      Effort: Pulmonary effort is normal.      Breath sounds: Normal breath sounds. Abdominal:      General: Bowel sounds are normal.      Palpations: Abdomen is soft. Tenderness: There is no abdominal tenderness. There is no guarding or rebound. Hernia: No hernia is present. Musculoskeletal:         General: Swelling, tenderness and signs of injury present. No deformity. Cervical back: Normal range of motion and neck supple. Comments: Right medial elbow area pain with active range of motion of the right arm; Skin integrity is intact. There is no obvious bony or soft tissue deformity; no rash, bruising or extending erythema. Good neurovascular sensation. No apparent tendon or nerve injury. Lymphadenopathy:      Cervical: No cervical adenopathy. Skin:     General: Skin is warm and dry. Findings: No bruising, erythema or rash. Neurological:      General: No focal deficit present. Mental Status: She is alert and oriented to person, place, and time. Psychiatric:         Mood and Affect: Mood normal.         Behavior: Behavior normal.          MDM       Procedures    XR ELBOW RT MIN 3 V    Result Date: 11/20/2021  Acute radial head fracture. Patient was placed in a long-arm posterior splint by the RN; good neurovascular sensation before and after the splint placement. She was also placed in a sling good neurovascular sensation before and after the sling placement. RICE recommendations were reviewed. 5:28 PM  Patient's results and plan of care have been reviewed with her. Patient has verbally conveyed her understanding and agreement of her signs, symptoms, diagnosis, treatment and prognosis and additionally agrees to follow up as recommended or return to the Emergency Room should her condition change prior to follow-up. Discharge instructions have also been provided to the patient with some educational information regarding her diagnosis as well a list of reasons why she would want to return to the ER prior to her follow-up appointment should her condition change. Ara Winston NP

## 2021-11-22 ENCOUNTER — TELEPHONE (OUTPATIENT)
Dept: INTERNAL MEDICINE CLINIC | Age: 76
End: 2021-11-22

## 2021-11-22 NOTE — TELEPHONE ENCOUNTER
I called the patient and verified them by name and date of birth. I informed her to call insurance to see who is covered with her FEP and let us know. Then we will do her referral. She stated understanding and had no further questions.

## 2021-11-22 NOTE — TELEPHONE ENCOUNTER
----- Message from Mark Solo sent at 11/22/2021  2:47 PM EST -----  Subject: Referral Request    QUESTIONS   Reason for referral request? Orthopedic   Has the physician seen you for this condition before? No   Preferred Specialist (if applicable)? Do you already have an appointment scheduled? No  Additional Information for Provider? Requesting a referral to an ortho   doctor in the \"FEP\" she broke her arm over the weekend and the doctor [facility] gave her is not in the FEP.  ---------------------------------------------------------------------------  --------------  [address]  What is the best way for the office to contact you? OK to leave message on   voicemail  Preferred Call Back Phone Number?  [phone]

## 2021-11-23 ENCOUNTER — TELEPHONE (OUTPATIENT)
Dept: INTERNAL MEDICINE CLINIC | Age: 76
End: 2021-11-23

## 2021-11-23 DIAGNOSIS — M79.643 PAIN OF HAND, UNSPECIFIED LATERALITY: Primary | ICD-10-CM

## 2021-11-23 NOTE — TELEPHONE ENCOUNTER
----- Message from Neelam Anayaeun sent at 11/23/2021  4:46 PM EST -----  Subject: Referral Request    QUESTIONS   Reason for referral request? Broke arm on saturday 11/20   Has the physician seen you for this condition before? No   Preferred Specialist (if applicable)? Sanjeev Perez you already have an appointment scheduled? Yes  Select Scheduled Date? 2021-12-01  Select Scheduled Physician? Additional Information for Provider? Pt would like a referral to Dr. Eriberto Johnson, [facility], [address], [address] [place] [address] 200; [phone]  ---------------------------------------------------------------------------  --------------  Daroksana Carver INFO  What is the best way for the office to contact you? OK to leave message on   voicemail  Preferred Call Back Phone Number?  [phone]

## 2021-11-24 NOTE — TELEPHONE ENCOUNTER
I left the patient a voicemail informing them that the referral has been completed and will be mailed out on Friday.

## 2021-12-08 ENCOUNTER — OFFICE VISIT (OUTPATIENT)
Dept: INTERNAL MEDICINE CLINIC | Age: 76
End: 2021-12-08
Payer: MEDICARE

## 2021-12-08 VITALS
WEIGHT: 115 LBS | BODY MASS INDEX: 22.58 KG/M2 | DIASTOLIC BLOOD PRESSURE: 82 MMHG | SYSTOLIC BLOOD PRESSURE: 137 MMHG | TEMPERATURE: 98.1 F | HEART RATE: 95 BPM | HEIGHT: 60 IN | OXYGEN SATURATION: 95 % | RESPIRATION RATE: 18 BRPM

## 2021-12-08 DIAGNOSIS — R41.3 MEMORY IMPAIRMENT: ICD-10-CM

## 2021-12-08 DIAGNOSIS — F31.62 BIPOLAR DISORDER, CURRENT EPISODE MIXED, MODERATE (HCC): ICD-10-CM

## 2021-12-08 DIAGNOSIS — S52.124D CLOSED NONDISPLACED FRACTURE OF HEAD OF RIGHT RADIUS WITH ROUTINE HEALING, SUBSEQUENT ENCOUNTER: ICD-10-CM

## 2021-12-08 DIAGNOSIS — E78.00 HYPERCHOLESTEROLEMIA: ICD-10-CM

## 2021-12-08 DIAGNOSIS — M81.0 AGE-RELATED OSTEOPOROSIS WITHOUT CURRENT PATHOLOGICAL FRACTURE: ICD-10-CM

## 2021-12-08 DIAGNOSIS — H93.19 TINNITUS, UNSPECIFIED LATERALITY: ICD-10-CM

## 2021-12-08 DIAGNOSIS — E55.9 VITAMIN D DEFICIENCY: ICD-10-CM

## 2021-12-08 DIAGNOSIS — Z00.00 MEDICARE ANNUAL WELLNESS VISIT, SUBSEQUENT: Primary | ICD-10-CM

## 2021-12-08 DIAGNOSIS — R73.03 PREDIABETES: ICD-10-CM

## 2021-12-08 DIAGNOSIS — Z23 NEEDS FLU SHOT: ICD-10-CM

## 2021-12-08 PROBLEM — S52.126D CLOSED NONDISPLACED FRACTURE OF HEAD OF RADIUS WITH ROUTINE HEALING: Status: ACTIVE | Noted: 2021-12-08

## 2021-12-08 PROCEDURE — G0439 PPPS, SUBSEQ VISIT: HCPCS | Performed by: INTERNAL MEDICINE

## 2021-12-08 PROCEDURE — 90694 VACC AIIV4 NO PRSRV 0.5ML IM: CPT | Performed by: INTERNAL MEDICINE

## 2021-12-08 PROCEDURE — 99214 OFFICE O/P EST MOD 30 MIN: CPT | Performed by: INTERNAL MEDICINE

## 2021-12-08 PROCEDURE — 1101F PT FALLS ASSESS-DOCD LE1/YR: CPT | Performed by: INTERNAL MEDICINE

## 2021-12-08 PROCEDURE — G9717 DOC PT DX DEP/BP F/U NT REQ: HCPCS | Performed by: INTERNAL MEDICINE

## 2021-12-08 PROCEDURE — 3017F COLORECTAL CA SCREEN DOC REV: CPT | Performed by: INTERNAL MEDICINE

## 2021-12-08 PROCEDURE — G8536 NO DOC ELDER MAL SCRN: HCPCS | Performed by: INTERNAL MEDICINE

## 2021-12-08 PROCEDURE — G9899 SCRN MAM PERF RSLTS DOC: HCPCS | Performed by: INTERNAL MEDICINE

## 2021-12-08 PROCEDURE — 1090F PRES/ABSN URINE INCON ASSESS: CPT | Performed by: INTERNAL MEDICINE

## 2021-12-08 PROCEDURE — G8427 DOCREV CUR MEDS BY ELIG CLIN: HCPCS | Performed by: INTERNAL MEDICINE

## 2021-12-08 PROCEDURE — G0008 ADMIN INFLUENZA VIRUS VAC: HCPCS | Performed by: INTERNAL MEDICINE

## 2021-12-08 PROCEDURE — G8420 CALC BMI NORM PARAMETERS: HCPCS | Performed by: INTERNAL MEDICINE

## 2021-12-08 RX ORDER — PANTOPRAZOLE SODIUM 40 MG/1
40 TABLET, DELAYED RELEASE ORAL DAILY
COMMUNITY
Start: 2021-11-16 | End: 2021-12-08 | Stop reason: ALTCHOICE

## 2021-12-08 RX ORDER — FLUOXETINE 10 MG/1
10 CAPSULE ORAL DAILY
COMMUNITY
End: 2022-01-25

## 2021-12-08 NOTE — PATIENT INSTRUCTIONS
Medicare Wellness Visit, Female     The best way to live healthy is to have a lifestyle where you eat a well-balanced diet, exercise regularly, limit alcohol use, and quit all forms of tobacco/nicotine, if applicable. Regular preventive services are another way to keep healthy. Preventive services (vaccines, screening tests, monitoring & exams) can help personalize your care plan, which helps you manage your own care. Screening tests can find health problems at the earliest stages, when they are easiest to treat. [facility] follows the current, evidence-based guidelines published by the [facility] ([facility]) when recommending preventive services for our patients. Because we follow these guidelines, sometimes recommendations change over time as research supports it. (For example, mammograms used to be recommended annually. Even though Medicare will still pay for an annual mammogram, the newer guidelines recommend a mammogram every two years for women of average risk). Of course, you and your doctor may decide to screen more often for some diseases, based on your risk and your co-morbidities (chronic disease you are already diagnosed with). Preventive services for you include:  - Medicare offers their members a free annual wellness visit, which is time for you and your primary care provider to discuss and plan for your preventive service needs. Take advantage of this benefit every year!  -All adults over the age of 72 should receive the recommended pneumonia vaccines. Current USPSTF guidelines recommend a series of two vaccines for the best pneumonia protection.   -All adults should have a flu vaccine yearly and a tetanus vaccine every 10 years.   -All adults age 48 and older should receive the shingles vaccines (series of two vaccines).       -All adults age 38-68 who are overweight should have a diabetes screening test once every three years.   -All adults born between [birth year] and 1965 should be screened once for Hepatitis C.  -Other screening tests and preventive services for persons with diabetes include: an eye exam to screen for diabetic retinopathy, a kidney function test, a foot exam, and stricter control over your cholesterol.   -Cardiovascular screening for adults with routine risk involves an electrocardiogram (ECG) at intervals determined by your doctor.   -Colorectal cancer screenings should be done for adults age 54-65 with no increased risk factors for colorectal cancer. There are a number of acceptable methods of screening for this type of cancer. Each test has its own benefits and drawbacks. Discuss with your doctor what is most appropriate for you during your annual wellness visit. The different tests include: colonoscopy (considered the best screening method), a fecal occult blood test, a fecal DNA test, and sigmoidoscopy.    -A bone mass density test is recommended when a woman turns 65 to screen for osteoporosis. This test is only recommended one time, as a screening. Some providers will use this same test as a disease monitoring tool if you already have osteoporosis. -Breast cancer screenings are recommended every other year for women of normal risk, age 54-69.  -Cervical cancer screenings for women over age 72 are only recommended with certain risk factors. Here is a list of your current Health Maintenance items (your personalized list of preventive services) with a due date:  Health Maintenance Due   Topic Date Due    Yearly Flu Vaccine (1) 09/01/2021    COVID-19 Vaccine (3 - Booster for [employer] series) 11/07/2021            Preventing Falls: Care Instructions  Your Care Instructions     Getting around your home safely can be a challenge if you have injuries or health problems that make it easy for you to fall.  Loose rugs and furniture in walkways are among the dangers for many older people who have problems walking or who have poor eyesight. People who have conditions such as arthritis, osteoporosis, or dementia also have to be careful not to fall. You can make your home safer with a few simple measures. Follow-up care is a key part of your treatment and safety. Be sure to make and go to all appointments, and call your doctor if you are having problems. It's also a good idea to know your test results and keep a list of the medicines you take. How can you care for yourself at home? Taking care of yourself  · You may get dizzy if you do not drink enough water. To prevent dehydration, drink plenty of fluids. Choose water and other clear liquids. If you have kidney, heart, or liver disease and have to limit fluids, talk with your doctor before you increase the amount of fluids you drink. · Exercise regularly to improve your strength, muscle tone, and balance. Walk if you can. Swimming may be a good choice if you cannot walk easily. · Have your vision and hearing checked each year or any time you notice a change. If you have trouble seeing and hearing, you might not be able to avoid objects and could lose your balance. · Know the side effects of the medicines you take. Ask your doctor or pharmacist whether the medicines you take can affect your balance. Sleeping pills or sedatives can affect your balance. · Limit the amount of alcohol you drink. Alcohol can impair your balance and other senses. · Ask your doctor whether calluses or corns on your feet need to be removed. If you wear loose-fitting shoes because of calluses or corns, you can lose your balance and fall. · Talk to your doctor if you have numbness in your feet. Preventing falls at home  · Remove raised doorway thresholds, throw rugs, and clutter. Repair loose carpet or raised areas in the floor. · Move furniture and electrical cords to keep them out of walking paths. · Use nonskid floor wax, and wipe up spills right away, especially on ceramic tile floors.   · If you use a walker or cane, put rubber tips on it. If you use crutches, clean the bottoms of them regularly with an abrasive pad, such as steel wool. · Keep your house well lit, especially Russella Lime, and outside walkways. Use night-lights in areas such as hallways and bathrooms. Add extra light switches or use remote switches (such as switches that go on or off when you clap your hands) to make it easier to turn lights on if you have to get up during the night. · Install sturdy handrails on stairways. · Move items in your cabinets so that the things you use a lot are on the lower shelves (about waist level). · Keep a cordless phone and a flashlight with new batteries by your bed. If possible, put a phone in each of the main rooms of your house, or carry a cell phone in case you fall and cannot reach a phone. Or, you can wear a device around your neck or wrist. You push a button that sends a signal for help. · Wear low-heeled shoes that fit well and give your feet good support. Use footwear with nonskid soles. Check the heels and soles of your shoes for wear. Repair or replace worn heels or soles. · Do not wear socks without shoes on wood floors. · Walk on the grass when the sidewalks are slippery. If you live in an area that gets snow and ice in the winter, sprinkle salt on slippery steps and sidewalks. Preventing falls in the bath  · Install grab bars and nonskid mats inside and outside your shower or tub and near the toilet and sinks. · Use shower chairs and bath benches. · Use a hand-held shower head that will allow you to sit while showering. · Get into a tub or shower by putting the weaker leg in first. Get out of a tub or shower with your strong side first.  · Repair loose toilet seats and consider installing a raised toilet seat to make getting on and off the toilet easier. · Keep your bathroom door unlocked while you are in the shower. Where can you learn more?   Go to http://www.gray.com/  Enter G117 in the search box to learn more about \"Preventing Falls: Care Instructions. \"  Current as of: December 7, 2020               Content Version: 13.0  © 1691-0575 Healthwise, [employer]. Care instructions adapted under license by [employer] (which disclaims liability or warranty for this information). If you have questions about a medical condition or this instruction, always ask your healthcare professional. [place] [address] any warranty or liability for your use of this information.

## 2021-12-08 NOTE — PROGRESS NOTES
Identified pt with two pt identifiers(name and ). Reviewed record in preparation for visit and have obtained necessary documentation. Chief Complaint   Patient presents with    Annual Wellness Visit    Follow-up     6 month follow up      Pt lives alone, claims to have no family, does not recall the exact dose Prozac. Claims to have problems sleeping, claims to live alone, claims to write a lot and recalls some of the words and writings. Vitals:    21 1005   BP: 137/82   Pulse: 95   Resp: 18   Temp: 98.1 °F (36.7 °C)   TempSrc: Oral   SpO2: 95%   Weight: 115 lb (52.2 kg)   Height: 5' (1.524 m)   PainSc:   1   PainLoc: Arm     Flu shot wanted    Health Maintenance Due   Topic    Flu Vaccine (1)    Medicare Yearly Exam     COVID-19 Vaccine (3 - Booster for Moderna series)       Coordination of Care Questionnaire:  :   1) Have you been to an emergency room, urgent care, or hospitalized since your last visit? If yes, where when, and reason for visit? no       2. Have seen or consulted any other health care provider since your last visit? If yes, where when, and reason for visit? NO      Patient is accompanied by self I have received verbal consent from Willam Wise to discuss any/all medical information while they are present in the room.

## 2021-12-08 NOTE — PROGRESS NOTES
CC:   Chief Complaint   Patient presents with    Annual Wellness Visit    Follow-up     6 month follow up        [address] Lal is a 78 y.o. female. Presents for Medicare AWV and ED follow up evaluation. She has osteoporosis, OA of hands, bipolar disorder, and anxiety. Seen at [address] [facility] ED on 11/20/21 with right elbow fracture after falling from a ladder in her yard while trimming a tree. X-ray: acute radial head fracture. Missed appointment with Dr. Celina Blount (Orthopedics) due to transportation issues; rescheduled to Friday, 12/10/21. Has noticed increased forgetfulness over the past few months. Wants to be tested for Alzheimer's dementia. Lives alone. Has been under increased stress lately. Was told she has to prove her [address] and has no birth certificate. Having dispute with a neighbor regarding boundaries of property. Complains of sensation of soap suds popping in her ears. Would like to see an ENT doctor. Psychiatrist: Fatmata Mosley NP, [facility], [facility], [place]. COVID-19 vaccine: plans to get booster    ROS  A complete review of systems was performed and is negative except for those mentioned in the HPI.        Patient Active Problem List   Diagnosis Code    Bipolar disorder ([facility] [address] F31.9    Anxiety disorder F41.9    Primary osteoarthritis of both hands M19.041, M19.042    Hypercholesterolemia E78.00    Vitamin D deficiency E55.9    Age-related osteoporosis without current pathological fracture M81.0    Prediabetes R73.03    Gastroesophageal reflux disease without esophagitis K21.9    Urge urinary incontinence N39.41    Abdominal pain R10.9     Past Medical History:   Diagnosis Date    Arthritis     Osteoarthritis    Broken arm 11/17/2021    Excessive sweating     Occurs in summers; TSH/CMP/CBC normal    GERD (gastroesophageal reflux disease)     WITH HAITEL HERNIA    MRSA (methicillin resistant Staphylococcus aureus) infection 2007 MRSA + abd abscess; nasal swab negative after treatment    Psychiatric disorder     DEPRESSION. Allergies   Allergen Reactions    Fosamax [Alendronate] Other (comments)     Severe heartburn       Current Outpatient Medications   Medication Sig Dispense Refill    FLUoxetine (PROzac) 10 mg capsule Take 10 mg by mouth daily. Pt does not recall the specific dose      sertraline (ZOLOFT) 100 mg tablet 200 mg nightly. PHYSICAL EXAM  Visit Vitals  /82 (BP 1 Location: Left arm, BP Patient Position: Sitting, BP Cuff Size: Adult)   Pulse 95   Temp 98.1 °F (36.7 °C) (Oral)   Resp 18   Ht 5' (1.524 m)   Wt 115 lb (52.2 kg)   SpO2 95%   BMI 22.46 kg/m²       General: Well-developed and well-nourished, no distress. HEENT:  Head normocephalic/atraumatic, no scleral icterus  Lungs:  Clear to ausculation bilaterally. Good air movement. Heart:  Regular rate and rhythm, normal S1 and S2, no murmur, gallop, or rub  Extremities: No clubbing, cyanosis, or edema. Long-arm posterior splint in place at right arm. Neurological: Alert and oriented. Psychiatric: Normal mood and affect. Behavior is normal.         ASSESSMENT AND PLAN    ICD-10-CM ICD-9-CM    1. Medicare annual wellness visit, subsequent  Z00.00 V70.0    2. Closed nondisplaced fracture of head of right radius with routine healing, subsequent encounter  S52.124D V54.12    3. Age-related osteoporosis without current pathological fracture  M81.0 733.01    4. Hypercholesterolemia  Y03.42 102.4 METABOLIC PANEL, COMPREHENSIVE      CBC WITH AUTOMATED DIFF      LIPID PANEL      METABOLIC PANEL, COMPREHENSIVE      CBC WITH AUTOMATED DIFF      LIPID PANEL   5. Memory impairment  R41.3 780.93 REFERRAL TO NEUROPSYCHOLOGY   6. Bipolar disorder, current episode mixed, moderate (HCC)  F31.62 296.62    7. Prediabetes  R73.03 790.29 HEMOGLOBIN A1C WITH EAG      HEMOGLOBIN A1C WITH EAG   8.  Vitamin D deficiency  E55.9 268.9 VITAMIN D, 25 HYDROXY      VITAMIN D, 25 HYDROXY   9. Tinnitus, unspecified laterality  H93.19 388.30 REFERRAL TO ENT-OTOLARYNGOLOGY   10. Needs flu shot  Z23 V04.81 FLU (FLUAD QUAD INFLUENZA VACCINE,QUAD,ADJUVANTED)     Diagnoses and all orders for this visit:    1. Medicare annual wellness visit, subsequent    2. Closed nondisplaced fracture of head of right radius with routine healing, subsequent encounter  Right elbow fracture. Follow up with Dr. Chema Urbina (Orthopedics) as scheduled. 3. Age-related osteoporosis without current pathological fracture  She stopped Fosamax after taking for 4-5 months due to severe heartburn. 4. Hypercholesterolemia  6/4/19: tot chol 232, . Will recheck. -     METABOLIC PANEL, COMPREHENSIVE; Future  -     CBC WITH AUTOMATED DIFF; Future  -     LIPID PANEL; Future    5. Memory impairment  -     REFERRAL TO NEUROPSYCHOLOGY    6. Bipolar disorder, current episode mixed, moderate ([facility] [address]  Managed by Eunice Coy NP at [address] [place] [address]. 7. Prediabetes  -     HEMOGLOBIN A1C WITH EAG; Future    8. Vitamin D deficiency  -     VITAMIN D, 25 HYDROXY; Future    9. Tinnitus, unspecified laterality  -     REFERRAL TO ENT-OTOLARYNGOLOGY    10. Needs flu shot  -     FLU (FLUAD QUAD INFLUENZA VACCINE,QUAD,ADJUVANTED)      Follow-up and Dispositions    · Return in about 4 months (around 4/8/2022), or if symptoms worsen or fail to improve, for Osteoporosis, chol; have fasting labs 1 week before next appointment. I have discussed the diagnosis with the patient and the intended plan as seen in the above orders. Patient is in agreement. The patient has received an after-visit summary and questions were answered concerning future plans. I have discussed medication side effects and warnings with the patient as well.

## 2021-12-08 NOTE — ACP (ADVANCE CARE PLANNING)
Advance Care Planning     Advance Care Planning (ACP) Physician/NP/PA Conversation      Date of Conversation: 12/8/2021  Conducted with: Patient with Decision Making Capacity    Healthcare Decision Maker:     Primary Decision Maker: Paul Munozleans - [phone]    Secondary Decision Maker: Briana Todd - [phone]  Click here to complete [address] including selection of the Healthcare Decision Maker Relationship (ie \"Primary\")    Care Preferences:    Hospitalization: \"If your health worsens and it becomes clear that your chance of recovery is unlikely, what would be your preference regarding hospitalization? \"  The patient would prefer hospitalization. Ventilation: \"If you were unable to breathe on your own and your chance of recovery was unlikely, what would be your preference about the use of a ventilator (breathing machine) if it was available to you? \"   The patient would desire the use of a ventilator. Resuscitation: \"In the event your heart stopped as a result of an underlying serious health condition, would you want attempts to be made to restart your heart, or would you prefer a natural death? \"   Yes, attempt to resuscitate.     Additional topics discussed: treatment goals    Conversation Outcomes / Follow-Up Plan:   ACP in process - information provided, considering goals and options  Reviewed DNR/DNI and patient elects Full Code (Attempt Resuscitation)     Length of Voluntary ACP Conversation in minutes:  <16 minutes (Non-Billable)    Adam Ibarra MD

## 2021-12-08 NOTE — PROGRESS NOTES
This is the Subsequent Medicare Annual Wellness Exam, performed 12 months or more after the Initial AWV or the last Subsequent AWV    I have reviewed the patient's medical history in detail and updated the computerized patient record. Assessment/Plan   Education and counseling provided:  Are appropriate based on today's review and evaluation  End-of-Life planning (with patient's consent)  Influenza Vaccine    1. Needs flu shot  -     FLU (FLUAD QUAD INFLUENZA VACCINE,QUAD,ADJUVANTED)  2. Medicare annual wellness visit, subsequent       Depression Risk Factor Screening     3 most recent PHQ Screens 12/8/2021   PHQ Not Done -   Little interest or pleasure in doing things Not at all   Feeling down, depressed, irritable, or hopeless Not at all   Total Score PHQ 2 0   Trouble falling or staying asleep, or sleeping too much -   Feeling tired or having little energy -   Poor appetite, weight loss, or overeating -   Feeling bad about yourself - or that you are a failure or have let yourself or your family down -   Trouble concentrating on things such as school, work, reading, or watching TV -   Moving or speaking so slowly that other people could have noticed; or the opposite being so fidgety that others notice -   Thoughts of being better off dead, or hurting yourself in some way -   How difficult have these problems made it for you to do your work, take care of your home and get along with others -       Alcohol Risk Screen    Do you average more than 1 drink per night or more than 7 drinks a week:  No    On any one occasion in the past three months have you have had more than 3 drinks containing alcohol:  No        Functional Ability and Level of Safety    Hearing: Hearing is good. Activities of Daily Living: The home contains: no safety equipment. Patient does total self care      Ambulation: with no difficulty     Fall Risk:  Fall Risk Assessment, last 12 mths 12/8/2021   Able to walk?  Yes   Fall in past 12 months? 1   Do you feel unsteady? 0   Are you worried about falling 0   Is the gait abnormal? 0   Number of falls in past 12 months 1   Fall with injury? 1      Abuse Screen:  Patient is not abused       Cognitive Screening    Has your family/caregiver stated any concerns about your memory: no     Cognitive Screening: normal on exam    Health Maintenance Due     Health Maintenance Due   Topic Date Due    Flu Vaccine (1) 2021    COVID-19 Vaccine (3 - Booster for [place] [employer] series) 2021       Patient Care Team   Patient Care Team:  Lamar Encarnacion MD as PCP - General (Internal Medicine)  Lamar Encarnacion MD as PCP - [facility]  Eleno Johnson MD (Psychiatry)  Abram Ross MD (Gastroenterology)    History     Patient Active Problem List   Diagnosis Code    Bipolar disorder [facility]) F31.9    Anxiety disorder F41.9    Primary osteoarthritis of both hands M19.041, M19.042    Hypercholesterolemia E78.00    Vitamin D deficiency E55.9    Age-related osteoporosis without current pathological fracture M81.0    Prediabetes R73.03    Gastroesophageal reflux disease without esophagitis K21.9    Urge urinary incontinence N39.41    Abdominal pain R10.9     Past Medical History:   Diagnosis Date    Arthritis     Osteoarthritis    Broken arm 2021    Excessive sweating     Occurs in summers; TSH/CMP/CBC normal    GERD (gastroesophageal reflux disease)     WITH HAITEL HERNIA    MRSA (methicillin resistant Staphylococcus aureus) infection     MRSA + abd abscess; nasal swab negative after treatment    Psychiatric disorder     DEPRESSION. Past Surgical History:   Procedure Laterality Date    HX  SECTION      HX COLONOSCOPY      HX ENDOSCOPY  05/10/2018    Dr. Caroline Matthews; gastritis and esophagitis    HX HYSTERECTOMY      Age 29 for endometriosis    IMPLANT BREAST SILICONE/EQ Bilateral 0595    1st surgery , replaced bilaterally in .     DC BREAST SURGERY PROCEDURE UNLISTED Bilateral 1974    AUGMENTATION    NE REVISION OF RECONSTRUCTED BREAST  03/12/2014    Bilateral breast implant exchange due to  cosmetic defect of previous implants     Current Outpatient Medications   Medication Sig Dispense Refill    FLUoxetine (PROzac) 10 mg capsule Take 10 mg by mouth daily. Pt does not recall the specific dose      sertraline (ZOLOFT) 100 mg tablet 200 mg nightly. Allergies   Allergen Reactions    Fosamax [Alendronate] Other (comments)     Severe heartburn       Family History   Problem Relation Age of Onset    Diabetes Father     Cancer Father         PROSTATE CANCER    OSTEOARTHRITIS Mother     OSTEOARTHRITIS Sister     Anesth Problems Sister         HAS DIFFICULTY WAKING UP-TAKES VERY LONG.      Social History     Tobacco Use    Smoking status: Never Smoker    Smokeless tobacco: Never Used   Substance Use Topics    Alcohol use: No         Anson Cockayne, MD

## 2021-12-09 NOTE — PROGRESS NOTES
HPI: Manuel Maddox (: 1945) is a 78 y.o. female, patient, here for evaluation of the following chief complaint(s): Patient presents with complaint of a right elbow pain. On 2021, she was trimming a tree in her yard using a ladder and she fell off the ladder. She presented to [facility] emergency department where it was determined she has a right radial neck fracture. She was placed in a sugar tong splint, given an sling and ultimately referred to us. She has no pain. No other complaints or concerns. X-rays of the right elbow obtained today. New Patient (patient injured right elbow about 1 month ago, Initial X rays were done at [facility] showed right elbow fx. Patient states she has been doing well, has minimal pain and no new concerns at this time)       Vitals: Wt 115 lb (52.2 kg)   BMI 22.46 kg/m²    Body mass index is 22.46 kg/m². Allergies   Allergen Reactions    Fosamax [Alendronate] Other (comments)     Severe heartburn       Current Outpatient Medications   Medication Sig    FLUoxetine (PROzac) 10 mg capsule Take 10 mg by mouth daily. Pt does not recall the specific dose    sertraline (ZOLOFT) 100 mg tablet 200 mg nightly. No current facility-administered medications for this visit. Past Medical History:   Diagnosis Date    Arthritis     Osteoarthritis    Broken arm 2021    Excessive sweating     Occurs in summers; TSH/CMP/CBC normal    GERD (gastroesophageal reflux disease)     WITH HAITEL HERNIA    MRSA (methicillin resistant Staphylococcus aureus) infection     MRSA + abd abscess; nasal swab negative after treatment    Psychiatric disorder     DEPRESSION.          Past Surgical History:   Procedure Laterality Date    HX  SECTION      HX COLONOSCOPY      HX ENDOSCOPY  05/10/2018    Dr. Sofiya Dowell; gastritis and esophagitis    HX HYSTERECTOMY      Age 29 for endometriosis    IMPLANT BREAST SILICONE/EQ Bilateral 1486    1st surgery , replaced bilaterally in 2014.  MI BREAST SURGERY PROCEDURE UNLISTED Bilateral 1974    AUGMENTATION    MI REVISION OF RECONSTRUCTED BREAST  03/12/2014    Bilateral breast implant exchange due to  cosmetic defect of previous implants       Family History   Problem Relation Age of Onset    Diabetes Father     Cancer Father         PROSTATE CANCER    OSTEOARTHRITIS Mother     OSTEOARTHRITIS Sister     Anesth Problems Sister         HAS DIFFICULTY WAKING UP-TAKES VERY LONG. Social History     Tobacco Use    Smoking status: Never Smoker    Smokeless tobacco: Never Used   Vaping Use    Vaping Use: Never used   Substance Use Topics    Alcohol use: No    Drug use: No        Review of Systems    Constitutional: No fevers, chills, night sweats, excessive fatigue or weight loss. Musculoskeletal: No joint pain, swelling or redness. No decreased range of motion. Neurologic: No headache, blurred vision, and no areas of focal weakness or numbness. Normal gait. No sensory problems. Respiratory: No dyspnea on exertion, orthopnea, chest pain, cough or hemoptysis. Cardiovascular: No anginal chest pain, irregular heart beat, tachycardia, palpitations or orthopnea  Integumentary: No chronic rashes, inflammation, ulcerations, pruritus, petechiae, purpura, ecchymoses, or skin changes    ROS     Negative for: Constitutional, Gastrointestinal, Neurological, Skin, Genitourinary, Musculoskeletal, HENT, Endocrine, Cardiovascular, Eyes, Respiratory, Psychiatric, Allergic/Imm, Heme/Lymph    Last edited by Balaji Baxter on 12/10/2021  9:56 AM. (History)             Physical Exam    General: Alert, cooperative, no distress  Musculosketal: Right elbow - Near normal full range of motion of the right elbow as compared to the contralateral side. No difficulty with pronation and supination. No tenderness to palpation. No erythema, edema or warmth to the joint. No fluid collection. No deformity.   Neurologic:  CNII-XII intact, Normal strength, sensation, and reflexes throughout    XR Results (most recent):  Results from Appointment encounter on 12/10/21    XR ELBOW RT MIN 3 V    Narrative  Routine healing of right radial neck fracture. Callus formation appreciated. No dislocation. ASSESSMENT/PLAN:  Below is the assessment and plan developed based on review of pertinent history, physical exam, labs, studies, and medications. Right elbow pain. On 11/20/2021, she was trimming a tree in her yard using a ladder and she fell off the ladder. She presented to [facility] emergency department where it was determined she has a right radial neck fracture. She was placed in a sugar tong splint and ultimately referred to us. She has no pain. Clinical exam is consistent with routine healing of right radial neck fracture. She would like to begin outpatient physical therapy. Referral provided. She will follow up in the office in 4 weeks to review her progress. She was advised to use her sling for comfort and support as needed. 1. Elbow injury, initial encounter  -     XR ELBOW RT MIN 3 V; Future  2. Closed nondisplaced fracture of neck of right radius, initial encounter  -     REFERRAL TO PHYSICAL THERAPY  3. Right elbow pain      Return in about 4 weeks (around 1/7/2022). Dr. Rebecca Packer was available for immediate consult during this encounter. An electronic signature was used to authenticate this note.   -- Silverio Hoang PA-C

## 2021-12-10 ENCOUNTER — OFFICE VISIT (OUTPATIENT)
Dept: ORTHOPEDIC SURGERY | Age: 76
End: 2021-12-10
Payer: MEDICARE

## 2021-12-10 VITALS — BODY MASS INDEX: 22.46 KG/M2 | WEIGHT: 115 LBS

## 2021-12-10 DIAGNOSIS — S59.909A ELBOW INJURY, INITIAL ENCOUNTER: Primary | ICD-10-CM

## 2021-12-10 DIAGNOSIS — S52.134A CLOSED NONDISPLACED FRACTURE OF NECK OF RIGHT RADIUS, INITIAL ENCOUNTER: ICD-10-CM

## 2021-12-10 DIAGNOSIS — M25.521 RIGHT ELBOW PAIN: ICD-10-CM

## 2021-12-10 PROCEDURE — G8427 DOCREV CUR MEDS BY ELIG CLIN: HCPCS | Performed by: PHYSICIAN ASSISTANT

## 2021-12-10 PROCEDURE — G8536 NO DOC ELDER MAL SCRN: HCPCS | Performed by: PHYSICIAN ASSISTANT

## 2021-12-10 PROCEDURE — G8420 CALC BMI NORM PARAMETERS: HCPCS | Performed by: PHYSICIAN ASSISTANT

## 2021-12-10 PROCEDURE — 1101F PT FALLS ASSESS-DOCD LE1/YR: CPT | Performed by: PHYSICIAN ASSISTANT

## 2021-12-10 PROCEDURE — 99203 OFFICE O/P NEW LOW 30 MIN: CPT | Performed by: PHYSICIAN ASSISTANT

## 2021-12-10 PROCEDURE — 1090F PRES/ABSN URINE INCON ASSESS: CPT | Performed by: PHYSICIAN ASSISTANT

## 2021-12-10 PROCEDURE — G9717 DOC PT DX DEP/BP F/U NT REQ: HCPCS | Performed by: PHYSICIAN ASSISTANT

## 2021-12-10 PROCEDURE — 3017F COLORECTAL CA SCREEN DOC REV: CPT | Performed by: PHYSICIAN ASSISTANT

## 2021-12-10 PROCEDURE — G9899 SCRN MAM PERF RSLTS DOC: HCPCS | Performed by: PHYSICIAN ASSISTANT

## 2021-12-21 ENCOUNTER — OFFICE VISIT (OUTPATIENT)
Dept: ORTHOPEDIC SURGERY | Age: 76
End: 2021-12-21
Payer: MEDICARE

## 2021-12-21 DIAGNOSIS — M25.521 RIGHT ELBOW PAIN: Primary | ICD-10-CM

## 2021-12-21 DIAGNOSIS — M25.621 STIFFNESS OF RIGHT ELBOW JOINT: ICD-10-CM

## 2021-12-21 PROCEDURE — 97162 PT EVAL MOD COMPLEX 30 MIN: CPT | Performed by: PHYSICAL THERAPIST

## 2021-12-21 PROCEDURE — 97110 THERAPEUTIC EXERCISES: CPT | Performed by: PHYSICAL THERAPIST

## 2021-12-21 NOTE — PROGRESS NOTES
ELBOW EVALUATION  Patient Name: Courtney Kessler  Date:2021  : 1945  [x]  Patient  Verified  [place]or: [place] MED[place]ARE / Plan: [place] MEDICARE PART A & B / Product Type: Medicare /    In time: 10:30  Out time: 11:15  Total Treatment Time (min): 45  Total Timed Codes (min): 45  1:1 Treatment Time ( only): 39     SUBJECTIVE  Patient is a 22-year-old female referred to physical therapy by Dr. Conchita Goldsmith for right radial neck fracture. She reports she fell off a ladder a little over 1 month ago. Fall was about from 5 feet high. She sustained a [address] and went to the hospital afterwards. She was placed in a splint afterwards, just removed 2 weeks ago. She denies having any significant pain. Most of her discomfort is along the medial elbow and upper arm. Describes as an ache, 2-3/10 at its worst.  She has not been icing. She does live alone, is right-hand dominant. She is having expected difficulty with ADLs such as opening bottles, brushing her teeth, etc.  She would like to return to her previous level of function. OBJECTIVE  Observations: No notable effusion or ecchymosis    Palpation: Tenderness to radial head    Range of Motion: Full elbow flexion and extension and pain-free. Pronation limited 25% and moderately painful, supination limited less than 25% with discomfort reported. Full wrist and digital mobility. Shoulder motion is full with discomfort reported with behind the back reach. Strength: Not formally assessed.  strength limited grossly 50% compared to the left side     Quick-DASH: 43%    Treatment:  Performed initial evaluation. Treatment consisted of passive elbow mobility, focusing on forearm supination and pronation to tolerance. Provided patient with home exercise program focused on improving range of motion and  strength. We discussed patient's goals and collaborated upon progressive plan of care. She declined ice posttreatment.       ASSESSMENT  Patient presents with impairments related to elbow and shoulder range of motion, strength, impaired ability to perform ADLs that involve gripping, dexterity, and carrying heavy objects, second to right radial neck fracture. She will benefit from physical therapy to address above limitations and maximize function. Short-term Goals (1 week)  1. In 1 week, patient will demonstrate independence with home exercise program.    Long-term Goals (4-6 weeks)  1. Patient will report at least 25% decrease in pain to improve activity tolerance. 2. Patient will demonstrate range of motion within normal limits to improve ADL performance. 3. Patient will demonstrate  strength within normal limits to improve dexterity and object manipulation. 4. Patient will return to normal activities without increase in symptoms from baseline. Interventions to include but are not limited to joint mobilizations, soft tissue mobilization, myofascial release, passive range of motion, therapeutic exercise, neuromuscular reeducation, and modalities as indicated. Frequency: 2x per week. Duration 20 visits. Kenya Salazar, PT 12/21/2021  11:15 AM    The referring physician has reviewed and approved this evaluation and plan of care as noted by the electronic signature attached to note.

## 2021-12-22 LAB
25(OH)D3+25(OH)D2 SERPL-MCNC: 21.3 NG/ML (ref 30–100)
ALBUMIN SERPL-MCNC: 4.6 G/DL (ref 3.7–4.7)
ALBUMIN/GLOB SERPL: 2.3 {RATIO} (ref 1.2–2.2)
ALP SERPL-CCNC: 110 IU/L (ref 44–121)
ALT SERPL-CCNC: 15 IU/L (ref 0–32)
AST SERPL-CCNC: 22 IU/L (ref 0–40)
BASOPHILS # BLD AUTO: 0 X10E3/UL (ref 0–0.2)
BASOPHILS NFR BLD AUTO: 1 %
BILIRUB SERPL-MCNC: 0.2 MG/DL (ref 0–1.2)
BUN SERPL-MCNC: 22 MG/DL (ref 8–27)
BUN/CREAT SERPL: 25 (ref 12–28)
CALCIUM SERPL-MCNC: 9.2 MG/DL (ref 8.7–10.3)
CHLORIDE SERPL-SCNC: 107 MMOL/L (ref 96–106)
CHOLEST SERPL-MCNC: 222 MG/DL (ref 100–199)
CO2 SERPL-SCNC: 24 MMOL/L (ref 20–29)
CREAT SERPL-MCNC: 0.88 MG/DL (ref 0.57–1)
EOSINOPHIL # BLD AUTO: 0.1 X10E3/UL (ref 0–0.4)
EOSINOPHIL NFR BLD AUTO: 1 %
ERYTHROCYTE [DISTWIDTH] IN BLOOD BY AUTOMATED COUNT: 12.9 % (ref 11.7–15.4)
EST. AVERAGE GLUCOSE BLD GHB EST-MCNC: 108 MG/DL
GLOBULIN SER CALC-MCNC: 2 G/DL (ref 1.5–4.5)
GLUCOSE SERPL-MCNC: 72 MG/DL (ref 65–99)
HBA1C MFR BLD: 5.4 % (ref 4.8–5.6)
HCT VFR BLD AUTO: 37.9 % (ref 34–46.6)
HDLC SERPL-MCNC: 59 MG/DL
HGB BLD-MCNC: 12.6 G/DL (ref 11.1–15.9)
IMM GRANULOCYTES # BLD AUTO: 0 X10E3/UL (ref 0–0.1)
IMM GRANULOCYTES NFR BLD AUTO: 1 %
LDLC SERPL CALC-MCNC: 145 MG/DL (ref 0–99)
LYMPHOCYTES # BLD AUTO: 1.4 X10E3/UL (ref 0.7–3.1)
LYMPHOCYTES NFR BLD AUTO: 33 %
MCH RBC QN AUTO: 30.7 PG (ref 26.6–33)
MCHC RBC AUTO-ENTMCNC: 33.2 G/DL (ref 31.5–35.7)
MCV RBC AUTO: 92 FL (ref 79–97)
MONOCYTES # BLD AUTO: 0.4 X10E3/UL (ref 0.1–0.9)
MONOCYTES NFR BLD AUTO: 9 %
NEUTROPHILS # BLD AUTO: 2.4 X10E3/UL (ref 1.4–7)
NEUTROPHILS NFR BLD AUTO: 55 %
PLATELET # BLD AUTO: 199 X10E3/UL (ref 150–450)
POTASSIUM SERPL-SCNC: 4.6 MMOL/L (ref 3.5–5.2)
PROT SERPL-MCNC: 6.6 G/DL (ref 6–8.5)
RBC # BLD AUTO: 4.11 X10E6/UL (ref 3.77–5.28)
SODIUM SERPL-SCNC: 141 MMOL/L (ref 134–144)
TRIGL SERPL-MCNC: 103 MG/DL (ref 0–149)
VLDLC SERPL CALC-MCNC: 18 MG/DL (ref 5–40)
WBC # BLD AUTO: 4.2 X10E3/UL (ref 3.4–10.8)

## 2021-12-22 NOTE — PROGRESS NOTES
Your labs showed your cholesterol was high. Your calculated 10-year ASCVD risk, or risk having a heart attack in 10 years, is 16.5%. To lower this risk, Dr. Carl Lilly recommends you start taking a cholesterol-lowering medication. [If this is okay with her, send me rx for Crestor 5 mg, 1 tab by mouth nightly, #30, 5 RF.] Your vitamin D was a little low. Vitamin D is important for the bones. Dr. Carl Lilly recommends you take over-the-counter vitamin D 2000 units once daily.  Your other labs were all normal, including your kidney and liver tests, blood counts, and diabetes screening test.

## 2021-12-23 DIAGNOSIS — E78.00 HYPERCHOLESTEROLEMIA: Primary | ICD-10-CM

## 2021-12-23 RX ORDER — ROSUVASTATIN CALCIUM 5 MG/1
5 TABLET, COATED ORAL
Qty: 30 TABLET | Refills: 5 | Status: SHIPPED | OUTPATIENT
Start: 2021-12-23 | End: 2022-01-25 | Stop reason: SDUPTHER

## 2021-12-23 NOTE — PROGRESS NOTES
Called pt, HIPAA verified by two patient identifiers. Reviewed labs. Letter and information on Cholesterol mailed as requested.

## 2022-01-12 ENCOUNTER — TELEPHONE (OUTPATIENT)
Dept: INTERNAL MEDICINE CLINIC | Age: 77
End: 2022-01-12

## 2022-01-12 NOTE — TELEPHONE ENCOUNTER
----- Message from Miles Messina sent at 1/12/2022  3:47 PM EST -----  Subject: Referral Request    QUESTIONS   Reason for referral request? Pt stated that when she sneezes or yawns, she   has excruciating pain in her right ear. Pt stated that she mentioned this   at the end of her last visit. Pt stated that she feels like there is fluid   and suds in her right ear. She also wants a referral to a foot doctor, the   top of her right foot is very painful. She dropped a big can next to it   and now she can only wear shoes for a short amt of time. Has the physician seen you for this condition before? No   Preferred Specialist (if applicable)? Do you already have an appointment scheduled? No  Additional Information for Provider? Pt would like a referral with someone   that is in network w/ her insurance, Federal employee plan and somewhere   close to [place].  ---------------------------------------------------------------------------  --------------  [address]  What is the best way for the office to contact you? OK to leave message on   voicemail  Preferred Call Back Phone Number?  [phone]

## 2022-01-12 NOTE — TELEPHONE ENCOUNTER
I called the patient and verified them by name and date of birth. Ear:  For about 2 months, knows that there was fluid in both ear. Spoke with Dr. Bella Lynne about it. Sounds/feels like there are suds/water in her ear. It would not go away and was always in there. Got otc medicaition for swimmers ear and it did not work. Tried to use a qtip to get it out and it did not work. Also tried some certain toothpicks with brushes at the end. Went down as far as she could very carefully, could hear it crackling. Moved the brush until there were no more sounds. Has not had the sounds in the ears since. About 1 week and a half later, sneezed and got excruciating pain in the right ear. Pain is still there and can mainly feel it when yawning or sneezing. I suggested that she see an ENT specialist due to there not being much we can do in our office. Will call insurance in the morning to see who is in network. Foot:  Several years, on the top of the right foot there has been a calcium build up. Sometimes it hurts a little and its an inconvienece with wearing shoes. Several weeks ago dropped a big can on the foot near the calcium buildup. It is now painful and had to change the way she walks. Does not know if anything is broken and wants to get the calcium build up looked at along with the bones. I suggested [facility]. She does not know if they are apart of the FEP. I told her it will be the same thing with ENT. She will have to check with them to see what is covered. She stated understanding and had further questions.

## 2022-01-24 ENCOUNTER — NURSE TRIAGE (OUTPATIENT)
Dept: OTHER | Facility: CLINIC | Age: 77
End: 2022-01-24

## 2022-01-24 NOTE — TELEPHONE ENCOUNTER
Received call from [place] at [facility] with Red Flag Complaint. Subjective: Caller states \"Several weeks ago I was going about business and all of a sudden this feeling came over me. It was like someone had shut something up in me, it was like when you're getting surgery and the falling asleep takes affect. You can feel yourself and sense. Almost like you can taste it. Something passing through me from nose and up, it was a sensation. It felt like I was smelling it, almost like it had a taste and smell. It happened to me 4 times, each time it was the same. Last time was yesterday and was brief in time and intensity. Fear and felt like needed to go sit down. Live alone. \"     Current Symptoms: see above     Onset: several weeks ago; sudden    Associated Symptoms: NA    Pain Severity: none    What has been tried: nothing- rest when having episode     LMP: NA Pregnant: NA    Recommended disposition: see in office within 3 days. Care advice provided, patient verbalizes understanding; denies any other questions or concerns; instructed to call back for any new or worsening symptoms. Writer provided warm transfer to Miriam at [facility] for appointment scheduling    Attention Provider: Thank you for allowing me to participate in the care of your patient. The patient was connected to triage in response to information provided to the [facility]. Please do not respond through this encounter as the response is not directed to a shared pool.     Reason for Disposition   Patient wants to be seen    Protocols used: ANXIETY AND PANIC ATTACK-ADULT-[place]

## 2022-01-24 NOTE — TELEPHONE ENCOUNTER
I called the patient and verified them by name and date of birth. I informed the patient on the message from the provider. She stated she did not have a panic attack and would like to speak with Dr. Shankar Magaña. I informed her it would require an appointment. She stated understanding and the call was transferred to the PSR's.

## 2022-01-25 ENCOUNTER — VIRTUAL VISIT (OUTPATIENT)
Dept: INTERNAL MEDICINE CLINIC | Age: 77
End: 2022-01-25
Payer: MEDICARE

## 2022-01-25 DIAGNOSIS — E78.00 HYPERCHOLESTEROLEMIA: ICD-10-CM

## 2022-01-25 DIAGNOSIS — M19.042 PRIMARY OSTEOARTHRITIS OF BOTH HANDS: ICD-10-CM

## 2022-01-25 DIAGNOSIS — F31.62 BIPOLAR DISORDER, CURRENT EPISODE MIXED, MODERATE (HCC): Primary | ICD-10-CM

## 2022-01-25 DIAGNOSIS — M19.041 PRIMARY OSTEOARTHRITIS OF BOTH HANDS: ICD-10-CM

## 2022-01-25 DIAGNOSIS — K21.9 GASTROESOPHAGEAL REFLUX DISEASE WITHOUT ESOPHAGITIS: ICD-10-CM

## 2022-01-25 PROCEDURE — 99443 PR PHYS/QHP TELEPHONE EVALUATION 21-30 MIN: CPT | Performed by: INTERNAL MEDICINE

## 2022-01-25 RX ORDER — TOPIRAMATE 25 MG/1
50 TABLET ORAL
Qty: 60 TABLET | Refills: 0
Start: 2022-01-25

## 2022-01-25 RX ORDER — ROSUVASTATIN CALCIUM 5 MG/1
5 TABLET, COATED ORAL
Qty: 30 TABLET | Refills: 5 | Status: SHIPPED | OUTPATIENT
Start: 2022-01-25 | End: 2022-07-28

## 2022-01-25 RX ORDER — DEXTROMETHORPHAN HYDROBROMIDE, GUAIFENESIN 5; 100 MG/5ML; MG/5ML
650-1300 LIQUID ORAL AS NEEDED
Qty: 30 TABLET | Refills: 0
Start: 2022-01-25

## 2022-01-25 RX ORDER — PANTOPRAZOLE SODIUM 40 MG/1
40 TABLET, DELAYED RELEASE ORAL DAILY
Qty: 90 TABLET | Refills: 0
Start: 2022-01-25 | End: 2022-05-25 | Stop reason: ALTCHOICE

## 2022-01-25 NOTE — PROGRESS NOTES
Kisha Torres  Identified pt with two pt identifiers(name and ). Chief Complaint   Patient presents with    Panic Attack     wants to talk about prozac // pain - 0        Reviewed record In preparation for visit and have obtained necessary documentation. 1. Have you been to the ER, urgent care clinic or hospitalized since your last visit? Yes. [facility]     2. Have you seen or consulted any other health care providers outside of the [address] [place] [address] since your last visit? Include any pap smears or colon screening. No    Patient has an advance directive. Vitals reviewed with provider. Health Maintenance reviewed:     Health Maintenance Due   Topic    COVID-19 Vaccine (3 - Booster for Moderna series)          Wt Readings from Last 3 Encounters:   12/10/21 115 lb (52.2 kg)   21 115 lb (52.2 kg)   21 110 lb 14.3 oz (50.3 kg)        Temp Readings from Last 3 Encounters:   21 98.1 °F (36.7 °C) (Oral)   21 98.1 °F (36.7 °C)   21 98.7 °F (37.1 °C) (Oral)        BP Readings from Last 3 Encounters:   21 137/82   21 (!) 134/58   21 (!) 108/52        Pulse Readings from Last 3 Encounters:   21 95   21 65   21 68      There were no vitals filed for this visit.        Learning Assessment:   :       Learning Assessment 2015   PRIMARY LEARNER Patient   BARRIERS PRIMARY LEARNER NONE   CO-LEARNER CAREGIVER No   CO-LEARNER HIGHEST LEVEL OF EDUCATION GRADUATED HIGH SCHOOL OR GED   PRIMARY LANGUAGE ENGLISH   LEARNER PREFERENCE PRIMARY LISTENING   ANSWERED BY patient   RELATIONSHIP SELF        Depression Screening:   :       3 most recent PHQ Screens 2022   PHQ Not Done -   Little interest or pleasure in doing things Not at all   Feeling down, depressed, irritable, or hopeless Not at all   Total Score PHQ 2 0   Trouble falling or staying asleep, or sleeping too much -   Feeling tired or having little energy -   Poor appetite, weight loss, or overeating -   Feeling bad about yourself - or that you are a failure or have let yourself or your family down -   Trouble concentrating on things such as school, work, reading, or watching TV -   Moving or speaking so slowly that other people could have noticed; or the opposite being so fidgety that others notice -   Thoughts of being better off dead, or hurting yourself in some way -   How difficult have these problems made it for you to do your work, take care of your home and get along with others -        Fall Risk Assessment:   :       Fall Risk Assessment, last 12 mths 12/8/2021   Able to walk? Yes   Fall in past 12 months? 1   Do you feel unsteady? 0   Are you worried about falling 0   Is the gait abnormal? 0   Number of falls in past 12 months 1   Fall with injury? 1        Abuse Screening:   :       Abuse Screening Questionnaire 12/8/2021 4/19/2021 2/28/2020 11/1/2019 6/29/2018 11/23/2015   Do you ever feel afraid of your partner? N N N N N N   Are you in a relationship with someone who physically or mentally threatens you? N N N N N N   Is it safe for you to go home?  Y Y Y Y Y Y        ADL Screening:   :       ADL Assessment 12/8/2021   Feeding yourself No Help Needed   Getting from bed to chair No Help Needed   Getting dressed No Help Needed   Bathing or showering No Help Needed   Walk across the room (includes cane/walker) No Help Needed   Using the telphone No Help Needed   Taking your medications No Help Needed   Preparing meals No Help Needed   Managing money (expenses/bills) No Help Needed   Moderately strenuous housework (laundry) No Help Needed   Shopping for personal items (toiletries/medicines) No Help Needed   Shopping for groceries No Help Needed   Driving No Help Needed   Climbing a flight of stairs No Help Needed   Getting to places beyond walking distances No Help Needed

## 2022-01-25 NOTE — PROGRESS NOTES
Rufino Morel is a 78 y.o. female, evaluated via audio-only technology on 1/25/2022 for Panic Attack (wants to talk about prozac // pain - 0 )  . Assessment & Plan:   Diagnoses and all orders for this visit:    1. Bipolar disorder, current episode mixed, moderate ([address]  She has anxiety also although she is very reluctant to admit she has anxiety/panic attacks. Continue following with psychiatrist.  -     Continue topiramate (TOPAMAX) 25 mg tablet; Take 2 Tablets by mouth nightly. 2. Gastroesophageal reflux disease without esophagitis  Controlled. -     Continue pantoprazole (PROTONIX) 40 mg tablet; Take 1 Tablet by mouth daily. 3. Hypercholesterolemia  She never picked up Crestor prescribed at last clinic visit but after discussion is now agreeable to take. -     Start rosuvastatin (CRESTOR) 5 mg tablet; Take 1 Tablet by mouth nightly. Indications: high cholesterol    4. Primary osteoarthritis of both hands  Controlled. -     Continue acetaminophen (Tylenol Arthritis Pain) 650 mg TbER; Take 1-2 Tablets by mouth as needed for Pain. Follow-up and Dispositions    · Return in about 4 months (around 5/25/2022), or if symptoms worsen or fail to improve, for Chol, memory, anxiety. Routing History         12  Subjective:     Called yesterday and told the triage nurse the following: \"Several weeks ago I was going about business and all of a sudden this feeling came over me. It was like someone had shut something up in me, it was like when you're getting surgery and the falling asleep takes affect. You can feel yourself and sense. Almost like you can taste it. Something passing through me from nose and up, it was a sensation. It felt like I was smelling it, almost like it had a taste and smell. It happened to me 4 times, each time it was the same. Last time was yesterday and was brief in time and intensity. Fear and felt like needed to go sit down. Live alone. \"     Today denies feeling anxious; says she is not worried about above episodes. Follows with psychiatrist regularly. Reports having forgetfulness when she was last seen on 12/8/21 due to Topamax side effect; memory improved once dose decreased from 100 mg. We reviewed her medications in detail; is taking 3 medications she told rooming nurse she was not taking on 12/8/21. Psychiatrist: Earna Blizzard NP, [address].     COVID-19 vaccine: plans to get booster    Prior to Admission medications    Medication Sig Start Date End Date Taking? Authorizing Provider   rosuvastatin (CRESTOR) 5 mg tablet Take 1 Tablet by mouth nightly. 12/23/21  Yes Mark Robbins MD   FLUoxetine (PROzac) 10 mg capsule Take 10 mg by mouth daily. Pt does not recall the specific dose   Yes Provider, Historical   sertraline (ZOLOFT) 100 mg tablet 200 mg nightly. 10/30/19  Yes Provider, Historical     Patient Active Problem List   Diagnosis Code    Bipolar disorder ([facility] F31.9    Anxiety disorder F41.9    Primary osteoarthritis of both hands M19.041, M19.042    Hypercholesterolemia E78.00    Vitamin D deficiency E55.9    Age-related osteoporosis without current pathological fracture M81.0    Prediabetes R73.03    Gastroesophageal reflux disease without esophagitis K21.9    Urge urinary incontinence N39.41    Abdominal pain R10.9    Closed nondisplaced fracture of head of radius with routine healing S52.126D    Tinnitus H93.19    Memory impairment R41.3    Right elbow pain M25.521         Patient-Reported Weight: 107lb       Kisha Torres, who was evaluated through a patient-initiated, synchronous (real-time) audio only encounter, and/or her healthcare decision maker, is aware that it is a billable service, which includes applicable co-pays, with coverage as determined by her insurance carrier. She provided verbal consent to proceed. She has not had a related appointment within my department in the past 7 days or scheduled within the next 24 hours. The patient was located at home in a state where the provider was licensed to provide care. On this date 01/25/2022 I have spent 30 minutes reviewing previous notes, test results and face to face (virtual) with the patient discussing the diagnosis and importance of compliance with the treatment plan as well as documenting on the day of the visit.     Hellen Stone MD

## 2022-03-18 PROBLEM — R73.03 PREDIABETES: Status: ACTIVE | Noted: 2019-06-18

## 2022-03-18 PROBLEM — R10.9 ABDOMINAL PAIN: Status: ACTIVE | Noted: 2021-04-25

## 2022-03-18 PROBLEM — M81.0 AGE-RELATED OSTEOPOROSIS WITHOUT CURRENT PATHOLOGICAL FRACTURE: Status: ACTIVE | Noted: 2019-01-15

## 2022-03-18 PROBLEM — M25.521 RIGHT ELBOW PAIN: Status: ACTIVE | Noted: 2021-12-10

## 2022-03-18 PROBLEM — E55.9 VITAMIN D DEFICIENCY: Status: ACTIVE | Noted: 2018-06-29

## 2022-03-18 PROBLEM — K21.9 GASTROESOPHAGEAL REFLUX DISEASE WITHOUT ESOPHAGITIS: Status: ACTIVE | Noted: 2019-09-26

## 2022-03-19 PROBLEM — E78.00 HYPERCHOLESTEROLEMIA: Status: ACTIVE | Noted: 2018-06-29

## 2022-03-19 PROBLEM — N39.41 URGE URINARY INCONTINENCE: Status: ACTIVE | Noted: 2020-10-15

## 2022-03-20 PROBLEM — R41.3 MEMORY IMPAIRMENT: Status: ACTIVE | Noted: 2021-12-08

## 2022-03-20 PROBLEM — S52.126D CLOSED NONDISPLACED FRACTURE OF HEAD OF RADIUS WITH ROUTINE HEALING: Status: ACTIVE | Noted: 2021-12-08

## 2022-03-20 PROBLEM — H93.19 TINNITUS: Status: ACTIVE | Noted: 2021-12-08

## 2022-04-21 ENCOUNTER — TRANSCRIBE ORDER (OUTPATIENT)
Dept: SCHEDULING | Age: 77
End: 2022-04-21

## 2022-04-21 DIAGNOSIS — Z12.31 SCREENING MAMMOGRAM FOR HIGH-RISK PATIENT: Primary | ICD-10-CM

## 2022-04-26 ENCOUNTER — HOSPITAL ENCOUNTER (OUTPATIENT)
Dept: MAMMOGRAPHY | Age: 77
Discharge: HOME | End: 2022-04-26
Attending: INTERNAL MEDICINE
Payer: MEDICARE

## 2022-04-26 DIAGNOSIS — Z12.31 SCREENING MAMMOGRAM FOR HIGH-RISK PATIENT: ICD-10-CM

## 2022-04-26 PROCEDURE — 77067 SCR MAMMO BI INCL CAD: CPT

## 2022-05-25 ENCOUNTER — OFFICE VISIT (OUTPATIENT)
Dept: INTERNAL MEDICINE CLINIC | Age: 77
End: 2022-05-25
Payer: MEDICARE

## 2022-05-25 VITALS
RESPIRATION RATE: 18 BRPM | SYSTOLIC BLOOD PRESSURE: 120 MMHG | HEIGHT: 61 IN | HEART RATE: 67 BPM | BODY MASS INDEX: 22.47 KG/M2 | TEMPERATURE: 98 F | DIASTOLIC BLOOD PRESSURE: 69 MMHG | OXYGEN SATURATION: 96 % | WEIGHT: 119 LBS

## 2022-05-25 DIAGNOSIS — F41.1 GENERALIZED ANXIETY DISORDER: ICD-10-CM

## 2022-05-25 DIAGNOSIS — K21.9 GASTROESOPHAGEAL REFLUX DISEASE WITHOUT ESOPHAGITIS: ICD-10-CM

## 2022-05-25 DIAGNOSIS — E78.00 HYPERCHOLESTEROLEMIA: Primary | ICD-10-CM

## 2022-05-25 PROCEDURE — G8536 NO DOC ELDER MAL SCRN: HCPCS | Performed by: INTERNAL MEDICINE

## 2022-05-25 PROCEDURE — 1101F PT FALLS ASSESS-DOCD LE1/YR: CPT | Performed by: INTERNAL MEDICINE

## 2022-05-25 PROCEDURE — 1090F PRES/ABSN URINE INCON ASSESS: CPT | Performed by: INTERNAL MEDICINE

## 2022-05-25 PROCEDURE — 1123F ACP DISCUSS/DSCN MKR DOCD: CPT | Performed by: INTERNAL MEDICINE

## 2022-05-25 PROCEDURE — 3017F COLORECTAL CA SCREEN DOC REV: CPT | Performed by: INTERNAL MEDICINE

## 2022-05-25 PROCEDURE — G9899 SCRN MAM PERF RSLTS DOC: HCPCS | Performed by: INTERNAL MEDICINE

## 2022-05-25 PROCEDURE — 99214 OFFICE O/P EST MOD 30 MIN: CPT | Performed by: INTERNAL MEDICINE

## 2022-05-25 PROCEDURE — G8420 CALC BMI NORM PARAMETERS: HCPCS | Performed by: INTERNAL MEDICINE

## 2022-05-25 PROCEDURE — G9717 DOC PT DX DEP/BP F/U NT REQ: HCPCS | Performed by: INTERNAL MEDICINE

## 2022-05-25 PROCEDURE — G8427 DOCREV CUR MEDS BY ELIG CLIN: HCPCS | Performed by: INTERNAL MEDICINE

## 2022-05-25 RX ORDER — PANTOPRAZOLE SODIUM 40 MG/1
40 TABLET, DELAYED RELEASE ORAL DAILY
Qty: 90 TABLET | Refills: 1 | Status: SHIPPED | OUTPATIENT
Start: 2022-05-25

## 2022-05-25 NOTE — PROGRESS NOTES
Juan Jose Meza is a 78 y.o. female  Chief Complaint   Patient presents with    Follow-up       1. Have you been to the ER, urgent care clinic since your last visit? Yes fx from a fall. Hospitalized since your last visit?no    2. Have you seen or consulted any other health care providers outside of the [address] [place] [address] since your last visit? Include any pap smears or colon screening.    No  Health Maintenance   Topic Date Due    Shingrix Vaccine Age 49> (2 of 2) 05/10/2022    Medicare Yearly Exam  12/09/2022    A1C test (Diabetic or Prediabetic)  12/21/2022    Lipid Screen  12/21/2022    Depression Monitoring  01/25/2023    Breast Cancer Screen Mammogram  04/26/2024    Colorectal Cancer Screening Combo  03/31/2025    DTaP/Tdap/Td series (3 - Td or Tdap) 08/09/2029    Hepatitis C Screening  Completed    Bone Densitometry (Dexa) Screening  Completed    Flu Vaccine  Completed    COVID-19 Vaccine  Completed    Pneumococcal 65+ years  Completed     Visit Vitals  /69 (BP 1 Location: Left upper arm, BP Patient Position: At rest, BP Cuff Size: Adult)   Pulse 67   Temp 98 °F (36.7 °C) (Skin)   Resp 18   Ht 5' 1\" (1.549 m)   Wt 119 lb (54 kg)   SpO2 96%   BMI 22.48 kg/m²

## 2022-05-25 NOTE — PROGRESS NOTES
CC:   Chief Complaint   Patient presents with    Cholesterol Problem    Memory Loss    Anxiety       HISTORY OF PRESENT ILLNESS  Ekta Craig is a 78 y.o. female. Presents for 4 month follow up evaluation. She has hypercholesterolemia, osteoporosis, OA of hands, bipolar disorder, and anxiety. Complains of heartburn. Had EGD last year; was told she did not need surgery. Ran out of pantoprazole. Has chronic dry cough. Denies SOB or CP. Taking Crestor 5 mg nightly with no side effects. Stays on low-fat diet. Eats fried foods infrequently. Does not eat beef or pork. Stays active by working 6 days in yard daily. Her boyfriend has leukemia and high blood sugars. Psychiatrist: Eros Henderson NP, [facility]  A complete review of systems was performed and is negative except for those mentioned in the HPI. Patient Active Problem List   Diagnosis Code    Bipolar disorder ([facility] [address] F31.9    Anxiety disorder F41.9    Primary osteoarthritis of both hands M19.041, M19.042    Hypercholesterolemia E78.00    Vitamin D deficiency E55.9    Age-related osteoporosis without current pathological fracture M81.0    Prediabetes R73.03    Gastroesophageal reflux disease without esophagitis K21.9    Urge urinary incontinence N39.41    Abdominal pain R10.9    Closed nondisplaced fracture of head of radius with routine healing S52.126D    Tinnitus H93.19    Memory impairment R41.3    Right elbow pain M25.521     Past Medical History:   Diagnosis Date    Arthritis     Osteoarthritis    Broken arm 11/17/2021    Excessive sweating     Occurs in summers; TSH/CMP/CBC normal    GERD (gastroesophageal reflux disease)     WITH HAITEL HERNIA    Menopause     LMP-29years old    MRSA (methicillin resistant Staphylococcus aureus) infection 2007    MRSA + abd abscess; nasal swab negative after treatment    Psychiatric disorder     DEPRESSION.       Allergies   Allergen Reactions    Fosamax [Alendronate] Other (comments)     Severe heartburn       Current Outpatient Medications   Medication Sig Dispense Refill    acetaminophen (Tylenol Arthritis Pain) 650 mg TbER Take 1-2 Tablets by mouth as needed for Pain. 30 Tablet 0    topiramate (TOPAMAX) 25 mg tablet Take 2 Tablets by mouth nightly. 60 Tablet 0    rosuvastatin (CRESTOR) 5 mg tablet Take 1 Tablet by mouth nightly. Indications: high cholesterol 30 Tablet 5    sertraline (ZOLOFT) 100 mg tablet 200 mg nightly. PHYSICAL EXAM  Visit Vitals  /69 (BP 1 Location: Left upper arm, BP Patient Position: At rest, BP Cuff Size: Adult)   Pulse 67   Temp 98 °F (36.7 °C) (Skin)   Resp 18   Ht 5' 1\" (1.549 m)   Wt 119 lb (54 kg)   SpO2 96%   BMI 22.48 kg/m²       General: Well-developed and well-nourished, no distress. HEENT:  Head normocephalic/atraumatic, no scleral icterus  Lungs:  Clear to ausculation bilaterally. Good air movement. Heart:  Regular rate and rhythm, normal S1 and S2, no murmur, gallop, or rub  Abdomen: Soft, non-distended, normal bowel sounds, no tenderness, no guarding, masses, rebound tenderness, or HSM. Extremities: No clubbing, cyanosis, or edema. Neurological: Alert and oriented. Psychiatric: Normal mood and affect. Behavior is normal.         ASSESSMENT AND PLAN    ICD-10-CM ICD-9-CM    1. Hypercholesterolemia  E78.00 272.0 LIPID PANEL      LIPID PANEL   2. Gastroesophageal reflux disease without esophagitis  K21.9 530.81 pantoprazole (PROTONIX) 40 mg tablet   3. Generalized anxiety disorder  F41.1 300.02      Diagnoses and all orders for this visit:    1. Hypercholesterolemia  Stable. Continue Crestor 5 mg nightly. -     LIPID PANEL; Future    2. Gastroesophageal reflux disease without esophagitis  -     Refill pantoprazole (PROTONIX) 40 mg tablet; Take 1 Tablet by mouth daily. 3. Generalized anxiety disorder  Controlled on Zoloft.  Continue following with psychiatrist    Follow-up and Dispositions · Return in about 6 months (around 11/25/2022), or if symptoms worsen or fail to improve, for Medicare AW (schedule 12/1/22 or later); have fasting lab within next 2 weeks. I have discussed the diagnosis with the patient and the intended plan as seen in the above orders. Patient is in agreement. The patient has received an after-visit summary and questions were answered concerning future plans. I have discussed medication side effects and warnings with the patient as well.

## 2022-05-25 NOTE — PATIENT INSTRUCTIONS
Learning About High Cholesterol  What is high cholesterol? High cholesterol means that you have too much cholesterol in your blood. Cholesterol is a type of fat. It's needed for many body functions, such as making new cells. Cholesterol is made by your body. It also comes from food you eat. Having high cholesterol can lead to the buildup of plaque in artery walls. This can increase your risk of heart attack and stroke. When your doctor talks about high cholesterol levels, your doctor is talking about your total cholesterol and LDL cholesterol (the \"bad\" cholesterol) levels. Your doctor may also speak about HDL (the \"good\" cholesterol) levels. High HDL is linked with a lower risk for coronary artery disease, heart attack, and stroke. Your cholesterol levels help your doctor find out your risk for having a heart attack or stroke. How can you help prevent high cholesterol? A heart-healthy lifestyle can help you prevent high cholesterol and lower your risk for a heart attack and stroke. · Eat heart-healthy foods. ? Eat fruits, vegetables, whole grains, beans, and other high-fiber foods. ? Eat lean proteins, such as seafood, lean meats, beans, nuts, and soy products. ? Eat healthy fats, such as canola and olive oil. ? Choose foods that are low in saturated fat. ? Limit sodium and alcohol. ? Limit drinks and foods with added sugar. · Be active. Try to do moderate activity at least 2½ hours a week. Or try vigorous activity at least 1¼ hours a week. You may want to walk or try other activities, such as running, swimming, cycling, or playing tennis or team sports. · Stay at a healthy weight. Lose weight if you need to. · Don't smoke. If you need help quitting, talk to your doctor about stop-smoking programs and medicines. These can increase your chances of quitting for good. How is high cholesterol treated? The goal of treatment is to reduce your chances of having a heart attack or stroke.  The goal is not to lower your cholesterol numbers only. · Have a heart-healthy lifestyle. This includes eating healthy foods, not smoking, losing weight, and being more active. · You may choose to take medicine. Follow-up care is a key part of your treatment and safety. Be sure to make and go to all appointments, and call your doctor if you are having problems. It's also a good idea to know your test results and keep a list of the medicines you take. Where can you learn more? Go to http://www.jack.com/  Enter Q621 in the search box to learn more about \"Learning About High Cholesterol. \"  Current as of: January 10, 2022               Content Version: 13.2  © 2006-2022 Healthwise, Incorporated. Care instructions adapted under license by [employer] (which disclaims liability or warranty for this information). If you have questions about a medical condition or this instruction, always ask your healthcare professional. [address] any warranty or liability for your use of this information.

## 2022-05-26 ENCOUNTER — TELEPHONE (OUTPATIENT)
Dept: INTERNAL MEDICINE CLINIC | Age: 77
End: 2022-05-26

## 2022-06-01 NOTE — TELEPHONE ENCOUNTER
Approvedon May 27  Your PA request has been approved. Additional information will be provided in the approval communication. (Message [phone])    Pharmacy notified.

## 2022-06-03 ENCOUNTER — OFFICE VISIT (OUTPATIENT)
Dept: URGENT CARE | Age: 77
End: 2022-06-03
Payer: MEDICARE

## 2022-06-03 VITALS
RESPIRATION RATE: 16 BRPM | TEMPERATURE: 98.7 F | DIASTOLIC BLOOD PRESSURE: 68 MMHG | SYSTOLIC BLOOD PRESSURE: 124 MMHG | HEART RATE: 76 BPM | WEIGHT: 112 LBS | OXYGEN SATURATION: 99 % | HEIGHT: 60 IN | BODY MASS INDEX: 21.99 KG/M2

## 2022-06-03 DIAGNOSIS — R05.9 COUGH: ICD-10-CM

## 2022-06-03 DIAGNOSIS — U07.1 COVID-19: Primary | ICD-10-CM

## 2022-06-03 DIAGNOSIS — Z11.59 SCREENING FOR VIRAL DISEASE: ICD-10-CM

## 2022-06-03 LAB — SARS-COV-2 PCR, POC: POSITIVE

## 2022-06-03 PROCEDURE — G9717 DOC PT DX DEP/BP F/U NT REQ: HCPCS | Performed by: FAMILY MEDICINE

## 2022-06-03 PROCEDURE — 1090F PRES/ABSN URINE INCON ASSESS: CPT | Performed by: FAMILY MEDICINE

## 2022-06-03 PROCEDURE — 1123F ACP DISCUSS/DSCN MKR DOCD: CPT | Performed by: FAMILY MEDICINE

## 2022-06-03 PROCEDURE — G8420 CALC BMI NORM PARAMETERS: HCPCS | Performed by: FAMILY MEDICINE

## 2022-06-03 PROCEDURE — G8536 NO DOC ELDER MAL SCRN: HCPCS | Performed by: FAMILY MEDICINE

## 2022-06-03 PROCEDURE — G8427 DOCREV CUR MEDS BY ELIG CLIN: HCPCS | Performed by: FAMILY MEDICINE

## 2022-06-03 PROCEDURE — 1101F PT FALLS ASSESS-DOCD LE1/YR: CPT | Performed by: FAMILY MEDICINE

## 2022-06-03 PROCEDURE — 87635 SARS-COV-2 COVID-19 AMP PRB: CPT | Performed by: FAMILY MEDICINE

## 2022-06-03 PROCEDURE — 3017F COLORECTAL CA SCREEN DOC REV: CPT | Performed by: FAMILY MEDICINE

## 2022-06-03 PROCEDURE — G9899 SCRN MAM PERF RSLTS DOC: HCPCS | Performed by: FAMILY MEDICINE

## 2022-06-03 PROCEDURE — 99213 OFFICE O/P EST LOW 20 MIN: CPT | Performed by: FAMILY MEDICINE

## 2022-06-03 RX ORDER — BENZONATATE 100 MG/1
100 CAPSULE ORAL
Qty: 20 CAPSULE | Refills: 0 | Status: SHIPPED | OUTPATIENT
Start: 2022-06-03

## 2022-06-03 NOTE — PROGRESS NOTES
Spoke with pt. Via phone, pt. Returned call for test results. Pt. Aware of + covid 19 result pt. Advised to quarantine x5 days and wear well fitting mask an additional  5 days. Pt.  Also encouraged to ambulate and practice deep breathing exercises  take tylenol as needed and go to ER for SOB or lethargy

## 2022-06-03 NOTE — PROGRESS NOTES
Tammy Escobar is a 78 y.o. female who presents with dry cough x 4 days, gradually improving. Taking tea and honey. No known COVID contacts. Denies fever, SOB, N/V/D. The history is provided by the patient. Past Medical History:   Diagnosis Date    Arthritis     Osteoarthritis    Broken arm 2021    Excessive sweating     Occurs in summers; TSH/CMP/CBC normal    GERD (gastroesophageal reflux disease)     WITH HAITEL HERNIA    Menopause     LMP-29years old    MRSA (methicillin resistant Staphylococcus aureus) infection     MRSA + abd abscess; nasal swab negative after treatment    Psychiatric disorder     DEPRESSION. Past Surgical History:   Procedure Laterality Date    HX  SECTION      HX COLONOSCOPY      HX ENDOSCOPY  05/10/2018    Dr. Eliza Goff; gastritis and esophagitis    HX HYSTERECTOMY      Age 29 for endometriosis    IMPLANT BREAST SILICONE/EQ Bilateral  and     1st surgery , replaced bilaterally in .  MT BREAST SURGERY PROCEDURE UNLISTED Bilateral     AUGMENTATION         Family History   Problem Relation Age of Onset    Diabetes Father     Cancer Father         PROSTATE CANCER    OSTEOARTHRITIS Mother     OSTEOARTHRITIS Sister     Anesth Problems Sister         HAS DIFFICULTY WAKING UP-TAKES VERY LONG.         Social History     Socioeconomic History    Marital status:      Spouse name: Not on file    Number of children: Not on file    Years of education: Not on file    Highest education level: Not on file   Occupational History    Not on file   Tobacco Use    Smoking status: Never Smoker    Smokeless tobacco: Never Used   Vaping Use    Vaping Use: Never used   Substance and Sexual Activity    Alcohol use: No    Drug use: No    Sexual activity: Not Currently   Other Topics Concern    Not on file   Social History Narrative    Not on file     Social Determinants of Health     Financial Resource Strain: Low Risk     Difficulty of Paying Living Expenses: Not hard at all   Food Insecurity: No Food Insecurity    Worried About Running Out of Food in the Last Year: Never true    Ran Out of Food in the Last Year: Never true   Transportation Needs: No Transportation Needs    Lack of Transportation (Medical): No    Lack of Transportation (Non-Medical): No   Physical Activity:     Days of Exercise per Week: Not on file    Minutes of Exercise per Session: Not on file   Stress:     Feeling of Stress : Not on file   Social Connections:     Frequency of Communication with Friends and Family: Not on file    Frequency of Social Gatherings with Friends and Family: Not on file    Attends [employer] Services: Not on file    Active Member of [address] [place] [address] or Organizations: Not on file    Attends Club or Organization Meetings: Not on file    Marital Status: Not on file   Intimate Partner Violence:     Fear of Current or Ex-Partner: Not on file    Emotionally Abused: Not on file    Physically Abused: Not on file    Sexually Abused: Not on file   Housing Stability: [address] Unable to Pay for Housing in the Last Year: No    Number of [place] in the Last Year: 1    Unstable Housing in the Last Year: No                ALLERGIES: Fosamax [alendronate]    Review of Systems   Constitutional: Negative for activity change, appetite change and fever. Respiratory: Positive for cough. Negative for shortness of breath. Gastrointestinal: Negative for diarrhea, nausea and vomiting. Vitals:    06/03/22 0959   BP: 124/68   Pulse: 76   Resp: 16   Temp: 98.7 °F (37.1 °C)   SpO2: 99%   Weight: 112 lb (50.8 kg)   Height: 5' (1.524 m)       Physical Exam  Vitals and nursing note reviewed. Constitutional:       General: She is not in acute distress. Appearance: She is well-developed. She is not diaphoretic.    HENT:      Right Ear: Tympanic membrane, ear canal and external ear normal.      Left Ear: Tympanic membrane, ear canal and external ear normal.      Nose: Nose normal.      Right Sinus: No maxillary sinus tenderness or frontal sinus tenderness. Left Sinus: No maxillary sinus tenderness or frontal sinus tenderness. Mouth/Throat:      Pharynx: No oropharyngeal exudate or posterior oropharyngeal erythema. Tonsils: No tonsillar abscesses. Cardiovascular:      Rate and Rhythm: Normal rate and regular rhythm. Heart sounds: Normal heart sounds. Pulmonary:      Effort: Pulmonary effort is normal. No respiratory distress. Breath sounds: Normal breath sounds. No stridor. No wheezing, rhonchi or rales. Lymphadenopathy:      Cervical: No cervical adenopathy. Neurological:      Mental Status: She is alert. Psychiatric:         Behavior: Behavior normal.         Thought Content: Thought content normal.         Judgment: Judgment normal.         MDM    ICD-10-CM ICD-9-CM   1. COVID-19  U07.1 079.89   2. Cough  R05.9 786.2   3. Screening for viral disease  Z11.59 V73.99       Orders Placed This Encounter    POCT COVID-19, SARS-COV-2, PCR     Order Specific Question:   Is this test for diagnosis or screening? Answer:   Diagnosis of ill patient     Order Specific Question:   Symptomatic for COVID-19 as defined by CDC? Answer:   Yes     Order Specific Question:   Date of Symptom Onset     Answer:   5/31/2022     Order Specific Question:   Hospitalized for COVID-19? Answer:   No     Order Specific Question:   Admitted to ICU for COVID-19? Answer:   No     Order Specific Question:   Employed in healthcare setting? Answer:   No     Order Specific Question:   Resident in a congregate (group) care setting? Answer:   No     Order Specific Question:   Pregnant? Answer:   No     Order Specific Question:   Previously tested for COVID-19? Answer: Yes    benzonatate (Tessalon Perles) 100 mg capsule     Sig: Take 1 Capsule by mouth three (3) times daily as needed for Cough.      Dispense: 20 Capsule     Refill:  0      Tessalon prn  Quarantine x 5 days from sx onset then wear well fitting mask for an additional 5 days thereafter  Deep breathing exercises, ambulation  Tylenol prn  Increase fluids with electrolytes    If signs and symptoms become worse the pt is to go to the ER.        Results for orders placed or performed in visit on 06/03/22   POCT COVID-19, SARS-COV-2, PCR   Result Value Ref Range    SARS-COV-2 PCR, POC Positive (A) Negative     Procedures

## 2022-06-03 NOTE — PATIENT INSTRUCTIONS
Cough: Care Instructions  Overview     A cough is your body's response to something that bothers your throat or airways. Many things can cause a cough. You might cough because of a cold or the flu, bronchitis, or asthma. Smoking, postnasal drip, allergies, and stomach acid that backs up into your throat also can cause coughs. A cough is a symptom, not a disease. Most coughs stop when the cause, such as a cold, goes away. You can take a few steps at home to cough less and feel better. Follow-up care is a key part of your treatment and safety. Be sure to make and go to all appointments, and call your doctor if you are having problems. It's also a good idea to know your test results and keep a list of the medicines you take. How can you care for yourself at home? · Drink lots of water and other fluids. This helps thin the mucus and soothes a dry or sore throat. Honey or lemon juice in hot water or tea may ease a dry cough. · Take cough medicine as directed by your doctor. · Prop up your head on pillows to help you breathe and ease a dry cough. · Try cough drops or hard candy to soothe a dry or sore throat. · Do not smoke. Avoid secondhand smoke. If you need help quitting, talk to your doctor about stop-smoking programs and medicines. These can increase your chances of quitting for good. When should you call for help? Call 911 anytime you think you may need emergency care. For example, call if:    · You have severe trouble breathing. Call your doctor now or seek immediate medical care if:    · You cough up blood.     · You have new or worse trouble breathing.     · You have a new or higher fever.     · You have a new rash.    Watch closely for changes in your health, and be sure to contact your doctor if:    · You cough more deeply or more often, especially if you notice more mucus or a change in the color of your mucus.     · You have new symptoms, such as a sore throat, an earache, or sinus pain.     · You do not get better as expected. Where can you learn more? Go to http://www.gray.com/  Enter D279 in the search box to learn more about \"Cough: Care Instructions. \"  Current as of: July 6, 2021               Content Version: 13.2  © 2844-8574 Healthwise, [employer]. Care instructions adapted under license by [employer] (which disclaims liability or warranty for this information). If you have questions about a medical condition or this instruction, always ask your healthcare professional. [place] [address] any warranty or liability for your use of this information.

## 2022-06-03 NOTE — PROGRESS NOTES
Notify patient of positive COVID-19 result. Advise to quarantine x 5 days from sx onset. Wear well fitting mask an additional 5 days. Encourage ambulation and deep breathing exercises. Tylenol as needed. ER if SOB, lethargy.

## 2022-06-24 LAB
CHOLEST SERPL-MCNC: 176 MG/DL (ref 100–199)
HDLC SERPL-MCNC: 65 MG/DL
LDLC SERPL CALC-MCNC: 97 MG/DL (ref 0–99)
TRIGL SERPL-MCNC: 72 MG/DL (ref 0–149)
VLDLC SERPL CALC-MCNC: 14 MG/DL (ref 5–40)

## 2022-06-26 NOTE — PROGRESS NOTES
Letter sent: Your cholesterol level was normal and much better than it was 6 months ago. Continue taking rosuvastatin 5 mg nightly.

## 2022-11-24 ENCOUNTER — APPOINTMENT (OUTPATIENT)
Dept: CT IMAGING | Age: 77
End: 2022-11-24
Attending: STUDENT IN AN ORGANIZED HEALTH CARE EDUCATION/TRAINING PROGRAM
Payer: MEDICARE

## 2022-11-24 ENCOUNTER — APPOINTMENT (OUTPATIENT)
Dept: GENERAL RADIOLOGY | Age: 77
End: 2022-11-24
Attending: STUDENT IN AN ORGANIZED HEALTH CARE EDUCATION/TRAINING PROGRAM
Payer: MEDICARE

## 2022-11-24 ENCOUNTER — HOSPITAL ENCOUNTER (EMERGENCY)
Age: 77
Discharge: HOME | End: 2022-11-24
Attending: STUDENT IN AN ORGANIZED HEALTH CARE EDUCATION/TRAINING PROGRAM
Payer: MEDICARE

## 2022-11-24 VITALS
TEMPERATURE: 98.1 F | OXYGEN SATURATION: 99 % | HEART RATE: 62 BPM | DIASTOLIC BLOOD PRESSURE: 61 MMHG | RESPIRATION RATE: 16 BRPM | SYSTOLIC BLOOD PRESSURE: 112 MMHG

## 2022-11-24 DIAGNOSIS — R05.1 ACUTE COUGH: Primary | ICD-10-CM

## 2022-11-24 DIAGNOSIS — R10.2 PELVIC PAIN: ICD-10-CM

## 2022-11-24 DIAGNOSIS — R10.84 ABDOMINAL PAIN, GENERALIZED: ICD-10-CM

## 2022-11-24 LAB
ALBUMIN SERPL-MCNC: 3.5 G/DL (ref 3.5–5)
ALBUMIN/GLOB SERPL: 1.1 {RATIO} (ref 1.1–2.2)
ALP SERPL-CCNC: 116 U/L (ref 45–117)
ALT SERPL-CCNC: 23 U/L (ref 12–78)
ANION GAP SERPL CALC-SCNC: 6 MMOL/L (ref 5–15)
APPEARANCE UR: CLEAR
AST SERPL-CCNC: 20 U/L (ref 15–37)
BASOPHILS # BLD: 0 K/UL (ref 0–0.1)
BASOPHILS NFR BLD: 0 % (ref 0–1)
BILIRUB SERPL-MCNC: 0.2 MG/DL (ref 0.2–1)
BILIRUB UR QL: NEGATIVE
BUN SERPL-MCNC: 34 MG/DL (ref 6–20)
BUN/CREAT SERPL: 30 (ref 12–20)
CALCIUM SERPL-MCNC: 8.6 MG/DL (ref 8.5–10.1)
CHLORIDE SERPL-SCNC: 112 MMOL/L (ref 97–108)
CO2 SERPL-SCNC: 25 MMOL/L (ref 21–32)
COLOR UR: ABNORMAL
COMMENT, HOLDF: NORMAL
COMMENT, HOLDF: NORMAL
CREAT SERPL-MCNC: 1.14 MG/DL (ref 0.55–1.02)
DIFFERENTIAL METHOD BLD: ABNORMAL
EOSINOPHIL # BLD: 0.1 K/UL (ref 0–0.4)
EOSINOPHIL NFR BLD: 2 % (ref 0–7)
ERYTHROCYTE [DISTWIDTH] IN BLOOD BY AUTOMATED COUNT: 13.3 % (ref 11.5–14.5)
GLOBULIN SER CALC-MCNC: 3.3 G/DL (ref 2–4)
GLUCOSE SERPL-MCNC: 114 MG/DL (ref 65–100)
GLUCOSE UR STRIP.AUTO-MCNC: NEGATIVE MG/DL
HCT VFR BLD AUTO: 36.1 % (ref 35–47)
HGB BLD-MCNC: 11.6 G/DL (ref 11.5–16)
HGB UR QL STRIP: NEGATIVE
IMM GRANULOCYTES # BLD AUTO: 0 K/UL (ref 0–0.04)
IMM GRANULOCYTES NFR BLD AUTO: 0 % (ref 0–0.5)
KETONES UR QL STRIP.AUTO: ABNORMAL MG/DL
LEUKOCYTE ESTERASE UR QL STRIP.AUTO: NEGATIVE
LIPASE SERPL-CCNC: 236 U/L (ref 73–393)
LYMPHOCYTES # BLD: 1.2 K/UL (ref 0.8–3.5)
LYMPHOCYTES NFR BLD: 30 % (ref 12–49)
MCH RBC QN AUTO: 30.5 PG (ref 26–34)
MCHC RBC AUTO-ENTMCNC: 32.1 G/DL (ref 30–36.5)
MCV RBC AUTO: 95 FL (ref 80–99)
MONOCYTES # BLD: 0.6 K/UL (ref 0–1)
MONOCYTES NFR BLD: 15 % (ref 5–13)
NEUTS SEG # BLD: 2 K/UL (ref 1.8–8)
NEUTS SEG NFR BLD: 53 % (ref 32–75)
NITRITE UR QL STRIP.AUTO: NEGATIVE
NRBC # BLD: 0 K/UL (ref 0–0.01)
NRBC BLD-RTO: 0 PER 100 WBC
PH UR STRIP: 5.5 [PH] (ref 5–8)
PLATELET # BLD AUTO: 149 K/UL (ref 150–400)
PMV BLD AUTO: 9.4 FL (ref 8.9–12.9)
POTASSIUM SERPL-SCNC: 3.8 MMOL/L (ref 3.5–5.1)
PROT SERPL-MCNC: 6.8 G/DL (ref 6.4–8.2)
PROT UR STRIP-MCNC: NEGATIVE MG/DL
RBC # BLD AUTO: 3.8 M/UL (ref 3.8–5.2)
SAMPLES BEING HELD,HOLD: NORMAL
SAMPLES BEING HELD,HOLD: NORMAL
SODIUM SERPL-SCNC: 143 MMOL/L (ref 136–145)
SP GR UR REFRACTOMETRY: 1.03 (ref 1–1.03)
UROBILINOGEN UR QL STRIP.AUTO: 0.2 EU/DL (ref 0.2–1)
WBC # BLD AUTO: 3.8 K/UL (ref 3.6–11)

## 2022-11-24 PROCEDURE — 74011000636 HC RX REV CODE- 636: Performed by: STUDENT IN AN ORGANIZED HEALTH CARE EDUCATION/TRAINING PROGRAM

## 2022-11-24 PROCEDURE — 81003 URINALYSIS AUTO W/O SCOPE: CPT

## 2022-11-24 PROCEDURE — 74011250636 HC RX REV CODE- 250/636: Performed by: STUDENT IN AN ORGANIZED HEALTH CARE EDUCATION/TRAINING PROGRAM

## 2022-11-24 PROCEDURE — 99285 EMERGENCY DEPT VISIT HI MDM: CPT

## 2022-11-24 PROCEDURE — 96361 HYDRATE IV INFUSION ADD-ON: CPT

## 2022-11-24 PROCEDURE — 36415 COLL VENOUS BLD VENIPUNCTURE: CPT

## 2022-11-24 PROCEDURE — 96374 THER/PROPH/DIAG INJ IV PUSH: CPT

## 2022-11-24 PROCEDURE — 80053 COMPREHEN METABOLIC PANEL: CPT

## 2022-11-24 PROCEDURE — 74177 CT ABD & PELVIS W/CONTRAST: CPT

## 2022-11-24 PROCEDURE — 85025 COMPLETE CBC W/AUTO DIFF WBC: CPT

## 2022-11-24 PROCEDURE — 83690 ASSAY OF LIPASE: CPT

## 2022-11-24 PROCEDURE — 71046 X-RAY EXAM CHEST 2 VIEWS: CPT

## 2022-11-24 RX ORDER — BENZONATATE 100 MG/1
100 CAPSULE ORAL
Qty: 30 CAPSULE | Refills: 0 | Status: SHIPPED | OUTPATIENT
Start: 2022-11-24 | End: 2022-12-01

## 2022-11-24 RX ORDER — KETOROLAC TROMETHAMINE 30 MG/ML
15 INJECTION, SOLUTION INTRAMUSCULAR; INTRAVENOUS
Status: COMPLETED | OUTPATIENT
Start: 2022-11-24 | End: 2022-11-24

## 2022-11-24 RX ORDER — ACETAMINOPHEN 500 MG
1000 TABLET ORAL ONCE
Status: DISCONTINUED | OUTPATIENT
Start: 2022-11-24 | End: 2022-11-25 | Stop reason: HOSPADM

## 2022-11-24 RX ADMIN — KETOROLAC TROMETHAMINE 15 MG: 30 INJECTION, SOLUTION INTRAMUSCULAR; INTRAVENOUS at 23:04

## 2022-11-24 RX ADMIN — SODIUM CHLORIDE 1000 ML: 900 INJECTION, SOLUTION INTRAVENOUS at 22:05

## 2022-11-24 RX ADMIN — IOPAMIDOL 100 ML: 755 INJECTION, SOLUTION INTRAVENOUS at 21:44

## 2022-11-25 NOTE — ED TRIAGE NOTES
TRIAGE NOTE:  Patient arrives ambulatory with c/o 3 days ago was working outside in the cold not dressed properly, and the next day started coughing and it has progressed. Patient reports the bones in her hips are hurting. Patient reports \"its like my bones hurt\".

## 2022-11-25 NOTE — ED PROVIDER NOTES
HPI   Patient is a 78 y.o. F who presents today with complaints of abdominal pain, flank pain. Symptoms started 2 days ago. No aggravating or alleviating factors. Has not been taking anything for symptoms. Reports LLQ and RLQ abdominal pain radiates to flanks, constant. Also reports cough, sore throat x 2 days. Cough is non-productive. Denies any difficulty breathing or handling secretions. Denies facial or oral swelling. Able to eat and drink appropriately. Denies chest pain, shortness of breath. Denies any confusion, syncope, seizure, no focal weakness, no facial droop, no change in gait, no loss of bowel bladder, no urinary retention, no paresthesias or numbness in extremities, no changes to vision blurry vision or change in visual acuity. No diarrhea, constipation, vomiting. Denies vaginal bleeding, vaginal discharge, dysuria. Only PMH is GERD, PTSD-takes Zoloft, Topamax. PMH: GERD  Surgical History: None  Smoking: None  Alcohol: None  Drug Use: None  ALLERGIES: Fosamax [alendronate]    Past Medical History:   Diagnosis Date    Arthritis     Osteoarthritis    Broken arm 2021    Excessive sweating     Occurs in summers; TSH/CMP/CBC normal    GERD (gastroesophageal reflux disease)     WITH HAITEL HERNIA    Menopause     LMP-29years old    MRSA (methicillin resistant Staphylococcus aureus) infection     MRSA + abd abscess; nasal swab negative after treatment    Psychiatric disorder     DEPRESSION. Past Surgical History:   Procedure Laterality Date    HX  SECTION      HX COLONOSCOPY      HX ENDOSCOPY  05/10/2018    Dr. Weiss Handler; gastritis and esophagitis    HX HYSTERECTOMY      Age 29 for endometriosis    IMPLANT BREAST SILICONE/EQ Bilateral  and     1st surgery , replaced bilaterally in .     VT BREAST SURGERY PROCEDURE UNLISTED Bilateral     AUGMENTATION     Family History:   Problem Relation Age of Onset    Diabetes Father     Cancer Father         PROSTATE CANCER    OSTEOARTHRITIS Mother     OSTEOARTHRITIS Sister     Anesth Problems Sister         HAS DIFFICULTY WAKING UP-TAKES VERY LONG. Social History     Socioeconomic History    Marital status:      Spouse name: Not on file    Number of children: Not on file    Years of education: Not on file    Highest education level: Not on file   Occupational History    Not on file   Tobacco Use    Smoking status: Never    Smokeless tobacco: Never   Vaping Use    Vaping Use: Never used   Substance and Sexual Activity    Alcohol use: No    Drug use: No    Sexual activity: Not Currently   Other Topics Concern    Not on file   Social History Narrative    Not on file     Social Determinants of Health     Financial Resource Strain: Not on file   Food Insecurity: Not on file   Transportation Needs: Not on file   Physical Activity: Not on file   Stress: Not on file   Social Connections: Not on file   Intimate Partner Violence: Not on file   Housing Stability: Not on file           Review of Systems   Constitutional:  Negative for fatigue and fever. HENT:  Positive for sore throat. Negative for congestion. Respiratory:  Positive for cough. Negative for shortness of breath and wheezing. Cardiovascular:  Negative for chest pain. Gastrointestinal:  Positive for abdominal pain. Negative for constipation, diarrhea, nausea and vomiting. Genitourinary:  Positive for flank pain. Musculoskeletal:  Negative for arthralgias and myalgias. Skin:  Negative for wound. Neurological:  Negative for dizziness, seizures, light-headedness and numbness. Psychiatric/Behavioral:  Negative for confusion. Vitals:    11/24/22 1939   BP: 119/60   Pulse: 66   Resp: 18   Temp: 97.7 °F (36.5 °C)   SpO2: 98%            Physical Exam  Vitals and nursing note reviewed. Constitutional:       General: She is not in acute distress. Appearance: Normal appearance. She is not ill-appearing, toxic-appearing or diaphoretic.    HENT: Head: Normocephalic and atraumatic. Nose: Nose normal.      Mouth/Throat:      Mouth: Mucous membranes are moist.   Eyes:      Pupils: Pupils are equal, round, and reactive to light. Cardiovascular:      Rate and Rhythm: Normal rate and regular rhythm. Pulmonary:      Effort: Pulmonary effort is normal. No respiratory distress. Breath sounds: Normal breath sounds. No stridor. No wheezing, rhonchi or rales. Chest:      Chest wall: No tenderness. Abdominal:      General: Abdomen is flat. Bowel sounds are normal.      Palpations: Abdomen is soft. Tenderness: There is abdominal tenderness. There is no right CVA tenderness or left CVA tenderness. Negative signs include McBurney's sign. Musculoskeletal:         General: Normal range of motion. Cervical back: Normal range of motion. Skin:     General: Skin is warm and dry. Neurological:      Mental Status: She is alert and oriented to person, place, and time. Mental status is at baseline. Psychiatric:         Mood and Affect: Mood normal.         Behavior: Behavior normal.         Thought Content: Thought content normal.            LABORATORY RESULTS:  No results found for this or any previous visit (from the past 24 hour(s)). IMAGING RESULTS:  No results found. MEDICATIONS GIVEN:  Medications - No data to display         MDM    Patient is a 78 y.o. F who presents today with complaints of abdominal pain, flank pain. Symptoms started 2 days ago. No aggravating or alleviating factors. Has not been taking anything for symptoms. Reports LLQ and RLQ abdominal pain radiates to flanks, constant. Also reports cough, sore throat x 2 days. Cough is non-productive. On exam, TTP in LLQ, RLQ. Ddx include appendicitis vs nephrolithiasis vs pyelonephritis, ordered blood work, CT abdomen and pelvis, CXR. Tylenol for pain. ED Course as of 11/24/22 2155   Thu Nov 24, 2022 2147 CMP reveals elevation of Cr at 1.12, last 6 months ago was 0.8.  BUN slightly elevated, suspect dehydration, no history of HF will order 1 L IVF. CBC unremarkable, UA neg. [KJ]      ED Course User Index  [KJ] He Cedeno PA-C     Patient signed out to Ervin Truong PA-C pending CT abdomen and pelvis.

## 2022-11-25 NOTE — DISCHARGE INSTRUCTIONS
Please take Tessalon Perles to help with cough. Ensure you are staying hydrated. Please follow-up with your primary care physician for reevaluation of symptoms.

## 2022-12-05 ENCOUNTER — VIRTUAL VISIT (OUTPATIENT)
Dept: INTERNAL MEDICINE CLINIC | Age: 77
End: 2022-12-05
Payer: MEDICARE

## 2022-12-05 DIAGNOSIS — M81.0 AGE-RELATED OSTEOPOROSIS WITHOUT CURRENT PATHOLOGICAL FRACTURE: ICD-10-CM

## 2022-12-05 DIAGNOSIS — M54.50 CHRONIC BILATERAL LOW BACK PAIN WITHOUT SCIATICA: Primary | ICD-10-CM

## 2022-12-05 DIAGNOSIS — E78.00 HYPERCHOLESTEROLEMIA: ICD-10-CM

## 2022-12-05 DIAGNOSIS — G89.29 CHRONIC BILATERAL LOW BACK PAIN WITHOUT SCIATICA: Primary | ICD-10-CM

## 2022-12-05 PROCEDURE — G8427 DOCREV CUR MEDS BY ELIG CLIN: HCPCS | Performed by: INTERNAL MEDICINE

## 2022-12-05 PROCEDURE — 99213 OFFICE O/P EST LOW 20 MIN: CPT | Performed by: INTERNAL MEDICINE

## 2022-12-05 PROCEDURE — 3017F COLORECTAL CA SCREEN DOC REV: CPT | Performed by: INTERNAL MEDICINE

## 2022-12-05 PROCEDURE — 1101F PT FALLS ASSESS-DOCD LE1/YR: CPT | Performed by: INTERNAL MEDICINE

## 2022-12-05 PROCEDURE — 1090F PRES/ABSN URINE INCON ASSESS: CPT | Performed by: INTERNAL MEDICINE

## 2022-12-05 PROCEDURE — 1123F ACP DISCUSS/DSCN MKR DOCD: CPT | Performed by: INTERNAL MEDICINE

## 2022-12-05 PROCEDURE — G9717 DOC PT DX DEP/BP F/U NT REQ: HCPCS | Performed by: INTERNAL MEDICINE

## 2022-12-05 RX ORDER — TOPIRAMATE 50 MG/1
50 TABLET, FILM COATED ORAL DAILY
COMMUNITY
Start: 2022-11-26

## 2022-12-05 RX ORDER — ROSUVASTATIN CALCIUM 5 MG/1
TABLET, COATED ORAL
Qty: 90 TABLET | Refills: 3 | Status: SHIPPED | OUTPATIENT
Start: 2022-12-05

## 2022-12-05 NOTE — PROGRESS NOTES
Nikki eHrrera is a 78 y.o. female who was seen by synchronous (real-time) audio-video technology on 12/5/2022 for Cough and LOW BACK PAIN        Assessment & Plan:   Diagnoses and all orders for this visit:    1. Chronic bilateral low back pain without sciatica  I recommended she call her insurance company and find out which acupuncturists or Physical Therapy facilities are covered. Then let me know and I will write a referral order. 2. Hypercholesterolemia  -     Refill rosuvastatin (CRESTOR) 5 mg tablet; TAKE 1 TABLET BY MOUTH NIGHTLY FOR HIGH CHOLESTEROL    3. Age-related osteoporosis without current pathological fracture  Last DEXA done 12/5/19; showed osteoporosis. Due for repeat DEXA. -     DEXA ordered. Follow-up and Dispositions    Return in about 4 months (around 4/5/2023), or if symptoms worsen or fail to improve, for Medicare AWV.           712  Subjective:     Reports her cough is gone. Thinks she had the flu and got from her significant other, Peg Carp. Complains of low back pain. Usually a nagging pain but sometimes worse. Still able to do yard work. Would like to get acupuncture or dry needling for the back pain. Still follows with psychiatrist but no longer following with therapist. Needs refill on Crestor. Prior to Admission medications    Medication Sig Start Date End Date Taking? Authorizing Provider   topiramate (TOPAMAX) 50 mg tablet Take 50 mg by mouth daily. 11/26/22  Yes Provider, Historical   acetaminophen (Tylenol Arthritis Pain) 650 mg TbER Take 1-2 Tablets by mouth as needed for Pain. 1/25/22  Yes Liliana Robbins MD   sertraline (ZOLOFT) 100 mg tablet 200 mg nightly.  10/30/19  Yes Provider, Historical   rosuvastatin (CRESTOR) 5 mg tablet TAKE 1 TABLET BY MOUTH NIGHTLY FOR HIGH CHOLESTEROL 7/28/22   Nova Villanueva MD     Patient Active Problem List   Diagnosis Code    Bipolar disorder ([facility] [address] F31.9    Anxiety disorder F41.9    Primary osteoarthritis of both hands M19.041, M19.042    Hypercholesterolemia E78.00    Vitamin D deficiency E55.9    Age-related osteoporosis without current pathological fracture M81.0    Prediabetes R73.03    Gastroesophageal reflux disease without esophagitis K21.9    Urge urinary incontinence N39.41    Abdominal pain R10.9    Closed nondisplaced fracture of head of radius with routine healing S52.126D    Tinnitus H93.19    Memory impairment R41.3    Right elbow pain M25.521       Objective:     Patient-Reported Vitals 1/25/2022   Patient-Reported Weight 107lb      General: alert, cooperative, no distress   Mental  status: normal mood, behavior, speech, dress, motor activity, and thought processes, able to follow commands   HENT: NCAT   Neck: no visualized mass   Resp: no respiratory distress   Neuro: no gross deficits   Skin: no discoloration or lesions of concern on visible areas   Psychiatric: normal affect, consistent with stated mood, no evidence of hallucinations     Additional exam findings: We discussed the expected course, resolution and complications of the diagnosis(es) in detail. Medication risks, benefits, costs, interactions, and alternatives were discussed as indicated. I advised her to contact the office if her condition worsens, changes or fails to improve as anticipated. She expressed understanding with the diagnosis(es) and plan. Kody Ac, was evaluated through a synchronous (real-time) audio-video encounter. The patient (or guardian if applicable) is aware that this is a billable service, which includes applicable co-pays. This Virtual Visit was conducted with patient's (and/or legal guardian's) consent. The visit was conducted pursuant to the emergency declaration under the [address] [place] [address] authority and the [employer] General Act. Patient identification was verified, and a caregiver was present when appropriate.   The patient was located at: Home: [address] [address]  The provider was located at:  Facility (Appt Department): [address]        Bertha Osei MD

## 2022-12-05 NOTE — PROGRESS NOTES
Kisha Torres  Identified pt with two pt identifiers(name and ). Chief Complaint   Patient presents with    Cough    LOW BACK PAIN       Reviewed record In preparation for visit and have obtained necessary documentation. 1. Have you been to the ER, urgent care clinic or hospitalized since your last visit? No     2. Have you seen or consulted any other health care providers outside of the [address] [place] [address] since your last visit? Include any pap smears or colon screening. No    Vitals reviewed with provider. Health Maintenance reviewed:     Health Maintenance Due   Topic    COVID-19 Vaccine (4 - Booster for Moderna series)    Shingrix Vaccine Age 50> (2 of 2)    Flu Vaccine (1)    Medicare Yearly Exam           Wt Readings from Last 3 Encounters:   22 112 lb (50.8 kg)   22 119 lb (54 kg)   12/10/21 115 lb (52.2 kg)        Temp Readings from Last 3 Encounters:   22 98.1 °F (36.7 °C)   22 98.7 °F (37.1 °C)   22 98 °F (36.7 °C) (Skin)        BP Readings from Last 3 Encounters:   22 112/61   22 124/68   22 120/69        Pulse Readings from Last 3 Encounters:   22 62   22 76   22 67      There were no vitals filed for this visit.        Learning Assessment:   :       Learning Assessment 2015   PRIMARY LEARNER Patient   BARRIERS PRIMARY LEARNER NONE   CO-LEARNER CAREGIVER No   CO-LEARNER HIGHEST LEVEL OF EDUCATION GRADUATED HIGH SCHOOL OR GED   PRIMARY LANGUAGE ENGLISH   LEARNER PREFERENCE PRIMARY LISTENING   ANSWERED BY patient   RELATIONSHIP SELF        Depression Screening:   :       3 most recent PHQ Screens 2022   PHQ Not Done -   Little interest or pleasure in doing things Not at all   Feeling down, depressed, irritable, or hopeless Not at all   Total Score PHQ 2 0   Trouble falling or staying asleep, or sleeping too much Not at all   Feeling tired or having little energy Not at all   Poor appetite, weight loss, or overeating Not at all   Feeling bad about yourself - or that you are a failure or have let yourself or your family down Not at all   Trouble concentrating on things such as school, work, reading, or watching TV Not at all   Moving or speaking so slowly that other people could have noticed; or the opposite being so fidgety that others notice Not at all   Thoughts of being better off dead, or hurting yourself in some way Not at all   PHQ 9 Score 0   How difficult have these problems made it for you to do your work, take care of your home and get along with others Not difficult at all        Fall Risk Assessment:   :       Fall Risk Assessment, last 12 mths 5/25/2022   Able to walk? Yes   Fall in past 12 months? 1   Do you feel unsteady? 0   Are you worried about falling 0   Is TUG test greater than 12 seconds? 0   Is the gait abnormal? 0   Number of falls in past 12 months 1   Fall with injury? 1        Abuse Screening:   :       Abuse Screening Questionnaire 5/25/2022 12/8/2021 4/19/2021 2/28/2020 11/1/2019 6/29/2018 11/23/2015   Do you ever feel afraid of your partner? N N N N N N N   Are you in a relationship with someone who physically or mentally threatens you? N N N N N N N   Is it safe for you to go home?  Y Y Y Y Y Y Y        ADL Screening:   :       ADL Assessment 5/25/2022   Feeding yourself No Help Needed   Getting from bed to chair No Help Needed   Getting dressed No Help Needed   Bathing or showering No Help Needed   Walk across the room (includes cane/walker) No Help Needed   Using the telphone No Help Needed   Taking your medications No Help Needed   Preparing meals No Help Needed   Managing money (expenses/bills) No Help Needed   Moderately strenuous housework (laundry) No Help Needed   Shopping for personal items (toiletries/medicines) No Help Needed   Shopping for groceries No Help Needed   Driving No Help Needed   Climbing a flight of stairs No Help Needed   Getting to places beyond walking distances No Help Needed

## 2023-04-18 ENCOUNTER — OFFICE VISIT (OUTPATIENT)
Dept: INTERNAL MEDICINE CLINIC | Age: 78
End: 2023-04-18
Payer: MEDICARE

## 2023-04-18 VITALS
WEIGHT: 111.2 LBS | OXYGEN SATURATION: 96 % | DIASTOLIC BLOOD PRESSURE: 56 MMHG | TEMPERATURE: 98.3 F | HEART RATE: 80 BPM | HEIGHT: 60 IN | RESPIRATION RATE: 14 BRPM | SYSTOLIC BLOOD PRESSURE: 114 MMHG | BODY MASS INDEX: 21.83 KG/M2

## 2023-04-18 DIAGNOSIS — F41.1 GENERALIZED ANXIETY DISORDER: ICD-10-CM

## 2023-04-18 DIAGNOSIS — M81.0 AGE-RELATED OSTEOPOROSIS WITHOUT CURRENT PATHOLOGICAL FRACTURE: ICD-10-CM

## 2023-04-18 DIAGNOSIS — E78.00 HYPERCHOLESTEROLEMIA: ICD-10-CM

## 2023-04-18 DIAGNOSIS — R73.03 PREDIABETES: ICD-10-CM

## 2023-04-18 DIAGNOSIS — Z00.00 MEDICARE ANNUAL WELLNESS VISIT, SUBSEQUENT: Primary | ICD-10-CM

## 2023-04-18 DIAGNOSIS — E55.9 VITAMIN D DEFICIENCY: ICD-10-CM

## 2023-04-18 DIAGNOSIS — R41.3 MEMORY IMPAIRMENT: ICD-10-CM

## 2023-04-18 DIAGNOSIS — F31.62 BIPOLAR DISORDER, CURRENT EPISODE MIXED, MODERATE (HCC): ICD-10-CM

## 2023-04-18 DIAGNOSIS — Z13.31 SCREENING FOR DEPRESSION: ICD-10-CM

## 2023-04-18 PROCEDURE — G8420 CALC BMI NORM PARAMETERS: HCPCS | Performed by: INTERNAL MEDICINE

## 2023-04-18 PROCEDURE — G8427 DOCREV CUR MEDS BY ELIG CLIN: HCPCS | Performed by: INTERNAL MEDICINE

## 2023-04-18 PROCEDURE — 99214 OFFICE O/P EST MOD 30 MIN: CPT | Performed by: INTERNAL MEDICINE

## 2023-04-18 PROCEDURE — 1090F PRES/ABSN URINE INCON ASSESS: CPT | Performed by: INTERNAL MEDICINE

## 2023-04-18 PROCEDURE — G8536 NO DOC ELDER MAL SCRN: HCPCS | Performed by: INTERNAL MEDICINE

## 2023-04-18 PROCEDURE — 1101F PT FALLS ASSESS-DOCD LE1/YR: CPT | Performed by: INTERNAL MEDICINE

## 2023-04-18 PROCEDURE — G9717 DOC PT DX DEP/BP F/U NT REQ: HCPCS | Performed by: INTERNAL MEDICINE

## 2023-04-18 PROCEDURE — 3017F COLORECTAL CA SCREEN DOC REV: CPT | Performed by: INTERNAL MEDICINE

## 2023-04-18 PROCEDURE — G0439 PPPS, SUBSEQ VISIT: HCPCS | Performed by: INTERNAL MEDICINE

## 2023-04-18 PROCEDURE — 1123F ACP DISCUSS/DSCN MKR DOCD: CPT | Performed by: INTERNAL MEDICINE

## 2023-04-18 RX ORDER — SERTRALINE HYDROCHLORIDE 100 MG/1
100 TABLET, FILM COATED ORAL DAILY
Qty: 90 TABLET | Refills: 1 | Status: SHIPPED | OUTPATIENT
Start: 2023-04-18 | End: 2023-04-18 | Stop reason: ALTCHOICE

## 2023-04-18 RX ORDER — SERTRALINE HYDROCHLORIDE 100 MG/1
200 TABLET, FILM COATED ORAL
Qty: 180 TABLET | Refills: 1 | Status: SHIPPED | OUTPATIENT
Start: 2023-04-18

## 2023-04-18 RX ORDER — TOPIRAMATE 50 MG/1
50 TABLET, FILM COATED ORAL DAILY
Qty: 90 TABLET | Refills: 1 | Status: SHIPPED | OUTPATIENT
Start: 2023-04-18

## 2023-04-18 NOTE — PROGRESS NOTES
This is the Subsequent Medicare Annual Wellness Exam, performed 12 months or more after the Initial AWV or the last Subsequent AWV    I have reviewed the patient's medical history in detail and updated the computerized patient record. Assessment/Plan   Education and counseling provided:  Are appropriate based on today's review and evaluation    1. Medicare annual wellness visit, subsequent  2. Screening for depression  -     DEPRESSION SCREEN ANNUAL       Depression Risk Factor Screening     3 most recent PHQ Screens 4/18/2023   PHQ Not Done -   Little interest or pleasure in doing things Not at all   Feeling down, depressed, irritable, or hopeless Not at all   Total Score PHQ 2 0   Trouble falling or staying asleep, or sleeping too much -   Feeling tired or having little energy -   Poor appetite, weight loss, or overeating -   Feeling bad about yourself - or that you are a failure or have let yourself or your family down -   Trouble concentrating on things such as school, work, reading, or watching TV -   Moving or speaking so slowly that other people could have noticed; or the opposite being so fidgety that others notice -   Thoughts of being better off dead, or hurting yourself in some way -   PHQ 9 Score -   How difficult have these problems made it for you to do your work, take care of your home and get along with others -       Alcohol & Drug Abuse Risk Screen    Do you average more than 1 drink per night or more than 7 drinks a week:  No    On any one occasion in the past three months have you have had more than 3 drinks containing alcohol:  No          Functional Ability and Level of Safety    Hearing: Hearing is good. Activities of Daily Living: The home contains: no safety equipment. Patient does total self care      Ambulation: with no difficulty     Fall Risk:  Fall Risk Assessment, last 12 mths 4/18/2023   Able to walk? Yes   Fall in past 12 months? 0   Do you feel unsteady?  0   Are you worried about falling 0   Is TUG test greater than 12 seconds? -   Is the gait abnormal? -   Number of falls in past 12 months -   Fall with injury? -      Abuse Screen:  Patient is not abused       Cognitive Screening    Has your family/caregiver stated any concerns about your memory: no     Cognitive Screening: Recalled 2 of 3 words after 5 mins    Health Maintenance Due     Health Maintenance Due   Topic Date Due    A1C test (Diabetic or Prediabetic)  2022       Patient Care Team   Patient Care Team:  Allyson Rucker MD as PCP - General (Internal Medicine Physician)  Allyson Rucker MD as PCP - [facility] Empaneled Provider  Stefania Avendano NP (Psychiatry)    History     Patient Active Problem List   Diagnosis Code    Bipolar disorder [facility]) F31.9    Anxiety disorder F41.9    Primary osteoarthritis of both hands M19.041, M19.042    Hypercholesterolemia E78.00    Vitamin D deficiency E55.9    Age-related osteoporosis without current pathological fracture M81.0    Prediabetes R73.03    Gastroesophageal reflux disease without esophagitis K21.9    Urge urinary incontinence N39.41    Abdominal pain R10.9    Closed nondisplaced fracture of head of radius with routine healing S52.126D    Tinnitus H93.19    Memory impairment R41.3    Right elbow pain M25.521     Past Medical History:   Diagnosis Date    Arthritis     Osteoarthritis    Broken arm 2021    Excessive sweating     Occurs in summers; TSH/CMP/CBC normal    GERD (gastroesophageal reflux disease)     WITH HAITEL HERNIA    Menopause     LMP-29years old    MRSA (methicillin resistant Staphylococcus aureus) infection 2007    MRSA + abd abscess; nasal swab negative after treatment    Psychiatric disorder     DEPRESSION.        Past Surgical History:   Procedure Laterality Date    HX  SECTION      HX COLONOSCOPY      HX ENDOSCOPY  05/10/2018    Dr. Estelita Mcintosh; gastritis and esophagitis    HX HYSTERECTOMY      Age 29 for endometriosis    IMPLANT BREAST SILICONE/EQ Bilateral 1936 and 2014    1st surgery 1973, replaced bilaterally in 2014. [place] UNLISTED PROCEDURE BREAST Bilateral 1974    AUGMENTATION     Current Outpatient Medications   Medication Sig Dispense Refill    topiramate (TOPAMAX) 50 mg tablet Take 1 Tablet by mouth daily. acetaminophen (Tylenol Arthritis Pain) 650 mg TbER Take 1-2 Tablets by mouth as needed for Pain. 30 Tablet 0    sertraline (ZOLOFT) 100 mg tablet 2 Tablets nightly. rosuvastatin (CRESTOR) 5 mg tablet TAKE 1 TABLET BY MOUTH NIGHTLY FOR HIGH CHOLESTEROL (Patient not taking: Reported on 4/18/2023) 90 Tablet 3     Allergies   Allergen Reactions    Fosamax [Alendronate] Other (comments)     Severe heartburn       Family History   Problem Relation Age of Onset    Diabetes Father     Cancer Father         PROSTATE CANCER    OSTEOARTHRITIS Mother     OSTEOARTHRITIS Sister     Anesth Problems Sister         HAS DIFFICULTY WAKING UP-TAKES VERY LONG.      Social History     Tobacco Use    Smoking status: Never    Smokeless tobacco: Never   Substance Use Topics    Alcohol use: No         Rubina Monge MD

## 2023-04-18 NOTE — PROGRESS NOTES
Kisha Torres  Identified pt with two pt identifiers(name and ). Chief Complaint   Patient presents with    Annual Wellness Visit     Room 3A //        Reviewed record In preparation for visit and have obtained necessary documentation. 1. Have you been to the ER, urgent care clinic or hospitalized since your last visit? No     2. Have you seen or consulted any other health care providers outside of the [address] [place] [address] since your last visit? Include any pap smears or colon screening. No    Patient has an advance directive. Vitals reviewed with provider.     Health Maintenance reviewed:     Health Maintenance Due   Topic    Medicare Yearly Exam     A1C test (Diabetic or Prediabetic)           Wt Readings from Last 3 Encounters:   23 111 lb 3.2 oz (50.4 kg)   22 112 lb (50.8 kg)   22 119 lb (54 kg)        Temp Readings from Last 3 Encounters:   23 98.3 °F (36.8 °C) (Oral)   22 98.1 °F (36.7 °C)   22 98.7 °F (37.1 °C)        BP Readings from Last 3 Encounters:   23 (!) 114/56   22 112/61   22 124/68        Pulse Readings from Last 3 Encounters:   23 80   22 62   22 76        Vitals:    23 1415   BP: (!) 114/56   Pulse: 80   Resp: 14   Temp: 98.3 °F (36.8 °C)   TempSrc: Oral   SpO2: 96%   Weight: 111 lb 3.2 oz (50.4 kg)   Height: 5' (1.524 m)   PainSc:   0 - No pain          Learning Assessment:   :       Learning Assessment 2015   PRIMARY LEARNER Patient   BARRIERS PRIMARY LEARNER NONE   CO-LEARNER CAREGIVER No   CO-LEARNER HIGHEST LEVEL OF EDUCATION GRADUATED HIGH SCHOOL OR GED   PRIMARY LANGUAGE ENGLISH   LEARNER PREFERENCE PRIMARY LISTENING   ANSWERED BY patient   RELATIONSHIP SELF        Depression Screening:   :       3 most recent PHQ Screens 2023   PHQ Not Done -   Little interest or pleasure in doing things Not at all   Feeling down, depressed, irritable, or hopeless Not at all   Total Score PHQ 2 0 Trouble falling or staying asleep, or sleeping too much -   Feeling tired or having little energy -   Poor appetite, weight loss, or overeating -   Feeling bad about yourself - or that you are a failure or have let yourself or your family down -   Trouble concentrating on things such as school, work, reading, or watching TV -   Moving or speaking so slowly that other people could have noticed; or the opposite being so fidgety that others notice -   Thoughts of being better off dead, or hurting yourself in some way -   PHQ 9 Score -   How difficult have these problems made it for you to do your work, take care of your home and get along with others -        Fall Risk Assessment:   :       Fall Risk Assessment, last 12 mths 4/18/2023   Able to walk? Yes   Fall in past 12 months? 0   Do you feel unsteady? 0   Are you worried about falling 0   Is TUG test greater than 12 seconds? -   Is the gait abnormal? -   Number of falls in past 12 months -   Fall with injury? -        Abuse Screening:   :       Abuse Screening Questionnaire 4/18/2023 5/25/2022 12/8/2021 4/19/2021 2/28/2020 11/1/2019 6/29/2018   Do you ever feel afraid of your partner? N N N N N N N   Are you in a relationship with someone who physically or mentally threatens you? N N N N N N N   Is it safe for you to go home?  Y Y Y Y Y Y Y        ADL Screening:   :       ADL Assessment 4/18/2023   Feeding yourself No Help Needed   Getting from bed to chair No Help Needed   Getting dressed No Help Needed   Bathing or showering No Help Needed   Walk across the room (includes cane/walker) No Help Needed   Using the telphone No Help Needed   Taking your medications No Help Needed   Preparing meals No Help Needed   Managing money (expenses/bills) No Help Needed   Moderately strenuous housework (laundry) No Help Needed   Shopping for personal items (toiletries/medicines) No Help Needed   Shopping for groceries No Help Needed   Driving No Help Needed   Climbing a flight of stairs No Help Needed   Getting to places beyond walking distances No Help Needed

## 2023-04-18 NOTE — PATIENT INSTRUCTIONS
Medicare Wellness Visit, Female     The best way to live healthy is to have a lifestyle where you eat a well-balanced diet, exercise regularly, limit alcohol use, and quit all forms of tobacco/nicotine, if applicable. Regular preventive services are another way to keep healthy. Preventive services (vaccines, screening tests, monitoring & exams) can help personalize your care plan, which helps you manage your own care. Screening tests can find health problems at the earliest stages, when they are easiest to treat. [facility] follows the current, evidence-based guidelines published by the [facility] ([facility]) when recommending preventive services for our patients. Because we follow these guidelines, sometimes recommendations change over time as research supports it. (For example, mammograms used to be recommended annually. Even though Medicare will still pay for an annual mammogram, the newer guidelines recommend a mammogram every two years for women of average risk). Of course, you and your doctor may decide to screen more often for some diseases, based on your risk and your co-morbidities (chronic disease you are already diagnosed with). Preventive services for you include:  - Medicare offers their members a free annual wellness visit, which is time for you and your primary care provider to discuss and plan for your preventive service needs.  Take advantage of this benefit every year!    -Over the age of 72 should receive the recommended pneumonia vaccines.    -All adults should have a flu vaccine yearly.  -All adults should have a tetanus vaccine every 10 years.   -Over the age 48 should receive the shingles vaccines.        -All adults should be screened once for Hepatitis C.  -All adults age 38-68 who are overweight should have a diabetes screening test once every three years.   -Other screening tests and preventive services for persons with diabetes include: an eye exam to screen for diabetic retinopathy, a kidney function test, a foot exam, and stricter control over your cholesterol.   -Cardiovascular screening for adults with routine risk involves an electrocardiogram (ECG) at intervals determined by your doctor.     -Colorectal cancer screenings should be done for adults age 39-70 with no increased risk factors for colorectal cancer. There are a number of acceptable methods of screening for this type of cancer. Each test has its own benefits and drawbacks. Discuss with your doctor what is most appropriate for you during your annual wellness visit. The different tests include: colonoscopy (considered the best screening method), a fecal occult blood test, a fecal DNA test, and sigmoidoscopy.    -Lung cancer screening is recommended annually with a low dose CT scan for adults between age 54 and 68, who have smoked at least 30 pack years (equivalent of 1 pack per day for 30 days), and who is a current smoker or quit less than 15 years ago.    -A bone mass density test is recommended when a woman turns 65 to screen for osteoporosis. This test is only recommended one time, as a screening. Some providers will use this same test as a disease monitoring tool if you already have osteoporosis. -Breast cancer screenings are recommended every other year for women of normal risk, age 54-69.    -Cervical cancer screenings for women over age 72 are only recommended with certain risk factors. Here is a list of your current Health Maintenance items (your personalized list of preventive services) with a due date:  Health Maintenance Due   Topic Date Due    Hemoglobin A1C    2022           Patient Instructions  Call [phone] to schedule DEXA bone density study.   Call the following places to schedule with a psychiatrist.    [facility]  [address] [facility], [address] [facility]  [phone]    [facility] [place] [phone]    [facility]  [address] [facility], [address] [place] [address]    Dr. America Porter and Terri Jamison, KALEN  [facility], [address]  [phone]

## 2023-04-18 NOTE — ACP (ADVANCE CARE PLANNING)
Advance Care Planning     Advance Care Planning (ACP) Physician/NP/PA Conversation      Date of Conversation: 4/18/2023  Conducted with: Patient with Decision Making Capacity    Healthcare Decision Maker:     Primary Decision Maker: Nirmal Juwan - [phone]    Secondary Decision Maker: Stoney Muniz - [phone]  Click here to complete [address] including selection of the Healthcare Decision Maker Relationship (ie \"Primary\")    Care Preferences:    Hospitalization: \"If your health worsens and it becomes clear that your chance of recovery is unlikely, what would be your preference regarding hospitalization? \"  The patient would prefer hospitalization. Ventilation: \"If you were unable to breathe on your own and your chance of recovery was unlikely, what would be your preference about the use of a ventilator (breathing machine) if it was available to you? \"   The patient would desire the use of a ventilator. Resuscitation: \"In the event your heart stopped as a result of an underlying serious health condition, would you want attempts to be made to restart your heart, or would you prefer a natural death? \"   Yes, attempt to resuscitate.     Additional topics discussed: treatment goals    Conversation Outcomes / Follow-Up Plan:   ACP in process - information provided, considering goals and options  Reviewed DNR/DNI and patient elects Full Code (Attempt Resuscitation)     Length of Voluntary ACP Conversation in minutes:  <16 minutes (Non-Billable)    Charlene Peres MD

## 2023-04-18 NOTE — PROGRESS NOTES
Chief Complaint   Patient presents with    Annual Wellness Visit     Room 3A //        HISTORY OF PRESENT ILLNESS  Skyler Rodriguez is a 78 y.o. female    Presents for Medicare AWV and follow up evaluation. Complains of feeling tired. 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 Has not slept well past few days due to some issues after speaking with her twin sister. Will be traveling to [place] her friend Leonard leaving on 5/4/23. Has occasional right-sided hip aching from the way she positions herself during landscaping work. Complains of a nodule on the back of her right middle finger. Used to see Dr. Reilly Bardales (Plastic Surgery) for hand problems but he is now retired. Had GERD surgery 2 weeks at [facility]. Her mental health specialist, Dr. Amadou Levi at [address] [place] [address] has moved to [facility].  Needs to find a new psychiatrist. Needs refills on sertraline and Topamax until she can find a new psychiatrist.      Patient Active Problem List   Diagnosis Code    Bipolar disorder ([facility] [address] F31.9    Anxiety disorder F41.9    Primary osteoarthritis of both hands M19.041, M19.042    Hypercholesterolemia E78.00    Vitamin D deficiency E55.9    Age-related osteoporosis without current pathological fracture M81.0    Prediabetes R73.03    Gastroesophageal reflux disease without esophagitis K21.9    Urge urinary incontinence N39.41    Abdominal pain R10.9    Closed nondisplaced fracture of head of radius with routine healing S52.126D    Tinnitus H93.19    Memory impairment R41.3    Right elbow pain M25.521     Past Medical History:   Diagnosis Date    Arthritis     Osteoarthritis    Broken arm 11/17/2021    Excessive sweating     Occurs in summers; TSH/CMP/CBC normal    GERD (gastroesophageal reflux disease)     WITH HAITEL HERNIA    Menopause     LMP-29years old    MRSA (methicillin resistant Staphylococcus aureus) infection 2007    MRSA + abd abscess; nasal swab negative after treatment Psychiatric disorder     DEPRESSION. Allergies   Allergen Reactions    Fosamax [Alendronate] Other (comments)     Severe heartburn       Current Outpatient Medications   Medication Sig Dispense Refill    topiramate (TOPAMAX) 50 mg tablet Take 1 Tablet by mouth daily. acetaminophen (Tylenol Arthritis Pain) 650 mg TbER Take 1-2 Tablets by mouth as needed for Pain. 30 Tablet 0    sertraline (ZOLOFT) 100 mg tablet 2 Tablets nightly. rosuvastatin (CRESTOR) 5 mg tablet TAKE 1 TABLET BY MOUTH NIGHTLY FOR HIGH CHOLESTEROL (Patient not taking: Reported on 4/18/2023) 90 Tablet 3         PHYSICAL EXAM  Visit Vitals  BP (!) 114/56 (BP 1 Location: Left upper arm, BP Patient Position: Sitting, BP Cuff Size: Adult)   Pulse 80   Temp 98.3 °F (36.8 °C) (Oral)   Resp 14   Ht 5' (1.524 m)   Wt 111 lb 3.2 oz (50.4 kg)   SpO2 96%   BMI 21.72 kg/m²       General: Well-developed and well-nourished, no distress. HEENT:  Head normocephalic/atraumatic, no scleral icterus  Neck: Supple. No carotid bruits, JVD, lymphadenopathy, or thyromegaly. Lungs:  Clear to ausculation bilaterally. Good air movement. Heart:  Regular rate and rhythm, normal S1 and S2, no murmur, gallop, or rub  Abdomen: Soft, non-distended, healing small incisions across abdomen, normal bowel sounds. Extremities: No clubbing, cyanosis, or edema. 2+ pedal pulses bilaterally. Normal ROM at both knees. Neurological: Alert and oriented. No focal deficits. Psychiatric: Normal mood and affect. Behavior is normal.          ASSESSMENT AND PLAN    ICD-10-CM ICD-9-CM    1. Medicare annual wellness visit, subsequent  Z00.00 V70.0       2.  Hypercholesterolemia  J80.53 146.8 METABOLIC PANEL, COMPREHENSIVE      CBC WITH AUTOMATED DIFF      LIPID PANEL      METABOLIC PANEL, COMPREHENSIVE      CBC WITH AUTOMATED DIFF      LIPID PANEL      3. Memory impairment  R41.3 780.93 REFERRAL TO NEUROPSYCHOLOGY      4. Generalized anxiety disorder  F41.1 300.02 sertraline (ZOLOFT) 100 mg tablet      DISCONTINUED: sertraline (ZOLOFT) 100 mg tablet      5. Bipolar disorder, current episode mixed, moderate (HCC)  F31.62 296.62 topiramate (TOPAMAX) 50 mg tablet      6. Age-related osteoporosis without current pathological fracture  M81.0 733.01       7. Prediabetes  R73.03 790.29 HEMOGLOBIN A1C WITH EAG      HEMOGLOBIN A1C WITH EAG      8. Vitamin D deficiency  E55.9 268.9 VITAMIN D, 25 HYDROXY      VITAMIN D, 25 HYDROXY      9. Screening for depression  Z13.31 V79.0 DEPRESSION SCREEN ANNUAL        Diagnoses and all orders for this visit:    1. Medicare annual wellness visit, subsequent    2. Hypercholesterolemia  -     METABOLIC PANEL, COMPREHENSIVE; Future  -     CBC WITH AUTOMATED DIFF; Future  -     LIPID PANEL; Future    3. Memory impairment  -     REFERRAL TO NEUROPSYCHOLOGY    4. Generalized anxiety disorder  -     sertraline (ZOLOFT) 100 mg tablet; Take 2 Tablets by mouth nightly. 5. Bipolar disorder, current episode mixed, moderate (HCC)  -     topiramate (TOPAMAX) 50 mg tablet; Take 1 Tablet by mouth daily. 6. Age-related osteoporosis without current pathological fracture    7. Prediabetes  -     HEMOGLOBIN A1C WITH EAG; Future    8. Vitamin D deficiency  -     VITAMIN D, 25 HYDROXY; Future    9. Screening for depression  Time spent: 8 mins spent on depression screening.  -     Ping Fields    She never scheduled DEXA.  called her 3 times and was unable to reach her. Bipolar disorder and anxiety disorder controlled. Refills sent on sertraline and Topamax. She is concerned about her short-term memory. Cannot remember names and never recalls them. Referral for Neuropsychological testing. To have fasting labs before next appointment. Patient Instructions  Call [phone] to schedule DEXA bone density study.   Call the following places to schedule with a psychiatrist.    [facility]    [address], [address], [place], [address] [facility]  [address]  [phone]    [facility]  [address] [facility], [address] [place] [address]    Dr. Brittney Langford and Henrietta Arreola, [address], [address]  [phone]    Follow-up and Dispositions    Return in about 6 months (around 10/18/2023), or if symptoms worsen or fail to improve, for Osteoporosis; have fasting labs 1 week before appointment. I have discussed the diagnosis with the patient and the intended plan as seen in the above orders. Patient is in agreement. The patient has received an after-visit summary and questions were answered concerning future plans. I have discussed medication side effects and warnings with the patient as well.

## 2023-04-23 DIAGNOSIS — E78.00 HYPERCHOLESTEROLEMIA: Primary | ICD-10-CM

## 2023-04-24 DIAGNOSIS — E78.00 HYPERCHOLESTEROLEMIA: Primary | ICD-10-CM

## 2023-04-24 DIAGNOSIS — E55.9 VITAMIN D DEFICIENCY: Primary | ICD-10-CM

## 2023-04-25 DIAGNOSIS — E78.00 HYPERCHOLESTEROLEMIA: Primary | ICD-10-CM

## 2023-04-28 DIAGNOSIS — R73.03 PREDIABETES: Primary | ICD-10-CM

## 2023-05-18 RX ORDER — SERTRALINE HYDROCHLORIDE 100 MG/1
2 TABLET, FILM COATED ORAL NIGHTLY
COMMUNITY
Start: 2023-04-18

## 2023-05-18 RX ORDER — SENNOSIDES 8.6 MG
650-1300 CAPSULE ORAL PRN
COMMUNITY
Start: 2022-01-25

## 2023-05-18 RX ORDER — TOPIRAMATE 50 MG/1
50 TABLET, FILM COATED ORAL DAILY
COMMUNITY
Start: 2023-04-18

## 2023-10-09 DIAGNOSIS — F31.9 BIPOLAR AFFECTIVE DISORDER, REMISSION STATUS UNSPECIFIED (HCC): Primary | ICD-10-CM

## 2023-10-09 DIAGNOSIS — F41.1 GENERALIZED ANXIETY DISORDER: ICD-10-CM

## 2023-10-10 RX ORDER — SERTRALINE HYDROCHLORIDE 100 MG/1
200 TABLET, FILM COATED ORAL NIGHTLY
Qty: 60 TABLET | Refills: 5 | Status: SHIPPED | OUTPATIENT
Start: 2023-10-10

## 2023-10-10 RX ORDER — TOPIRAMATE 50 MG/1
50 TABLET, FILM COATED ORAL DAILY
Qty: 60 TABLET | Refills: 5 | Status: SHIPPED | OUTPATIENT
Start: 2023-10-10

## 2023-10-10 NOTE — TELEPHONE ENCOUNTER
Called pt verified name and , asked pt which pharmacy she would like her Rx sent to.  Pharmacy added in pt chart

## 2023-10-10 NOTE — TELEPHONE ENCOUNTER
PCP: Carole Lopez MD     Last appt:  4/18/2023    No future appointments.        Requested Prescriptions     Pending Prescriptions Disp Refills    topiramate (TOPAMAX) 50 MG tablet 60 tablet 0     Sig: Take 1 tablet by mouth daily    sertraline (ZOLOFT) 100 MG tablet 60 tablet 0     Sig: Take 2 tablets by mouth nightly

## 2023-12-18 ENCOUNTER — TELEPHONE (OUTPATIENT)
Facility: CLINIC | Age: 78
End: 2023-12-18

## 2024-02-14 ENCOUNTER — TELEPHONE (OUTPATIENT)
Facility: CLINIC | Age: 79
End: 2024-02-14

## 2024-02-14 NOTE — TELEPHONE ENCOUNTER
----- Message from Jovana Ng sent at 2/14/2024 11:01 AM EST -----  Subject: Message to Provider    QUESTIONS  Information for Provider? [facility] is calling in and wanting to   know when was the pt last appt with Dr. Alejandre. I looked and can not find   it out. They want to knw if pcp would sign orders for braces order or does   the office have an eternal provider. This is for a back and sosa brace.   Please call Nancy barrientos with this information.  ---------------------------------------------------------------------------  --------------  CALL BACK INFO  [phone]; Do not leave any message, patient will call back for answer  ---------------------------------------------------------------------------  --------------  SCRIPT ANSWERS  Relationship to Patient? Covered Entity  Covered Entity Type? Durable Medical Equipment?  Representative Name? [facility]

## 2024-02-26 ENCOUNTER — OFFICE VISIT (OUTPATIENT)
Facility: CLINIC | Age: 79
End: 2024-02-26
Payer: MEDICARE

## 2024-02-26 VITALS
SYSTOLIC BLOOD PRESSURE: 113 MMHG | DIASTOLIC BLOOD PRESSURE: 74 MMHG | TEMPERATURE: 97.8 F | HEIGHT: 60 IN | WEIGHT: 117 LBS | RESPIRATION RATE: 16 BRPM | HEART RATE: 77 BPM | OXYGEN SATURATION: 97 % | BODY MASS INDEX: 22.97 KG/M2

## 2024-02-26 DIAGNOSIS — M81.0 AGE-RELATED OSTEOPOROSIS WITHOUT CURRENT PATHOLOGICAL FRACTURE: ICD-10-CM

## 2024-02-26 DIAGNOSIS — M54.50 CHRONIC BILATERAL LOW BACK PAIN WITHOUT SCIATICA: Primary | ICD-10-CM

## 2024-02-26 DIAGNOSIS — G89.29 CHRONIC BILATERAL LOW BACK PAIN WITHOUT SCIATICA: Primary | ICD-10-CM

## 2024-02-26 DIAGNOSIS — L60.0 INGROWN TOENAIL OF RIGHT FOOT: ICD-10-CM

## 2024-02-26 DIAGNOSIS — R41.3 MEMORY LOSS: ICD-10-CM

## 2024-02-26 DIAGNOSIS — Z23 NEEDS FLU SHOT: ICD-10-CM

## 2024-02-26 DIAGNOSIS — F31.62 BIPOLAR DISORDER, CURRENT EPISODE MIXED, MODERATE (HCC): ICD-10-CM

## 2024-02-26 PROCEDURE — 90694 VACC AIIV4 NO PRSRV 0.5ML IM: CPT | Performed by: INTERNAL MEDICINE

## 2024-02-26 PROCEDURE — G8420 CALC BMI NORM PARAMETERS: HCPCS | Performed by: INTERNAL MEDICINE

## 2024-02-26 PROCEDURE — G8484 FLU IMMUNIZE NO ADMIN: HCPCS | Performed by: INTERNAL MEDICINE

## 2024-02-26 PROCEDURE — G0008 ADMIN INFLUENZA VIRUS VAC: HCPCS | Performed by: INTERNAL MEDICINE

## 2024-02-26 PROCEDURE — 1036F TOBACCO NON-USER: CPT | Performed by: INTERNAL MEDICINE

## 2024-02-26 PROCEDURE — G8427 DOCREV CUR MEDS BY ELIG CLIN: HCPCS | Performed by: INTERNAL MEDICINE

## 2024-02-26 PROCEDURE — 99214 OFFICE O/P EST MOD 30 MIN: CPT | Performed by: INTERNAL MEDICINE

## 2024-02-26 PROCEDURE — G8399 PT W/DXA RESULTS DOCUMENT: HCPCS | Performed by: INTERNAL MEDICINE

## 2024-02-26 PROCEDURE — 1123F ACP DISCUSS/DSCN MKR DOCD: CPT | Performed by: INTERNAL MEDICINE

## 2024-02-26 PROCEDURE — 1090F PRES/ABSN URINE INCON ASSESS: CPT | Performed by: INTERNAL MEDICINE

## 2024-02-26 SDOH — ECONOMIC STABILITY: INCOME INSECURITY: HOW HARD IS IT FOR YOU TO PAY FOR THE VERY BASICS LIKE FOOD, HOUSING, MEDICAL CARE, AND HEATING?: NOT HARD AT ALL

## 2024-02-26 SDOH — ECONOMIC STABILITY: FOOD INSECURITY: WITHIN THE PAST 12 MONTHS, YOU WORRIED THAT YOUR FOOD WOULD RUN OUT BEFORE YOU GOT MONEY TO BUY MORE.: NEVER TRUE

## 2024-02-26 SDOH — ECONOMIC STABILITY: FOOD INSECURITY: WITHIN THE PAST 12 MONTHS, THE FOOD YOU BOUGHT JUST DIDN'T LAST AND YOU DIDN'T HAVE MONEY TO GET MORE.: NEVER TRUE

## 2024-02-26 SDOH — ECONOMIC STABILITY: HOUSING INSECURITY
IN THE LAST 12 MONTHS, WAS THERE A TIME WHEN YOU DID NOT HAVE A STEADY PLACE TO SLEEP OR SLEPT IN A SHELTER (INCLUDING NOW)?: NO

## 2024-02-26 ASSESSMENT — PATIENT HEALTH QUESTIONNAIRE - PHQ9
SUM OF ALL RESPONSES TO PHQ9 QUESTIONS 1 & 2: 0
SUM OF ALL RESPONSES TO PHQ QUESTIONS 1-9: 0
SUM OF ALL RESPONSES TO PHQ QUESTIONS 1-9: 0
6. FEELING BAD ABOUT YOURSELF - OR THAT YOU ARE A FAILURE OR HAVE LET YOURSELF OR YOUR FAMILY DOWN: 0
8. MOVING OR SPEAKING SO SLOWLY THAT OTHER PEOPLE COULD HAVE NOTICED. OR THE OPPOSITE, BEING SO FIGETY OR RESTLESS THAT YOU HAVE BEEN MOVING AROUND A LOT MORE THAN USUAL: 0
SUM OF ALL RESPONSES TO PHQ QUESTIONS 1-9: 0
10. IF YOU CHECKED OFF ANY PROBLEMS, HOW DIFFICULT HAVE THESE PROBLEMS MADE IT FOR YOU TO DO YOUR WORK, TAKE CARE OF THINGS AT HOME, OR GET ALONG WITH OTHER PEOPLE: 0
SUM OF ALL RESPONSES TO PHQ QUESTIONS 1-9: 0
5. POOR APPETITE OR OVEREATING: 0
4. FEELING TIRED OR HAVING LITTLE ENERGY: 0
2. FEELING DOWN, DEPRESSED OR HOPELESS: 0
9. THOUGHTS THAT YOU WOULD BE BETTER OFF DEAD, OR OF HURTING YOURSELF: 0
3. TROUBLE FALLING OR STAYING ASLEEP: 0
SUM OF ALL RESPONSES TO PHQ QUESTIONS 1-9: 0
7. TROUBLE CONCENTRATING ON THINGS, SUCH AS READING THE NEWSPAPER OR WATCHING TELEVISION: 0
1. LITTLE INTEREST OR PLEASURE IN DOING THINGS: 0

## 2024-02-26 NOTE — PROGRESS NOTES
After verbal order read back of Dr. Alejandre, patient received High Dose Flu Shot (Adjuvanted Fluad) in Right deltoid.  NDC:  98133-777-43 Lot: [id number]  Exp: 06.30.2024.  Patient tolerated procedure without complaints and received VIS.

## 2024-02-26 NOTE — PROGRESS NOTES
Chief Complaint   Patient presents with    Forms    Dementia     Sibling has stated she is forgetting things.       HISTORY OF PRESENT ILLNESS  Jen Borges is a 78 y.o. female    Complains of low back pain that starts in the middle then spreads to both sides. Sometimes spreads to tailbone. Would like to get a back brace. Has forms that need to be completed.    Also complains of:  1) worsening memory. Forgets thing that were recently told to her. Forgets where she places things.  2) right great toe ingrown toenail, present for 1 month, not improving    Due for repeat DEXA.     Patient Active Problem List   Diagnosis    Gastroesophageal reflux disease without esophagitis    Right elbow pain    Abdominal pain    Anxiety disorder    Age-related osteoporosis without current pathological fracture    Prediabetes    Vitamin D deficiency    Hypercholesterolemia    Primary osteoarthritis of both hands    Bipolar disorder (HCC)    Urge urinary incontinence    Closed nondisplaced fracture of head of radius with routine healing    Tinnitus    Memory impairment     Past Medical History:   Diagnosis Date    Arthritis     Osteoarthritis    Broken arm 11/17/2021    Excessive sweating     Occurs in summers; TSH/CMP/CBC normal    GERD (gastroesophageal reflux disease)     WITH HAITEL HERNIA    Menopause     LMP-32 years old    MRSA (methicillin resistant Staphylococcus aureus) infection 2007    MRSA + abd abscess; nasal swab negative after treatment    Psychiatric disorder     DEPRESSION.      Allergies   Allergen Reactions    Alendronate Other (See Comments)     Severe heartburn       Current Outpatient Medications   Medication Sig Dispense Refill    topiramate (TOPAMAX) 50 MG tablet Take 1 tablet by mouth daily (Patient not taking: Reported on 2/26/2024) 60 tablet 5    sertraline (ZOLOFT) 100 MG tablet Take 2 tablets by mouth nightly (Patient not taking: Reported on 2/26/2024) 60 tablet 5    acetaminophen (TYLENOL) 650 MG

## 2024-02-26 NOTE — PROGRESS NOTES
Jen Borges  Identified pt with two pt identifiers(name and ).  Chief Complaint   Patient presents with    Forms    Dementia     Sibling has stated she is forgetting things.       1. Have you been to the ER, urgent care clinic since your last visit?  Hospitalized since your last visit? NO    2. Have you seen or consulted any other health care providers outside of the [facility] since your last visit?  Include any pap smears or colon screening. NO  Pt plans on getting RSV, Shingles and Covid vaccine.     Provider notified of reason for visit, vitals and flowsheets obtained on patients.     Patient received paperwork for advance directive during previous visit but has not completed at this time     Reviewed record In preparation for visit, huddled with provider and have obtained necessary documentation      Health Maintenance Due   Topic    Respiratory Syncytial Virus (RSV) Pregnant or age 60 yrs+ (1 - 1-dose 60+ series)    Shingles vaccine (2 of 2)    Flu vaccine (1)    COVID-19 Vaccine ( season)       Wt Readings from Last 3 Encounters:   24 53.1 kg (117 lb)   23 50.4 kg (111 lb 3.2 oz)   22 50.8 kg (112 lb)     Temp Readings from Last 3 Encounters:   24 97.8 °F (36.6 °C) (Oral)     BP Readings from Last 3 Encounters:   24 113/74   23 (!) 114/56   22 124/68     Pulse Readings from Last 3 Encounters:   24 77   23 80   22 76          No data to display                  Learning Assessment:  :         2024     2:20 PM   [facility] AMB LEARNING ASSESSMENT   Primary Learner Patient   level of education GRADUATED HIGH SCHOOL OR GED   Primary Language ENGLISH   Learning Preference DEMONSTRATION   Answered By Patient   Relationship to Learner SELF       Fall Risk Assessment:  :         2024     2:37 PM 2023     2:00 PM 2022     9:37 AM 2021    10:06 AM 2021     9:35 AM 2021    10:08 AM 2021    10:43

## 2024-02-29 ENCOUNTER — TELEPHONE (OUTPATIENT)
Facility: CLINIC | Age: 79
End: 2024-02-29

## 2024-02-29 NOTE — TELEPHONE ENCOUNTER
[facility] called and stated they need an order signed for knees and wrist braces, can call back at [phone]

## 2024-02-29 NOTE — TELEPHONE ENCOUNTER
Faxed only form we received with office note for Lumbar Orthosis with the success it went through.

## 2024-03-04 ENCOUNTER — TELEPHONE (OUTPATIENT)
Facility: CLINIC | Age: 79
End: 2024-03-04

## 2024-03-18 ENCOUNTER — TELEPHONE (OUTPATIENT)
Facility: CLINIC | Age: 79
End: 2024-03-18

## 2024-03-18 ENCOUNTER — OFFICE VISIT (OUTPATIENT)
Facility: CLINIC | Age: 79
End: 2024-03-18
Payer: MEDICARE

## 2024-03-18 VITALS
WEIGHT: 113.4 LBS | RESPIRATION RATE: 16 BRPM | DIASTOLIC BLOOD PRESSURE: 70 MMHG | BODY MASS INDEX: 22.26 KG/M2 | OXYGEN SATURATION: 98 % | SYSTOLIC BLOOD PRESSURE: 138 MMHG | HEART RATE: 66 BPM | HEIGHT: 60 IN | TEMPERATURE: 97.4 F

## 2024-03-18 DIAGNOSIS — F31.9 BIPOLAR AFFECTIVE DISORDER, REMISSION STATUS UNSPECIFIED (HCC): Primary | ICD-10-CM

## 2024-03-18 DIAGNOSIS — F41.1 GENERALIZED ANXIETY DISORDER: ICD-10-CM

## 2024-03-18 PROCEDURE — 1090F PRES/ABSN URINE INCON ASSESS: CPT | Performed by: INTERNAL MEDICINE

## 2024-03-18 PROCEDURE — 1123F ACP DISCUSS/DSCN MKR DOCD: CPT | Performed by: INTERNAL MEDICINE

## 2024-03-18 PROCEDURE — G8399 PT W/DXA RESULTS DOCUMENT: HCPCS | Performed by: INTERNAL MEDICINE

## 2024-03-18 PROCEDURE — 1036F TOBACCO NON-USER: CPT | Performed by: INTERNAL MEDICINE

## 2024-03-18 PROCEDURE — G8427 DOCREV CUR MEDS BY ELIG CLIN: HCPCS | Performed by: INTERNAL MEDICINE

## 2024-03-18 PROCEDURE — 99213 OFFICE O/P EST LOW 20 MIN: CPT | Performed by: INTERNAL MEDICINE

## 2024-03-18 PROCEDURE — G8484 FLU IMMUNIZE NO ADMIN: HCPCS | Performed by: INTERNAL MEDICINE

## 2024-03-18 PROCEDURE — G8420 CALC BMI NORM PARAMETERS: HCPCS | Performed by: INTERNAL MEDICINE

## 2024-03-18 RX ORDER — SERTRALINE HYDROCHLORIDE 100 MG/1
200 TABLET, FILM COATED ORAL NIGHTLY
Qty: 180 TABLET | Refills: 1 | Status: SHIPPED | OUTPATIENT
Start: 2024-03-18

## 2024-03-18 NOTE — PATIENT INSTRUCTIONS
For a therapist and/or psychiatrist, call:    [facility]  [phone]    [facility] [place] [address] [facility], [place], [place] [address] [phone]    [facility]  [address] [facility], [address], [place], [place] [id number] [phone]    [facility]  [facility] [address], [address], [place] [address] [phone]    [facility]  [address], [address], [place] [address] [phone]

## 2024-03-18 NOTE — PROGRESS NOTES
Jen Borges  Identified pt with two pt identifiers(name and ).  Chief Complaint   Patient presents with    Forn     DMV       1. Have you been to the ER, urgent care clinic since your last visit?  Hospitalized since your last visit? NO    2. Have you seen or consulted any other health care providers outside of the [facility] since your last visit?  Include any pap smears or colon screening. NO      Provider notified of reason for visit, vitals and flowsheets obtained on patients.     Patient received paperwork for advance directive during previous visit but has not completed at this time     Reviewed record In preparation for visit, huddled with provider and have obtained necessary documentation      Health Maintenance Due   Topic    Respiratory Syncytial Virus (RSV) Pregnant or age 60 yrs+ (1 - 1-dose 60+ series)    Shingles vaccine (2 of 2)    COVID-19 Vaccine ( season)    Annual Wellness Visit (Medicare)        Wt Readings from Last 3 Encounters:   24 51.4 kg (113 lb 6.4 oz)   24 53.1 kg (117 lb)   23 50.4 kg (111 lb 3.2 oz)     Temp Readings from Last 3 Encounters:   24 97.8 °F (36.6 °C) (Oral)     BP Readings from Last 3 Encounters:   24 113/74   23 (!) 114/56   22 124/68     Pulse Readings from Last 3 Encounters:   24 77   23 80   22 76          No data to display                  Learning Assessment:  :         2024     2:20 PM   [facility] AMB LEARNING ASSESSMENT   Primary Learner Patient   level of education GRADUATED HIGH SCHOOL OR GED   Primary Language ENGLISH   Learning Preference DEMONSTRATION   Answered By Patient   Relationship to Learner SELF       Fall Risk Assessment:  :         2024     2:37 PM 2023     2:00 PM 2022     9:37 AM 2021    10:06 AM 2021     9:35 AM 2021    10:08 AM 2021    10:43 AM   Amb Fall Risk Assessment and TUG Test   Do you feel unsteady or are you 
     Backwards.                                         RECALL         3,3                            Repeat 3 items                                        LANGUAGE         2,2                             Naming: \"wristwatch,\" \"pencil\"         1,0                             Repetition: \"No ifs, ands, or buts\"         3,3                             3-Stage Command \"Take piece of paper in your right hand, fold it                                                                        in half and place it on the floor.         1,1                             Reading: Large printed \"CLOSE YOUR EYES.\" Read and do                                                      what it says.             1,1                             Writing: Write a sentence. Must contain a subject, verb and be                                                    sensible.                            1,1                             Copying: Copy figure of intersecting pentagons. Must contain all                                                     10 angles and intersect to form a 4 sided figure    Patient's score 25 of 30.          30                            24 or greater = Normal cognition for age        19-23 = Mild cognitive impairment (mild dementia)                                       10-18 = Moderate cognitive impairment (moderate dementia)                                        9 or less = Severe cognitive impairment (severe dementia)          Patient Active Problem List   Diagnosis    Gastroesophageal reflux disease without esophagitis    Right elbow pain    Abdominal pain    Anxiety disorder    Age-related osteoporosis without current pathological fracture    Prediabetes    Vitamin D deficiency    Hypercholesterolemia    Primary osteoarthritis of both hands    Bipolar disorder (HCC)    Urge urinary incontinence    Closed nondisplaced fracture of head of radius with routine healing    Tinnitus    Memory impairment     Past Medical

## 2024-03-18 NOTE — TELEPHONE ENCOUNTER
This was already discussed in clinic. Tell her she did okay with the questions. The memory test showed age-related memory loss.

## 2024-03-18 NOTE — TELEPHONE ENCOUNTER
Patient wants to know how she did with the questions that Dr Alejandre ask her. If she isnt there its okay to leave a message.

## 2024-05-10 ENCOUNTER — OFFICE VISIT (OUTPATIENT)
Facility: CLINIC | Age: 79
End: 2024-05-10

## 2024-05-10 VITALS
BODY MASS INDEX: 21.75 KG/M2 | HEIGHT: 60 IN | HEART RATE: 88 BPM | TEMPERATURE: 98.4 F | WEIGHT: 110.8 LBS | DIASTOLIC BLOOD PRESSURE: 83 MMHG | SYSTOLIC BLOOD PRESSURE: 134 MMHG | RESPIRATION RATE: 16 BRPM | OXYGEN SATURATION: 96 %

## 2024-05-10 DIAGNOSIS — R41.3 MEMORY IMPAIRMENT: ICD-10-CM

## 2024-05-10 DIAGNOSIS — M70.41 PREPATELLAR BURSITIS OF RIGHT KNEE: ICD-10-CM

## 2024-05-10 DIAGNOSIS — R73.03 PREDIABETES: ICD-10-CM

## 2024-05-10 DIAGNOSIS — E55.9 VITAMIN D DEFICIENCY: ICD-10-CM

## 2024-05-10 DIAGNOSIS — E78.00 HYPERCHOLESTEROLEMIA: ICD-10-CM

## 2024-05-10 DIAGNOSIS — Z00.00 MEDICARE ANNUAL WELLNESS VISIT, SUBSEQUENT: Primary | ICD-10-CM

## 2024-05-10 DIAGNOSIS — F31.9 BIPOLAR AFFECTIVE DISORDER, REMISSION STATUS UNSPECIFIED (HCC): ICD-10-CM

## 2024-05-10 LAB
25(OH)D3 SERPL-MCNC: 17.4 NG/ML (ref 30–100)
ALBUMIN SERPL-MCNC: 4.4 G/DL (ref 3.5–5)
ALBUMIN/GLOB SERPL: 1.6 (ref 1.1–2.2)
ALP SERPL-CCNC: 112 U/L (ref 45–117)
ALT SERPL-CCNC: 21 U/L (ref 12–78)
ANION GAP SERPL CALC-SCNC: 7 MMOL/L (ref 5–15)
AST SERPL-CCNC: 18 U/L (ref 15–37)
BASOPHILS # BLD: 0 K/UL (ref 0–0.1)
BASOPHILS NFR BLD: 0 % (ref 0–1)
BILIRUB SERPL-MCNC: 0.7 MG/DL (ref 0.2–1)
BUN SERPL-MCNC: 23 MG/DL (ref 6–20)
BUN/CREAT SERPL: 23 (ref 12–20)
CALCIUM SERPL-MCNC: 9.6 MG/DL (ref 8.5–10.1)
CHLORIDE SERPL-SCNC: 109 MMOL/L (ref 97–108)
CHOLEST SERPL-MCNC: 210 MG/DL
CO2 SERPL-SCNC: 23 MMOL/L (ref 21–32)
CREAT SERPL-MCNC: 1 MG/DL (ref 0.55–1.02)
DIFFERENTIAL METHOD BLD: NORMAL
EOSINOPHIL # BLD: 0 K/UL (ref 0–0.4)
EOSINOPHIL NFR BLD: 0 % (ref 0–7)
ERYTHROCYTE [DISTWIDTH] IN BLOOD BY AUTOMATED COUNT: 13.2 % (ref 11.5–14.5)
EST. AVERAGE GLUCOSE BLD GHB EST-MCNC: 100 MG/DL
GLOBULIN SER CALC-MCNC: 2.8 G/DL (ref 2–4)
GLUCOSE SERPL-MCNC: 111 MG/DL (ref 65–100)
HBA1C MFR BLD: 5.1 % (ref 4–5.6)
HCT VFR BLD AUTO: 40.6 % (ref 35–47)
HDLC SERPL-MCNC: 66 MG/DL
HDLC SERPL: 3.2 (ref 0–5)
HGB BLD-MCNC: 14 G/DL (ref 11.5–16)
IMM GRANULOCYTES # BLD AUTO: 0 K/UL (ref 0–0.04)
IMM GRANULOCYTES NFR BLD AUTO: 0 % (ref 0–0.5)
LDLC SERPL CALC-MCNC: 129.8 MG/DL (ref 0–100)
LYMPHOCYTES # BLD: 1.3 K/UL (ref 0.8–3.5)
LYMPHOCYTES NFR BLD: 26 % (ref 12–49)
MCH RBC QN AUTO: 31.5 PG (ref 26–34)
MCHC RBC AUTO-ENTMCNC: 34.5 G/DL (ref 30–36.5)
MCV RBC AUTO: 91.4 FL (ref 80–99)
MONOCYTES # BLD: 0.3 K/UL (ref 0–1)
MONOCYTES NFR BLD: 7 % (ref 5–13)
NEUTS SEG # BLD: 3.4 K/UL (ref 1.8–8)
NEUTS SEG NFR BLD: 67 % (ref 32–75)
NRBC # BLD: 0 K/UL (ref 0–0.01)
NRBC BLD-RTO: 0 PER 100 WBC
PLATELET # BLD AUTO: 238 K/UL (ref 150–400)
PMV BLD AUTO: 9.9 FL (ref 8.9–12.9)
POTASSIUM SERPL-SCNC: 4 MMOL/L (ref 3.5–5.1)
PROT SERPL-MCNC: 7.2 G/DL (ref 6.4–8.2)
RBC # BLD AUTO: 4.44 M/UL (ref 3.8–5.2)
SODIUM SERPL-SCNC: 139 MMOL/L (ref 136–145)
TRIGL SERPL-MCNC: 71 MG/DL
VLDLC SERPL CALC-MCNC: 14.2 MG/DL
WBC # BLD AUTO: 5.1 K/UL (ref 3.6–11)

## 2024-05-10 RX ORDER — PREDNISONE 20 MG/1
20 TABLET ORAL DAILY
Qty: 7 TABLET | Refills: 0 | Status: SHIPPED | OUTPATIENT
Start: 2024-05-10 | End: 2024-05-17

## 2024-05-10 ASSESSMENT — PATIENT HEALTH QUESTIONNAIRE - PHQ9
10. IF YOU CHECKED OFF ANY PROBLEMS, HOW DIFFICULT HAVE THESE PROBLEMS MADE IT FOR YOU TO DO YOUR WORK, TAKE CARE OF THINGS AT HOME, OR GET ALONG WITH OTHER PEOPLE: NOT DIFFICULT AT ALL
SUM OF ALL RESPONSES TO PHQ QUESTIONS 1-9: 0
7. TROUBLE CONCENTRATING ON THINGS, SUCH AS READING THE NEWSPAPER OR WATCHING TELEVISION: NOT AT ALL
SUM OF ALL RESPONSES TO PHQ QUESTIONS 1-9: 0
1. LITTLE INTEREST OR PLEASURE IN DOING THINGS: NOT AT ALL
2. FEELING DOWN, DEPRESSED OR HOPELESS: NOT AT ALL
5. POOR APPETITE OR OVEREATING: NOT AT ALL
SUM OF ALL RESPONSES TO PHQ QUESTIONS 1-9: 0
6. FEELING BAD ABOUT YOURSELF - OR THAT YOU ARE A FAILURE OR HAVE LET YOURSELF OR YOUR FAMILY DOWN: NOT AT ALL
9. THOUGHTS THAT YOU WOULD BE BETTER OFF DEAD, OR OF HURTING YOURSELF: NOT AT ALL
SUM OF ALL RESPONSES TO PHQ9 QUESTIONS 1 & 2: 0
3. TROUBLE FALLING OR STAYING ASLEEP: NOT AT ALL
SUM OF ALL RESPONSES TO PHQ9 QUESTIONS 1 & 2: 0
1. LITTLE INTEREST OR PLEASURE IN DOING THINGS: NOT AT ALL
2. FEELING DOWN, DEPRESSED OR HOPELESS: NOT AT ALL
8. MOVING OR SPEAKING SO SLOWLY THAT OTHER PEOPLE COULD HAVE NOTICED. OR THE OPPOSITE, BEING SO FIGETY OR RESTLESS THAT YOU HAVE BEEN MOVING AROUND A LOT MORE THAN USUAL: NOT AT ALL
4. FEELING TIRED OR HAVING LITTLE ENERGY: NOT AT ALL
SUM OF ALL RESPONSES TO PHQ QUESTIONS 1-9: 0
SUM OF ALL RESPONSES TO PHQ QUESTIONS 1-9: 0

## 2024-05-10 ASSESSMENT — LIFESTYLE VARIABLES
HOW OFTEN DO YOU HAVE A DRINK CONTAINING ALCOHOL: NEVER
HOW MANY STANDARD DRINKS CONTAINING ALCOHOL DO YOU HAVE ON A TYPICAL DAY: PATIENT DOES NOT DRINK

## 2024-05-10 NOTE — PROGRESS NOTES
injury in past year? no no        Fall in past 12 months?   0 1 1 0 0   Able to walk?   Yes Yes Yes Yes Yes       Abuse Screening:  :         2/26/2024     2:00 PM   AMB Abuse Screening   Do you ever feel afraid of your partner? N   Are you in a relationship with someone who physically or mentally threatens you? N   Is it safe for you to go home? Y       ADL Screening:  :         2/26/2024     2:00 PM   ADL ASSESSMENT   Feeding yourself No Help Needed   Getting from bed to chair No Help Needed   Getting dressed No Help Needed   Bathing or showering No Help Needed   Walk across the room (includes cane/walker) No Help Needed   Using the telphone No Help Needed   Taking your medications No Help Needed   Preparing meals No Help Needed   Managing money (expenses/bills) No Help Needed   Moderately strenuous housework (laundry) No Help Needed   Shopping for personal items (toiletries/medicines) No Help Needed   Shopping for groceries No Help Needed   Driving No Help Needed   Climbing a flight of stairs No Help Needed   Getting to places beyond walking distances No Help Needed         Medication reconciliation up to date and corrected with patient at this time.       
 Allergies  Meds  Problems  Med Hx  Surg Hx  Soc Hx  Fam Hx

## 2024-05-12 LAB — TSH SERPL DL<=0.05 MIU/L-ACNC: 1.34 UIU/ML (ref 0.45–4.5)

## 2024-05-17 ENCOUNTER — TELEPHONE (OUTPATIENT)
Facility: CLINIC | Age: 79
End: 2024-05-17

## 2024-05-17 NOTE — TELEPHONE ENCOUNTER
[facility] called to verify provider received fax that needs to be signed, can call back at [phone]

## 2024-05-20 ENCOUNTER — TELEPHONE (OUTPATIENT)
Facility: CLINIC | Age: 79
End: 2024-05-20

## 2024-05-20 DIAGNOSIS — R41.3 MEMORY IMPAIRMENT: ICD-10-CM

## 2024-05-20 DIAGNOSIS — F31.9 BIPOLAR AFFECTIVE DISORDER, REMISSION STATUS UNSPECIFIED (HCC): Primary | ICD-10-CM

## 2024-05-20 NOTE — TELEPHONE ENCOUNTER
Pt is requesting to be referred to a psychiatrist by provider, states she really needs one soon for mental problems

## 2024-05-20 NOTE — TELEPHONE ENCOUNTER
Referral order to Dr. Elmer Fields signed and printed. The psychiatrists usually do not accept referrals and prefer the patient to call and schedule an appointment. I gave her a list of places to call for a psychiatrist. I called her boyfriend, Andrei Barrera ([phone]), and he reports that she forgot I gave her a list. I will e-mail a list of places to call for a psychiatrist directly to him. Is e-mail address is: [email or web address]

## 2024-05-22 ENCOUNTER — OFFICE VISIT (OUTPATIENT)
Facility: CLINIC | Age: 79
End: 2024-05-22
Payer: MEDICARE

## 2024-05-22 VITALS
WEIGHT: 111.4 LBS | SYSTOLIC BLOOD PRESSURE: 149 MMHG | DIASTOLIC BLOOD PRESSURE: 69 MMHG | BODY MASS INDEX: 21.87 KG/M2 | TEMPERATURE: 98.3 F | HEIGHT: 60 IN | HEART RATE: 71 BPM | RESPIRATION RATE: 16 BRPM | OXYGEN SATURATION: 98 %

## 2024-05-22 DIAGNOSIS — F31.9 BIPOLAR AFFECTIVE DISORDER, REMISSION STATUS UNSPECIFIED (HCC): ICD-10-CM

## 2024-05-22 DIAGNOSIS — F41.1 GENERALIZED ANXIETY DISORDER: Primary | ICD-10-CM

## 2024-05-22 DIAGNOSIS — F63.2 KLEPTOMANIA IN ADULT: ICD-10-CM

## 2024-05-22 PROCEDURE — 1036F TOBACCO NON-USER: CPT | Performed by: INTERNAL MEDICINE

## 2024-05-22 PROCEDURE — 99214 OFFICE O/P EST MOD 30 MIN: CPT | Performed by: INTERNAL MEDICINE

## 2024-05-22 PROCEDURE — G8420 CALC BMI NORM PARAMETERS: HCPCS | Performed by: INTERNAL MEDICINE

## 2024-05-22 PROCEDURE — G8399 PT W/DXA RESULTS DOCUMENT: HCPCS | Performed by: INTERNAL MEDICINE

## 2024-05-22 PROCEDURE — G8427 DOCREV CUR MEDS BY ELIG CLIN: HCPCS | Performed by: INTERNAL MEDICINE

## 2024-05-22 PROCEDURE — 1090F PRES/ABSN URINE INCON ASSESS: CPT | Performed by: INTERNAL MEDICINE

## 2024-05-22 PROCEDURE — 1123F ACP DISCUSS/DSCN MKR DOCD: CPT | Performed by: INTERNAL MEDICINE

## 2024-05-22 RX ORDER — TOPIRAMATE 50 MG/1
50 TABLET, FILM COATED ORAL DAILY
Qty: 30 TABLET | Refills: 3 | Status: SHIPPED | OUTPATIENT
Start: 2024-05-22

## 2024-05-22 RX ORDER — LORAZEPAM 0.5 MG/1
0.5 TABLET ORAL 2 TIMES DAILY PRN
Qty: 10 TABLET | Refills: 0 | Status: SHIPPED | OUTPATIENT
Start: 2024-05-22 | End: 2024-05-27

## 2024-05-22 NOTE — TELEPHONE ENCOUNTER
Spoke to pt while in the office, she stated she did not want the Psychiatrist that was ordered for her. I let her know she would have to go off the list Dr. Alejandre had given her and make apt. She understood and had no further question.

## 2024-05-22 NOTE — PROGRESS NOTES
Jen Borges  Identified pt with two pt identifiers(name and ).  Chief Complaint   Patient presents with    Memory Loss       1. Have you been to the ER, urgent care clinic since your last visit?  Hospitalized since your last visit? NO    2. Have you seen or consulted any other health care providers outside of the [facility] since your last visit?  Include any pap smears or colon screening. NO      Provider notified of reason for visit, vitals and flowsheets obtained on patients.     Patient received paperwork for advance directive during previous visit but has not completed at this time     Reviewed record In preparation for visit, huddled with provider and have obtained necessary documentation      There are no preventive care reminders to display for this patient.    Wt Readings from Last 3 Encounters:   24 50.5 kg (111 lb 6.4 oz)   05/10/24 50.3 kg (110 lb 12.8 oz)   24 51.4 kg (113 lb 6.4 oz)     Temp Readings from Last 3 Encounters:   24 98.3 °F (36.8 °C) (Oral)   05/10/24 98.4 °F (36.9 °C) (Oral)   24 97.4 °F (36.3 °C) (Oral)     BP Readings from Last 3 Encounters:   24 (!) 154/80   05/10/24 134/83   24 138/70     Pulse Readings from Last 3 Encounters:   24 71   05/10/24 88   24 66          No data to display                  Learning Assessment:  :         2024     2:20 PM   [facility] AMB LEARNING ASSESSMENT   Primary Learner Patient   level of education GRADUATED HIGH SCHOOL OR GED   Primary Language ENGLISH   Learning Preference DEMONSTRATION   Answered By Patient   Relationship to Learner SELF       Fall Risk Assessment:  :         5/10/2024     1:33 PM 2024     2:37 PM 2023     2:00 PM 2022     9:37 AM 2021    10:06 AM 2021     9:35 AM 2021    10:08 AM   Amb Fall Risk Assessment and TUG Test   Do you feel unsteady or are you worried about falling?  no no        2 or more falls in past year? no no
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
## 2024-07-26 ENCOUNTER — TELEPHONE (OUTPATIENT)
Facility: CLINIC | Age: 79
End: 2024-07-26

## 2024-07-26 NOTE — TELEPHONE ENCOUNTER
Pt called and stated that [place] and stated that when returned home she received 3 big boxes of braces for her back shoulders and stated that the form inside states ordered by her pcp. Pt stated that she wants to make sure that her pcp did not order these before she sends them back. Pt stated that she is healthy and does not know why she is receiving this. Pt stated that she believes this is another scam but wants Dr. Alejandre to confirm so that she can mail them back and not be billed for them.

## 2024-09-03 ENCOUNTER — TELEPHONE (OUTPATIENT)
Facility: CLINIC | Age: 79
End: 2024-09-03

## 2024-09-03 NOTE — TELEPHONE ENCOUNTER
Pt called and stated that she has been having right side pain x2 weeks. Pt did not want to go to urgent care and asked for a call back

## 2024-09-04 NOTE — TELEPHONE ENCOUNTER
Spoke to pt verified name and . She stated for the past 2 weeks she has been experiencing discomfort on the right side of her stomach. She states it starts at her rib cage to upper part of her leg. States it feels like a burning and achy sensation. The discomfort has not worsened and stays in the front. She did not want to go to the ER or Urgent Care she wanted to see Dr. Alejandre. I made pt apt, advised her if it worsened she would need to go to the ER. She understood and had no further questions.

## 2024-09-16 ENCOUNTER — OFFICE VISIT (OUTPATIENT)
Facility: CLINIC | Age: 79
End: 2024-09-16
Payer: MEDICARE

## 2024-09-16 VITALS
OXYGEN SATURATION: 98 % | TEMPERATURE: 97.9 F | RESPIRATION RATE: 16 BRPM | BODY MASS INDEX: 22.07 KG/M2 | DIASTOLIC BLOOD PRESSURE: 72 MMHG | SYSTOLIC BLOOD PRESSURE: 136 MMHG | WEIGHT: 112.4 LBS | HEART RATE: 73 BPM | HEIGHT: 60 IN

## 2024-09-16 DIAGNOSIS — F31.9 BIPOLAR AFFECTIVE DISORDER, REMISSION STATUS UNSPECIFIED (HCC): ICD-10-CM

## 2024-09-16 DIAGNOSIS — R10.9 RIGHT FLANK PAIN: Primary | ICD-10-CM

## 2024-09-16 DIAGNOSIS — Z23 NEEDS FLU SHOT: ICD-10-CM

## 2024-09-16 DIAGNOSIS — Z02.89 ENCOUNTER FOR COMPLETION OF FORM WITH PATIENT: ICD-10-CM

## 2024-09-16 PROCEDURE — 99214 OFFICE O/P EST MOD 30 MIN: CPT | Performed by: INTERNAL MEDICINE

## 2024-09-16 PROCEDURE — G0008 ADMIN INFLUENZA VIRUS VAC: HCPCS | Performed by: INTERNAL MEDICINE

## 2024-09-16 PROCEDURE — G8427 DOCREV CUR MEDS BY ELIG CLIN: HCPCS | Performed by: INTERNAL MEDICINE

## 2024-09-16 PROCEDURE — 1123F ACP DISCUSS/DSCN MKR DOCD: CPT | Performed by: INTERNAL MEDICINE

## 2024-09-16 PROCEDURE — G8399 PT W/DXA RESULTS DOCUMENT: HCPCS | Performed by: INTERNAL MEDICINE

## 2024-09-16 PROCEDURE — G8420 CALC BMI NORM PARAMETERS: HCPCS | Performed by: INTERNAL MEDICINE

## 2024-09-16 PROCEDURE — 90653 IIV ADJUVANT VACCINE IM: CPT | Performed by: INTERNAL MEDICINE

## 2024-09-16 PROCEDURE — 1090F PRES/ABSN URINE INCON ASSESS: CPT | Performed by: INTERNAL MEDICINE

## 2024-09-16 PROCEDURE — 1036F TOBACCO NON-USER: CPT | Performed by: INTERNAL MEDICINE

## 2024-09-16 RX ORDER — SERTRALINE HYDROCHLORIDE 200 MG/1
1 CAPSULE ORAL DAILY
Qty: 30 CAPSULE | Refills: 3 | Status: SHIPPED | OUTPATIENT
Start: 2024-09-16

## 2024-09-16 RX ORDER — TOPIRAMATE 50 MG/1
50 TABLET, FILM COATED ORAL DAILY
Qty: 30 TABLET | Refills: 3 | Status: SHIPPED | OUTPATIENT
Start: 2024-09-16

## 2024-09-18 ENCOUNTER — HOSPITAL ENCOUNTER (EMERGENCY)
Facility: HOSPITAL | Age: 79
Discharge: HOME | End: 2024-09-18
Attending: EMERGENCY MEDICINE
Payer: MEDICARE

## 2024-09-18 ENCOUNTER — APPOINTMENT (OUTPATIENT)
Facility: HOSPITAL | Age: 79
End: 2024-09-18
Payer: MEDICARE

## 2024-09-18 VITALS
OXYGEN SATURATION: 99 % | DIASTOLIC BLOOD PRESSURE: 78 MMHG | WEIGHT: 111 LBS | BODY MASS INDEX: 21.68 KG/M2 | HEART RATE: 72 BPM | SYSTOLIC BLOOD PRESSURE: 142 MMHG | TEMPERATURE: 98.8 F | RESPIRATION RATE: 16 BRPM

## 2024-09-18 DIAGNOSIS — R10.84 GENERALIZED ABDOMINAL PAIN: Primary | ICD-10-CM

## 2024-09-18 DIAGNOSIS — K31.89 GASTRIC DISTENTION: ICD-10-CM

## 2024-09-18 LAB
ALBUMIN SERPL-MCNC: 4.5 G/DL (ref 3.5–5)
ALBUMIN/GLOB SERPL: 1.6 (ref 1.1–2.2)
ALP SERPL-CCNC: 120 U/L (ref 45–117)
ALT SERPL-CCNC: 30 U/L (ref 12–78)
ANION GAP SERPL CALC-SCNC: 4 MMOL/L (ref 2–12)
APPEARANCE UR: ABNORMAL
AST SERPL-CCNC: 22 U/L (ref 15–37)
BACTERIA URNS QL MICRO: NEGATIVE /HPF
BASOPHILS # BLD: 0 K/UL (ref 0–0.1)
BASOPHILS NFR BLD: 0 % (ref 0–1)
BILIRUB SERPL-MCNC: 0.3 MG/DL (ref 0.2–1)
BILIRUB UR QL: NEGATIVE
BUN SERPL-MCNC: 14 MG/DL (ref 6–20)
BUN/CREAT SERPL: 13 (ref 12–20)
CALCIUM SERPL-MCNC: 9.3 MG/DL (ref 8.5–10.1)
CAOX CRY URNS QL MICRO: ABNORMAL
CHLORIDE SERPL-SCNC: 110 MMOL/L (ref 97–108)
CO2 SERPL-SCNC: 24 MMOL/L (ref 21–32)
COLOR UR: ABNORMAL
COMMENT:: NORMAL
CREAT SERPL-MCNC: 1.11 MG/DL (ref 0.55–1.02)
DIFFERENTIAL METHOD BLD: NORMAL
EOSINOPHIL # BLD: 0.1 K/UL (ref 0–0.4)
EOSINOPHIL NFR BLD: 2 % (ref 0–7)
EPITH CASTS URNS QL MICRO: ABNORMAL /LPF
ERYTHROCYTE [DISTWIDTH] IN BLOOD BY AUTOMATED COUNT: 12.9 % (ref 11.5–14.5)
GLOBULIN SER CALC-MCNC: 2.8 G/DL (ref 2–4)
GLUCOSE SERPL-MCNC: 121 MG/DL (ref 65–100)
GLUCOSE UR STRIP.AUTO-MCNC: NEGATIVE MG/DL
HCT VFR BLD AUTO: 38.9 % (ref 35–47)
HGB BLD-MCNC: 13.1 G/DL (ref 11.5–16)
HGB UR QL STRIP: ABNORMAL
IMM GRANULOCYTES # BLD AUTO: 0 K/UL (ref 0–0.04)
IMM GRANULOCYTES NFR BLD AUTO: 0 % (ref 0–0.5)
KETONES UR QL STRIP.AUTO: ABNORMAL MG/DL
LEUKOCYTE ESTERASE UR QL STRIP.AUTO: ABNORMAL
LIPASE SERPL-CCNC: 49 U/L (ref 13–75)
LYMPHOCYTES # BLD: 1.5 K/UL (ref 0.8–3.5)
LYMPHOCYTES NFR BLD: 30 % (ref 12–49)
MCH RBC QN AUTO: 31.5 PG (ref 26–34)
MCHC RBC AUTO-ENTMCNC: 33.7 G/DL (ref 30–36.5)
MCV RBC AUTO: 93.5 FL (ref 80–99)
MONOCYTES # BLD: 0.4 K/UL (ref 0–1)
MONOCYTES NFR BLD: 9 % (ref 5–13)
NEUTS SEG # BLD: 2.8 K/UL (ref 1.8–8)
NEUTS SEG NFR BLD: 59 % (ref 32–75)
NITRITE UR QL STRIP.AUTO: NEGATIVE
NRBC # BLD: 0 K/UL (ref 0–0.01)
NRBC BLD-RTO: 0 PER 100 WBC
PH UR STRIP: 5.5 (ref 5–8)
PLATELET # BLD AUTO: 212 K/UL (ref 150–400)
PMV BLD AUTO: 9.3 FL (ref 8.9–12.9)
POTASSIUM SERPL-SCNC: 3.5 MMOL/L (ref 3.5–5.1)
PROT SERPL-MCNC: 7.3 G/DL (ref 6.4–8.2)
PROT UR STRIP-MCNC: NEGATIVE MG/DL
RBC # BLD AUTO: 4.16 M/UL (ref 3.8–5.2)
RBC #/AREA URNS HPF: ABNORMAL /HPF (ref 0–5)
SODIUM SERPL-SCNC: 138 MMOL/L (ref 136–145)
SP GR UR REFRACTOMETRY: 1.02 (ref 1–1.03)
SPECIMEN HOLD: NORMAL
SPECIMEN HOLD: NORMAL
TROPONIN I SERPL HS-MCNC: 16 NG/L (ref 0–51)
UROBILINOGEN UR QL STRIP.AUTO: 0.2 EU/DL (ref 0.2–1)
WBC # BLD AUTO: 4.9 K/UL (ref 3.6–11)
WBC URNS QL MICRO: ABNORMAL /HPF (ref 0–4)

## 2024-09-18 PROCEDURE — 93005 ELECTROCARDIOGRAM TRACING: CPT | Performed by: EMERGENCY MEDICINE

## 2024-09-18 PROCEDURE — 99285 EMERGENCY DEPT VISIT HI MDM: CPT

## 2024-09-18 PROCEDURE — 36415 COLL VENOUS BLD VENIPUNCTURE: CPT

## 2024-09-18 PROCEDURE — 85025 COMPLETE CBC W/AUTO DIFF WBC: CPT

## 2024-09-18 PROCEDURE — 74177 CT ABD & PELVIS W/CONTRAST: CPT

## 2024-09-18 PROCEDURE — 80053 COMPREHEN METABOLIC PANEL: CPT

## 2024-09-18 PROCEDURE — 83690 ASSAY OF LIPASE: CPT

## 2024-09-18 PROCEDURE — 6370000000 HC RX 637 (ALT 250 FOR IP): Performed by: EMERGENCY MEDICINE

## 2024-09-18 PROCEDURE — 81001 URINALYSIS AUTO W/SCOPE: CPT

## 2024-09-18 PROCEDURE — 84484 ASSAY OF TROPONIN QUANT: CPT

## 2024-09-18 PROCEDURE — 6360000004 HC RX CONTRAST MEDICATION: Performed by: RADIOLOGY

## 2024-09-18 RX ORDER — SUCRALFATE 1 G/1
1 TABLET ORAL 3 TIMES DAILY
Qty: 20 TABLET | Refills: 0 | Status: SHIPPED | OUTPATIENT
Start: 2024-09-18

## 2024-09-18 RX ORDER — IOPAMIDOL 755 MG/ML
100 INJECTION, SOLUTION INTRAVASCULAR
Status: COMPLETED | OUTPATIENT
Start: 2024-09-18 | End: 2024-09-18

## 2024-09-18 RX ORDER — 0.9 % SODIUM CHLORIDE 0.9 %
1000 INTRAVENOUS SOLUTION INTRAVENOUS ONCE
Status: DISCONTINUED | OUTPATIENT
Start: 2024-09-18 | End: 2024-09-18

## 2024-09-18 RX ORDER — FAMOTIDINE 20 MG/1
20 TABLET, FILM COATED ORAL
Status: COMPLETED | OUTPATIENT
Start: 2024-09-18 | End: 2024-09-18

## 2024-09-18 RX ADMIN — FAMOTIDINE 20 MG: 20 TABLET, FILM COATED ORAL at 22:57

## 2024-09-18 RX ADMIN — IOPAMIDOL 100 ML: 755 INJECTION, SOLUTION INTRAVENOUS at 21:20

## 2024-09-18 ASSESSMENT — PAIN DESCRIPTION - ORIENTATION: ORIENTATION: MID

## 2024-09-18 ASSESSMENT — ENCOUNTER SYMPTOMS
SHORTNESS OF BREATH: 0
ABDOMINAL PAIN: 1
COUGH: 0
VOMITING: 0
COLOR CHANGE: 0
BACK PAIN: 0
DIARRHEA: 1
SORE THROAT: 0

## 2024-09-18 ASSESSMENT — PAIN DESCRIPTION - ONSET: ONSET: ON-GOING

## 2024-09-18 ASSESSMENT — PAIN DESCRIPTION - PAIN TYPE: TYPE: ACUTE PAIN

## 2024-09-18 ASSESSMENT — PAIN - FUNCTIONAL ASSESSMENT: PAIN_FUNCTIONAL_ASSESSMENT: PREVENTS OR INTERFERES SOME ACTIVE ACTIVITIES AND ADLS

## 2024-09-18 ASSESSMENT — PAIN SCALES - GENERAL
PAINLEVEL_OUTOF10: 9
PAINLEVEL_OUTOF10: 6

## 2024-09-18 ASSESSMENT — PAIN DESCRIPTION - LOCATION
LOCATION: ABDOMEN
LOCATION: ABDOMEN

## 2024-09-18 ASSESSMENT — PAIN DESCRIPTION - DESCRIPTORS: DESCRIPTORS: ACHING;DISCOMFORT;DULL

## 2024-09-18 ASSESSMENT — PAIN DESCRIPTION - FREQUENCY: FREQUENCY: CONTINUOUS

## 2024-09-19 LAB
EKG ATRIAL RATE: 75 BPM
EKG DIAGNOSIS: NORMAL
EKG P AXIS: 34 DEGREES
EKG P-R INTERVAL: 112 MS
EKG Q-T INTERVAL: 372 MS
EKG QRS DURATION: 76 MS
EKG QTC CALCULATION (BAZETT): 415 MS
EKG R AXIS: 40 DEGREES
EKG T AXIS: 0 DEGREES
EKG VENTRICULAR RATE: 75 BPM

## 2024-09-20 ENCOUNTER — HOSPITAL ENCOUNTER (EMERGENCY)
Facility: HOSPITAL | Age: 79
Discharge: HOME | End: 2024-09-20
Attending: STUDENT IN AN ORGANIZED HEALTH CARE EDUCATION/TRAINING PROGRAM
Payer: MEDICARE

## 2024-09-20 VITALS
BODY MASS INDEX: 21.79 KG/M2 | RESPIRATION RATE: 20 BRPM | TEMPERATURE: 97.9 F | HEIGHT: 60 IN | WEIGHT: 111 LBS | OXYGEN SATURATION: 95 % | HEART RATE: 67 BPM | DIASTOLIC BLOOD PRESSURE: 63 MMHG | SYSTOLIC BLOOD PRESSURE: 127 MMHG

## 2024-09-20 DIAGNOSIS — K31.89 GASTRIC DISTENTION: Primary | ICD-10-CM

## 2024-09-20 LAB
ALBUMIN SERPL-MCNC: 3.4 G/DL (ref 3.5–5)
ALBUMIN/GLOB SERPL: 1.2 (ref 1.1–2.2)
ALP SERPL-CCNC: 95 U/L (ref 45–117)
ALT SERPL-CCNC: 27 U/L (ref 12–78)
AMMONIA PLAS-SCNC: 14 UMOL/L
ANION GAP SERPL CALC-SCNC: 6 MMOL/L (ref 2–12)
AST SERPL-CCNC: 18 U/L (ref 15–37)
BASOPHILS # BLD: 0 K/UL (ref 0–0.1)
BASOPHILS NFR BLD: 0 % (ref 0–1)
BILIRUB SERPL-MCNC: 0.3 MG/DL (ref 0.2–1)
BUN SERPL-MCNC: 20 MG/DL (ref 6–20)
BUN/CREAT SERPL: 22 (ref 12–20)
CALCIUM SERPL-MCNC: 8.8 MG/DL (ref 8.5–10.1)
CHLORIDE SERPL-SCNC: 111 MMOL/L (ref 97–108)
CO2 SERPL-SCNC: 23 MMOL/L (ref 21–32)
CREAT SERPL-MCNC: 0.91 MG/DL (ref 0.55–1.02)
DIFFERENTIAL METHOD BLD: ABNORMAL
EOSINOPHIL # BLD: 0 K/UL (ref 0–0.4)
EOSINOPHIL NFR BLD: 0 % (ref 0–7)
ERYTHROCYTE [DISTWIDTH] IN BLOOD BY AUTOMATED COUNT: 12.8 % (ref 11.5–14.5)
GLOBULIN SER CALC-MCNC: 2.9 G/DL (ref 2–4)
GLUCOSE SERPL-MCNC: 183 MG/DL (ref 65–100)
HCT VFR BLD AUTO: 36.1 % (ref 35–47)
HGB BLD-MCNC: 12.2 G/DL (ref 11.5–16)
IMM GRANULOCYTES # BLD AUTO: 0.1 K/UL (ref 0–0.04)
IMM GRANULOCYTES NFR BLD AUTO: 1 % (ref 0–0.5)
LIPASE SERPL-CCNC: 24 U/L (ref 13–75)
LYMPHOCYTES # BLD: 0.5 K/UL (ref 0.8–3.5)
LYMPHOCYTES NFR BLD: 7 % (ref 12–49)
MAGNESIUM SERPL-MCNC: 2.4 MG/DL (ref 1.6–2.4)
MCH RBC QN AUTO: 31.5 PG (ref 26–34)
MCHC RBC AUTO-ENTMCNC: 33.8 G/DL (ref 30–36.5)
MCV RBC AUTO: 93.3 FL (ref 80–99)
MONOCYTES # BLD: 0.1 K/UL (ref 0–1)
MONOCYTES NFR BLD: 2 % (ref 5–13)
NEUTS SEG # BLD: 5.9 K/UL (ref 1.8–8)
NEUTS SEG NFR BLD: 90 % (ref 32–75)
NRBC # BLD: 0 K/UL (ref 0–0.01)
NRBC BLD-RTO: 0 PER 100 WBC
PLATELET # BLD AUTO: 185 K/UL (ref 150–400)
PMV BLD AUTO: 9.5 FL (ref 8.9–12.9)
POTASSIUM SERPL-SCNC: 3.8 MMOL/L (ref 3.5–5.1)
PROT SERPL-MCNC: 6.3 G/DL (ref 6.4–8.2)
RBC # BLD AUTO: 3.87 M/UL (ref 3.8–5.2)
RBC MORPH BLD: ABNORMAL
SODIUM SERPL-SCNC: 140 MMOL/L (ref 136–145)
TSH SERPL DL<=0.05 MIU/L-ACNC: 0.62 UIU/ML (ref 0.36–3.74)
WBC # BLD AUTO: 6.6 K/UL (ref 3.6–11)

## 2024-09-20 PROCEDURE — 36415 COLL VENOUS BLD VENIPUNCTURE: CPT

## 2024-09-20 PROCEDURE — 83735 ASSAY OF MAGNESIUM: CPT

## 2024-09-20 PROCEDURE — 85025 COMPLETE CBC W/AUTO DIFF WBC: CPT

## 2024-09-20 PROCEDURE — 99284 EMERGENCY DEPT VISIT MOD MDM: CPT

## 2024-09-20 PROCEDURE — 83690 ASSAY OF LIPASE: CPT

## 2024-09-20 PROCEDURE — 2580000003 HC RX 258: Performed by: STUDENT IN AN ORGANIZED HEALTH CARE EDUCATION/TRAINING PROGRAM

## 2024-09-20 PROCEDURE — 80053 COMPREHEN METABOLIC PANEL: CPT

## 2024-09-20 PROCEDURE — 82140 ASSAY OF AMMONIA: CPT

## 2024-09-20 PROCEDURE — 84443 ASSAY THYROID STIM HORMONE: CPT

## 2024-09-20 PROCEDURE — 93005 ELECTROCARDIOGRAM TRACING: CPT | Performed by: STUDENT IN AN ORGANIZED HEALTH CARE EDUCATION/TRAINING PROGRAM

## 2024-09-20 RX ORDER — SODIUM CHLORIDE, SODIUM LACTATE, POTASSIUM CHLORIDE, AND CALCIUM CHLORIDE .6; .31; .03; .02 G/100ML; G/100ML; G/100ML; G/100ML
1000 INJECTION, SOLUTION INTRAVENOUS ONCE
Status: COMPLETED | OUTPATIENT
Start: 2024-09-20 | End: 2024-09-20

## 2024-09-20 RX ADMIN — SODIUM CHLORIDE, POTASSIUM CHLORIDE, SODIUM LACTATE AND CALCIUM CHLORIDE 1000 ML: 600; 310; 30; 20 INJECTION, SOLUTION INTRAVENOUS at 16:15

## 2024-09-20 ASSESSMENT — PAIN - FUNCTIONAL ASSESSMENT: PAIN_FUNCTIONAL_ASSESSMENT: NONE - DENIES PAIN

## 2024-09-21 LAB
EKG ATRIAL RATE: 69 BPM
EKG DIAGNOSIS: NORMAL
EKG P AXIS: 70 DEGREES
EKG P-R INTERVAL: 128 MS
EKG Q-T INTERVAL: 428 MS
EKG QRS DURATION: 72 MS
EKG QTC CALCULATION (BAZETT): 458 MS
EKG R AXIS: 54 DEGREES
EKG T AXIS: 51 DEGREES
EKG VENTRICULAR RATE: 69 BPM

## 2024-09-30 ENCOUNTER — TELEPHONE (OUTPATIENT)
Facility: CLINIC | Age: 79
End: 2024-09-30

## 2024-09-30 NOTE — TELEPHONE ENCOUNTER
Patient is calling to remind Dr. Alejandre that the DMV form needs to be completed and sent back by October 10th.    Patient would like a callback when this is completed.

## 2024-10-04 NOTE — TELEPHONE ENCOUNTER
Spoke to pt verified name and . She is at [facility] right now and cannot come to the office to fill out her part of the form. I asked her about to explain the reasoning for the DMV Form. I wrote what she explained to me and put on the DMV form. I made copy of form and faxed form to DMV with the success it went through.

## 2024-10-18 ENCOUNTER — TELEPHONE (OUTPATIENT)
Facility: CLINIC | Age: 79
End: 2024-10-18

## 2024-10-18 NOTE — TELEPHONE ENCOUNTER
Spoke to the pharmacist at [facility]. I asked her about the below message. She stated no, they did not need/ request last office note.

## 2024-10-30 ENCOUNTER — HOSPITAL ENCOUNTER (OUTPATIENT)
Facility: HOSPITAL | Age: 79
Discharge: HOME | End: 2024-11-02
Payer: MEDICARE

## 2024-10-30 DIAGNOSIS — R55 SYNCOPE, UNSPECIFIED SYNCOPE TYPE: ICD-10-CM

## 2024-10-30 DIAGNOSIS — R10.9 RIGHT SIDED ABDOMINAL PAIN: ICD-10-CM

## 2024-10-30 DIAGNOSIS — R41.3 TEMPORARY AMNESIA: ICD-10-CM

## 2024-10-30 PROCEDURE — 70553 MRI BRAIN STEM W/O & W/DYE: CPT

## 2024-10-30 PROCEDURE — 6360000004 HC RX CONTRAST MEDICATION: Performed by: FAMILY MEDICINE

## 2024-10-30 PROCEDURE — A9579 GAD-BASE MR CONTRAST NOS,1ML: HCPCS | Performed by: FAMILY MEDICINE

## 2024-10-30 PROCEDURE — 76700 US EXAM ABDOM COMPLETE: CPT

## 2024-10-30 RX ADMIN — GADOTERIDOL 11 ML: 279.3 INJECTION, SOLUTION INTRAVENOUS at 09:51

## 2024-11-07 ENCOUNTER — TELEPHONE (OUTPATIENT)
Facility: CLINIC | Age: 79
End: 2024-11-07

## 2024-11-07 NOTE — TELEPHONE ENCOUNTER
Pharmacy called with some concerns of pt trying to  Sertraline HCl 200 MG CAPS (prescribed by Dr. Alejandre) and Fluoxetine 20 mg from another dr. Blackwood give call back

## 2024-11-07 NOTE — TELEPHONE ENCOUNTER
Spoke to pharmacist Aurelia at [facility]. Aurelia stated she filled the Fluoxetine only due to being the newest prescription and prescribed by psychiatrist. I let her know per Dr. Alejandre's office note on 9/16/2024 her psychiatrist is to take over medication management. She stated she was going to inactivate Sertraline for no future refills.

## 2024-11-24 ENCOUNTER — HOSPITAL ENCOUNTER (INPATIENT)
Facility: HOSPITAL | Age: 79
LOS: 5 days | Discharge: HOME | DRG: 882 | End: 2024-11-29
Attending: STUDENT IN AN ORGANIZED HEALTH CARE EDUCATION/TRAINING PROGRAM | Admitting: STUDENT IN AN ORGANIZED HEALTH CARE EDUCATION/TRAINING PROGRAM
Payer: MEDICARE

## 2024-11-24 DIAGNOSIS — T50.902A MEDICATION OVERDOSE, INTENTIONAL SELF-HARM, INITIAL ENCOUNTER (HCC): Primary | ICD-10-CM

## 2024-11-24 DIAGNOSIS — T14.91XA SUICIDE ATTEMPT (HCC): ICD-10-CM

## 2024-11-24 PROBLEM — R45.89 SUICIDAL BEHAVIOR WITHOUT ATTEMPTED SELF-INJURY: Status: ACTIVE | Noted: 2024-11-24

## 2024-11-24 LAB
ALBUMIN SERPL-MCNC: 4.1 G/DL (ref 3.5–5)
ALBUMIN/GLOB SERPL: 1.2 (ref 1.1–2.2)
ALP SERPL-CCNC: 107 U/L (ref 45–117)
ALT SERPL-CCNC: 23 U/L (ref 12–78)
AMPHET UR QL SCN: NEGATIVE
ANION GAP SERPL CALC-SCNC: 9 MMOL/L (ref 2–12)
APAP SERPL-MCNC: <2 UG/ML (ref 10–30)
AST SERPL-CCNC: 17 U/L (ref 15–37)
BARBITURATES UR QL SCN: NEGATIVE
BASOPHILS # BLD: 0 K/UL (ref 0–0.1)
BASOPHILS NFR BLD: 0 % (ref 0–1)
BENZODIAZ UR QL: NEGATIVE
BILIRUB SERPL-MCNC: 0.5 MG/DL (ref 0.2–1)
BUN SERPL-MCNC: 23 MG/DL (ref 6–20)
BUN/CREAT SERPL: 23 (ref 12–20)
CALCIUM SERPL-MCNC: 9.5 MG/DL (ref 8.5–10.1)
CANNABINOIDS UR QL SCN: POSITIVE
CHLORIDE SERPL-SCNC: 112 MMOL/L (ref 97–108)
CK SERPL-CCNC: 94 U/L (ref 26–192)
CO2 SERPL-SCNC: 19 MMOL/L (ref 21–32)
COCAINE UR QL SCN: NEGATIVE
COMMENT:: NORMAL
CREAT SERPL-MCNC: 0.98 MG/DL (ref 0.55–1.02)
DIFFERENTIAL METHOD BLD: ABNORMAL
EKG ATRIAL RATE: 83 BPM
EKG DIAGNOSIS: NORMAL
EKG P AXIS: 64 DEGREES
EKG P-R INTERVAL: 110 MS
EKG Q-T INTERVAL: 356 MS
EKG QRS DURATION: 64 MS
EKG QTC CALCULATION (BAZETT): 418 MS
EKG R AXIS: 69 DEGREES
EKG T AXIS: 51 DEGREES
EKG VENTRICULAR RATE: 83 BPM
EOSINOPHIL # BLD: 0 K/UL (ref 0–0.4)
EOSINOPHIL NFR BLD: 0 % (ref 0–7)
ERYTHROCYTE [DISTWIDTH] IN BLOOD BY AUTOMATED COUNT: 13.2 % (ref 11.5–14.5)
ETHANOL SERPL-MCNC: <10 MG/DL (ref 0–0.08)
GLOBULIN SER CALC-MCNC: 3.3 G/DL (ref 2–4)
GLUCOSE SERPL-MCNC: 126 MG/DL (ref 65–100)
HCT VFR BLD AUTO: 42.9 % (ref 35–47)
HGB BLD-MCNC: 13.8 G/DL (ref 11.5–16)
IMM GRANULOCYTES # BLD AUTO: 0 K/UL (ref 0–0.04)
IMM GRANULOCYTES NFR BLD AUTO: 0 % (ref 0–0.5)
LYMPHOCYTES # BLD: 1 K/UL (ref 0.8–3.5)
LYMPHOCYTES NFR BLD: 13 % (ref 12–49)
Lab: ABNORMAL
MCH RBC QN AUTO: 31.2 PG (ref 26–34)
MCHC RBC AUTO-ENTMCNC: 32.2 G/DL (ref 30–36.5)
MCV RBC AUTO: 97.1 FL (ref 80–99)
METHADONE UR QL: NEGATIVE
MONOCYTES # BLD: 0.5 K/UL (ref 0–1)
MONOCYTES NFR BLD: 6 % (ref 5–13)
NEUTS SEG # BLD: 6.3 K/UL (ref 1.8–8)
NEUTS SEG NFR BLD: 81 % (ref 32–75)
NRBC # BLD: 0 K/UL (ref 0–0.01)
NRBC BLD-RTO: 0 PER 100 WBC
OPIATES UR QL: NEGATIVE
PCP UR QL: NEGATIVE
PLATELET # BLD AUTO: 240 K/UL (ref 150–400)
PMV BLD AUTO: 9.4 FL (ref 8.9–12.9)
POTASSIUM SERPL-SCNC: 3.9 MMOL/L (ref 3.5–5.1)
PROT SERPL-MCNC: 7.4 G/DL (ref 6.4–8.2)
RBC # BLD AUTO: 4.42 M/UL (ref 3.8–5.2)
SALICYLATES SERPL-MCNC: <1.7 MG/DL (ref 2.8–20)
SODIUM SERPL-SCNC: 140 MMOL/L (ref 136–145)
SPECIMEN HOLD: NORMAL
WBC # BLD AUTO: 7.9 K/UL (ref 3.6–11)

## 2024-11-24 PROCEDURE — 1240000000 HC EMOTIONAL WELLNESS R&B

## 2024-11-24 PROCEDURE — 6370000000 HC RX 637 (ALT 250 FOR IP)

## 2024-11-24 PROCEDURE — 80307 DRUG TEST PRSMV CHEM ANLYZR: CPT

## 2024-11-24 PROCEDURE — 82550 ASSAY OF CK (CPK): CPT

## 2024-11-24 PROCEDURE — 99285 EMERGENCY DEPT VISIT HI MDM: CPT

## 2024-11-24 PROCEDURE — 36415 COLL VENOUS BLD VENIPUNCTURE: CPT

## 2024-11-24 PROCEDURE — 93005 ELECTROCARDIOGRAM TRACING: CPT | Performed by: EMERGENCY MEDICINE

## 2024-11-24 PROCEDURE — 6360000002 HC RX W HCPCS: Performed by: EMERGENCY MEDICINE

## 2024-11-24 PROCEDURE — 96374 THER/PROPH/DIAG INJ IV PUSH: CPT

## 2024-11-24 PROCEDURE — 80053 COMPREHEN METABOLIC PANEL: CPT

## 2024-11-24 PROCEDURE — 85025 COMPLETE CBC W/AUTO DIFF WBC: CPT

## 2024-11-24 PROCEDURE — 80143 DRUG ASSAY ACETAMINOPHEN: CPT

## 2024-11-24 PROCEDURE — 80179 DRUG ASSAY SALICYLATE: CPT

## 2024-11-24 PROCEDURE — 82077 ASSAY SPEC XCP UR&BREATH IA: CPT

## 2024-11-24 RX ORDER — SENNOSIDES A AND B 8.6 MG/1
1 TABLET, FILM COATED ORAL DAILY PRN
Status: DISCONTINUED | OUTPATIENT
Start: 2024-11-24 | End: 2024-11-29 | Stop reason: HOSPADM

## 2024-11-24 RX ORDER — TRAZODONE HYDROCHLORIDE 50 MG/1
25 TABLET, FILM COATED ORAL NIGHTLY PRN
Status: DISCONTINUED | OUTPATIENT
Start: 2024-11-24 | End: 2024-11-29 | Stop reason: HOSPADM

## 2024-11-24 RX ORDER — HALOPERIDOL 5 MG/ML
2.5 INJECTION INTRAMUSCULAR EVERY 4 HOURS PRN
Status: DISCONTINUED | OUTPATIENT
Start: 2024-11-24 | End: 2024-11-29 | Stop reason: HOSPADM

## 2024-11-24 RX ORDER — HALOPERIDOL 5 MG/1
2.5 TABLET ORAL EVERY 4 HOURS PRN
Status: DISCONTINUED | OUTPATIENT
Start: 2024-11-24 | End: 2024-11-29 | Stop reason: HOSPADM

## 2024-11-24 RX ORDER — LORAZEPAM 2 MG/ML
0.5 INJECTION INTRAMUSCULAR ONCE
Status: COMPLETED | OUTPATIENT
Start: 2024-11-24 | End: 2024-11-24

## 2024-11-24 RX ORDER — HYDROXYZINE HYDROCHLORIDE 10 MG/1
10 TABLET, FILM COATED ORAL 3 TIMES DAILY PRN
Status: DISCONTINUED | OUTPATIENT
Start: 2024-11-24 | End: 2024-11-29 | Stop reason: HOSPADM

## 2024-11-24 RX ORDER — POLYETHYLENE GLYCOL 3350 17 G/17G
17 POWDER, FOR SOLUTION ORAL DAILY PRN
Status: DISCONTINUED | OUTPATIENT
Start: 2024-11-24 | End: 2024-11-29 | Stop reason: HOSPADM

## 2024-11-24 RX ORDER — DIPHENHYDRAMINE HYDROCHLORIDE 50 MG/ML
25 INJECTION INTRAMUSCULAR; INTRAVENOUS EVERY 4 HOURS PRN
Status: DISCONTINUED | OUTPATIENT
Start: 2024-11-24 | End: 2024-11-29 | Stop reason: HOSPADM

## 2024-11-24 RX ORDER — ACETAMINOPHEN 325 MG/1
650 TABLET ORAL EVERY 4 HOURS PRN
Status: DISCONTINUED | OUTPATIENT
Start: 2024-11-24 | End: 2024-11-29 | Stop reason: HOSPADM

## 2024-11-24 RX ORDER — MAGNESIUM HYDROXIDE/ALUMINUM HYDROXICE/SIMETHICONE 120; 1200; 1200 MG/30ML; MG/30ML; MG/30ML
30 SUSPENSION ORAL EVERY 6 HOURS PRN
Status: DISCONTINUED | OUTPATIENT
Start: 2024-11-24 | End: 2024-11-29 | Stop reason: HOSPADM

## 2024-11-24 RX ADMIN — TRAZODONE HYDROCHLORIDE 25 MG: 50 TABLET ORAL at 23:34

## 2024-11-24 RX ADMIN — LORAZEPAM 0.5 MG: 2 INJECTION INTRAMUSCULAR; INTRAVENOUS at 16:49

## 2024-11-24 ASSESSMENT — LIFESTYLE VARIABLES
HOW MANY STANDARD DRINKS CONTAINING ALCOHOL DO YOU HAVE ON A TYPICAL DAY: PATIENT DOES NOT DRINK
HOW OFTEN DO YOU HAVE A DRINK CONTAINING ALCOHOL: NEVER

## 2024-11-24 ASSESSMENT — SLEEP AND FATIGUE QUESTIONNAIRES
AVERAGE NUMBER OF SLEEP HOURS: 2
DO YOU USE A SLEEP AID: YES
DO YOU HAVE DIFFICULTY SLEEPING: YES

## 2024-11-24 ASSESSMENT — PAIN - FUNCTIONAL ASSESSMENT: PAIN_FUNCTIONAL_ASSESSMENT: 0-10

## 2024-11-24 ASSESSMENT — PAIN SCALES - GENERAL: PAINLEVEL_OUTOF10: 0

## 2024-11-24 NOTE — ED TRIAGE NOTES
Pt presents from home accompanied by partner w/ CC suicidal attempt. About 3 hours pta she took fluoxetine and rosuvastatin, about 20 of each. States \"I want to die\" for a couple of weeks now. A&Ox4.

## 2024-11-24 NOTE — ED NOTES
Poison control contacted. Poison control stated to monitor for GI upset from the rosuvastatin. And to monitor for serotonin syndrome, fever, clonus, tachycardia, and seizures from the fluoxetine. Poison control stated pt should be on cardiac monitor. Pt placed on cardiac monitor. Treatment is supportive care and treat symptoms that arise. Pt is asymptomatic at this time.

## 2024-11-24 NOTE — BSMART NOTE
Comprehensive Assessment Form Part 1      Section I - Disposition    Bipolar Disorder per Hx  BPD per Hx  OCD per Hx    Past Medical History:   Diagnosis Date    Arthritis     Osteoarthritis    Broken arm 2021    Excessive sweating     Occurs in summers; TSH/CMP/CBC normal    GERD (gastroesophageal reflux disease)     WITH HAITEL HERNIA    Menopause     LMP-32 years old    MRSA (methicillin resistant Staphylococcus aureus) infection     MRSA + abd abscess; nasal swab negative after treatment    Psychiatric disorder     DEPRESSION.      The Medical Doctor to Psychiatrist conference was not completed.  The Medical Doctor is in agreement with Psychiatrist disposition.  The plan is for voluntary psychiatric hospitalization.  The Psychi  The admitting Psychiatrist will be TBD.  The admitting Diagnosis is Bipolar D/O.  The Payor source is MEDICARE PART A AND [facility].      This writer reviewed the [place] Suicide Severity Rating Scale in nursing flowsheet and the risk level assigned is high risk.  Based on this assessment, the risk of suicide is high risk and the plan is for voluntary psychiatric hospitalization.    Section II - Integrated Summary  Summary:      78 yo female arrived to the ED w/ her partner, Andrei Barrera ([phone]) reporting a suicide attempt earlier today via overdose. Per triage report by SPENCER Galan, \"Pt presents from home accompanied by partner w/ CC suicidal attempt. About 3 hours pta she took fluoxetine and rosuvastatin, about 20 of each. States 'I want to die' for a couple of weeks now. A&Ox4\".  Pt consented to complete her assessment, consented for her partner to be present during the assessment and was able to confirm her name and .  This assessment was completed face to face.     Pt endorses SI for \"about a month\" stating several times throughout this meeting, \"I wanna die\".  Pt reports a suicide attempt earlier today stating she took about 20-22 Fluoxetine 20

## 2024-11-24 NOTE — ED PROVIDER NOTES
5:16 PM  Reviewed labs, medically cleared    Patient remained stable in the emergency department, no signs or symptoms of serotonin syndrome.  She was given medication for anxiety as well as provided meal. Has been advised that she is medically cleared.  Will admit     Britt Boone DO  11/24/24 1940    
Housing Stability Vital Sign     Unstable Housing in the Last Year: No           PHYSICAL EXAM    (up to 7 for level 4, 8 or more for level 5)     ED Triage Vitals [11/24/24 1400]   BP Systolic BP Percentile Diastolic BP Percentile Temp Temp Source Pulse Respirations SpO2   (!) 147/79 -- -- 98.4 °F (36.9 °C) Oral 87 17 97 %      Height Weight - Scale         1.448 m (4' 9\") 48.4 kg (106 lb 11.2 oz)             Body mass index is 23.09 kg/m².    Physical Exam  Vitals and nursing note reviewed.   Constitutional:       General: She is not in acute distress.     Appearance: She is well-developed and normal weight. She is not ill-appearing or toxic-appearing.   HENT:      Head: Normocephalic and atraumatic.      Mouth/Throat:      Mouth: Mucous membranes are moist.      Pharynx: Oropharynx is clear.   Eyes:      Extraocular Movements: Extraocular movements intact.      Pupils: Pupils are equal, round, and reactive to light.   Cardiovascular:      Rate and Rhythm: Normal rate and regular rhythm.   Pulmonary:      Effort: Pulmonary effort is normal.      Breath sounds: Normal breath sounds.   Chest:      Chest wall: No deformity, tenderness or edema.   Abdominal:      Palpations: Abdomen is soft.      Tenderness: There is no abdominal tenderness.   Musculoskeletal:         General: Normal range of motion.      Cervical back: Normal range of motion and neck supple.   Skin:     General: Skin is warm and dry.      Capillary Refill: Capillary refill takes less than 2 seconds.   Neurological:      General: No focal deficit present.      Mental Status: She is alert and oriented to person, place, and time.   Psychiatric:         Mood and Affect: Affect is flat and inappropriate.         Speech: Speech normal.         Behavior: Behavior normal. Behavior is cooperative.         Thought Content: Thought content includes suicidal ideation. Thought content does not include homicidal ideation. Thought content does not include homicidal

## 2024-11-24 NOTE — BSMART NOTE
BSMART assessment completed, and suicide risk level noted to be HIGH. Charge Nurse, Ari notified. Concerns not observed. 1:1 constant observer and partner, Andrei Barrera are present at bedside.

## 2024-11-24 NOTE — ED NOTES
Pt put in green gown and belongings labeled and placed in cabined shelf 3. Belongings included brown shoes, jeans, flannel shirt and jacket.

## 2024-11-25 PROCEDURE — 1240000000 HC EMOTIONAL WELLNESS R&B

## 2024-11-25 RX ORDER — ROSUVASTATIN CALCIUM 5 MG/1
5 TABLET, COATED ORAL DAILY
COMMUNITY

## 2024-11-25 RX ORDER — SERTRALINE HYDROCHLORIDE 100 MG/1
200 TABLET, FILM COATED ORAL DAILY
Status: ON HOLD | COMMUNITY
End: 2024-11-29 | Stop reason: HOSPADM

## 2024-11-25 ASSESSMENT — PAIN - FUNCTIONAL ASSESSMENT: PAIN_FUNCTIONAL_ASSESSMENT: NONE - DENIES PAIN

## 2024-11-25 NOTE — H&P
[facility] BEHAVIORAL HEALTH  INITIAL PSYCHIATRIC INTERVIEW:    Name: Jen Borges  MR#: [id number]  ACCOUNT#: [id number]  : 1945  ADMIT DATE: 2024    CHIEF COMPLAINT: \"I've lost the zest for life.\"     HISTORY OF PRESENTING COMPLAINT:  This is a 79 y.o. female who is currently admitted to the general psychiatric floor at [facility] on a voluntary basis after a suicide attempt by overdose. The pt has been struggling with depression for a while, having suicidal ideations for about a month. She reportedly took about 400mg Prozac and 100mgmg Rosuvastatin. She had also thought about shooting herself with her partner's firearm, which her partner has since confirmed that they have removed. She has reported there were no specific stressors leading up to this suicide attempt. The pt is calm and cooperative, but states she has to go on with life despite continuous suicidal ideation. She states that at 79 she's becoming very weary. She states she has habits that she'd rather not have. She states she's dealing with \"the boogeyman,\" which upon clarification she means the boogeyman is the suicidal thoughts she continues to have. 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 She states someone left her a lot of money, so she's not in any financial trouble. She states for this reason she doesn't understand why she has to engage in compulsive stealing. She agrees that she has \"OCD to the max.\" She states if there's something on the floor, she has to pick it up, and she has to  leaves from her yard, but she doesn't mind this - however, she does have insight into the nature of her stealing and states she would like to stop it. She has never done ERP but is open to this therapy modality. She does believe that

## 2024-11-25 NOTE — PLAN OF CARE
Problem: Depression  Goal: Will be euthymic at discharge  Description: INTERVENTIONS:  1. Administer medication as ordered  2. Provide emotional support via 1:1 interaction with staff  3. Encourage involvement in milieu/groups/activities  4. Monitor for social isolation  Outcome: Progressing  Note: Out on unit engaged, social and attending groups. Demonstrates ability to get her needs met. Following nursing direction and unit routine, shares common spaces. Self identifies as obsessive compulsive kleptomaniac, lonely and \"lost the zest for life\". Describes not wanting to be alive and the ongoing struggle with her OCD symptoms are exhausting and leading her to feel hopeless. States she currently does not have suicidal thoughts and has no plan today. Review medications and her plan of care. Pt verbalized understanding

## 2024-11-25 NOTE — BSMART NOTE
The patient received a bed placement at [facility] via VICKY Mata DO. RN 2 RN [phone].

## 2024-11-25 NOTE — ED NOTES
TRANSFER - OUT REPORT:    Verbal report given to SPENCER Gore on Jen Borges  being transferred to [facility] for routine progression of patient care       Report consisted of patient's Situation, Background, Assessment and   Recommendations(SBAR).     Information from the following report(s) ED Encounter Summary, ED SBAR, MAR, and Event Log was reviewed with the receiving nurse.    [place] Fall Assessment:    Presents to emergency department  because of falls (Syncope, seizure, or loss of consciousness): No  Age > 70: Yes  Altered Mental Status, Intoxication with alcohol or substance confusion (Disorientation, impaired judgment, poor safety awaremess, or inability to follow instructions): Yes  Impaired Mobility: Ambulates or transfers with assistive devices or assistance; Unable to ambulate or transer.: No  Nursing Judgement: Yes          Lines:   Peripheral IV 11/24/24 Left Antecubital (Active)   Site Assessment Clean, dry & intact 11/24/24 1428   Line Status Blood return noted;Specimen collected;Flushed 11/24/24 1428   Line Care Connections checked and tightened 11/24/24 1428   Phlebitis Assessment No symptoms 11/24/24 1428   Infiltration Assessment 0 11/24/24 1428   Alcohol Cap Used Yes 11/24/24 1428   Dressing Status Clean, dry & intact;New dressing applied 11/24/24 1428   Dressing Type Transparent 11/24/24 1428   Dressing Intervention New 11/24/24 1428        Opportunity for questions and clarification was provided.      Patient transported with: security and tech

## 2024-11-25 NOTE — CARE COORDINATION
11/25/24 0721   ITP   Date of Plan 11/25/24   Date of Next Review 12/02/24   Primary Diagnosis Code Depression   Barriers to Treatment Need for psychoeducation   Strengths Incorporated in Plan Acknowledging need for assistance;Seeking interactions;Verbal   Short Term Goal 1   Short Term Goal 1 Client will remain safe during stay   Baseline Functioning She had a suicide attempt beforeadmission - \" I want to die \"   Target Be free of SI   Objectives Client will participate in group therapy   Intervention 1 Acknowledge client strengths;Assess safety   Frequency daily   Measured by Behavioral data;Self report;Staff observation   Staff Responsible [facility] staff;Clinical staff   Intervention 2 Group therapy   Frequency daily   Measured by Behavioral data;Self report;Staff observation   Staff Responsible [facility] staff;Clinical staff   Intervention 3 Milieu therapy and support   Frequency daily   Measured by Behavioral data;Self report;Staff observation   Staff Responsible [facility] staff;Clinical staff   Short Term Goal 2   Short Term Goal 2 Client will learn and demonstrate effective coping skills   Baseline Functioning feeling like a burden to others   Target learn two ways to manage frustration in a positive manor   Objectives Client will participate in group therapy   Intervention 1 Group therapy   Frequency daily   Measured by Behavioral data;Self report;Staff observation   Staff Responsible Clinical staff;[facility] staff   Intervention 2 Monitor medications   Frequency daily   Measured by Self report;Behavioral data;Staff observation   Staff Responsible Clinical staff   Intervention 3 Milieu therapy and support   Frequency daily   Measured by Behavioral data;Self report;Staff observation   Staff Responsible [facility] staff;Clinical staff   Crisis/Safety/Discharge Plan   Crisis/Safety Plan Standard program interventions and protocol   Comprehensive Assessment Completion Date 11/25/24   Discharge Plan She will return home with outpatient

## 2024-11-25 NOTE — CARE COORDINATION
11/25/24 1007   Suicidal Ideation   Wish to be Dead Past 1 month - Yes;Lifetime - No   Non-Specific Active Suicidal Thoughts Past 1 month - Yes;Lifetime - No   Suicidal Behavior Trigger Wish to be Dead   Active Suicidal Ideation with Any Methods (Not Plan) without Intent to Act Past 1 month - Yes   Active Suicidal Ideation with Some Intent to Act, without Specific Plan Past 1 month - Yes   Active Suicidal Ideation with Specific Plan and Intent Past 1 month - Yes   Intensity of Ideation   Lifetime - Most Severe Ideation 4   Lifetime - Most Recent Ideation 3   Frequency Daily or almost daily   Duration 1-4 hours/a lot of time   Controllability Can control thoughts with a lot of difficulty   Deterrents Deterrents definitely did not stop you   Reasons for Ideation Does not apply   Suicidal Behavior   Actual Attempt Past 3 months - Yes;Lifetime - No   Total # of Attempts 1   Has subject engaged in Non-Suicidal Self-Injurious Behavior? Lifetime - No   Interrupted Attempt Lifetime - No   Aborted or Self-Interrupted Attempt Lifetime - No   Preparatory Acts or Behavior Lifetime - No   Actual Lethality/Medical Damage 2   Potential Lethality 1   Most Recent Attempt Date 11/23/24   Most Lethal Attempt Date 11/23/24   Initial/First Attempt Date 11/23/24

## 2024-11-25 NOTE — INTERDISCIPLINARY ROUNDS
Behavioral Health Interdisciplinary Rounds     Patient Name: Jen Borges  Age: 79 y.o.  Room/Bed:  3/  Primary Diagnosis: Suicidal behavior without attempted self-injury   Admission Status: Voluntary    Readmission within 30 days: No  Power of  in place: No  Patient requires a blocked bed: Yes          Reason for blocked bed: Hx of MRSA  Sleep hours: 7       Participation in Care/Groups:  Yes  Medication Compliant?:  New admission   PRNS (last 24 hours): Sleep Aid   Restraints (last 24 hours):  No  __________________________________________________  OQ Admission Analysis Survey completed:yes   OQ Admission Analysis Survey score:97  __________________________________________________     Alcohol screening (AUDIT) completed -  yes    :  SCORE : 97   Tobacco - :  no  Illegal Drugs use:  THC   CSSRS - Completed     24 hour chart check complete: Yes    _______________________________________________    Patient goal(s) for today: meet with treatment team   Treatment team focus/goals: Plan to assess for medications and discharge needs   Progress note:    she reported an increased in depression and took an overdose of Prozac and Rosuvastatin / she also discussed her OCD and how she has compulsive thought to steal. Recent change in her medications, she was not taking an antidepressant for quite some time   Spiritual Care Consult: no  Financial concerns/prescription coverage:    Family contact:    Andrei Andujar [phone]                     Family requesting physician contact today:    Discharge plan: she will return home   Access to weapons : no - weapon has been secured by Andrei Andujar [phone]  PAYTON spoke to her SO and updated him on the treatment plan                                                             Outpatient provider(s): Dr. Rees     LOS:  1  Expected LOS: TBD     Participating treatment team members: Jen Borges, Michelle Pelaez,PAYTON- Jennifer Martin, NP - Kamila Pereira RN - Sofía

## 2024-11-25 NOTE — PLAN OF CARE
Problem: Depression  Goal: Will be euthymic at discharge  Description: INTERVENTIONS:  1. Administer medication as ordered  2. Provide emotional support via 1:1 interaction with staff  3. Encourage involvement in milieu/groups/activities  4. Monitor for social isolation  11/25/2024 4018 by Tata Santiago RN  Outcome: Progressing     Problem: Anxiety  Goal: Will report anxiety at manageable levels  Description: INTERVENTIONS:  1. Administer medication as ordered  2. Teach and rehearse alternative coping skills  3. Provide emotional support with 1:1 interaction with staff  Outcome: Progressing  Flowsheets (Taken 11/25/2024 1330)  Will report anxiety at manageable levels:   Administer medication as ordered   Provide emotional support with 1:1 interaction with staff      PATIENT INSTRUCTIONS     Physical Therapy - they will call you to schedule the first appointment OR you may call THEM to schedule.     [facility]    [address]  [id number] [facility] [address]  [id number]       [phone]         · Occupational Therapy (Hand/Upper extremity therapy) is offered at the [facility]  · Please dress according to treatment area (ex: knee treatment needs to wear shorts)  · Co-payments are due at the time of appointment  · To ensure your visit goes as smooth as possible, please check your insurance benefits for coverage of physical therapy and bring a copy of your insurance card.      ICE:  Ice:  apply ice for 20 minutes 3 times a day.  No barrier between the ice and skin is needed when using cubes or frozen peas.  If you are using a chemical ice pack please place a barrier between the ice pack and your skin      Medication  TWO DAY BEFORE STARTING PHYSICAL THERAPY, START TAKING THIS-  Mobic: take 1 tablet 1 time a day for 10 days.  Do not take any other anti-inflammatory medication while taking this medication.        Follow-Up:  Please make an appointment with  Dr. Vitaly Hyde in 6 weeks.               Time left: 5/23/2019 12:01 PM     Next appointment:        Location:        Provider:         Please note: 24 hour notice for cancellation of appointment is required.    You may receive a survey in the mail, or via the e-mail address that you have provided.  We would appreciate if you could fill out the survey and provide us with any feedback on your experience regarding your visit today. Thank you for allowing us to provide you with your health care needs.     Do not hesitate to call if you are experiencing severe pain, worsening or change in your pain, have symptoms of infection (fever, warmth, redness, increased drainage), or have any other problem that concerns you ~ [phone] (or [phone] after hours).    Please remember when  requesting refills on pain medication that the request should be made by Thursday at the latest. [facility] is open Monday-Friday, 8am-5pm, and closed on the weekends.  No narcotic refills will be filled after hours.    Additional Educational Resources:  For additional resources regarding your symptoms, diagnosis, or further health information, please visit the Health Resources section on [email or web address] or the Online Health Resources section in meevl.

## 2024-11-26 LAB
BACTERIA SPEC CULT: NORMAL
BACTERIA SPEC CULT: NORMAL
SERVICE CMNT-IMP: NORMAL

## 2024-11-26 PROCEDURE — 6370000000 HC RX 637 (ALT 250 FOR IP): Performed by: PSYCHIATRY & NEUROLOGY

## 2024-11-26 PROCEDURE — 1240000000 HC EMOTIONAL WELLNESS R&B

## 2024-11-26 PROCEDURE — 6370000000 HC RX 637 (ALT 250 FOR IP)

## 2024-11-26 RX ORDER — RISPERIDONE 0.5 MG/1
0.25 TABLET ORAL NIGHTLY
Status: DISCONTINUED | OUTPATIENT
Start: 2024-11-26 | End: 2024-11-27

## 2024-11-26 RX ORDER — MIRTAZAPINE 15 MG/1
7.5 TABLET, FILM COATED ORAL NIGHTLY
Status: DISCONTINUED | OUTPATIENT
Start: 2024-11-26 | End: 2024-11-27

## 2024-11-26 RX ADMIN — HYDROXYZINE HYDROCHLORIDE 10 MG: 10 TABLET ORAL at 19:28

## 2024-11-26 RX ADMIN — HALOPERIDOL 2.5 MG: 5 TABLET ORAL at 14:24

## 2024-11-26 RX ADMIN — HYDROXYZINE HYDROCHLORIDE 10 MG: 10 TABLET ORAL at 11:56

## 2024-11-26 RX ADMIN — RISPERIDONE 0.25 MG: 0.5 TABLET, FILM COATED ORAL at 20:46

## 2024-11-26 RX ADMIN — MIRTAZAPINE 7.5 MG: 15 TABLET, FILM COATED ORAL at 20:47

## 2024-11-26 RX ADMIN — HALOPERIDOL 2.5 MG: 5 TABLET ORAL at 08:16

## 2024-11-26 RX ADMIN — HYDROXYZINE HYDROCHLORIDE 10 MG: 10 TABLET ORAL at 06:09

## 2024-11-26 NOTE — PLAN OF CARE
Problem: Depression  Goal: Will be euthymic at discharge  Description: INTERVENTIONS:  1. Administer medication as ordered  2. Provide emotional support via 1:1 interaction with staff  3. Encourage involvement in milieu/groups/activities  4. Monitor for social isolation  Outcome: Progressing     Problem: Anxiety  Goal: Will report anxiety at manageable levels  Description: INTERVENTIONS:  1. Administer medication as ordered  2. Teach and rehearse alternative coping skills  3. Provide emotional support with 1:1 interaction with staff  Outcome: Progressing  Flowsheets (Taken 11/26/2024 0821)  Will report anxiety at manageable levels: Teach and rehearse alternative coping skills  Patient's history discussed.  Patient states she has poor quality of life, stress, anxiety.  Patient medications discussed, patient appears to be a poor historian, recanting prior statements. Patient has some thought blocking.    Patient does not see the importance of taking her prescribed medications and does not know the meds prescribed to her.  Patient states she feels worthless.

## 2024-11-26 NOTE — INTERDISCIPLINARY ROUNDS
Behavioral Health Interdisciplinary Rounds     Patient Name: Jen Borges  Age: 79 y.o.  Room/Bed:  3/  Primary Diagnosis: Suicidal behavior without attempted self-injury  Admission Status: Voluntary  Readmission within 30 days: No  Power of  in place: No  Patient requires a blocked bed: Yes          Reason for blocked bed: Isolation Precautions  Sleep hours:  7+  Participation in Care/Groups: Yes  Medication Compliant?: Yes  PRNS (last 24 hours): None  Restraints (last 24 hours): No  ____________________________________  24 hour chart check complete: Yes    ___________________________________    Patient goal(s) for today: meet with treatment team   Treatment team focus/goals: Plan to start medications   Progress note: She reports she is not able to function.      Financial concerns/prescription coverage: Medicare   Family contact: HARESH Barrera     Discharge plan: She will return home   Access to weapons: no  Outpatient provider(s): Dr. Rees     LOS:  1  Expected LOS: TBD     Participating treatment team members: Jen LUZMA Katerina, PAYTON Muñoz- Dr. Raymundo Caldwell RN -

## 2024-11-27 PROCEDURE — 6370000000 HC RX 637 (ALT 250 FOR IP): Performed by: PSYCHIATRY & NEUROLOGY

## 2024-11-27 PROCEDURE — 1240000000 HC EMOTIONAL WELLNESS R&B

## 2024-11-27 PROCEDURE — 6370000000 HC RX 637 (ALT 250 FOR IP)

## 2024-11-27 RX ORDER — RISPERIDONE 0.5 MG/1
0.5 TABLET ORAL NIGHTLY
Status: DISCONTINUED | OUTPATIENT
Start: 2024-11-27 | End: 2024-11-29 | Stop reason: HOSPADM

## 2024-11-27 RX ORDER — MIRTAZAPINE 15 MG/1
15 TABLET, FILM COATED ORAL NIGHTLY
Status: DISCONTINUED | OUTPATIENT
Start: 2024-11-27 | End: 2024-11-28

## 2024-11-27 RX ADMIN — MIRTAZAPINE 15 MG: 15 TABLET, FILM COATED ORAL at 20:39

## 2024-11-27 RX ADMIN — HYDROXYZINE HYDROCHLORIDE 10 MG: 10 TABLET ORAL at 11:46

## 2024-11-27 RX ADMIN — RISPERIDONE 0.5 MG: 0.5 TABLET, FILM COATED ORAL at 20:39

## 2024-11-27 NOTE — PLAN OF CARE
Problem: Safety - Adult  Goal: Free from fall injury  11/26/2024 2331 by Nati Orr, LPN  Outcome: Progressing   Pt laying in bed with eyes closed, appears to be sleeping. Respirations even and unlabored, NAD. Safety measures in place. Q15 min safety checks continued.

## 2024-11-27 NOTE — DISCHARGE INSTRUCTIONS
system including your family/friends and provider Boris Rees NP at [phone]    *  Please give a list of your current medications to your Primary Care Provider.    *  Please update this list whenever your medications are discontinued, doses are      changed, or new medications (including over-the-counter products) are added.    *  Please carry medication information at all times in case of emergency situations.    These are general instructions for a healthy lifestyle:    No smoking/ No tobacco products/ Avoid exposure to second hand smoke  Surgeon General's Warning:  Quitting smoking now greatly reduces serious risk to your health.    Obesity, smoking, and sedentary lifestyle greatly increases your risk for illness    A healthy diet, regular physical exercise & weight monitoring are important for maintaining a healthy lifestyle    You may be retaining fluid if you have a history of heart failure or if you experience any of the following symptoms:  Weight gain of 3 pounds or more overnight or 5 pounds in a week, increased swelling in our hands or feet or shortness of breath while lying flat in bed.  Please call your doctor as soon as you notice any of these symptoms; do not wait until your next office visit.        The discharge information has been reviewed with the patient.  The patient verbalized understanding.  Discharge medications reviewed with the patient and appropriate educational materials and side effects teaching were provided.  ___________________________________________________________________________________________________________________________________

## 2024-11-27 NOTE — INTERDISCIPLINARY ROUNDS
Behavioral Health Interdisciplinary Rounds     Patient Name: Jen Borges  Age: 79 y.o.  Room/Bed:  3/  Primary Diagnosis: Suicidal behavior without attempted self-injury  Admission Status: Voluntary  Readmission within 30 days: No  Power of  in place: No  Patient requires a blocked bed: Yes          Reason for blocked bed: Isolation Precautions  Sleep hours: 7+  Flu vaccine: Due  Participation in Care/Groups: Yes  Medication Compliant?: Yes  PRNS (last 24 hours): Antipsychotic (PO) and Antianxiety  Restraints (last 24 hours): No  ____________________________________  24 hour chart check complete: Yes    ____________________________________    Patient goal(s) for today: meet with treatment team   Treatment team focus/goals: Plan to increase her Remeron   Progress note: She denies SI today and stated she can be dramatic.  Reports she slept okay over night    Spiritual Care Consult: no   Financial concerns/prescription coverage: medicare   Family contact: She sign a CALVIN for Andrei Barrera     Discharge plan: she will return home   Access to weapons: no  Outpatient provider(s): : [facility]  Call: [phone]  Fax: [phone]  LOS:  3  Expected LOS: Friday     Participating treatment team members: Jen Borges, PAYTON Muñoz- Dr. Raymundo Chavarria, PharmD. - Colby SMALL RN

## 2024-11-27 NOTE — PLAN OF CARE
Problem: Safety - Adult  Goal: Free from fall injury  11/27/2024 0937 by Colby Morales RN  Outcome: Progressing     Problem: Depression  Goal: Will be euthymic at discharge  Description: INTERVENTIONS:  1. Administer medication as ordered  2. Provide emotional support via 1:1 interaction with staff  3. Encourage involvement in milieu/groups/activities  4. Monitor for social isolation  Outcome: Progressing     Pt received on the unit eating breakfast and socializing with peers. Denies SI/HI/AVH. Pt is medication focused but cooperative with staff.     Pt reports deep sleep and says her night was 'wonderful.' Pt claims she is no longer suicidal and is now looking at what she has in her life - says she is 'stronger than I think I am.' Pt reports no side effects from medications, but asked for a more effective sleep medication.     Pt's sister is asking for pt to be discharged. Treatment team will continue treatment until patient safety is assured.     Staff will continue to provide encouragement and monitor patient status via Q15 minute safety rounds.

## 2024-11-28 LAB
CHOLEST SERPL-MCNC: 119 MG/DL
EST. AVERAGE GLUCOSE BLD GHB EST-MCNC: 103 MG/DL
HBA1C MFR BLD: 5.2 % (ref 4–5.6)
HDLC SERPL-MCNC: 63 MG/DL
HDLC SERPL: 1.9 (ref 0–5)
LDLC SERPL CALC-MCNC: 44 MG/DL (ref 0–100)
TRIGL SERPL-MCNC: 60 MG/DL
VLDLC SERPL CALC-MCNC: 12 MG/DL

## 2024-11-28 PROCEDURE — 6370000000 HC RX 637 (ALT 250 FOR IP)

## 2024-11-28 PROCEDURE — 6370000000 HC RX 637 (ALT 250 FOR IP): Performed by: NURSE PRACTITIONER

## 2024-11-28 PROCEDURE — 80061 LIPID PANEL: CPT

## 2024-11-28 PROCEDURE — 1240000000 HC EMOTIONAL WELLNESS R&B

## 2024-11-28 PROCEDURE — 83036 HEMOGLOBIN GLYCOSYLATED A1C: CPT

## 2024-11-28 PROCEDURE — 36415 COLL VENOUS BLD VENIPUNCTURE: CPT

## 2024-11-28 PROCEDURE — 6370000000 HC RX 637 (ALT 250 FOR IP): Performed by: PSYCHIATRY & NEUROLOGY

## 2024-11-28 RX ORDER — MIRTAZAPINE 15 MG/1
30 TABLET, FILM COATED ORAL NIGHTLY
Status: DISCONTINUED | OUTPATIENT
Start: 2024-11-28 | End: 2024-11-29 | Stop reason: HOSPADM

## 2024-11-28 RX ADMIN — HYDROXYZINE HYDROCHLORIDE 10 MG: 10 TABLET ORAL at 10:51

## 2024-11-28 RX ADMIN — RISPERIDONE 0.5 MG: 0.5 TABLET, FILM COATED ORAL at 21:25

## 2024-11-28 RX ADMIN — TRAZODONE HYDROCHLORIDE 25 MG: 50 TABLET ORAL at 21:25

## 2024-11-28 RX ADMIN — MIRTAZAPINE 30 MG: 15 TABLET, FILM COATED ORAL at 21:25

## 2024-11-28 NOTE — PLAN OF CARE
Problem: Depression  Goal: Will be euthymic at discharge  Description: INTERVENTIONS:  1. Administer medication as ordered  2. Provide emotional support via 1:1 interaction with staff  3. Encourage involvement in milieu/groups/activities  4. Monitor for social isolation  Outcome: Progressing   PATIENT IS CURRENTLY RESTING IN BED. NO DISTRESS NOTED. SAFETY MEASURE IN PLACE. WILL CONTINUE TO MONITOR WITH Q15 MINUTE CHECKS.

## 2024-11-28 NOTE — INTERDISCIPLINARY ROUNDS
Behavioral Health Interdisciplinary Rounds     Patient Name: Jen Borges  Age: 79 y.o.  Room/Bed:  [facility]  Primary Diagnosis: Suicidal behavior without attempted self-injury  Admission Status: Voluntary  Readmission within 30 days: No  Power of  in place: No  Patient requires a blocked bed: No          Reason for blocked bed: N/A  Sleep hours:   Flu vaccine: Due  Participation in Care/Groups: Yes  Medication Compliant?: Yes  PRNS (last 24 hours): Antianxiety  Restraints (last 24 hours): No  __________________________________________________  OQ Admission Analysis Survey completed:   OQ Admission Analysis Survey score:     __________________________________________________     Alcohol screening (AUDIT) completed -     Score:   Tobacco :   Illegal Drugs use:   CSSR Lifetime:     24 hour chart check complete: Yes    _______________________________________________    Patient goal(s) for today:   Treatment team focus/goals:   Progress note:     Spiritual Care Consult:   Financial concerns/prescription coverage:   Family contact:   Family requesting physician contact today:   Discharge plan:   Access to weapons:   Outpatient provider(s):     LOS:  4  Expected LOS:     Participating treatment team members: Jen LUZMA Katerina, * (assigned SW),

## 2024-11-28 NOTE — PLAN OF CARE
Problem: Safety - Adult  Goal: Free from fall injury  Outcome: Progressing     Problem: Depression  Goal: Will be euthymic at discharge  Description: INTERVENTIONS:  1. Administer medication as ordered  2. Provide emotional support via 1:1 interaction with staff  3. Encourage involvement in milieu/groups/activities  4. Monitor for social isolation  11/28/2024 1543 by Alexa Mcgowan RN  Outcome: Progressing     Problem: Anxiety  Goal: Will report anxiety at manageable levels  Description: INTERVENTIONS:  1. Administer medication as ordered  2. Teach and rehearse alternative coping skills  3. Provide emotional support with 1:1 interaction with staff  Outcome: Progressing   Pt out on unit engaged with peers. Denies current SI/HI/AVH. Remains medication and meal compliant. Will continue to monitor q15 minutes for safety.

## 2024-11-28 NOTE — PLAN OF CARE
Problem: Depression  Goal: Will be euthymic at discharge  Description: INTERVENTIONS:  1. Administer medication as ordered  2. Provide emotional support via 1:1 interaction with staff  3. Encourage involvement in milieu/groups/activities  4. Monitor for social isolation  11/28/2024 1023 by Kamila Pereira RN  Outcome: Progressing  Note: Out on unit passively engaged, describes preferring to be in room writing in journal. Denies SI, no self harming behaviors, no intent and no plan. Hopeful for future and anticipating discharge in am. Staff focus is on offering support

## 2024-11-29 VITALS
DIASTOLIC BLOOD PRESSURE: 67 MMHG | WEIGHT: 106.7 LBS | OXYGEN SATURATION: 97 % | SYSTOLIC BLOOD PRESSURE: 118 MMHG | HEIGHT: 57 IN | TEMPERATURE: 98.1 F | HEART RATE: 73 BPM | BODY MASS INDEX: 23.02 KG/M2 | RESPIRATION RATE: 18 BRPM

## 2024-11-29 RX ORDER — RISPERIDONE 0.5 MG/1
0.5 TABLET ORAL NIGHTLY
Qty: 30 TABLET | Refills: 0 | Status: SHIPPED | OUTPATIENT
Start: 2024-11-29

## 2024-11-29 RX ORDER — MIRTAZAPINE 30 MG/1
30 TABLET, FILM COATED ORAL NIGHTLY
Qty: 30 TABLET | Refills: 0 | Status: SHIPPED | OUTPATIENT
Start: 2024-11-29

## 2024-11-29 RX ORDER — HYDROXYZINE HYDROCHLORIDE 10 MG/1
10 TABLET, FILM COATED ORAL 3 TIMES DAILY PRN
Qty: 30 TABLET | Refills: 0 | Status: SHIPPED | OUTPATIENT
Start: 2024-11-29 | End: 2024-12-09

## 2024-11-29 NOTE — BH NOTE
2220 - Pt came to the nurse's station requesting to be moved into a double occupancy room with a roommate due to the Pt not wanting to sleep in a room alone. When staff explained that due to her unresolved MRSA screening the Pt was still on isolation precautions and would have a single room until the screen came back negative. Pt became upset and loud. Redirection was provided and limits were set. Pt did go to her room and appeared to be resting with her eyes closed. Breathing was even and unlabored.  
Behavioral Health Transition Record    Patient Name: Jen Borges  YOB: 1945   Medical Record Number: [id number]  Date of Admission: 11/24/2024  2:13 PM   Date of Discharge: 11/29/2024    Attending Provider: Tania Fonseca MD   Discharging Provider: Jennifer Martin NP    To contact this individual call [phone] and ask the  to page.  If unavailable, ask to be transferred to Behavioral Health Provider on call.  A Behavioral Health Provider will be available on call 24/7 and during holidays.    Primary Care Provider: Justina Alejandre MD    Allergies   Allergen Reactions    Alendronate Other (See Comments)     Severe heartburn       Reason for Admission: CHIEF COMPLAINT: \"I've lost the zest for life.\"      HISTORY OF PRESENTING COMPLAINT:  This is a 79 y.o. female who is currently admitted to the general psychiatric floor at [facility] on a voluntary basis after a suicide attempt by overdose. The pt has been struggling with depression for a while, having suicidal ideations for about a month. She reportedly took about 400mg Prozac and 100mgmg Rosuvastatin. She had also thought about shooting herself with her partner's firearm, which her partner has since confirmed that they have removed. She has reported there were no specific stressors leading up to this suicide attempt. The pt is calm and cooperative, but states she has to go on with life despite continuous suicidal ideation. She states that at 79 she's becoming very weary. She states she has habits that she'd rather not have. She states she's dealing with \"the boogeyman,\" which upon clarification she means the boogeyman is the suicidal thoughts she continues to have. 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 She states someone left her a lot 
GROUP THERAPY PROGRESS NOTE    No Psychoeducation group due to low participation/engagemnt. SW provided pts with handouts to complete independently.     Katherine Gillies, MSW, [facility]  
GROUP THERAPY PROGRESS NOTE    Patient did not participate in self-care group.    Katherine Gillies MSW, [facility]  
GROUP THERAPY PROGRESS NOTE    Patient is participating in Healthy Living group.    Group time: 30 minutes    Personal goal for participation: To develop an understanding about the correlation between diet and mental health.    Goal orientation: Personal    Group therapy participation: Active     Therapeutic interventions reviewed and discussed:  Group members were offered education about nutrition. Members engaged in discussion about healthy and unhealthy eating habits. Handouts and video provided.     Impression of participation: Pt was present and engaged. Pt was calm, cooperative.           Katherine Gillies, MSW, [facility]      
GROUP THERAPY PROGRESS NOTE    Patient is participating in psychoeducation group.    Group time: 30 mins.    Personal goal for participation: to develop an understanding of cognitive distortions and how to alter our thinking.     Goal orientation: Personal    Group therapy participation: Active     Therapeutic interventions reviewed and discussed:  Group members were guided through learning about cognitive distortions through discussion and video. Members gained an understanding of how these types of distortions effect perception, relationships, and overall mental health. Members were guided through learning about methods that can be used for changing negative thought patterns. Handouts provided.    Impression of participation:  Pt was present and engaged in group activity. Pt was calm, cooperative     Katherine Gillies, MSW, [facility]    
GROUP THERAPY PROGRESS NOTE    Patient is participating in psychoeducation group.    Group time: 30 minutes    Personal goal for participation: To develop an understanding of Habit loops and strategies to change our unhealthy behaviors.     Goal orientation: Personal    Group therapy participation: Active     Therapeutic interventions reviewed and discussed:  Group members were introduced to habit loops and addressing bad habits. Pts watched video about strategies for building healthy habits. Pts were able to gain knowledge of personal habits and engage in discussion with peers.     Impression of participation:  Pt was present and engaged in discussion. Pt added insight to topic. Pt was calm, cooperative     Katherine Gillies, MSW, [facility]  
GROUP THERAPY PROGRESS NOTE    Patient is participating in recreational therapy group.    Group time: 30 minutes    Personal goal for participation: To develop an understanding and practice of stress relief through art therapy.      Goal orientation: Personal    Group therapy participation:  Active      Therapeutic interventions reviewed and discussed:  Group members were given the opportunity to engage in conversation about their mental health challenges and strengths while coloring/painting. Group members gained an understanding of how stress relief through artistic practice can be beneficial to their mental health.     Impression of participation: Pt was present and engaged in Yudith craft activity     Katherine Gillies, MSW, [facility]    
GROUP THERAPY PROGRESS NOTE    Patient is participating in recreational therapy group.    Group time: 30 minutes    Personal goal for participation: To engage in “finish the phrase” and other puzzle activities.    Goal orientation: Personal    Group therapy participation: passive     Therapeutic interventions reviewed and discussed:  Group members were given the opportunity to engage in the “finish the phrase” activity. Members were able to exercise socialization and memory skills. Members interacted with peers. Handout provided.     Impression of participation: SW provided handouts to be completed independently. SW went over answers with pts.    Katherine Gillies, MSW, [facility]        
GROUP THERAPY PROGRESS NOTE    Patient is participating in recreational therapy group.    Group time: 30 minutes    Personal goal for participation: Tree of Life    Goal orientation: Personal    Group therapy participation: passive     Therapeutic interventions reviewed and discussed:  Group members were given a handout with a blank tree. Members followed instructions about what the tree represents. Members were able to visually represent their roots, present, strengths and goals.     Impression of participation: Sw provided pts with activity to complete     Katherine Gillies, MSW, [facility]        
PRN Medication Documentation    Specific patient behavior that led to need for PRN medication: Anxiety  Staff interventions attempted prior to PRN being given: Therapeutic talk  PRN medication given: Atarax  Patient response/effectiveness of PRN medication: TL aware    
PRN Medication Documentation    Specific patient behavior that led to need for PRN medication: Anxiety, agitation, disorganized thoughts  Staff interventions attempted prior to PRN being given: Therapeutic talk  PRN medication given: Haldol  Patient response/effectiveness of PRN medication: TL aware    Pt approaches med room requesting medication for anxiety. Pt is visibly shaking, highly anxious, and stated \"I'm acting like someone who should be here. This anxiety is a really bad one. Atarax was already administered at 0622, no other anxiety meds ordered, Haldol 2.5MG PO provided - patient accepted med with no issue. Will continue to monitor for changes in patient status and behavior.   
PRN Medication Documentation    Specific patient behavior that led to need for PRN medication: Racing thoughts, anxiety  Staff interventions attempted prior to PRN being given: Therapeutic talk  PRN medication given: Haldol 2.5 MG PO  Patient response/effectiveness of PRN medication: TL aware   
PRN Medication Documentation    Specific patient behavior that led to need for PRN medication: c/o anxiety  Staff interventions attempted prior to PRN being given: coping skills  PRN medication given: atarax  Patient response/effectiveness of PRN medication: ivanna aware    
PSYCHIATRIC PROGRESS NOTE    Chief Complaint: \"I've been thinking about suicide for some time.\"    Length of Stay: 2 Days    Interval History:  Patient says that the quality of her life isn't what she wants it to be. She eventually took an overdose of her medications that she was prescribed for depression. She explains that even though she was taking the prozac, she came to the conclusion that it wasn't working. She recalls that the last time she was well was 1 year ago. She shares that there is a possiblity she hasn't been taking the medication the way she is supposed to take it. She shares that she has no sense of worth. She adds that her sister has 2 sons who are special needs. She says that she doesn't want to be with her partner of 12 years now. However, she says that there are a lot of items in his house and complains that she can't go in his house for that reason.    Zoloft in 11/2022, filled 4/2023 consistently to 12/2023, filled again in 8/2024.  Started seeing tha foley in prozac October, November.    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    Jen's face lights up when she talks about her son.    Past Medical History:  Past Medical History:   Diagnosis Date    Arthritis     Osteoarthritis    Broken arm 11/17/2021    Excessive sweating     Occurs in summers; TSH/CMP/CBC normal    GERD (gastroesophageal reflux disease)     WITH HAITEL HERNIA    Menopause     LMP-32 years old    MRSA (methicillin resistant Staphylococcus aureus) infection 2007    MRSA + abd abscess; nasal swab negative after treatment    Psychiatric disorder     DEPRESSION.          Labs:  Lab Results   Component Value Date/Time    WBC 7.9 11/24/2024 02:19 PM    HGB 13.8 11/24/2024 02:19 PM    HCT 42.9 11/24/2024 02:19 PM     11/24/2024 02:19 PM    MCV 97.1 11/24/2024 02:19 PM      Lab Results   Component Value Date/Time     11/24/2024 02:19 PM    K 3.9 11/24/2024 02:19 PM     11/24/2024 02:19 PM    CO2 19 
PSYCHIATRIC PROGRESS NOTE    Chief Complaint: \"I've been thinking about suicide for some time.\"    Length of Stay: 3 Days    Interval History:  11/27/2024  Nursing report patient slept well overnight.  Patient tells the team that she can be dramatic at times. She reports that she did sleep, but not as well or as deeply.  She shares that her mood swings fluctuate often, but prior to admission she felt quite triggered.  No side-effects to her medications.  Today she denies experiencing suicidal or homicidal thoughts, intents or plans.    11/26/2024  Patient says that the quality of her life isn't what she wants it to be. She eventually took an overdose of her medications that she was prescribed for depression. She explains that even though she was taking the prozac, she came to the conclusion that it wasn't working. She recalls that the last time she was well was 1 year ago. She shares that there is a possiblity she hasn't been taking the medication the way she is supposed to take it. She shares that she has no sense of worth. She adds that her sister has 2 sons who are special needs. She says that she doesn't want to be with her partner of 12 years now. However, she says that there are a lot of items in his house and complains that she can't go in his house for that reason.    Zoloft in 11/2022, filled 4/2023 consistently to 12/2023, filled again in 8/2024.  Started seeing tha foley in prozac October, November.    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    Jen's face lights up when she talks about her son.    Past Medical History:  Past Medical History:   Diagnosis Date    Arthritis     Osteoarthritis    Broken arm 11/17/2021    Excessive sweating     Occurs in summers; TSH/CMP/CBC normal    GERD (gastroesophageal reflux disease)     WITH HAITEL HERNIA    Menopause     LMP-32 years old    MRSA (methicillin resistant Staphylococcus aureus) infection 2007    MRSA + abd abscess; nasal swab negative 
PSYCHIATRIC PROGRESS NOTE    Chief Complaint: \"No longer do I wish to die.\"    Length of Stay: 4 Days    Interval History:  11/28/24- Jen continues to write poetry contemplating her situation, states she no longer wishes to die. She is tolerating her Risperdal and Remeron without adverse effects. She wishes to increase her Remeron. Eating and sleeping fairly. No other changes today.     11/27/2024  Nursing report patient slept well overnight.  Patient tells the team that she can be dramatic at times. She reports that she did sleep, but not as well or as deeply.  She shares that her mood swings fluctuate often, but prior to admission she felt quite triggered.  No side-effects to her medications.  Today she denies experiencing suicidal or homicidal thoughts, intents or plans.    11/26/2024  Patient says that the quality of her life isn't what she wants it to be. She eventually took an overdose of her medications that she was prescribed for depression. She explains that even though she was taking the prozac, she came to the conclusion that it wasn't working. She recalls that the last time she was well was 1 year ago. She shares that there is a possiblity she hasn't been taking the medication the way she is supposed to take it. She shares that she has no sense of worth. She adds that her sister has 2 sons who are special needs. She says that she doesn't want to be with her partner of 12 years now. However, she says that there are a lot of items in his house and complains that she can't go in his house for that reason.    Zoloft in 11/2022, filled 4/2023 consistently to 12/2023, filled again in 8/2024.  Started seeing tha gonzalezquincy in prozac October, November.    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    Jen's face lights up when she talks about her son.    Past Medical History:  Past Medical History:   Diagnosis Date    Arthritis     Osteoarthritis    Broken arm 11/17/2021    Excessive sweating     
Patient reported anxiety and requested medication. Patient given PRN atarax 10mg. Patient then started reciting poetry, that patient stated they wrote, and stated this helped them with anxiety. Patient given journal, and encouraged to write as a coping skill.   
Skin assessment conducted by Taylor Springer RN and SARATH Valadez. No impairments noted. Ronen score 23.          Admission Summary    Patient admitted voluntarily from [place] [facility] for intentional OD on 20 prozac and 20 crestor. THERE ARE GUNS AT HER PARTNER'S HOUSE.     Patient is alert and oriented x 4. Patient denies HI and AVH.    Patient endorses psychiatric history of bipolar and OCD.     Patient endorses medical history of HLD.    Patient endorses home medications of prozac and crestor.    When asked about suicidal thoughts or plans patient said \"not in the hospital but I can't say about after the hospital\".    Patient says \"I'm here because I want to die\". When asked about stress in her life or recent losses patient says that she has not had any losses or stressors in her life recently.    Patient says she lives alone, her support is her partner and twin sister, patient has one child and two grandchildren. Patient states that she is retired.     Patient says she is a kleptomaniac and steals things because she likes to steal. Patient says she is financially secure and does not need to steal anything as she can afford to buy things.    Patient endorses L rib pain x 2 weeks.    Patient endorses poor sleep, says she averages 2 hours of sleep a night, says she has been taking zzquil for several months but it doesn't work.    Patient says she has been having cognitive issues recently.    Patient says she came to the ER because her partner realized her pill bottles were empty and made her come to the ER, patient says she was not planning on coming to the ER after her OD.        
Sw provided pt with OQ. Pt did not complete.   
TRANSFER - IN REPORT:    Verbal report received from SPENCER Blake on Jen Borges  being received from [facility] for routine progression of patient care      Report consisted of patient's Situation, Background, Assessment and   Recommendations(SBAR).     Information from the following report(s) ED SBAR was reviewed with the receiving nurse.    Opportunity for questions and clarification was provided.      Assessment completed upon patient's arrival to unit and care assumed.     
She is on probation for shoplifting     History of  service: no     Financial status: retired     Restorationism/cultural factors:     Education/work history: high school education     Have you been licensed as a health care professional (current or ): no    Describe coping skills:ineffectual     TRINO MON  2024

## 2024-11-29 NOTE — INTERDISCIPLINARY ROUNDS
Behavioral Health Interdisciplinary Rounds     Patient Name: Jen Borges  Age: 79 y.o.  Room/Bed:  [facility]  Primary Diagnosis: Suicidal behavior without attempted self-injury  Admission Status: Voluntary  Readmission within 30 days: No  Power of  in place: No  Patient requires a blocked bed: No          Reason for blocked bed: N/A  Sleep hours:   Flu vaccine: Due  Participation in Care/Groups: Yes  Medication Compliant?: Yes  PRNS (last 24 hours): Antianxiety and Sleep Aid  Restraints (last 24 hours): No  __________________________________________________  OQ Admission Analysis Survey completed:   OQ Admission Analysis Survey score:     __________________________________________________     Alcohol screening (AUDIT) completed -     Score:   Tobacco :   Illegal Drugs use:   CSSR Lifetime:     24 hour chart check complete: Yes    _______________________________________________    Patient goal(s) for today:   Treatment team focus/goals:   Progress note:     Spiritual Care Consult:   Financial concerns/prescription coverage:   Family contact:   Family requesting physician contact today:   Discharge plan:   Access to weapons:   Outpatient provider(s):     LOS:  5  Expected LOS:     Participating treatment team members: Jen LUZMA Katerina, * (assigned SW),

## 2024-11-29 NOTE — PLAN OF CARE
Problem: Depression  Goal: Will be euthymic at discharge  Description: INTERVENTIONS:  1. Administer medication as ordered  2. Provide emotional support via 1:1 interaction with staff  3. Encourage involvement in milieu/groups/activities  4. Monitor for social isolation  11/29/2024 0245 by Meghna Loza, RN  Outcome: Progressing   PATIENT IS CURRENTLY RESTING IN BED. NO DISTRESS NOTED. SAFETY MEASURE IN PLACE. WILL CONTINUE TO MONITOR WITH Q15 MINUTE CHECKS.

## 2024-11-29 NOTE — PLAN OF CARE
Problem: Discharge Planning  Goal: Discharge to home or other facility with appropriate resources  Outcome: Progressing  Flowsheets (Taken 11/29/2024 7732)  Discharge to home or other facility with appropriate resources:   Identify barriers to discharge with patient and caregiver   Arrange for needed discharge resources and transportation as appropriate   Identify discharge learning needs (meds, wound care, etc)   Refer to discharge planning if patient needs post-hospital services based on physician order or complex needs related to functional status, cognitive ability or social support system

## 2024-11-29 NOTE — PROGRESS NOTES
Behavioral Services  Medicare Certification Upon Admission    I certify that this patient's inpatient psychiatric hospital admission is medically necessary for:    [x] (1) Treatment which could reasonably be expected to improve this patient's condition,       [] (2) Or for diagnostic study;     AND     [x](2) The inpatient psychiatric services are provided while the individual is under the care of a physician and are included in the individualized plan of care.    Estimated length of stay/service 3-5 days.    Plan for post-hospital care outpatient psychiatry.    Electronically signed by Tania Fonseca MD on 11/26/2024 at 11:15 AM      
Laboratory Monitoring for Antipsychotics:    This patient is currently prescribed the following medication(s):   Current Facility-Administered Medications: mirtazapine (REMERON) tablet 30 mg, 30 mg, Oral, Nightly  risperiDONE (RISPERDAL) tablet 0.5 mg, 0.5 mg, Oral, Nightly  acetaminophen (TYLENOL) tablet 650 mg, 650 mg, Oral, Q4H PRN  polyethylene glycol (GLYCOLAX) packet 17 g, 17 g, Oral, Daily PRN  senna (SENOKOT) tablet 8.6 mg, 1 tablet, Oral, Daily PRN  aluminum & magnesium hydroxide-simethicone (MAALOX) 200-200-20 MG/5ML suspension 30 mL, 30 mL, Oral, Q6H PRN  hydrOXYzine HCl (ATARAX) tablet 10 mg, 10 mg, Oral, TID PRN  haloperidol (HALDOL) tablet 2.5 mg, 2.5 mg, Oral, Q4H PRN **OR** haloperidol lactate (HALDOL) injection 2.5 mg, 2.5 mg, IntraMUSCular, Q4H PRN  diphenhydrAMINE (BENADRYL) injection 25 mg, 25 mg, IntraMUSCular, Q4H PRN  traZODone (DESYREL) tablet 25 mg, 25 mg, Oral, Nightly PRN    The following labs have been completed for monitoring of antipsychotics and/or mood stabilizers:    Height, Weight, BMI Estimation  Estimated body mass index is 23.09 kg/m² as calculated from the following:    Height as of this encounter: 1.448 m (4' 9\").    Weight as of this encounter: 48.4 kg (106 lb 11.2 oz).     Vital Signs/Blood Pressure  /67   Pulse 73   Temp 98.1 °F (36.7 °C) (Oral)   Resp 18   Ht 1.448 m (4' 9\")   Wt 48.4 kg (106 lb 11.2 oz)   SpO2 97%   BMI 23.09 kg/m²     Renal Function, Hepatic Function and Chemistry  Estimated Creatinine Clearance: 35 mL/min (based on SCr of 0.98 mg/dL).    Lab Results   Component Value Date/Time     11/24/2024 02:19 PM    K 3.9 11/24/2024 02:19 PM     11/24/2024 02:19 PM    CO2 19 11/24/2024 02:19 PM    BUN 23 11/24/2024 02:19 PM    GLOB 3.3 11/24/2024 02:19 PM    ALT 23 11/24/2024 02:19 PM    AST 17 11/24/2024 02:19 PM       Glucose/Hemoglobin A1c  No results found for: \"GLU\", \"GLUCPOC\"  Lab Results   Component Value Date/Time    LABA1C 5.2 
Laboratory Monitoring for Antipsychotics:    This patient is currently prescribed the following medication(s):   Current Facility-Administered Medications: mirtazapine (REMERON) tablet 7.5 mg, 7.5 mg, Oral, Nightly  risperiDONE (RISPERDAL) tablet 0.25 mg, 0.25 mg, Oral, Nightly  acetaminophen (TYLENOL) tablet 650 mg, 650 mg, Oral, Q4H PRN  polyethylene glycol (GLYCOLAX) packet 17 g, 17 g, Oral, Daily PRN  senna (SENOKOT) tablet 8.6 mg, 1 tablet, Oral, Daily PRN  aluminum & magnesium hydroxide-simethicone (MAALOX) 200-200-20 MG/5ML suspension 30 mL, 30 mL, Oral, Q6H PRN  hydrOXYzine HCl (ATARAX) tablet 10 mg, 10 mg, Oral, TID PRN  haloperidol (HALDOL) tablet 2.5 mg, 2.5 mg, Oral, Q4H PRN **OR** haloperidol lactate (HALDOL) injection 2.5 mg, 2.5 mg, IntraMUSCular, Q4H PRN  diphenhydrAMINE (BENADRYL) injection 25 mg, 25 mg, IntraMUSCular, Q4H PRN  traZODone (DESYREL) tablet 25 mg, 25 mg, Oral, Nightly PRN    The following labs have been completed for monitoring of antipsychotics and/or mood stabilizers:    Height, Weight, BMI Estimation  Estimated body mass index is 23.09 kg/m² as calculated from the following:    Height as of this encounter: 1.448 m (4' 9\").    Weight as of this encounter: 48.4 kg (106 lb 11.2 oz).     Vital Signs/Blood Pressure  /61   Pulse 74   Temp 98.4 °F (36.9 °C) (Oral)   Resp 16   Ht 1.448 m (4' 9\")   Wt 48.4 kg (106 lb 11.2 oz)   SpO2 98%   BMI 23.09 kg/m²     Renal Function, Hepatic Function and Chemistry  Estimated Creatinine Clearance: 35 mL/min (based on SCr of 0.98 mg/dL).    Lab Results   Component Value Date/Time     11/24/2024 02:19 PM    K 3.9 11/24/2024 02:19 PM     11/24/2024 02:19 PM    CO2 19 11/24/2024 02:19 PM    BUN 23 11/24/2024 02:19 PM    GLOB 3.3 11/24/2024 02:19 PM    ALT 23 11/24/2024 02:19 PM    AST 17 11/24/2024 02:19 PM       Glucose/Hemoglobin A1c  No results found for: \"GLU\", \"GLUCPOC\"  Lab Results   Component Value Date/Time    LABA1C 5.1 
Menopause     LMP-32 years old    MRSA (methicillin resistant Staphylococcus aureus) infection 2007    MRSA + abd abscess; nasal swab negative after treatment    Psychiatric disorder     DEPRESSION.        Chief Complaint for this Admission:    Chief Complaint   Patient presents with    Suicide Attempt     Prior to Admission Medications:   Prior to Admission Medications   Prescriptions Last Dose Informant   FLUoxetine (PROZAC) 20 MG capsule     Sig: Take 1 capsule by mouth daily   omeprazole (PRILOSEC) 20 MG delayed release capsule     Sig: Take 1 capsule by mouth every morning (before breakfast)   rosuvastatin (CRESTOR) 5 MG tablet     Sig: Take 1 tablet by mouth daily   sertraline (ZOLOFT) 100 MG tablet     Sig: Take 2 tablets by mouth daily      Facility-Administered Medications: None     Steffanie Chavarria RPH    
RAFAEL Chavarria.

## 2024-11-29 NOTE — DISCHARGE SUMMARY
DISCHARGE SUMMARY  Some parts of the discharge summary are from the initial Psychiatric interview that was done on admission by the admitting psychiatrist.     Name: Jen Borges  MR#: [id number]  Account#: [id number]  : 1945  Date of Admission: 2024    Date of Discharge: 2024     TYPE OF DISCHARGE:   REGULAR     INITIAL PSYCHIATRIC INTERVIEW:   CHIEF COMPLAINT: \"I've lost the zest for life.\"      HISTORY OF PRESENTING COMPLAINT:  This is a 79 y.o. female who is currently admitted to the general psychiatric floor at [facility] on a voluntary basis after a suicide attempt by overdose. The pt has been struggling with depression for a while, having suicidal ideations for about a month. She reportedly took about 400mg Prozac and 100mgmg Rosuvastatin. She had also thought about shooting herself with her partner's firearm, which her partner has since confirmed that they have removed. She has reported there were no specific stressors leading up to this suicide attempt. The pt is calm and cooperative, but states she has to go on with life despite continuous suicidal ideation. She states that at 79 she's becoming very weary. She states she has habits that she'd rather not have. She states she's dealing with \"the boogeyman,\" which upon clarification she means the boogeyman is the suicidal thoughts she continues to have. 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 She states someone left her a lot of money, so she's not in any financial trouble. She states for this reason she doesn't understand why she has to engage in compulsive stealing. She agrees that she has \"OCD to the max.\" She states if there's something on the floor, she has to pick it up, and she has to  leaves from her yard, but she doesn't

## 2025-01-23 ENCOUNTER — OFFICE VISIT (OUTPATIENT)
Facility: CLINIC | Age: 80
End: 2025-01-23

## 2025-01-23 VITALS
WEIGHT: 125.2 LBS | HEART RATE: 76 BPM | DIASTOLIC BLOOD PRESSURE: 58 MMHG | OXYGEN SATURATION: 98 % | SYSTOLIC BLOOD PRESSURE: 92 MMHG | RESPIRATION RATE: 16 BRPM | HEIGHT: 57 IN | TEMPERATURE: 98.4 F | BODY MASS INDEX: 27.01 KG/M2

## 2025-01-23 DIAGNOSIS — M81.0 AGE-RELATED OSTEOPOROSIS WITHOUT CURRENT PATHOLOGICAL FRACTURE: ICD-10-CM

## 2025-01-23 DIAGNOSIS — F31.9 BIPOLAR AFFECTIVE DISORDER, REMISSION STATUS UNSPECIFIED (HCC): Primary | ICD-10-CM

## 2025-01-23 DIAGNOSIS — F63.2 KLEPTOMANIA: ICD-10-CM

## 2025-01-23 DIAGNOSIS — F41.1 GENERALIZED ANXIETY DISORDER: ICD-10-CM

## 2025-01-23 DIAGNOSIS — F42.9 OBSESSIVE-COMPULSIVE DISORDER, UNSPECIFIED TYPE: ICD-10-CM

## 2025-01-23 ASSESSMENT — PATIENT HEALTH QUESTIONNAIRE - PHQ9
9. THOUGHTS THAT YOU WOULD BE BETTER OFF DEAD, OR OF HURTING YOURSELF: NOT AT ALL
SUM OF ALL RESPONSES TO PHQ QUESTIONS 1-9: 0
5. POOR APPETITE OR OVEREATING: NOT AT ALL
8. MOVING OR SPEAKING SO SLOWLY THAT OTHER PEOPLE COULD HAVE NOTICED. OR THE OPPOSITE, BEING SO FIGETY OR RESTLESS THAT YOU HAVE BEEN MOVING AROUND A LOT MORE THAN USUAL: NOT AT ALL
10. IF YOU CHECKED OFF ANY PROBLEMS, HOW DIFFICULT HAVE THESE PROBLEMS MADE IT FOR YOU TO DO YOUR WORK, TAKE CARE OF THINGS AT HOME, OR GET ALONG WITH OTHER PEOPLE: NOT DIFFICULT AT ALL
SUM OF ALL RESPONSES TO PHQ QUESTIONS 1-9: 0
2. FEELING DOWN, DEPRESSED OR HOPELESS: NOT AT ALL
SUM OF ALL RESPONSES TO PHQ QUESTIONS 1-9: 0
7. TROUBLE CONCENTRATING ON THINGS, SUCH AS READING THE NEWSPAPER OR WATCHING TELEVISION: NOT AT ALL
6. FEELING BAD ABOUT YOURSELF - OR THAT YOU ARE A FAILURE OR HAVE LET YOURSELF OR YOUR FAMILY DOWN: NOT AT ALL
4. FEELING TIRED OR HAVING LITTLE ENERGY: NOT AT ALL
SUM OF ALL RESPONSES TO PHQ9 QUESTIONS 1 & 2: 0
3. TROUBLE FALLING OR STAYING ASLEEP: NOT AT ALL
1. LITTLE INTEREST OR PLEASURE IN DOING THINGS: NOT AT ALL

## 2025-01-23 NOTE — PROGRESS NOTES
Chief Complaint   Patient presents with    Mood Disorder     History of Present Illness  The patient is a 79-year-old female who presents for a follow-up evaluation of a mood disorder.    She was hospitalized at [facility] from 11/24/2024 to 11/29/2024 for suicidal behavior, having taken 400 mg of Prozac and 100 mg of Crestor in a suicide attempt. Upon discharge, she was prescribed Risperdal 0.5 mg nightly, mirtazapine 30 mg nightly, and hydroxyzine 10 mg three times daily as needed for anxiety attacks.    She reports a stable mental state post-hospitalization, attributing this to the support of her significant other, Andrei Barrera, who provides transportation and companionship. She has been compliant with her prescribed medications, including mirtazapine and risperidone, and reports satisfactory sleep quality. She is uncertain if she has hydroxyzine. She has no prior experience with psychotherapy and has not engaged in any form of \"talk therapy\".  She expresses a desire to regain independence in managing her appointments and daily activities. She denies any suicidal ideation. She reports no current feelings of anxiety. She is scheduled to see Boris Rees NP with [facility] later this month.     She maintains an active lifestyle, walking for approximately 40 minutes daily.    She experienced abdominal pain, which she describes as a large lump, but this symptom has since resolved. However, she continues to experience mild, dull shoulder pain, which is exacerbated by movement. She reports no lightheadedness or dizziness.        1/23/2025    11:32 AM   PHQ-9    Trouble falling or staying asleep, or sleeping too much 0   Feeling tired or having little energy 0   Poor appetite or overeating 0   Feeling bad about yourself - or that you are a failure or have let yourself or your family down 0   Trouble concentrating on things, such as reading the newspaper or watching television 0

## 2025-01-23 NOTE — PATIENT INSTRUCTIONS
Patient Instructions  Check to see if you have hydroxyzine medication at home for anxiety attacks.

## 2025-01-23 NOTE — PROGRESS NOTES
Jen Borges  Identified pt with two pt identifiers(name and ).  Chief Complaint   Patient presents with    Mood Disorder       1. Have you been to the ER, urgent care clinic since your last visit?  Hospitalized since your last visit? NO    2. Have you seen or consulted any other health care providers outside of [facility] since your last visit?  Include any pap smears or colon screening. NO      Provider notified of reason for visit, vitals and flowsheets obtained on patients.     Patient received paperwork for advance directive during previous visit but has not completed at this time     Reviewed record In preparation for visit, huddled with provider and have obtained necessary documentation      There are no preventive care reminders to display for this patient.    Wt Readings from Last 3 Encounters:   25 56.8 kg (125 lb 3.2 oz)   24 50.3 kg (111 lb)   24 50.3 kg (111 lb)     Temp Readings from Last 3 Encounters:   25 98.4 °F (36.9 °C) (Oral)   24 97.9 °F (36.6 °C)   24 98.8 °F (37.1 °C) (Oral)     BP Readings from Last 3 Encounters:   25 (!) 92/58   24 127/63   24 (!) 142/78     Pulse Readings from Last 3 Encounters:   25 76   24 67   24 72          No data to display                  Learning Assessment:  :         2024     2:20 PM   [facility] AMB LEARNING ASSESSMENT   Primary Learner Patient   level of education GRADUATED HIGH SCHOOL OR GED   Primary Language ENGLISH   Learning Preference DEMONSTRATION   Answered By Patient   Relationship to Learner SELF       Fall Risk Assessment:  :         5/10/2024     1:33 PM 2024     2:37 PM 2023     2:00 PM 2022     9:37 AM 2021    10:06 AM 2021     9:35 AM 2021    10:08 AM   Amb Fall Risk Assessment and TUG Test   Do you feel unsteady or are you worried about falling?  no no        2 or more falls in past year? no no        Fall with injury in

## 2025-03-28 ENCOUNTER — TELEPHONE (OUTPATIENT)
Facility: CLINIC | Age: 80
End: 2025-03-28

## 2025-03-28 NOTE — TELEPHONE ENCOUNTER
Spoke to pt verified name and . I let her know the DMV form is ready to be picked up. The front page needs to be filled out and signed by pt. The medical part was faxed with the success it went through. Pt understood and had no further questions.    18

## 2025-04-07 ENCOUNTER — TELEPHONE (OUTPATIENT)
Facility: CLINIC | Age: 80
End: 2025-04-07

## 2025-04-07 NOTE — TELEPHONE ENCOUNTER
Pt wanted to get a referral for a neurologist. She was not sure if she needed an appt or not. She did schedule one please call if she does not need this appt and we can just send her a referral

## 2025-04-11 ENCOUNTER — OFFICE VISIT (OUTPATIENT)
Facility: CLINIC | Age: 80
End: 2025-04-11

## 2025-04-11 VITALS
HEART RATE: 89 BPM | OXYGEN SATURATION: 100 % | DIASTOLIC BLOOD PRESSURE: 70 MMHG | WEIGHT: 120.8 LBS | SYSTOLIC BLOOD PRESSURE: 130 MMHG | RESPIRATION RATE: 16 BRPM | BODY MASS INDEX: 26.06 KG/M2 | TEMPERATURE: 98 F | HEIGHT: 57 IN

## 2025-04-11 DIAGNOSIS — F41.1 GENERALIZED ANXIETY DISORDER: ICD-10-CM

## 2025-04-11 DIAGNOSIS — R41.3 MEMORY DEFICIT: Primary | ICD-10-CM

## 2025-04-11 DIAGNOSIS — E78.00 HYPERCHOLESTEROLEMIA: ICD-10-CM

## 2025-04-11 RX ORDER — HYDROXYZINE HYDROCHLORIDE 10 MG/1
10 TABLET, FILM COATED ORAL EVERY 4 HOURS PRN
COMMUNITY
Start: 2025-03-15 | End: 2025-04-11 | Stop reason: DRUGHIGH

## 2025-04-11 RX ORDER — ROSUVASTATIN CALCIUM 5 MG/1
5 TABLET, COATED ORAL DAILY
Qty: 90 TABLET | Refills: 3 | Status: SHIPPED | OUTPATIENT
Start: 2025-04-11

## 2025-04-11 RX ORDER — HYDROXYZINE HYDROCHLORIDE 10 MG/1
10 TABLET, FILM COATED ORAL 3 TIMES DAILY
Qty: 90 TABLET | Refills: 0 | Status: SHIPPED
Start: 2025-04-11

## 2025-04-11 NOTE — PROGRESS NOTES
Chief Complaint   Patient presents with    Referral - General     For neurology      History of Present Illness  79-year-old female presents for neurology referral. Accompanied by boyfriend, Andrei Barrera, who helps with the HPI.    Reports memory lapses, especially with navigation and driving. She restricts her driving to familiar routes. Saw a neurologist in [place] that her twin sister recommended she see. Neurologist said she had brain plaques, but subsequent tests showed no significant concerns. Neither patient nor her sister can remember the name of the neurologist she saw. Cognitive function described as intermittent. Cites ability to recognize people and places. Two years ago, had difficulty recalling a turn, leading to police encounter. Maintains competence as a  with a valid license.    Current medications: hydroxyzine, mirtazapine, omeprazole, risperidone, prescribed after a suicide attempt and hospital stay at [facility]. Takes hydroxyzine three times daily. Psychiatric follow-up with Dr. Rees, appointment in late April. Does not have a psychotherapist.    She also follows with Dr. Hasmukh Funes at [facility] where her sister goes. Patient states that she wants me to be her doctor.    Non-compliance with cholesterol medication due to weight gain concerns. Reports diet low in sweets and junk food, no fried foods. Concerned about over-medication.    Patient Active Problem List   Diagnosis    Gastroesophageal reflux disease without esophagitis    Right elbow pain    Abdominal pain    Anxiety disorder    Age-related osteoporosis without current pathological fracture    Prediabetes    Vitamin D deficiency    Hypercholesterolemia    Primary osteoarthritis of both hands    Bipolar disorder    Urge urinary incontinence    Closed nondisplaced fracture of head of radius with routine healing    Tinnitus    Memory impairment    Suicidal behavior without attempted self-injury

## 2025-04-11 NOTE — PROGRESS NOTES
Jen Borges  Identified pt with two pt identifiers(name and ).  Chief Complaint   Patient presents with    Referral - General     For neurology        1. Have you been to the ER, urgent care clinic since your last visit?  Hospitalized since your last visit? NO    2. Have you seen or consulted any other health care providers outside of the [facility] since your last visit?  Include any pap smears or colon screening. NO      Provider notified of reason for visit, vitals and flowsheets obtained on patients.     Patient received paperwork for advance directive during previous visit but has not completed at this time     Reviewed record In preparation for visit, huddled with provider and have obtained necessary documentation      Health Maintenance Due   Topic    Respiratory Syncytial Virus (RSV) Pregnant or age 60 yrs+ (1 - 1-dose 75+ series)       Wt Readings from Last 3 Encounters:   25 54.8 kg (120 lb 12.8 oz)   25 56.8 kg (125 lb 3.2 oz)   24 50.3 kg (111 lb)     Temp Readings from Last 3 Encounters:   25 98 °F (36.7 °C) (Oral)   25 98.4 °F (36.9 °C) (Oral)   24 97.9 °F (36.6 °C)     BP Readings from Last 3 Encounters:   25 130/70   25 (!) 92/58   24 127/63     Pulse Readings from Last 3 Encounters:   25 89   25 76   24 67          No data to display                  Learning Assessment:  :         2024     2:20 PM   [facility] AMB LEARNING ASSESSMENT   Primary Learner Patient   level of education GRADUATED HIGH SCHOOL OR GED   Primary Language ENGLISH   Learning Preference DEMONSTRATION   Answered By Patient   Relationship to Learner SELF       Fall Risk Assessment:  :         5/10/2024     1:33 PM 2024     2:37 PM 2023     2:00 PM 2022     9:37 AM 2021    10:06 AM 2021     9:35 AM 2021    10:08 AM   Amb Fall Risk Assessment and TUG Test   Do you feel unsteady or are you worried about falling?

## 2025-06-02 ENCOUNTER — TELEPHONE (OUTPATIENT)
Facility: CLINIC | Age: 80
End: 2025-06-02

## 2025-06-02 DIAGNOSIS — E55.9 VITAMIN D DEFICIENCY: ICD-10-CM

## 2025-06-02 DIAGNOSIS — R73.03 PREDIABETES: ICD-10-CM

## 2025-06-02 DIAGNOSIS — E78.00 HYPERCHOLESTEROLEMIA: Primary | ICD-10-CM

## 2025-06-02 DIAGNOSIS — F41.1 GENERALIZED ANXIETY DISORDER: ICD-10-CM

## 2025-06-04 ENCOUNTER — LAB (OUTPATIENT)
Facility: CLINIC | Age: 80
End: 2025-06-04

## 2025-06-04 DIAGNOSIS — E78.00 HYPERCHOLESTEROLEMIA: ICD-10-CM

## 2025-06-04 DIAGNOSIS — E55.9 VITAMIN D DEFICIENCY: ICD-10-CM

## 2025-06-04 DIAGNOSIS — R73.03 PREDIABETES: ICD-10-CM

## 2025-06-04 DIAGNOSIS — F41.1 GENERALIZED ANXIETY DISORDER: ICD-10-CM

## 2025-06-05 LAB
25(OH)D3 SERPL-MCNC: 24.7 NG/ML (ref 30–100)
ALBUMIN SERPL-MCNC: 3.9 G/DL (ref 3.5–5)
ALBUMIN/GLOB SERPL: 1.4 (ref 1.1–2.2)
ALP SERPL-CCNC: 98 U/L (ref 45–117)
ALT SERPL-CCNC: 25 U/L (ref 12–78)
ANION GAP SERPL CALC-SCNC: 6 MMOL/L (ref 2–12)
AST SERPL-CCNC: 18 U/L (ref 15–37)
BASOPHILS # BLD: 0.01 K/UL (ref 0–0.1)
BASOPHILS NFR BLD: 0.2 % (ref 0–1)
BILIRUB SERPL-MCNC: 0.5 MG/DL (ref 0.2–1)
BUN SERPL-MCNC: 15 MG/DL (ref 6–20)
BUN/CREAT SERPL: 17 (ref 12–20)
CALCIUM SERPL-MCNC: 8.9 MG/DL (ref 8.5–10.1)
CHLORIDE SERPL-SCNC: 110 MMOL/L (ref 97–108)
CHOLEST SERPL-MCNC: 130 MG/DL
CO2 SERPL-SCNC: 26 MMOL/L (ref 21–32)
CREAT SERPL-MCNC: 0.9 MG/DL (ref 0.55–1.02)
DIFFERENTIAL METHOD BLD: NORMAL
EOSINOPHIL # BLD: 0.06 K/UL (ref 0–0.4)
EOSINOPHIL NFR BLD: 1.3 % (ref 0–7)
ERYTHROCYTE [DISTWIDTH] IN BLOOD BY AUTOMATED COUNT: 12.8 % (ref 11.5–14.5)
EST. AVERAGE GLUCOSE BLD GHB EST-MCNC: 97 MG/DL
GLOBULIN SER CALC-MCNC: 2.8 G/DL (ref 2–4)
GLUCOSE SERPL-MCNC: 110 MG/DL (ref 65–100)
HBA1C MFR BLD: 5 % (ref 4–5.6)
HCT VFR BLD AUTO: 39.9 % (ref 35–47)
HDLC SERPL-MCNC: 65 MG/DL
HDLC SERPL: 2 (ref 0–5)
HGB BLD-MCNC: 13.2 G/DL (ref 11.5–16)
IMM GRANULOCYTES # BLD AUTO: 0.01 K/UL (ref 0–0.04)
IMM GRANULOCYTES NFR BLD AUTO: 0.2 % (ref 0–0.5)
LDLC SERPL CALC-MCNC: 52.4 MG/DL (ref 0–100)
LYMPHOCYTES # BLD: 1.01 K/UL (ref 0.8–3.5)
LYMPHOCYTES NFR BLD: 22.1 % (ref 12–49)
MCH RBC QN AUTO: 30.9 PG (ref 26–34)
MCHC RBC AUTO-ENTMCNC: 33.1 G/DL (ref 30–36.5)
MCV RBC AUTO: 93.4 FL (ref 80–99)
MONOCYTES # BLD: 0.3 K/UL (ref 0–1)
MONOCYTES NFR BLD: 6.6 % (ref 5–13)
NEUTS SEG # BLD: 3.18 K/UL (ref 1.8–8)
NEUTS SEG NFR BLD: 69.6 % (ref 32–75)
NRBC # BLD: 0 K/UL (ref 0–0.01)
NRBC BLD-RTO: 0 PER 100 WBC
PLATELET # BLD AUTO: 177 K/UL (ref 150–400)
PMV BLD AUTO: 10.1 FL (ref 8.9–12.9)
POTASSIUM SERPL-SCNC: 3.9 MMOL/L (ref 3.5–5.1)
PROT SERPL-MCNC: 6.7 G/DL (ref 6.4–8.2)
RBC # BLD AUTO: 4.27 M/UL (ref 3.8–5.2)
SODIUM SERPL-SCNC: 142 MMOL/L (ref 136–145)
TRIGL SERPL-MCNC: 63 MG/DL
TSH SERPL DL<=0.05 MIU/L-ACNC: 1.63 UIU/ML (ref 0.36–3.74)
VLDLC SERPL CALC-MCNC: 12.6 MG/DL
WBC # BLD AUTO: 4.6 K/UL (ref 3.6–11)

## 2025-06-08 ENCOUNTER — RESULTS FOLLOW-UP (OUTPATIENT)
Facility: CLINIC | Age: 80
End: 2025-06-08

## 2025-06-09 ENCOUNTER — TELEPHONE (OUTPATIENT)
Facility: CLINIC | Age: 80
End: 2025-06-09

## 2025-06-09 NOTE — TELEPHONE ENCOUNTER
Attempted to contact patient regarding upcoming Medicare wellness appointment and completion of HRA questionnaire. LVM for patient to please return call at  [phone].

## 2025-06-10 NOTE — TELEPHONE ENCOUNTER
Attempted to contact patient regarding upcoming Medicare wellness appointment and completion of HRA questionnaire. LVM for patient to please return call at  [phone].

## 2025-06-11 ENCOUNTER — OFFICE VISIT (OUTPATIENT)
Facility: CLINIC | Age: 80
End: 2025-06-11
Payer: MEDICARE

## 2025-06-11 VITALS
SYSTOLIC BLOOD PRESSURE: 116 MMHG | DIASTOLIC BLOOD PRESSURE: 70 MMHG | HEIGHT: 57 IN | HEART RATE: 98 BPM | TEMPERATURE: 98.4 F | OXYGEN SATURATION: 100 % | BODY MASS INDEX: 25.59 KG/M2 | RESPIRATION RATE: 16 BRPM | WEIGHT: 118.6 LBS

## 2025-06-11 DIAGNOSIS — Z13.5 SCREENING FOR GLAUCOMA: ICD-10-CM

## 2025-06-11 DIAGNOSIS — Z12.31 ENCOUNTER FOR SCREENING MAMMOGRAM FOR MALIGNANT NEOPLASM OF BREAST: ICD-10-CM

## 2025-06-11 DIAGNOSIS — F31.60 BIPOLAR AFFECTIVE DISORDER, CURRENT EPISODE MIXED, CURRENT EPISODE SEVERITY UNSPECIFIED (HCC): ICD-10-CM

## 2025-06-11 DIAGNOSIS — Z00.00 MEDICARE ANNUAL WELLNESS VISIT, SUBSEQUENT: Primary | ICD-10-CM

## 2025-06-11 DIAGNOSIS — F41.1 GENERALIZED ANXIETY DISORDER: ICD-10-CM

## 2025-06-11 DIAGNOSIS — M81.0 AGE-RELATED OSTEOPOROSIS WITHOUT CURRENT PATHOLOGICAL FRACTURE: ICD-10-CM

## 2025-06-11 DIAGNOSIS — R41.3 MEMORY IMPAIRMENT: ICD-10-CM

## 2025-06-11 PROCEDURE — G0439 PPPS, SUBSEQ VISIT: HCPCS | Performed by: INTERNAL MEDICINE

## 2025-06-11 PROCEDURE — 1159F MED LIST DOCD IN RCRD: CPT | Performed by: INTERNAL MEDICINE

## 2025-06-11 PROCEDURE — 1090F PRES/ABSN URINE INCON ASSESS: CPT | Performed by: INTERNAL MEDICINE

## 2025-06-11 PROCEDURE — G8427 DOCREV CUR MEDS BY ELIG CLIN: HCPCS | Performed by: INTERNAL MEDICINE

## 2025-06-11 PROCEDURE — 99214 OFFICE O/P EST MOD 30 MIN: CPT | Performed by: INTERNAL MEDICINE

## 2025-06-11 PROCEDURE — G8419 CALC BMI OUT NRM PARAM NOF/U: HCPCS | Performed by: INTERNAL MEDICINE

## 2025-06-11 PROCEDURE — 1126F AMNT PAIN NOTED NONE PRSNT: CPT | Performed by: INTERNAL MEDICINE

## 2025-06-11 PROCEDURE — 1123F ACP DISCUSS/DSCN MKR DOCD: CPT | Performed by: INTERNAL MEDICINE

## 2025-06-11 PROCEDURE — 1160F RVW MEDS BY RX/DR IN RCRD: CPT | Performed by: INTERNAL MEDICINE

## 2025-06-11 PROCEDURE — G8399 PT W/DXA RESULTS DOCUMENT: HCPCS | Performed by: INTERNAL MEDICINE

## 2025-06-11 PROCEDURE — 1036F TOBACCO NON-USER: CPT | Performed by: INTERNAL MEDICINE

## 2025-06-11 ASSESSMENT — PATIENT HEALTH QUESTIONNAIRE - PHQ9
2. FEELING DOWN, DEPRESSED OR HOPELESS: NOT AT ALL
SUM OF ALL RESPONSES TO PHQ QUESTIONS 1-9: 0
6. FEELING BAD ABOUT YOURSELF - OR THAT YOU ARE A FAILURE OR HAVE LET YOURSELF OR YOUR FAMILY DOWN: NOT AT ALL
1. LITTLE INTEREST OR PLEASURE IN DOING THINGS: NOT AT ALL
3. TROUBLE FALLING OR STAYING ASLEEP: NOT AT ALL
4. FEELING TIRED OR HAVING LITTLE ENERGY: NOT AT ALL
10. IF YOU CHECKED OFF ANY PROBLEMS, HOW DIFFICULT HAVE THESE PROBLEMS MADE IT FOR YOU TO DO YOUR WORK, TAKE CARE OF THINGS AT HOME, OR GET ALONG WITH OTHER PEOPLE: NOT DIFFICULT AT ALL
7. TROUBLE CONCENTRATING ON THINGS, SUCH AS READING THE NEWSPAPER OR WATCHING TELEVISION: NOT AT ALL
5. POOR APPETITE OR OVEREATING: NOT AT ALL
8. MOVING OR SPEAKING SO SLOWLY THAT OTHER PEOPLE COULD HAVE NOTICED. OR THE OPPOSITE, BEING SO FIGETY OR RESTLESS THAT YOU HAVE BEEN MOVING AROUND A LOT MORE THAN USUAL: NOT AT ALL
SUM OF ALL RESPONSES TO PHQ QUESTIONS 1-9: 0
9. THOUGHTS THAT YOU WOULD BE BETTER OFF DEAD, OR OF HURTING YOURSELF: NOT AT ALL

## 2025-06-11 NOTE — PATIENT INSTRUCTIONS
Called daughter in law. No answer. Unable to LVM as her VM is not set up. Called son Chris and LVM requesting a call back at his earliest convenience.   
Pt's daughter in law would like to speak to a RN, in regards to,    Pt needs clearance from Dr Dennis to go off her blood thinners to have a feeding tube removed...  
Spoke with Chris and updated him in regards to recommendations. Per Chris, okay to share/receive information with his girlfriend,  Dee Dee.   
can also have tofu, beans, peas, lentils, nuts, and seeds.        Dairy   Try milk, yogurt, and cheese.  Choose low-fat or fat-free when you can.  If you need to, use lactose-free milk or fortified plant-based milk products, such as soy milk.        Water   Drink water when you're thirsty.  Limit sugar-sweetened drinks, including soda, fruit drinks, and sports drinks.  Where can you learn more?  Go to https://www.[employer].net/patientEd and enter T756 to learn more about \"Eating Healthy Foods: Care Instructions.\"  Current as of: October 7, 2024  Content Version: 14.5  © 0400-4977 [employer].   Care instructions adapted under license by [employer]. If you have questions about a medical condition or this instruction, always ask your healthcare professional. [employer], disclaims any warranty or liability for your use of this information.    Personalized Preventive Plan for Jen Borges - 6/11/2025  Medicare offers a range of preventive health benefits. Some of the tests and screenings are paid in full while other may be subject to a deductible, co-insurance, and/or copay.  Some of these benefits include a comprehensive review of your medical history including lifestyle, illnesses that may run in your family, and various assessments and screenings as appropriate.  After reviewing your medical record and screening and assessments performed today your provider may have ordered immunizations, labs, imaging, and/or referrals for you.  A list of these orders (if applicable) as well as your Preventive Care list are included within your After Visit Summary for your review.        Patient Instructions  Call Central Scheduling at [phone] to schedule mammogram and DEXA bone density study.  Schedule appointment with eye doctor.  Schedule appointment with Dr. Longoria, neuropsychologist.  Start taking vitamin D3 1,000 units 1 capsule daily.

## 2025-06-11 NOTE — PROGRESS NOTES
Medicare Annual Wellness Visit    Jen Borges is here for Medicare AWV    Assessment & Plan  1. Medicare annual wellness visit, subsequent  Patient Instructions  Call Central Scheduling at [phone] to schedule mammogram and DEXA bone density study.  Schedule appointment with eye doctor.  Schedule appointment with Dr. Longoria, neuropsychologist.  Start taking vitamin D3 1,000 units 1 capsule daily.    2. Memory impairment  Misplaced referral to Dr. Longoria.  - [facility] - Albert Longoria PsyD, Neuropsychology, [place]    3. Age-related osteoporosis without current pathological fracture  DEXA ordered in Jan 2025. Never scheduled.    4. Generalized anxiety disorder  Increased anxiety, especially early morning. Andrei increased hydroxyzine 10 mg to 4 times a day.  - Continue mirtazapine, hydroxyzine.  - Follow up with Dr. Rees, psychiatrist, next week as scheduled.    5. Bipolar affective disorder, current episode mixed, current episode severity unspecified (HCC)  Stable.   - Continue mirtazapine, Risperdal, and following with Dr. Rees, psychiatrist.    6. Screening for glaucoma  - External Referral To Optometry ([facility])    7. Encounter for screening mammogram for malignant neoplasm of breast  - [facility] DIGITAL SCREEN W OR WO CAD BILATERAL; Future       Return in about 4 months (around 10/11/2025), or if symptoms worsen or fail to improve, for Anxiety, memory.     Subjective     History of Present Illness  A 79-year-old female presents for her Medicare annual wellness visit. Accompanied by boyfriend, Andrei Barrera     There have been no significant health changes since her last visit. She is not taking vitamin D supplements but consumes green leafy vegetables and yogurt. She has a decreased appetite with slight weight gain attributed to medication and is making efforts to lose 5-10 pounds. She has not had an ophthalmological exam in the past year but reports good vision without glasses.    She is 
about falling?  no no        2 or more falls in past year? no no        Fall with injury in past year? no no        Fall in past 12 months?   0 1 1 0 0   Able to walk?   Yes Yes Yes Yes Yes       Abuse Screening:  :         2/26/2024     2:00 PM   AMB Abuse Screening   Do you ever feel afraid of your partner? N   Are you in a relationship with someone who physically or mentally threatens you? N   Is it safe for you to go home? Y       ADL Screening:  :         2/26/2024     2:00 PM   ADL ASSESSMENT   Feeding yourself No Help Needed   Getting from bed to chair No Help Needed   Getting dressed No Help Needed   Bathing or showering No Help Needed   Walk across the room (includes cane/walker) No Help Needed   Using the telphone No Help Needed   Taking your medications No Help Needed   Preparing meals No Help Needed   Managing money (expenses/bills) No Help Needed   Moderately strenuous housework (laundry) No Help Needed   Shopping for personal items (toiletries/medicines) No Help Needed   Shopping for groceries No Help Needed   Driving No Help Needed   Climbing a flight of stairs No Help Needed   Getting to places beyond walking distances No Help Needed         Medication reconciliation up to date and corrected with patient at this time.

## 2025-07-02 ENCOUNTER — HOSPITAL ENCOUNTER (OUTPATIENT)
Facility: HOSPITAL | Age: 80
Discharge: HOME | End: 2025-07-05
Payer: MEDICARE

## 2025-07-02 DIAGNOSIS — Z12.31 ENCOUNTER FOR SCREENING MAMMOGRAM FOR MALIGNANT NEOPLASM OF BREAST: ICD-10-CM

## 2025-07-02 PROCEDURE — 77063 BREAST TOMOSYNTHESIS BI: CPT

## 2025-07-20 ENCOUNTER — HOSPITAL ENCOUNTER (INPATIENT)
Facility: HOSPITAL | Age: 80
LOS: 2 days | Discharge: PSYCHIATRIC OR SUBSTANCE USE FACILITY | DRG: 918 | End: 2025-07-22
Attending: EMERGENCY MEDICINE | Admitting: FAMILY MEDICINE
Payer: MEDICARE

## 2025-07-20 DIAGNOSIS — T54.92XA INGESTION OF CORROSIVE CHEMICAL, INTENTIONAL SELF-HARM, INITIAL ENCOUNTER (HCC): ICD-10-CM

## 2025-07-20 DIAGNOSIS — T14.91XA SUICIDE ATTEMPT (HCC): Primary | ICD-10-CM

## 2025-07-20 PROBLEM — T54.91XA INGESTION OF CAUSTIC SUBSTANCE: Status: ACTIVE | Noted: 2025-07-20

## 2025-07-20 LAB
ALBUMIN SERPL-MCNC: 3.8 G/DL (ref 3.5–5)
ALBUMIN/GLOB SERPL: 1.4 (ref 1.1–2.2)
ALP SERPL-CCNC: 86 U/L (ref 45–117)
ALT SERPL-CCNC: 27 U/L (ref 12–78)
AMPHET UR QL SCN: NEGATIVE
ANION GAP SERPL CALC-SCNC: 7 MMOL/L (ref 2–12)
APAP SERPL-MCNC: <2 UG/ML (ref 10–30)
APPEARANCE UR: CLEAR
AST SERPL-CCNC: 21 U/L (ref 15–37)
BACTERIA URNS QL MICRO: NEGATIVE /HPF
BARBITURATES UR QL SCN: NEGATIVE
BASOPHILS # BLD: 0.01 K/UL (ref 0–0.1)
BASOPHILS NFR BLD: 0.1 % (ref 0–1)
BENZODIAZ UR QL: NEGATIVE
BILIRUB SERPL-MCNC: 0.4 MG/DL (ref 0.2–1)
BILIRUB UR QL: NEGATIVE
BUN SERPL-MCNC: 20 MG/DL (ref 6–20)
BUN/CREAT SERPL: 23 (ref 12–20)
CALCIUM SERPL-MCNC: 8.9 MG/DL (ref 8.5–10.1)
CANNABINOIDS UR QL SCN: NEGATIVE
CHLORIDE SERPL-SCNC: 105 MMOL/L (ref 97–108)
CO2 SERPL-SCNC: 24 MMOL/L (ref 21–32)
COCAINE UR QL SCN: NEGATIVE
COLOR UR: ABNORMAL
COMMENT:: NORMAL
CREAT SERPL-MCNC: 0.88 MG/DL (ref 0.55–1.02)
DIFFERENTIAL METHOD BLD: ABNORMAL
EKG ATRIAL RATE: 93 BPM
EKG DIAGNOSIS: NORMAL
EKG P AXIS: 70 DEGREES
EKG P-R INTERVAL: 114 MS
EKG Q-T INTERVAL: 344 MS
EKG QRS DURATION: 70 MS
EKG QTC CALCULATION (BAZETT): 427 MS
EKG R AXIS: 98 DEGREES
EKG T AXIS: 20 DEGREES
EKG VENTRICULAR RATE: 93 BPM
EOSINOPHIL # BLD: 0.04 K/UL (ref 0–0.4)
EOSINOPHIL NFR BLD: 0.5 % (ref 0–7)
EPITH CASTS URNS QL MICRO: ABNORMAL /LPF
ERYTHROCYTE [DISTWIDTH] IN BLOOD BY AUTOMATED COUNT: 12.4 % (ref 11.5–14.5)
ETHANOL SERPL-MCNC: <10 MG/DL (ref 0–0.08)
GLOBULIN SER CALC-MCNC: 2.8 G/DL (ref 2–4)
GLUCOSE SERPL-MCNC: 120 MG/DL (ref 65–100)
GLUCOSE UR STRIP.AUTO-MCNC: NEGATIVE MG/DL
HCT VFR BLD AUTO: 40.9 % (ref 35–47)
HGB BLD-MCNC: 14.2 G/DL (ref 11.5–16)
HGB UR QL STRIP: NEGATIVE
HYALINE CASTS URNS QL MICRO: ABNORMAL /LPF (ref 0–5)
IMM GRANULOCYTES # BLD AUTO: 0.04 K/UL (ref 0–0.04)
IMM GRANULOCYTES NFR BLD AUTO: 0.5 % (ref 0–0.5)
KETONES UR QL STRIP.AUTO: 40 MG/DL
LEUKOCYTE ESTERASE UR QL STRIP.AUTO: ABNORMAL
LYMPHOCYTES # BLD: 0.67 K/UL (ref 0.8–3.5)
LYMPHOCYTES NFR BLD: 8.7 % (ref 12–49)
Lab: NORMAL
MCH RBC QN AUTO: 31.6 PG (ref 26–34)
MCHC RBC AUTO-ENTMCNC: 34.7 G/DL (ref 30–36.5)
MCV RBC AUTO: 90.9 FL (ref 80–99)
METHADONE UR QL: NEGATIVE
MONOCYTES # BLD: 0.49 K/UL (ref 0–1)
MONOCYTES NFR BLD: 6.4 % (ref 5–13)
NEUTS SEG # BLD: 6.45 K/UL (ref 1.8–8)
NEUTS SEG NFR BLD: 83.8 % (ref 32–75)
NITRITE UR QL STRIP.AUTO: NEGATIVE
NRBC # BLD: 0 K/UL (ref 0–0.01)
NRBC BLD-RTO: 0 PER 100 WBC
OPIATES UR QL: NEGATIVE
PCP UR QL: NEGATIVE
PH UR STRIP: 6.5 (ref 5–8)
PLATELET # BLD AUTO: 215 K/UL (ref 150–400)
PMV BLD AUTO: 9.2 FL (ref 8.9–12.9)
POTASSIUM SERPL-SCNC: 3.9 MMOL/L (ref 3.5–5.1)
PROT SERPL-MCNC: 6.6 G/DL (ref 6.4–8.2)
PROT UR STRIP-MCNC: NEGATIVE MG/DL
RBC # BLD AUTO: 4.5 M/UL (ref 3.8–5.2)
RBC #/AREA URNS HPF: ABNORMAL /HPF (ref 0–5)
RBC MORPH BLD: ABNORMAL
SALICYLATES SERPL-MCNC: <1.7 MG/DL (ref 2.8–20)
SODIUM SERPL-SCNC: 136 MMOL/L (ref 136–145)
SP GR UR REFRACTOMETRY: 1.01 (ref 1–1.03)
SPECIMEN HOLD: NORMAL
SPECIMEN HOLD: NORMAL
UROBILINOGEN UR QL STRIP.AUTO: 1 EU/DL (ref 0.2–1)
WBC # BLD AUTO: 7.7 K/UL (ref 3.6–11)
WBC URNS QL MICRO: ABNORMAL /HPF (ref 0–4)

## 2025-07-20 PROCEDURE — 90791 PSYCH DIAGNOSTIC EVALUATION: CPT

## 2025-07-20 PROCEDURE — 93005 ELECTROCARDIOGRAM TRACING: CPT

## 2025-07-20 PROCEDURE — 85025 COMPLETE CBC W/AUTO DIFF WBC: CPT

## 2025-07-20 PROCEDURE — 80307 DRUG TEST PRSMV CHEM ANLYZR: CPT

## 2025-07-20 PROCEDURE — 6360000002 HC RX W HCPCS

## 2025-07-20 PROCEDURE — 82077 ASSAY SPEC XCP UR&BREATH IA: CPT

## 2025-07-20 PROCEDURE — 99221 1ST HOSP IP/OBS SF/LOW 40: CPT | Performed by: INTERNAL MEDICINE

## 2025-07-20 PROCEDURE — 1100000000 HC RM PRIVATE

## 2025-07-20 PROCEDURE — 80143 DRUG ASSAY ACETAMINOPHEN: CPT

## 2025-07-20 PROCEDURE — 2500000003 HC RX 250 WO HCPCS

## 2025-07-20 PROCEDURE — 2580000003 HC RX 258

## 2025-07-20 PROCEDURE — 81001 URINALYSIS AUTO W/SCOPE: CPT

## 2025-07-20 PROCEDURE — 96374 THER/PROPH/DIAG INJ IV PUSH: CPT

## 2025-07-20 PROCEDURE — 80053 COMPREHEN METABOLIC PANEL: CPT

## 2025-07-20 PROCEDURE — 80179 DRUG ASSAY SALICYLATE: CPT

## 2025-07-20 PROCEDURE — 96361 HYDRATE IV INFUSION ADD-ON: CPT

## 2025-07-20 PROCEDURE — 99285 EMERGENCY DEPT VISIT HI MDM: CPT

## 2025-07-20 RX ORDER — ACETAMINOPHEN 650 MG/1
650 SUPPOSITORY RECTAL EVERY 6 HOURS PRN
Status: DISCONTINUED | OUTPATIENT
Start: 2025-07-20 | End: 2025-07-22 | Stop reason: HOSPADM

## 2025-07-20 RX ORDER — ONDANSETRON 4 MG/1
4 TABLET, ORALLY DISINTEGRATING ORAL EVERY 8 HOURS PRN
Status: DISCONTINUED | OUTPATIENT
Start: 2025-07-20 | End: 2025-07-22 | Stop reason: HOSPADM

## 2025-07-20 RX ORDER — CLONAZEPAM 0.5 MG/1
0.5 TABLET ORAL NIGHTLY PRN
Status: ON HOLD | COMMUNITY
End: 2025-07-28 | Stop reason: HOSPADM

## 2025-07-20 RX ORDER — SODIUM CHLORIDE 9 MG/ML
INJECTION, SOLUTION INTRAVENOUS PRN
OUTPATIENT
Start: 2025-07-20

## 2025-07-20 RX ORDER — ONDANSETRON 2 MG/ML
4 INJECTION INTRAMUSCULAR; INTRAVENOUS EVERY 6 HOURS PRN
Status: DISCONTINUED | OUTPATIENT
Start: 2025-07-20 | End: 2025-07-22 | Stop reason: HOSPADM

## 2025-07-20 RX ORDER — DIVALPROEX SODIUM 500 MG/1
500 TABLET, FILM COATED, EXTENDED RELEASE ORAL
Status: ON HOLD | COMMUNITY
End: 2025-07-28 | Stop reason: HOSPADM

## 2025-07-20 RX ORDER — SODIUM CHLORIDE 9 MG/ML
INJECTION, SOLUTION INTRAVENOUS PRN
Status: DISCONTINUED | OUTPATIENT
Start: 2025-07-20 | End: 2025-07-22

## 2025-07-20 RX ORDER — SERTRALINE HYDROCHLORIDE 100 MG/1
100 TABLET, FILM COATED ORAL
Status: ON HOLD | COMMUNITY
End: 2025-07-28 | Stop reason: HOSPADM

## 2025-07-20 RX ORDER — 0.9 % SODIUM CHLORIDE 0.9 %
1000 INTRAVENOUS SOLUTION INTRAVENOUS ONCE
Status: COMPLETED | OUTPATIENT
Start: 2025-07-20 | End: 2025-07-20

## 2025-07-20 RX ORDER — ACETAMINOPHEN 325 MG/1
650 TABLET ORAL EVERY 6 HOURS PRN
Status: DISCONTINUED | OUTPATIENT
Start: 2025-07-20 | End: 2025-07-22 | Stop reason: HOSPADM

## 2025-07-20 RX ORDER — POLYETHYLENE GLYCOL 3350 17 G/17G
17 POWDER, FOR SOLUTION ORAL DAILY PRN
Status: DISCONTINUED | OUTPATIENT
Start: 2025-07-20 | End: 2025-07-22 | Stop reason: HOSPADM

## 2025-07-20 RX ORDER — SODIUM CHLORIDE 0.9 % (FLUSH) 0.9 %
5-40 SYRINGE (ML) INJECTION PRN
Status: DISCONTINUED | OUTPATIENT
Start: 2025-07-20 | End: 2025-07-22 | Stop reason: HOSPADM

## 2025-07-20 RX ORDER — SODIUM CHLORIDE 0.9 % (FLUSH) 0.9 %
5-40 SYRINGE (ML) INJECTION EVERY 12 HOURS SCHEDULED
Status: DISCONTINUED | OUTPATIENT
Start: 2025-07-20 | End: 2025-07-22 | Stop reason: HOSPADM

## 2025-07-20 RX ADMIN — PANTOPRAZOLE SODIUM 40 MG: 40 INJECTION, POWDER, FOR SOLUTION INTRAVENOUS at 19:56

## 2025-07-20 RX ADMIN — SODIUM CHLORIDE 1000 ML: 0.9 INJECTION, SOLUTION INTRAVENOUS at 19:57

## 2025-07-20 ASSESSMENT — PAIN - FUNCTIONAL ASSESSMENT: PAIN_FUNCTIONAL_ASSESSMENT: 0-10

## 2025-07-20 ASSESSMENT — PAIN SCALES - GENERAL: PAINLEVEL_OUTOF10: 0

## 2025-07-20 NOTE — BSMART NOTE
ADULT VOLUNTARY BED SEARCH STARTED/RESUMED AT 7/20/2025:    RAFAELA YORK ([facility]): contacted at [phone] spoke with Jessica reported at capacity.    No other facilities have been considered at this time d/t patient preference.     Libra Carr, JUAN RAMONSW

## 2025-07-20 NOTE — CONSULTS
.  GI CONSULTATION NOTE      NAME:  Jen Borges  :   1945   MRN:   [id number]       Referring Physician: ED physician    Consult Date: 2025     Reason for GI Consult: Caustic substance ingestion/suicidal gesture      Impression:   In summary patient is a 80-year-old female with a history of major depression, GERD, osteoarthritis, presenting post ingestion of all purpose liquid  (caustic agent) in an attempt at suicidal gesture.  Now presenting with sore throat without nausea no vomiting or abdominal pain.  Given current clinical presentation, patient would benefit from subsequent upper endoscopic evaluation to determine extent and degree of GI tract injury if any      Recommendation:   1.  IV hydration  2.  Protonix 40 mg IV every 12 hours  3.  EGD in a.m.    History of Present Illness:  Patient is a 80 y.o. who is seen in consultation at the request of ED physician for further evaluation of self induced caustic agent ingestion.  When seen patient reports a strong desire of self-harm and suicidal ideation occurring over the past 4 weeks.  She has seen a psychiatrist at least on 2 separate occasions prior to this presentation.  Within 1 hour presenting to the emergency room.  Drank half a cup full of caustic agent (all purpose liquid ).  Patient denies any symptoms of nausea vomiting or abdominal pain.  She complains of oropharyngeal soreness and tongue discoloration.  Her remaining GI review of systems unremarkable.      PMH:  Past Medical History:   Diagnosis Date    Arthritis     Osteoarthritis    Broken arm 2021    Excessive sweating     Occurs in summers; TSH/CMP/CBC normal    GERD (gastroesophageal reflux disease)     WITH HAITEL HERNIA    Menopause     LMP-32 years old    MRSA (methicillin resistant Staphylococcus aureus) infection     MRSA + abd abscess; nasal swab negative after treatment    Psychiatric disorder     DEPRESSION.        PSH:  Past Surgical History:

## 2025-07-20 NOTE — ED TRIAGE NOTES
Pt arrives ambulatory from home w/ cc of a suicide attempt by trying to consume \"all purpose \".    Pt reports she took five sips of it. Denies and CP or SOB. Pt reports it hurts when she swallows, but she is not in any pain currently.     Denies HI and hallucinations

## 2025-07-20 NOTE — BSMART NOTE
Per Tele-Psych consult note by DARI Valencia, \"Patient is a 80 y.o.  female who presents for suicide attempt. Patient's symptoms are consistent with suicide attempt and mood disorder. Recommend inpatient psychiatric admission due to suicide attempt today, depression, anxiety, and recent medication changes. Patient is agreeable with admission. If patient changes her mind regarding admission, recommend TDO given significant risk of harm to self.  Discussed with ED provider Vickey Najera and notified of recommendation. ED provider is agreeable with plan\".     Clinician met w/ patient face to face to discuss treatment recommendations and bed search options. Patient was received w/ 1:1 constant observer present outside of the room and partner, Andrei present at bedside. Patient consented for Andrei to be present during this conversation. Pt confirms she is voluntary for admission at this time and only [facility]. Pt declined a bed search at this time when offered and agrees to hold in the ED until a bed becomes available at [facility]. Pt denies using any medical equipment or issues completing her ADLs independently. Relayed information to Jessica bess/ [place].

## 2025-07-20 NOTE — VIRTUAL HEALTH
Jen SEGAL [facility]  [id number]  1945     Social Work Behavioral Health Crisis Assessment    07/20/25    Chief Complaint: suicide attempt    HPI: Patient is a 80 y.o. White (non-) female who presents for suicide attempt. Patient presented to the ED on 07/20/25 from home.  Per EMR review of ED encounter, patient presents for \"SI attempt by swallowing all purpose clear ammonia  1 hour PTA. She had a few sips. She denies CP, SOB, N,V, or abdominal pain. She reports sore throat and difficulty swallowing. \"  Per EMR review, patient has previous diagnosis of Bipolar, OCD, kleptomania and anxiety disorder.   Received consult for suicidal. Reviewed EMR. Writer met with patient who was agreeable to assessment. Patient was A/O with clear speech. She had flat affect and anxious mood. She endorses suicide attempt today via intentional ingestion of  solution. She denies HI and ATV/H. Her insight and judgement is impaired. She is actively a danger to herself.     Patient stated reason for ED encounter today is \"tried to take my life, swallowed some liquid thinking about it for quite some time, tired wanted get it over with, what swallowed burned tongue and burned throat, if drank enough would be gone.\" Patient endorses depression and anxiety symptoms of racing thoughts, decrease sleep, and excessive worrying. She feels like a burden on others and negative self talk of \"always thinking I'm the worse.\" Patient is fearful of being alone. She has extensive history of kleptomania and stole a chapstick last week. Her appetite is normal. She had suicide attempt today via intentionally ingesting  solution to end her life.   Patient is established with outpatient psychiatry. Her psychiatrist recently changed her medication 1 week ago. Patient has previous inpatient psychiatric admissions for suicide attempt via intentional overdose.   Patient is residing with her significant other. She denies any alcohol or

## 2025-07-20 NOTE — H&P (VIEW-ONLY)
.  GI CONSULTATION NOTE      NAME:  Jen Borges  :   1945   MRN:   [id number]       Referring Physician: ED physician    Consult Date: 2025     Reason for GI Consult: Caustic substance ingestion/suicidal gesture      Impression:   In summary patient is a 80-year-old female with a history of major depression, GERD, osteoarthritis, presenting post ingestion of all purpose liquid  (caustic agent) in an attempt at suicidal gesture.  Now presenting with sore throat without nausea no vomiting or abdominal pain.  Given current clinical presentation, patient would benefit from subsequent upper endoscopic evaluation to determine extent and degree of GI tract injury if any      Recommendation:   1.  IV hydration  2.  Protonix 40 mg IV every 12 hours  3.  EGD in a.m.    History of Present Illness:  Patient is a 80 y.o. who is seen in consultation at the request of ED physician for further evaluation of self induced caustic agent ingestion.  When seen patient reports a strong desire of self-harm and suicidal ideation occurring over the past 4 weeks.  She has seen a psychiatrist at least on 2 separate occasions prior to this presentation.  Within 1 hour presenting to the emergency room.  Drank half a cup full of caustic agent (all purpose liquid ).  Patient denies any symptoms of nausea vomiting or abdominal pain.  She complains of oropharyngeal soreness and tongue discoloration.  Her remaining GI review of systems unremarkable.      PMH:  Past Medical History:   Diagnosis Date    Arthritis     Osteoarthritis    Broken arm 2021    Excessive sweating     Occurs in summers; TSH/CMP/CBC normal    GERD (gastroesophageal reflux disease)     WITH HAITEL HERNIA    Menopause     LMP-32 years old    MRSA (methicillin resistant Staphylococcus aureus) infection     MRSA + abd abscess; nasal swab negative after treatment    Psychiatric disorder     DEPRESSION.        PSH:  Past Surgical History:

## 2025-07-20 NOTE — ED PROVIDER NOTES
[facility] EMERGENCY DEPARTMENT  EMERGENCY DEPARTMENT ENCOUNTER      Pt Name: Jen Borges  MRN: [id number]  Birthdate 1945  Date of evaluation: 2025  Provider: Vickey Najera PA-C    CHIEF COMPLAINT       Chief Complaint   Patient presents with    Suicide Attempt         HISTORY OF PRESENT ILLNESS   (Location/Symptom, Timing/Onset, Context/Setting, Quality, Duration, Modifying Factors, Severity)  Note limiting factors.   Jen Borges is a 80 y.o. female who presents to the emergency department for SI attempt by swallowing all purpose liquid  1 hour PTA. She endorses self harm with this ingestion. She states she had a few sips. She denies CP, SOB, N,V, or abdominal pain. She reports sore throat and difficulty swallowing due to the discomfort in her throat. She notes of past psychiatric history with SI behavior, bipolar disorder and anxiety. She denies HI or V/A hallucinations.       The history is provided by the patient and the spouse.         Review of External Medical Records:     Nursing Notes were reviewed.    REVIEW OF SYSTEMS    (2-9 systems for level 4, 10 or more for level 5)     Review of Systems    Except as noted above the remainder of the review of systems was reviewed and negative.       PAST MEDICAL HISTORY     Past Medical History:   Diagnosis Date    Arthritis     Osteoarthritis    Broken arm 2021    Excessive sweating     Occurs in summers; TSH/CMP/CBC normal    GERD (gastroesophageal reflux disease)     WITH HAITEL HERNIA    Menopause     LMP-32 years old    MRSA (methicillin resistant Staphylococcus aureus) infection     MRSA + abd abscess; nasal swab negative after treatment    Psychiatric disorder     DEPRESSION.          SURGICAL HISTORY       Past Surgical History:   Procedure Laterality Date    BREAST SURGERY Bilateral 1974    AUGMENTATION     SECTION      COLONOSCOPY  2016    HYSTERECTOMY (CERVIX STATUS UNKNOWN)      Age 32 for

## 2025-07-21 ENCOUNTER — ANESTHESIA EVENT (OUTPATIENT)
Facility: HOSPITAL | Age: 80
End: 2025-07-21
Payer: MEDICARE

## 2025-07-21 ENCOUNTER — ANESTHESIA (OUTPATIENT)
Facility: HOSPITAL | Age: 80
End: 2025-07-21
Payer: MEDICARE

## 2025-07-21 LAB
ANION GAP SERPL CALC-SCNC: 5 MMOL/L (ref 2–12)
BASOPHILS # BLD: 0 K/UL (ref 0–0.1)
BASOPHILS NFR BLD: 0 % (ref 0–1)
BUN SERPL-MCNC: 11 MG/DL (ref 6–20)
BUN/CREAT SERPL: 17 (ref 12–20)
CALCIUM SERPL-MCNC: 8.4 MG/DL (ref 8.5–10.1)
CHLORIDE SERPL-SCNC: 110 MMOL/L (ref 97–108)
CO2 SERPL-SCNC: 24 MMOL/L (ref 21–32)
CREAT SERPL-MCNC: 0.64 MG/DL (ref 0.55–1.02)
DIFFERENTIAL METHOD BLD: ABNORMAL
EOSINOPHIL # BLD: 0.02 K/UL (ref 0–0.4)
EOSINOPHIL NFR BLD: 0.3 % (ref 0–7)
ERYTHROCYTE [DISTWIDTH] IN BLOOD BY AUTOMATED COUNT: 12.3 % (ref 11.5–14.5)
GLUCOSE SERPL-MCNC: 117 MG/DL (ref 65–100)
HCT VFR BLD AUTO: 37.7 % (ref 35–47)
HGB BLD-MCNC: 12.7 G/DL (ref 11.5–16)
IMM GRANULOCYTES # BLD AUTO: 0.02 K/UL (ref 0–0.04)
IMM GRANULOCYTES NFR BLD AUTO: 0.3 % (ref 0–0.5)
LYMPHOCYTES # BLD: 0.77 K/UL (ref 0.8–3.5)
LYMPHOCYTES NFR BLD: 10.9 % (ref 12–49)
MCH RBC QN AUTO: 31.1 PG (ref 26–34)
MCHC RBC AUTO-ENTMCNC: 33.7 G/DL (ref 30–36.5)
MCV RBC AUTO: 92.4 FL (ref 80–99)
MONOCYTES # BLD: 0.58 K/UL (ref 0–1)
MONOCYTES NFR BLD: 8.2 % (ref 5–13)
NEUTS SEG # BLD: 5.65 K/UL (ref 1.8–8)
NEUTS SEG NFR BLD: 80.3 % (ref 32–75)
NRBC # BLD: 0 K/UL (ref 0–0.01)
NRBC BLD-RTO: 0 PER 100 WBC
PLATELET # BLD AUTO: 156 K/UL (ref 150–400)
PMV BLD AUTO: 8.9 FL (ref 8.9–12.9)
POTASSIUM SERPL-SCNC: 3.4 MMOL/L (ref 3.5–5.1)
RBC # BLD AUTO: 4.08 M/UL (ref 3.8–5.2)
SODIUM SERPL-SCNC: 139 MMOL/L (ref 136–145)
WBC # BLD AUTO: 7 K/UL (ref 3.6–11)

## 2025-07-21 PROCEDURE — 2580000003 HC RX 258: Performed by: FAMILY MEDICINE

## 2025-07-21 PROCEDURE — 85025 COMPLETE CBC W/AUTO DIFF WBC: CPT

## 2025-07-21 PROCEDURE — 3600007502: Performed by: INTERNAL MEDICINE

## 2025-07-21 PROCEDURE — 2500000003 HC RX 250 WO HCPCS

## 2025-07-21 PROCEDURE — 3700000000 HC ANESTHESIA ATTENDED CARE: Performed by: INTERNAL MEDICINE

## 2025-07-21 PROCEDURE — 6370000000 HC RX 637 (ALT 250 FOR IP): Performed by: HOSPITALIST

## 2025-07-21 PROCEDURE — 6370000000 HC RX 637 (ALT 250 FOR IP): Performed by: FAMILY MEDICINE

## 2025-07-21 PROCEDURE — 80048 BASIC METABOLIC PNL TOTAL CA: CPT

## 2025-07-21 PROCEDURE — 2720000010 HC SURG SUPPLY STERILE: Performed by: INTERNAL MEDICINE

## 2025-07-21 PROCEDURE — 2500000003 HC RX 250 WO HCPCS: Performed by: FAMILY MEDICINE

## 2025-07-21 PROCEDURE — 2060000000 HC ICU INTERMEDIATE R&B

## 2025-07-21 PROCEDURE — 0DJ08ZZ INSPECTION OF UPPER INTESTINAL TRACT, VIA NATURAL OR ARTIFICIAL OPENING ENDOSCOPIC: ICD-10-PCS | Performed by: INTERNAL MEDICINE

## 2025-07-21 PROCEDURE — 7100000010 HC PHASE II RECOVERY - FIRST 15 MIN: Performed by: INTERNAL MEDICINE

## 2025-07-21 PROCEDURE — 6360000002 HC RX W HCPCS: Performed by: NURSE ANESTHETIST, CERTIFIED REGISTERED

## 2025-07-21 PROCEDURE — 6360000002 HC RX W HCPCS

## 2025-07-21 PROCEDURE — 7100000011 HC PHASE II RECOVERY - ADDTL 15 MIN: Performed by: INTERNAL MEDICINE

## 2025-07-21 RX ORDER — ROSUVASTATIN CALCIUM 10 MG/1
5 TABLET, COATED ORAL DAILY
Status: DISCONTINUED | OUTPATIENT
Start: 2025-07-21 | End: 2025-07-22 | Stop reason: HOSPADM

## 2025-07-21 RX ORDER — HYDROXYZINE HYDROCHLORIDE 25 MG/1
25 TABLET, FILM COATED ORAL 3 TIMES DAILY
Status: DISCONTINUED | OUTPATIENT
Start: 2025-07-21 | End: 2025-07-21

## 2025-07-21 RX ORDER — DEXTROSE MONOHYDRATE, SODIUM CHLORIDE, AND POTASSIUM CHLORIDE 50; 1.49; 9 G/1000ML; G/1000ML; G/1000ML
INJECTION, SOLUTION INTRAVENOUS CONTINUOUS
Status: DISCONTINUED | OUTPATIENT
Start: 2025-07-21 | End: 2025-07-22

## 2025-07-21 RX ORDER — HYDROXYZINE HYDROCHLORIDE 25 MG/1
25 TABLET, FILM COATED ORAL 3 TIMES DAILY
Status: DISCONTINUED | OUTPATIENT
Start: 2025-07-21 | End: 2025-07-22 | Stop reason: HOSPADM

## 2025-07-21 RX ORDER — LIDOCAINE HYDROCHLORIDE 20 MG/ML
INJECTION, SOLUTION EPIDURAL; INFILTRATION; INTRACAUDAL; PERINEURAL
Status: DISCONTINUED | OUTPATIENT
Start: 2025-07-21 | End: 2025-07-21 | Stop reason: SDUPTHER

## 2025-07-21 RX ORDER — DIVALPROEX SODIUM 500 MG/1
500 TABLET, FILM COATED, EXTENDED RELEASE ORAL DAILY
Status: DISCONTINUED | OUTPATIENT
Start: 2025-07-21 | End: 2025-07-22 | Stop reason: HOSPADM

## 2025-07-21 RX ORDER — CLONAZEPAM 0.5 MG/1
0.5 TABLET ORAL EVERY 12 HOURS
Status: DISCONTINUED | OUTPATIENT
Start: 2025-07-21 | End: 2025-07-22 | Stop reason: HOSPADM

## 2025-07-21 RX ORDER — RISPERIDONE 1 MG/1
0.5 TABLET ORAL NIGHTLY
Status: DISCONTINUED | OUTPATIENT
Start: 2025-07-21 | End: 2025-07-22 | Stop reason: HOSPADM

## 2025-07-21 RX ADMIN — POTASSIUM CHLORIDE, DEXTROSE MONOHYDRATE AND SODIUM CHLORIDE: 150; 5; 900 INJECTION, SOLUTION INTRAVENOUS at 21:58

## 2025-07-21 RX ADMIN — PANTOPRAZOLE SODIUM 40 MG: 40 INJECTION, POWDER, FOR SOLUTION INTRAVENOUS at 09:15

## 2025-07-21 RX ADMIN — Medication 10 ML: at 21:27

## 2025-07-21 RX ADMIN — HYDROXYZINE HYDROCHLORIDE 25 MG: 25 TABLET ORAL at 02:24

## 2025-07-21 RX ADMIN — CLONAZEPAM 0.5 MG: 1 TABLET ORAL at 09:15

## 2025-07-21 RX ADMIN — LIDOCAINE HYDROCHLORIDE 40 MG: 20 INJECTION, SOLUTION EPIDURAL; INFILTRATION; INTRACAUDAL; PERINEURAL at 16:36

## 2025-07-21 RX ADMIN — Medication 10 ML: at 09:16

## 2025-07-21 RX ADMIN — SERTRALINE HYDROCHLORIDE 100 MG: 50 TABLET ORAL at 09:15

## 2025-07-21 RX ADMIN — SODIUM CHLORIDE: 9 INJECTION, SOLUTION INTRAVENOUS at 16:26

## 2025-07-21 RX ADMIN — HYDROXYZINE HYDROCHLORIDE 25 MG: 25 TABLET ORAL at 21:25

## 2025-07-21 RX ADMIN — POTASSIUM CHLORIDE, DEXTROSE MONOHYDRATE AND SODIUM CHLORIDE: 150; 5; 900 INJECTION, SOLUTION INTRAVENOUS at 02:24

## 2025-07-21 RX ADMIN — RISPERIDONE 0.5 MG: 1 TABLET, FILM COATED ORAL at 21:25

## 2025-07-21 RX ADMIN — ACETAMINOPHEN 650 MG: 325 TABLET ORAL at 04:40

## 2025-07-21 RX ADMIN — HYDROXYZINE HYDROCHLORIDE 25 MG: 25 TABLET ORAL at 16:05

## 2025-07-21 RX ADMIN — HYDROXYZINE HYDROCHLORIDE 25 MG: 25 TABLET ORAL at 09:15

## 2025-07-21 RX ADMIN — PROPOFOL 100 MG: 10 INJECTION, EMULSION INTRAVENOUS at 16:36

## 2025-07-21 RX ADMIN — CLONAZEPAM 0.5 MG: 1 TABLET ORAL at 21:25

## 2025-07-21 RX ADMIN — ROSUVASTATIN 5 MG: 10 TABLET, FILM COATED ORAL at 09:15

## 2025-07-21 RX ADMIN — DIVALPROEX SODIUM 500 MG: 500 TABLET, EXTENDED RELEASE ORAL at 09:15

## 2025-07-21 ASSESSMENT — PAIN SCALES - GENERAL: PAINLEVEL_OUTOF10: 0

## 2025-07-21 NOTE — PROGRESS NOTES
[facility]                                                                                          Hospitalist Progress Note  Domenica France MD  Answering service: [phone] OR [phone] from in house phone        Date of Service:  2025  NAME:  Jen Borges  :  1945  MRN:  [id number]       Admission Summary:     \"Jen Borges is a 80 y.o. female with a pmhx bipolar d/o, anxiety, depression, past suicide attempts, GERD, and arthritis who presents s/p suicide attempt of swallowing all purpose liquid .  She subsequently developed sore throat and dysphagia.     In the ED, VSS.  Labs are grossly unremarkable.        In the ED, GI was consulted, and recommended IV hydration and IV protonix with plan for EGD in the AM.\"       Interval history / Subjective:     Patient seen and examined at the bedside.  She stated that her throat pain and swallowing improving.  She states that she feels better today.  No anxiety or depression.  No suicidal or homicidal ideation.  No chest pain, palpitation or difficulty of breathing.  Sitter in the room for one-to-one observation       Assessment & Plan:     Suicide attempt  Hx bipolar disorder  -Ingested all-purpose liquid   -Add home Klonopin 0.5 mg Q12, continue Depakote, hydroxyzine, risperidone and Zoloft  -Continue one-to-one monitoring  -Psychiatrist is consulted    Dysphagia likely related to ingestion of corrosive material  -NPO  -Continue iv Protonix 40 mg twice daily, prn iv zofran,   -Continue D5 percent and 0.9% NaCl with kcl at 125 mL/h  - GI on board, plan for EGD today    Hypokalemia  -on IVF with kcl   - Repeat K level in a.m.    Dyslipidemia  -Continue on home Crestor       Code status: Full code  Prophylaxis: SCD  Care Plan discussed with: Patient and nurse  Anticipated Disposition: Home vs to transfer to psych unit in the next 24 to 48 hours  Inpatient  Cardiac monitoring: Telemetry

## 2025-07-21 NOTE — ANESTHESIA POSTPROCEDURE EVALUATION
Department of Anesthesiology  Postprocedure Note    Patient: Jen Borges  MRN: [id number]  YOB: 1945  Date of evaluation: 7/21/2025    Procedure Summary       Date: 07/21/25 Room / Location: [place] [address] / [facility] ENDOSCOPY    Anesthesia Start: 1620 Anesthesia Stop: 1640    Procedure: ESOPHAGOGASTRODUODENOSCOPY Diagnosis:       Esophagus burn, initial encounter      (Esophagus burn, initial encounter [T28.1XXA])    Surgeons: Noah Mc MD Responsible Provider: Deshaun Alexandra MD    Anesthesia Type: MAC ASA Status: 2            Anesthesia Type: No value filed.    Ely Phase I: Ely Score: 10    Ely Phase II: Ely Score: 9    Anesthesia Post Evaluation    Patient location during evaluation: PACU  Patient participation: complete - patient participated  Level of consciousness: awake  Airway patency: patent  Nausea & Vomiting: no nausea  Cardiovascular status: hemodynamically stable  Respiratory status: acceptable  Hydration status: stable  Pain management: adequate    No notable events documented.

## 2025-07-21 NOTE — PROGRESS NOTES
Spiritual Health History and Assessment/Progress Note  [facility]    Initial Encounter,  ,  ,      Name: Jen Borges MRN: [id number]    Age: 80 y.o.     Sex: female   Language: English   Restorationist: Bahai   Suicide attempt (HCC)     Date: 7/21/2025            Total Time Calculated: 35 min              Spiritual Assessment began in [facility] EMERGENCY DEPARTMENT        Referral/Consult From: Rounding   Encounter Overview/Reason: Initial Encounter  Service Provided For: Patient    Sandi, Belief, Meaning:   Patient Other: expressed some Gnosticism Bahai sandi  Family/Friends Other: unable to assess      Importance and Influence:  Patient has spiritual/personal beliefs that influence decisions regarding their health  Family/Friends have spiritual/personal beliefs that influence decisions regarding the patient's health    Community:  Patient expresses feelings of isolation: feeling no one understands  Family/Friends Other: unable to assess    Assessment and Plan of Care:     Patient Interventions include: Facilitated expression of thoughts and feelings, Explored spiritual coping/struggle/distress, Engaged in theological reflection, Affirmed coping skills/support systems, and Facilitated life review and/ or legacy  Family/Friends Interventions include: Facilitated expression of thoughts and feelings and Affirmed coping skills/support systems    Patient Plan of Care: Spiritual Care available upon further referral  Family/Friends Plan of Care: Spiritual Care available upon further referral    I visited patient Jen Borges while rounding in the Emergency Department. Completed chart review.     Jen is experiencing spiritual despair as evidenced by her initial expressions of self-hatred, hopelessness, and inability to connect with any meaning.   We explored [place] support system including her significant other of fifteen years, Andrei. She expresses fears that her choices negatively and perhaps

## 2025-07-21 NOTE — OP NOTE
Operative Note      Patient: Jen Borges  YOB: 1945  MRN: [id number]    Date of Procedure: 7/21/2025    Pre-Op Diagnosis Codes:      * Esophagus burn, initial encounter [T28.1XXA]    Post-Op Diagnosis: [facility]  [address], [place] [address]                Endoscopic Gastroduodenoscopy Procedure Note    Jen Borges  1945  [id number]    Indication: Dyphagia/odynophagia, history of recent caustic ingestion     : Noah Mc MD    Assistants: None    Referring Provider:  Justina Alejandre MD    Anesthesia/Sedation:  General anesthesia      Airway assessment: No airway problems anticipated    Pre-Procedural Exam:  Airway: clear, no airway problems anticipated  Heart: RRR, without gallops or rubs  Lungs: clear bilaterally without wheezes, crackles, or rhonchi  Abdomen: soft, nontender, nondistended, bowel sounds present  Mental Status: awake, alert and oriented to person, place and time        Procedure Details   After infomed consent was obtained for the procedure, with all risks and benefits of procedure explained the patient was taken to the endoscopy suite and placed in the left lateral decubitus position.  Following sequential administration of sedation as per above, the endoscope was inserted into the mouth and advanced under direct vision to second portion of the duodenum.  A careful inspection was made as the gastroscope was withdrawn, including a retroflexed view of the proximal stomach; findings and interventions are described below.      Findings:   Esophagus: Evidence of grade C esophagitis (superficial mucosal ) noted in the lower third without any evidence of necrosis and or bleeding.  Rest of the exam esophagus was normal  Stomach: Gastritis noted primarily in the proximal antrum                    Superficial and linear ulcerations with no evidence or stigmata of bleeding noted.                      Rest of the examined stomach

## 2025-07-21 NOTE — H&P
History and Physical    Date of Service:  2025  Primary Care Provider: Justina Alejandre MD  Source of information: patient, electronic medical record    Chief Complaint: Suicide Attempt      History of Presenting Illness:   Jen Borges is a 80 y.o. female with a pmhx bipolar d/o, anxiety, depression, past suicide attempts, GERD, and arthritis who presents s/p suicide attempt of swallowing all purpose liquid .  She subsequently developed sore throat and dysphagia.    In the ED, VSS.  Labs are grossly unremarkable.       In the ED, GI was consulted, and recommended IV hydration and IV protonix with plan for EGD in the AM.     REVIEW OF SYSTEMS:  A comprehensive review of systems was negative except for that written in the History of Present Illness.     Past Medical History:   Diagnosis Date    Arthritis     Osteoarthritis    Broken arm 2021    Excessive sweating     Occurs in summers; TSH/CMP/CBC normal    GERD (gastroesophageal reflux disease)     WITH HAITEL HERNIA    Menopause     LMP-32 years old    MRSA (methicillin resistant Staphylococcus aureus) infection     MRSA + abd abscess; nasal swab negative after treatment    Psychiatric disorder     DEPRESSION.       Past Surgical History:   Procedure Laterality Date    BREAST SURGERY Bilateral     AUGMENTATION     SECTION      COLONOSCOPY  2016    HYSTERECTOMY (CERVIX STATUS UNKNOWN)      Age 32 for endometriosis    IMPLANT BREAST SILICONE/EQ Bilateral  and 2014    1st surgery , replaced bilaterally in .    UPPER GASTROINTESTINAL ENDOSCOPY  05/10/2018    Dr. Bateman; gastritis and esophagitis     Prior to Admission medications    Medication Sig Start Date End Date Taking? Authorizing Provider   hydrOXYzine HCl (ATARAX) 10 MG tablet Take 1 tablet by mouth in the morning, at noon, and at bedtime 25   Justina Alejandre MD   rosuvastatin (CRESTOR) 5 MG tablet Take 1 tablet by mouth daily To lower

## 2025-07-21 NOTE — INTERVAL H&P NOTE
Update History & Physical    The patient's History and Physical of July 20, the patient and chart was reviewed with the patient and I examined the patient. There was no change. The surgical site was confirmed by the patient and me.     Plan: The risks, benefits, expected outcome, and alternative to the recommended procedure have been discussed with the patient. Patient understands and wants to proceed with the procedure.     Electronically signed by Noah Mc MD on 7/21/2025 at 4:30 PM

## 2025-07-21 NOTE — PERIOP NOTE
TRANSFER - OUT REPORT:    Verbal report given to SPENCER Prather on Jen Borges  being transferred to [facility] for routine progression of patient care       Information from the following report(s) Nurse Handoff Report, Surgery Report, and Recent Results was reviewed with the receiving nurse.    Toño Fall Assessment:    Presents to emergency department  because of falls (Syncope, seizure, or loss of consciousness): No  Age > 70: Yes  Altered Mental Status, Intoxication with alcohol or substance confusion (Disorientation, impaired judgment, poor safety awaremess, or inability to follow instructions): No  Impaired Mobility: Ambulates or transfers with assistive devices or assistance; Unable to ambulate or transer.: No  Nursing Judgement: Yes          Lines:   Peripheral IV 07/20/25 Left Antecubital (Active)   Site Assessment Clean, dry & intact 07/21/25 0400   Line Status Infusing 07/21/25 0400   Line Care Cap changed;Connections checked and tightened 07/21/25 0400   Phlebitis Assessment No symptoms 07/21/25 0400   Infiltration Assessment 0 07/21/25 0400   Alcohol Cap Used Yes 07/21/25 0400   Dressing Status Clean, dry & intact 07/21/25 0400   Dressing Type Transparent 07/20/25 2300   Dressing Intervention New 07/20/25 7434        Opportunity for questions and clarification was provided.      Patient transported with:  Tech

## 2025-07-21 NOTE — PROGRESS NOTES
Initial RN admission and assessment performed and documented in Endoscopy navigator.     Patient evaluated by anesthesia in pre-procedure holding.     All procedural vital signs, airway assessment, and level of consciousness information monitored and recorded by anesthesia staff on the anesthesia record.     Report received from CRNA post procedure.  Patient transported to recovery area by RN.    Endoscopy post procedure time out was performed  with physician.    Endoscope was pre-cleaned at bedside immediately following procedure by Americo MCCORMICK

## 2025-07-21 NOTE — BSMART NOTE
BSMART Liaison Team Note     LOS:  1     Patient goals for today: take medications as prescribed, make needs known in an appropriate manner.  BSMART Liaison team focus/goals: assess MH needs, provide therapeutic support, brief therapy, and education, as needed.  Assist medical care management team with recommendations for coordination of care.    Progress note: Pt presented to the ED after suicide attempt via ingestion of ammonia .  Pt was last admitted to [facility] on 11/24/24 for suicide attempt after taking \"20-22 Fluoxetine 20 mg tablets and 20-22 Rosuvastatin 5 mg tablets\".  According to the BSMART note from 11/24/24, \"Pt also reports thoughts of wanting to shoot herself using her partner's firearm stating, \"He had a gun and I wanted to use the gun... to just go like this\" and motions shooting herself in the head\".     Multiple attempts were made by the Liaison to see the patient, but she was occupied with other staff interventions.  As of 15:45, pt's 1:1 reports she will be transferring to the medical floor, soon - room is unknown, at this time.  Patient will continue to be followed by BSMART Liaison and psychiatry team for brief therapy and medication mgmt during their medical stay.       Admission Status: Voluntary    Barriers to Discharge: medical clearance  Guns in the home: According to the BSMART note from 11/24/24, pt's partner, Andrei, removed his firearm from the home due to pt's suicide attempt.  It is unknown if pt currently has access to a firearm.    Insurance info/prescription coverage:  medicare, [facility]   Outpatient provider(s):  Boris Rees @ [facility] for medication mgmt.      Diagnosis:   PMHH: Bipolar D/O, BPD, OCD, MDD, recurrent, severe, without psychosis    Past Medical History:   Diagnosis Date    Arthritis     Osteoarthritis    Broken arm 11/17/2021    Excessive sweating     Occurs in summers; TSH/CMP/CBC normal    GERD (gastroesophageal reflux disease)     WITH

## 2025-07-21 NOTE — PLAN OF CARE
2200 Report received from SPENCER Pappas. Patient alert and oriented, resting in bed and anxious post transfer to new room.   Sitter in room, suicide precautions in place. Pt complaining of \"monkey chatter\" and trouble sleeping, PTA med list updated and Dr Singh notified for reconciliation.    Shift summary.    Pt refused telemetry due to feeling of being \"tied down\".    0500 Poison control called for update on pt status. No new orders given.    No acute events noted. Pt resting in bed comfortably, call bell left within reach.    Dr France notified of pt's anxiety and refusal of telemetry.  0735 Bedside shift change report given to SPENCER Will by SPENCER Alonzo. Report included the following information SBAR, Kardex, MAR, Accordion, and Recent Results.    Problem: Anxiety  Goal: Will report anxiety at manageable levels  Description: INTERVENTIONS:  1. Administer medication as ordered  2. Teach and rehearse alternative coping skills  3. Provide emotional support with 1:1 interaction with staff  Outcome: Progressing     Problem: Depression/Self Harm  Goal: Effect of psychiatric condition will be minimized and patient will be protected from self harm  Description: INTERVENTIONS:  1. Assess impact of patient's symptoms on level of functioning, self care needs and offer support as indicated  2. Assess patient/family knowledge of depression, impact on illness and need for teaching  3. Provide emotional support, presence and reassurance  4. Assess for possible suicidal thoughts or ideation. If patient expresses suicidal thoughts or statements do not leave alone, initiate Suicide Precautions, move to a room close to the nursing station and obtain sitter  5. Initiate consults as appropriate with Mental Health Professional, Spiritual Care, Psychosocial CNS, and consider a recommendation to the LIP for a Psychiatric Consultation  Outcome: Progressing     Problem: Gastrointestinal - Adult  Goal: Minimal or absence of nausea and  vomiting  Outcome: Progressing  Goal: Maintains or returns to baseline bowel function  Outcome: Progressing  Goal: Maintains adequate nutritional intake  Outcome: Progressing  Goal: Establish and maintain optimal ostomy function  Outcome: Progressing

## 2025-07-21 NOTE — PROGRESS NOTES
Patient arrived to floor. Primary nurse assessed patient. Patient states she doesn't want to live. Primary nurse asked \" how can I make you feel safe and comfortable?\" Patient states \" I want to get the he-be-jebee out of my head, I am a kleptomania\"  . Primary nurse asked patient \" do you feel safe at home \" patient states \" I don't have a home, I rent, I stay with Tata or Andrei, mostly at Andrei my stuff is here and there.\" Patient states she will not drink any more chemicals. Patient states if she had a loaded gun she will shoot herself. Patient states she looked everywhere for a gun and she could not find it. Patient states andrei hid the gun. Patient asked primary nurse if primary nurse would be able to give patient a pill. Patient states \" I do not want to deal with the outside world, I want to stay in this hospital bed for the rest of my time, I do not want to eat, drink, or bathe.\"

## 2025-07-21 NOTE — ANESTHESIA PRE PROCEDURE
Department of Anesthesiology  Preprocedure Note       Name:  Jen Borges   Age:  80 y.o.  :  1945                                          MRN:  [id number]         Date:  2025      Surgeon: Surgeon(s):  Noah Mc MD    Procedure: Procedure(s):  ESOPHAGOGASTRODUODENOSCOPY    Medications prior to admission:   Prior to Admission medications    Medication Sig Start Date End Date Taking? Authorizing Provider   sertraline (ZOLOFT) 100 MG tablet Take 1 tablet by mouth daily   Yes Provider, MD Mirza   clonazePAM (KLONOPIN) 0.5 MG tablet Take 1 tablet by mouth 2 times daily as needed.   Yes Provider, MD Mirza   divalproex (DEPAKOTE ER) 500 MG extended release tablet Take 1 tablet by mouth daily   Yes Provider, MD Mirza   hydrOXYzine HCl (ATARAX) 10 MG tablet Take 1 tablet by mouth in the morning, at noon, and at bedtime  Patient taking differently: Take 2.5 tablets by mouth in the morning, at noon, and at bedtime 25  Yes Justina Alejandre MD   rosuvastatin (CRESTOR) 5 MG tablet Take 1 tablet by mouth daily To lower cholesterol 25  Yes Justina Alejandre MD   mirtazapine (REMERON) 30 MG tablet Take 1 tablet by mouth nightly  Patient not taking: Reported on 24   Jennifer Martin APRN - NP   risperiDONE (RISPERDAL) 0.5 MG tablet Take 1 tablet by mouth nightly  Patient not taking: Reported on 24   Jennifer Martin APRN - NP       Current medications:    Current Facility-Administered Medications   Medication Dose Route Frequency Provider Last Rate Last Admin   • divalproex (DEPAKOTE ER) extended release tablet 500 mg  500 mg Oral Daily Zelda Singh MD   500 mg at 25 0915   • sertraline (ZOLOFT) tablet 100 mg  100 mg Oral Daily Zelda Singh MD   100 mg at 25 0915   • rosuvastatin (CRESTOR) tablet 5 mg  5 mg Oral Daily Zelda Singh MD   5 mg at 25 0915   • dextrose 5 % and 0.9 % NaCl with KCl 20 mEq infusion

## 2025-07-21 NOTE — BSMART NOTE
BSMART Update Note  BSMART clinician spoke with medical team about patient medical clearance. Per RN, plan is to speak with hospitalist about admitting patient medically for GI clearance.    Per chart - Dr. Stephens with GI recommends observation with IV fluids and PPI. Will plan to admit for hospital for GI to see patient and determine need for EGD tomorrow per GI consult. Patient is being admitted to the hospital by Dr. Singh, Service: Hospitalist.    Patient is to be followed by psych while medically admitted as telepsych consult recommends psychiatric admission once medically cleared for suicidal ideation. Access informed patient will be admitted medically.

## 2025-07-22 ENCOUNTER — HOSPITAL ENCOUNTER (INPATIENT)
Facility: HOSPITAL | Age: 80
LOS: 6 days | Discharge: HOME | DRG: 918 | End: 2025-07-28
Attending: PSYCHIATRY & NEUROLOGY | Admitting: PSYCHIATRY & NEUROLOGY
Payer: MEDICARE

## 2025-07-22 VITALS
DIASTOLIC BLOOD PRESSURE: 78 MMHG | RESPIRATION RATE: 16 BRPM | TEMPERATURE: 98.4 F | HEART RATE: 99 BPM | WEIGHT: 116.18 LBS | OXYGEN SATURATION: 95 % | SYSTOLIC BLOOD PRESSURE: 127 MMHG | HEIGHT: 60 IN | BODY MASS INDEX: 22.81 KG/M2

## 2025-07-22 DIAGNOSIS — F32.A DEPRESSION, UNSPECIFIED DEPRESSION TYPE: Primary | ICD-10-CM

## 2025-07-22 LAB
ANION GAP SERPL CALC-SCNC: 4 MMOL/L (ref 2–12)
BASOPHILS # BLD: 0.01 K/UL (ref 0–0.1)
BASOPHILS NFR BLD: 0.2 % (ref 0–1)
BUN SERPL-MCNC: 3 MG/DL (ref 6–20)
BUN/CREAT SERPL: 5 (ref 12–20)
CALCIUM SERPL-MCNC: 8.3 MG/DL (ref 8.5–10.1)
CHLORIDE SERPL-SCNC: 115 MMOL/L (ref 97–108)
CO2 SERPL-SCNC: 25 MMOL/L (ref 21–32)
CREAT SERPL-MCNC: 0.63 MG/DL (ref 0.55–1.02)
DIFFERENTIAL METHOD BLD: NORMAL
EOSINOPHIL # BLD: 0.06 K/UL (ref 0–0.4)
EOSINOPHIL NFR BLD: 1.2 % (ref 0–7)
ERYTHROCYTE [DISTWIDTH] IN BLOOD BY AUTOMATED COUNT: 12.7 % (ref 11.5–14.5)
GLUCOSE SERPL-MCNC: 104 MG/DL (ref 65–100)
HCT VFR BLD AUTO: 36.7 % (ref 35–47)
HGB BLD-MCNC: 12.4 G/DL (ref 11.5–16)
IMM GRANULOCYTES # BLD AUTO: 0.02 K/UL (ref 0–0.04)
IMM GRANULOCYTES NFR BLD AUTO: 0.4 % (ref 0–0.5)
LYMPHOCYTES # BLD: 0.83 K/UL (ref 0.8–3.5)
LYMPHOCYTES NFR BLD: 16.3 % (ref 12–49)
MCH RBC QN AUTO: 31.1 PG (ref 26–34)
MCHC RBC AUTO-ENTMCNC: 33.8 G/DL (ref 30–36.5)
MCV RBC AUTO: 92 FL (ref 80–99)
MONOCYTES # BLD: 0.4 K/UL (ref 0–1)
MONOCYTES NFR BLD: 7.9 % (ref 5–13)
NEUTS SEG # BLD: 3.77 K/UL (ref 1.8–8)
NEUTS SEG NFR BLD: 74 % (ref 32–75)
NRBC # BLD: 0 K/UL (ref 0–0.01)
NRBC BLD-RTO: 0 PER 100 WBC
PLATELET # BLD AUTO: 156 K/UL (ref 150–400)
PMV BLD AUTO: 9.8 FL (ref 8.9–12.9)
POTASSIUM SERPL-SCNC: 4.2 MMOL/L (ref 3.5–5.1)
RBC # BLD AUTO: 3.99 M/UL (ref 3.8–5.2)
SODIUM SERPL-SCNC: 144 MMOL/L (ref 136–145)
WBC # BLD AUTO: 5.1 K/UL (ref 3.6–11)

## 2025-07-22 PROCEDURE — 6360000002 HC RX W HCPCS

## 2025-07-22 PROCEDURE — 1240000000 HC EMOTIONAL WELLNESS R&B

## 2025-07-22 PROCEDURE — 80048 BASIC METABOLIC PNL TOTAL CA: CPT

## 2025-07-22 PROCEDURE — 6370000000 HC RX 637 (ALT 250 FOR IP): Performed by: PSYCHIATRY & NEUROLOGY

## 2025-07-22 PROCEDURE — 6370000000 HC RX 637 (ALT 250 FOR IP): Performed by: FAMILY MEDICINE

## 2025-07-22 PROCEDURE — 2500000003 HC RX 250 WO HCPCS

## 2025-07-22 PROCEDURE — 2500000003 HC RX 250 WO HCPCS: Performed by: FAMILY MEDICINE

## 2025-07-22 PROCEDURE — 85025 COMPLETE CBC W/AUTO DIFF WBC: CPT

## 2025-07-22 PROCEDURE — 80053 COMPREHEN METABOLIC PANEL: CPT

## 2025-07-22 PROCEDURE — 6370000000 HC RX 637 (ALT 250 FOR IP): Performed by: HOSPITALIST

## 2025-07-22 RX ORDER — SENNOSIDES 8.6 MG/1
1 TABLET ORAL DAILY PRN
Status: DISCONTINUED | OUTPATIENT
Start: 2025-07-22 | End: 2025-07-28 | Stop reason: HOSPADM

## 2025-07-22 RX ORDER — PANTOPRAZOLE SODIUM 40 MG/1
40 TABLET, DELAYED RELEASE ORAL
Status: DISCONTINUED | OUTPATIENT
Start: 2025-07-22 | End: 2025-07-22 | Stop reason: HOSPADM

## 2025-07-22 RX ORDER — CLONAZEPAM 0.5 MG/1
0.5 TABLET ORAL EVERY 12 HOURS
Status: DISCONTINUED | OUTPATIENT
Start: 2025-07-22 | End: 2025-07-25

## 2025-07-22 RX ORDER — RISPERIDONE 1 MG/1
0.5 TABLET ORAL NIGHTLY
Status: CANCELLED | OUTPATIENT
Start: 2025-07-22

## 2025-07-22 RX ORDER — SUCRALFATE 1 G/1
1 TABLET ORAL EVERY 6 HOURS SCHEDULED
Status: DISCONTINUED | OUTPATIENT
Start: 2025-07-22 | End: 2025-07-22 | Stop reason: HOSPADM

## 2025-07-22 RX ORDER — ACETAMINOPHEN 325 MG/1
650 TABLET ORAL EVERY 4 HOURS PRN
Status: DISCONTINUED | OUTPATIENT
Start: 2025-07-22 | End: 2025-07-28 | Stop reason: HOSPADM

## 2025-07-22 RX ORDER — HALOPERIDOL 5 MG/ML
2.5 INJECTION INTRAMUSCULAR EVERY 4 HOURS PRN
Status: DISCONTINUED | OUTPATIENT
Start: 2025-07-22 | End: 2025-07-28 | Stop reason: HOSPADM

## 2025-07-22 RX ORDER — HYDROXYZINE HYDROCHLORIDE 25 MG/1
25 TABLET, FILM COATED ORAL 3 TIMES DAILY
Status: DISCONTINUED | OUTPATIENT
Start: 2025-07-22 | End: 2025-07-28 | Stop reason: HOSPADM

## 2025-07-22 RX ORDER — ONDANSETRON 4 MG/1
4 TABLET, ORALLY DISINTEGRATING ORAL EVERY 8 HOURS PRN
Status: CANCELLED | OUTPATIENT
Start: 2025-07-22

## 2025-07-22 RX ORDER — CLONAZEPAM 0.5 MG/1
0.5 TABLET ORAL EVERY 12 HOURS
Status: CANCELLED | OUTPATIENT
Start: 2025-07-22

## 2025-07-22 RX ORDER — HALOPERIDOL 5 MG/1
2.5 TABLET ORAL EVERY 4 HOURS PRN
Status: DISCONTINUED | OUTPATIENT
Start: 2025-07-22 | End: 2025-07-28 | Stop reason: HOSPADM

## 2025-07-22 RX ORDER — DIPHENHYDRAMINE HYDROCHLORIDE 50 MG/ML
25 INJECTION, SOLUTION INTRAMUSCULAR; INTRAVENOUS EVERY 4 HOURS PRN
Status: DISCONTINUED | OUTPATIENT
Start: 2025-07-22 | End: 2025-07-28 | Stop reason: HOSPADM

## 2025-07-22 RX ORDER — ONDANSETRON 2 MG/ML
4 INJECTION INTRAMUSCULAR; INTRAVENOUS EVERY 6 HOURS PRN
Status: DISCONTINUED | OUTPATIENT
Start: 2025-07-22 | End: 2025-07-28 | Stop reason: HOSPADM

## 2025-07-22 RX ORDER — SUCRALFATE 1 G/1
1 TABLET ORAL 4 TIMES DAILY
Status: DISCONTINUED | OUTPATIENT
Start: 2025-07-23 | End: 2025-07-24

## 2025-07-22 RX ORDER — HYDROXYZINE HYDROCHLORIDE 25 MG/1
25 TABLET, FILM COATED ORAL 3 TIMES DAILY
Status: CANCELLED | OUTPATIENT
Start: 2025-07-22

## 2025-07-22 RX ORDER — PANTOPRAZOLE SODIUM 40 MG/1
40 TABLET, DELAYED RELEASE ORAL
Status: DISCONTINUED | OUTPATIENT
Start: 2025-07-23 | End: 2025-07-28 | Stop reason: HOSPADM

## 2025-07-22 RX ORDER — MAGNESIUM HYDROXIDE/ALUMINUM HYDROXICE/SIMETHICONE 120; 1200; 1200 MG/30ML; MG/30ML; MG/30ML
30 SUSPENSION ORAL EVERY 6 HOURS PRN
Status: DISCONTINUED | OUTPATIENT
Start: 2025-07-22 | End: 2025-07-28 | Stop reason: HOSPADM

## 2025-07-22 RX ORDER — RISPERIDONE 0.5 MG/1
0.5 TABLET ORAL NIGHTLY
Status: DISCONTINUED | OUTPATIENT
Start: 2025-07-22 | End: 2025-07-25

## 2025-07-22 RX ORDER — DIVALPROEX SODIUM 500 MG/1
500 TABLET, FILM COATED, EXTENDED RELEASE ORAL DAILY
Status: DISCONTINUED | OUTPATIENT
Start: 2025-07-23 | End: 2025-07-28 | Stop reason: HOSPADM

## 2025-07-22 RX ORDER — HYDROXYZINE HYDROCHLORIDE 10 MG/1
10 TABLET, FILM COATED ORAL 3 TIMES DAILY PRN
Status: DISCONTINUED | OUTPATIENT
Start: 2025-07-22 | End: 2025-07-28 | Stop reason: HOSPADM

## 2025-07-22 RX ORDER — ONDANSETRON 4 MG/1
4 TABLET, ORALLY DISINTEGRATING ORAL EVERY 8 HOURS PRN
Status: DISCONTINUED | OUTPATIENT
Start: 2025-07-22 | End: 2025-07-28 | Stop reason: HOSPADM

## 2025-07-22 RX ORDER — DIVALPROEX SODIUM 500 MG/1
500 TABLET, FILM COATED, EXTENDED RELEASE ORAL DAILY
Status: CANCELLED | OUTPATIENT
Start: 2025-07-23

## 2025-07-22 RX ORDER — POLYETHYLENE GLYCOL 3350 17 G/17G
17 POWDER, FOR SOLUTION ORAL DAILY PRN
Status: DISCONTINUED | OUTPATIENT
Start: 2025-07-22 | End: 2025-07-22 | Stop reason: SDUPTHER

## 2025-07-22 RX ORDER — POLYETHYLENE GLYCOL 3350 17 G/17G
17 POWDER, FOR SOLUTION ORAL DAILY PRN
Status: DISCONTINUED | OUTPATIENT
Start: 2025-07-22 | End: 2025-07-28 | Stop reason: HOSPADM

## 2025-07-22 RX ORDER — SUCRALFATE 1 G/1
1 TABLET ORAL EVERY 6 HOURS SCHEDULED
Status: CANCELLED | OUTPATIENT
Start: 2025-07-22 | End: 2025-08-01

## 2025-07-22 RX ORDER — ROSUVASTATIN CALCIUM 10 MG/1
5 TABLET, COATED ORAL DAILY
Status: CANCELLED | OUTPATIENT
Start: 2025-07-23

## 2025-07-22 RX ORDER — ONDANSETRON 2 MG/ML
4 INJECTION INTRAMUSCULAR; INTRAVENOUS EVERY 6 HOURS PRN
Status: CANCELLED | OUTPATIENT
Start: 2025-07-22

## 2025-07-22 RX ORDER — PANTOPRAZOLE SODIUM 40 MG/1
40 TABLET, DELAYED RELEASE ORAL
Status: CANCELLED | OUTPATIENT
Start: 2025-07-22

## 2025-07-22 RX ORDER — ROSUVASTATIN CALCIUM 10 MG/1
5 TABLET, COATED ORAL DAILY
Status: DISCONTINUED | OUTPATIENT
Start: 2025-07-23 | End: 2025-07-28 | Stop reason: HOSPADM

## 2025-07-22 RX ORDER — POLYETHYLENE GLYCOL 3350 17 G/17G
17 POWDER, FOR SOLUTION ORAL DAILY PRN
Status: CANCELLED | OUTPATIENT
Start: 2025-07-22

## 2025-07-22 RX ORDER — ONDANSETRON 4 MG/1
4 TABLET, ORALLY DISINTEGRATING ORAL EVERY 8 HOURS PRN
Status: DISCONTINUED | OUTPATIENT
Start: 2025-07-22 | End: 2025-07-22 | Stop reason: SDUPTHER

## 2025-07-22 RX ADMIN — DIVALPROEX SODIUM 500 MG: 500 TABLET, EXTENDED RELEASE ORAL at 10:05

## 2025-07-22 RX ADMIN — Medication 10 ML: at 10:05

## 2025-07-22 RX ADMIN — POTASSIUM CHLORIDE, DEXTROSE MONOHYDRATE AND SODIUM CHLORIDE: 150; 5; 900 INJECTION, SOLUTION INTRAVENOUS at 07:35

## 2025-07-22 RX ADMIN — SUCRALFATE 1 G: 1 TABLET ORAL at 10:05

## 2025-07-22 RX ADMIN — HYDROXYZINE HYDROCHLORIDE 25 MG: 25 TABLET ORAL at 07:33

## 2025-07-22 RX ADMIN — Medication 3 MG: at 22:00

## 2025-07-22 RX ADMIN — HYDROXYZINE HYDROCHLORIDE 25 MG: 25 TABLET ORAL at 22:00

## 2025-07-22 RX ADMIN — HYDROXYZINE HYDROCHLORIDE 25 MG: 25 TABLET ORAL at 14:47

## 2025-07-22 RX ADMIN — PANTOPRAZOLE SODIUM 40 MG: 40 INJECTION, POWDER, FOR SOLUTION INTRAVENOUS at 10:12

## 2025-07-22 RX ADMIN — CLONAZEPAM 0.5 MG: 0.5 TABLET ORAL at 20:54

## 2025-07-22 RX ADMIN — CLONAZEPAM 0.5 MG: 1 TABLET ORAL at 10:05

## 2025-07-22 RX ADMIN — SUCRALFATE 1 G: 1 TABLET ORAL at 17:36

## 2025-07-22 RX ADMIN — RISPERIDONE 0.5 MG: 1 TABLET, FILM COATED ORAL at 21:30

## 2025-07-22 RX ADMIN — SUCRALFATE 1 G: 1 TABLET ORAL at 14:47

## 2025-07-22 RX ADMIN — ROSUVASTATIN 5 MG: 10 TABLET, FILM COATED ORAL at 10:05

## 2025-07-22 RX ADMIN — SERTRALINE HYDROCHLORIDE 100 MG: 50 TABLET ORAL at 10:05

## 2025-07-22 RX ADMIN — PANTOPRAZOLE SODIUM 40 MG: 40 TABLET, DELAYED RELEASE ORAL at 17:36

## 2025-07-22 ASSESSMENT — SLEEP AND FATIGUE QUESTIONNAIRES
DO YOU HAVE DIFFICULTY SLEEPING: YES
AVERAGE NUMBER OF SLEEP HOURS: 6
DO YOU USE A SLEEP AID: YES
SLEEP PATTERN: DIFFICULTY FALLING ASLEEP

## 2025-07-22 ASSESSMENT — PAIN - FUNCTIONAL ASSESSMENT: PAIN_FUNCTIONAL_ASSESSMENT: NONE - DENIES PAIN

## 2025-07-22 NOTE — BH NOTE
TRANSFER - IN REPORT:    Verbal report received from Emily on Jen Borges  being received from [facility] ED for change in patient condition (new admit to u)      Report consisted of patient's Situation, Background, Assessment and   Recommendations(SBAR).     Information from the following report(s) Nurse Handoff Report, ED SBAR, Intake/Output, MAR, and Recent Results was reviewed with the receiving nurse.    Opportunity for questions and clarification was provided.      Assessment completed upon patient's arrival to unit and care assumed.

## 2025-07-22 NOTE — BH NOTE
[facility]  Admission Note     Admission Type:    [facility] Admission    Reason for admission:   SI attempt    PATIENT STRENGTHS:   Strong support system from partner and son, POA    Patient Strengths and Limitations:   Strength:  support system, poetry, writer  Limitations:  SI, Kleptomaniac       Addictive Behavior:    Pt stated she wanted to end her life because the children in [facility] can not afford a bowl of rice, and she is more fortunate. Pt states her trigger is the monkey chatter she hears every night when she lays down in bed.  Pt endorses SI, denies A/V H, no c/o pain.    Medical Problems:   Past Medical History:   Diagnosis Date    Arthritis     Osteoarthritis    Broken arm 11/17/2021    Excessive sweating     Occurs in summers; TSH/CMP/CBC normal    GERD (gastroesophageal reflux disease)     WITH HAITEL HERNIA    Menopause     LMP-32 years old    MRSA (methicillin resistant Staphylococcus aureus) infection 2007    MRSA + abd abscess; nasal swab negative after treatment    Psychiatric disorder     DEPRESSION.        Status EXAM:  Mental Status and Behavioral Exam  Normal: No  Level of Assistance: Independent/Self  Facial Expression: Sad  Affect: Congruent  Level of Consciousness: Alert  Frequency of Checks: 4 times per hour, close  Mood:Normal: No  Mood: Anxious, Depressed  Motor Activity:Normal: Yes  Eye Contact: Fair  Observed Behavior: Cooperative, Guarded  Sexual Misconduct History: Current - no  Preception: [place] to person, [place] to time, [place] to place, [place] to situation  Attention:Normal: Yes  Thought Processes: Circumstantial  Thought Content:Normal: No  Thought Content: Paranoia  Depression Symptoms: Appetite change, Change in energy level, Feelings of hopelessess, Feelings of helplessness, Feelings of worthlessness, Sleep disturbance  Anxiety Symptoms: Generalized, Panic attack  Chantel Symptoms: No problems reported or observed.  Hallucinations: None  Delusions:

## 2025-07-22 NOTE — BH NOTE
4 Eyes Skin Assessment     NAME:  Jen Borges  YOB: 1945  MEDICAL RECORD NUMBER:  [id number]    The patient is being assessed for  Admission    I agree that at least one RN has performed a thorough Head to Toe Skin Assessment on the patient. ALL assessment sites listed below have been assessed.      Areas assessed by both nurses:    Head, Face, Ears, Shoulders, Back, Chest, Arms, Elbows, Hands, Sacrum. Buttock, Coccyx, Ischium, and Legs. Feet and Heels-pt has marks on the back of her heels from them rubbing against her shoes.        Does the Patient have a Wound? No noted wound(s)       Ronen Prevention initiated by RN: No  Wound Care Orders initiated by RN: No    For hospital-acquired stage 1 & 2 and ALL Stage 3,4, Unstageable, DTI, NWPT, and Complex wounds: place order “IP Wound Care/Ostomy Nurse Eval and Treat” by RN under : NA    New Ostomies, if present place, Ostomy referral order under : No     Nurse 1 eSignature: Electronically signed by Rosa Gould RN on 7/22/25 at 7:33 PM EDT    **SHARE this note so that the co-signing nurse can place an eSignature**    Nurse 2 eSignature: Electronically signed by Griselda MCCORMICK LPN on 7/22/25 at 7:33 PM EDT

## 2025-07-22 NOTE — DISCHARGE SUMMARY
Discharge Summary       PATIENT ID: Jen Borges  MRN: [id number]   YOB: 1945    DATE OF ADMISSION: 7/20/2025  3:44 PM    DATE OF DISCHARGE: 7/22/2025   PRIMARY CARE PROVIDER: Justina Alejandre MD     ATTENDING PHYSICIAN:  Domenica France MD  DISCHARGING PROVIDER: Domenica France MD    To contact this individual call [phone] and ask the  to page.  If unavailable ask to be transferred the Adult Hospitalist Department.    CONSULTATIONS:   IP CONSULT TO TELE-PSYCH (SOCIAL WORK ONLY)  IP CONSULT TO BSMART  IP CONSULT TO GI  IP CONSULT TO PSYCHIATRY  IP CONSULT TO PSYCHIATRY    PROCEDURES/SURGERIES: Procedure(s):  ESOPHAGOGASTRODUODENOSCOPY    ADMITTING DIAGNOSES & HOSPITAL COURSE:     \"Jen Borges is a 80 y.o. female with a pmhx bipolar d/o, anxiety, depression, past suicide attempts, GERD, and arthritis who presents s/p suicide attempt of swallowing all purpose liquid .  She subsequently developed sore throat and dysphagia.     In the ED, VSS.  Labs are grossly unremarkable.        In the ED, GI was consulted, and recommended IV hydration and IV protonix with plan for EGD in the AM.\"    Suicide attempt  Hx bipolar disorder  -Ingested all-purpose liquid   - Continue home Klonopin 0.5 mg Q12, Depakote, hydroxyzine, risperidone and Zoloft  -Continue one-to-one monitoring  -seen by psychiatrist, recommend inpatient psychiatric admission  -Medically stable to transfer to psych unit   Dysphagia likely related to ingestion of corrosive material  -s/p EGD 7/21 distal esophagitis from very caustic and congestion (mucosal injury only) Superficial gastric ulcers likely from caustic ingestion injury  -Diet as advanced to regular diet  -Continue Protonix 40 mg po twice daily for 8 weeks, Carafate 1 g p.o. 4 times daily for 10 days, continue prn iv zofran,   - Continue D5 percent and 0.9% NaCl with kcl at 125 mL/h  -GI on board on board and signed off  Hypokalemia  - Replaced

## 2025-07-22 NOTE — PROGRESS NOTES
1700: Report called to SPENCER Lee on U. Asked all questions nurse had. Patient wants dinner before going up to U.    1800: VSS, IV taken out, off telemetry. Security called for transfer to U, sitter went with pt. Pt's partner is at bedside as well. All belongings sent home with partner.

## 2025-07-22 NOTE — PLAN OF CARE
Problem: Anxiety  Goal: Will report anxiety at manageable levels  Description: INTERVENTIONS:  1. Administer medication as ordered  2. Teach and rehearse alternative coping skills  3. Provide emotional support with 1:1 interaction with staff  Outcome: Progressing  Flowsheets (Taken 7/21/2025 2030)  Will report anxiety at manageable levels:   Administer medication as ordered   Provide emotional support with 1:1 interaction with staff     Problem: Depression/Self Harm  Goal: Effect of psychiatric condition will be minimized and patient will be protected from self harm  Description: INTERVENTIONS:  1. Assess impact of patient's symptoms on level of functioning, self care needs and offer support as indicated  2. Assess patient/family knowledge of depression, impact on illness and need for teaching  3. Provide emotional support, presence and reassurance  4. Assess for possible suicidal thoughts or ideation. If patient expresses suicidal thoughts or statements do not leave alone, initiate Suicide Precautions, move to a room close to the nursing station and obtain sitter  5. Initiate consults as appropriate with Mental Health Professional, Spiritual Care, Psychosocial CNS, and consider a recommendation to the LIP for a Psychiatric Consultation  Outcome: Progressing  Flowsheets (Taken 7/21/2025 2030)  Effect of psychiatric condition will be minimized and patient will be protected from self harm:   Provide emotional support, presence and reassurance   Assess for suicidal thoughts or ideation. If patient expresses suicidal thoughts or statements do not leave alone, initiate Suicide Precautions, move near nurse station, obtain sitter       Problem: Gastrointestinal - Adult  Goal: Minimal or absence of nausea and vomiting  Outcome: Progressing  Flowsheets (Taken 7/21/2025 2030)  Minimal or absence of nausea and vomiting: Administer IV fluids as ordered to ensure adequate hydration  Goal: Maintains or returns to baseline bowel

## 2025-07-22 NOTE — CONSULTS
PSYCHIATRY ATTENDING CONSULTATION NOTE    CHIEF COMPLAINT: \" I want to die.  I want to die now.\"    HISTORY OF PRESENTING COMPLAINT:  Ms. Jen Borges is a 80 WF w/PMH of GERD and past psychiatric history of mood disorder NOS, kleptomania presented after a volitional suicide attempt by swallowing all purpose liquid  1 hour prior to arrival to [facility] emergency department on 07/20/2025 at 1524.    She was admitted to the medical floor for further evaluation and treatment.  She is being followed by GI.    Upon my evaluation patient is awake, alert and oriented x 4.  Her safety one-on-one sitter is by her side.  Patient appears quite flat, but does participate in my evaluation.  She tells me that she is chronically depressed and she is more so when she can no longer drive.  She asked for the sitter to leave the room, and admitted that she struggles with kleptomania.  She says that this has been a lifelong struggle and she has been involved with several lawsuits in the past, but said that she has a good .  Regardless, patient says that she can no longer continue to suffer with depressive symptoms that are not responsive to medication.  She says that she has no salazar in her life, and that dying would be much easier.  She shares that the only thing that keeps alive is not disappointing her son.    Review of patient's symptoms is negative for sarah, or psychosis.  Patient denies having access to a firearm at home.  However she stated that it would be easier if she has a gun because she would end her life, and suffering immediately.        PAST PSYCHIATRIC HISTORY   Patient had patient admission to [facility] from 11/24 to 11/29/2024. That admission was post a sserious suicide attempt. She was followed by Boris Rees NP at [facility] for outpatient psychiatry. Hx of multiple prior psychiatric hospitalizations, most recently at [facility] in 2015.     Reports having tried  TUNG Murphy - NP   risperiDONE (RISPERDAL) 0.5 MG tablet Take 1 tablet by mouth nightly  Patient not taking: Reported on 7/20/2025 11/29/24   Jennifer Martin APRN - NP     Lab Results   Component Value Date    WBC 5.1 07/22/2025    HGB 12.4 07/22/2025    HCT 36.7 07/22/2025    MCV 92.0 07/22/2025     07/22/2025     Lab Results   Component Value Date     07/22/2025    K 4.2 07/22/2025     (H) 07/22/2025    CO2 25 07/22/2025    BUN 3 (L) 07/22/2025    CREATININE 0.63 07/22/2025    GLUCOSE 104 (H) 07/22/2025    CALCIUM 8.3 (L) 07/22/2025    BILITOT 0.4 07/20/2025    ALKPHOS 86 07/20/2025    AST 21 07/20/2025    ALT 27 07/20/2025    LABGLOM >90 07/22/2025    GFRAA 75 12/21/2021    AGRATIO 1.1 11/24/2022    GLOB 2.8 07/20/2025     FAMILY HISTORY:  No past suicide attempts/completions  No Bipolar disorder or Schizophrenia diagnoses in the family  No Substance use in the family    PSYCHOSOCIAL HISTORY:  Patient is a relationship with significant other for 15 years.  Patient states that she has a son.    MENTAL STATUS EXAM:  72-year-old white female with short gray hair who appears much younger than stated age.  She is dressed in seated comfortably in her hospital bed.  She maintains fair eye contact, but appears indifferent.  Speech: Spontaneous, has NL rate, volume and prosody.  Thought Process is Logical, linear, and goal directed.  Mood is reported as \"I want to die\"  Affect is congruent and dysthymic  Recurrent suicidal thoughts, intent, and various plans, including running in front of the tracks beside her home.  Patient is status post suicide attempt by ingestion of all purpose .   No Homicidal thoughts, intents or plans. Denies access to fire-arms  Denies experiencing hallucinations of any type.  Perception is negative for paranoia or delusional thinking  Attention/Concentration are both intact  Recent/Remote memories are intact per answers to my evaluation questions.  Insight is

## 2025-07-22 NOTE — DISCHARGE INSTR - COC
Continuity of Care Form    Patient Name: Jen Borges   :  1945  MRN:  [id number]    Admit date:  2025  Discharge date:  25     Code Status Order: Full Code   Advance Directives:    Date/Time Healthcare Directive Type of Healthcare Directive Copy in Chart Healthcare Agent Appointed Healthcare Agent's Name Healthcare Agent's Phone Number    25 1624 No, patient does not have an advance directive for healthcare treatment  --  --  --  --  --             Admitting Physician:  Zelda Singh MD  PCP: Justina Alejandre MD    Discharging Nurse: Emily Hanson RN   Discharging Hospital Unit/Room#: 417/01  Discharging Unit Phone Number: [phone]    Emergency Contact:   Extended Emergency Contact Information  Primary Emergency Contact: Andrei Bar[facility]   [facility]  Home Phone: [phone]  Relation: Other  Secondary Emergency Contact: Juli Marrero  Relation: Brother/Sister    Past Surgical History:  Past Surgical History:   Procedure Laterality Date    BREAST SURGERY Bilateral     AUGMENTATION     SECTION      COLONOSCOPY      HYSTERECTOMY (CERVIX STATUS UNKNOWN)      Age 32 for endometriosis    IMPLANT BREAST SILICONE/EQ Bilateral  and     1st surgery , replaced bilaterally in .    UPPER GASTROINTESTINAL ENDOSCOPY  05/10/2018    Dr. Bateman; gastritis and esophagitis    UPPER GASTROINTESTINAL ENDOSCOPY N/A 2025    ESOPHAGOGASTRODUODENOSCOPY performed by Noah Mc MD at [facility] ENDOSCOPY       Immunization History:   Immunization History   Administered Date(s) Administered    COVID-19, MODERNA BLUE border, Primary or Immunocompromised, (age 12y+), IM, 100 mcg/0.5mL 2021, 2021    COVID-19, PFIZER PURPLE top, DILUTE for use, (age 12 y+), 30mcg/0.3mL 2021    Influenza, FLUAD, (age 65 y+), IM, Quadv, 0.5mL 2021, 2024    Influenza, FLUAD, (age 65 y+), IM, Trivalent PF, 0.5mL 2024    Influenza, FLUZONE  High Dose, (age 65 y+), IM, Trivalent PF, 0.5mL 08/01/2019, 10/12/2020    Pneumococcal, PCV-13, PREVNAR 13, (age 6w+), IM, 0.5mL 02/17/2017, 08/09/2019    Pneumococcal, PPSV23, PNEUMOVAX 23, (age 2y+), SC/IM, 0.5mL 02/09/2016    TDaP, ADACEL (age 10y-64y), BOOSTRIX (age 10y+), IM, 0.5mL 07/22/2015, 08/09/2019    Zoster Recombinant (Shingrix) 03/15/2022, 06/23/2022       Active Problems:  Patient Active Problem List   Diagnosis Code    Gastroesophageal reflux disease without esophagitis K21.9    Right elbow pain M25.521    Abdominal pain R10.9    Anxiety disorder F41.9    Age-related osteoporosis without current pathological fracture M81.0    Prediabetes R73.03    Vitamin D deficiency E55.9    Hypercholesterolemia E78.00    Primary osteoarthritis of both hands M19.041, M19.042    Bipolar disorder ([facility]) F31.9    Urge urinary incontinence N39.41    Closed nondisplaced fracture of head of radius with routine healing S52.126D    Tinnitus H93.19    Memory impairment R41.3    Suicidal behavior without attempted self-injury R45.89    Obsessive-compulsive disorder F42.9    Suicide attempt ([facility]) T14.91XA    Ingestion of caustic substance T54.91XA       Isolation/Infection:   Isolation            No Isolation          Patient Infection Status    No active infections.   Resolved       Infection Onset Added Last Indicated Last Indicated By Resolved Resolved By    COVID-19 06/03/22 06/03/22 06/03/22 Conversion, Epic 06/17/22 Conversion, Epic                    Nurse Assessment:  Last Vital Signs: /70   Pulse 91   Temp 97.9 °F (36.6 °C) (Oral)   Resp 19   Ht 1.524 m (5')   Wt 52.7 kg (116 lb 2.9 oz)   SpO2 95%   BMI 22.69 kg/m²     Last documented pain score (0-10 scale): Pain Level: 0  Last Weight:   Wt Readings from Last 1 Encounters:   07/22/25 52.7 kg (116 lb 2.9 oz)     Mental Status:  oriented, alert, and SI    IV Access:  - None    Nursing Mobility/ADLs:  Walking   Assisted  Transfer  Independent  Bathing

## 2025-07-22 NOTE — PROGRESS NOTES
[facility]   [address] [address]       GI PROGRESS NOTE  Caprice Thomas PA-C  [phone] office  NP/PA in-hospital M-F until 4:30PM  After 5PM or on weekends, please call  for physician on call      NAME: Jen Borges   :  1945   MRN:  [id number]       Subjective:     Patient resting comfortably in bed. She denies any further dysphagia or odynophagia. No abdominal pain, nausea or vomiting. Tolerating clear liquids.     Objective:     VITALS:   Last 24hrs VS reviewed since prior progress note. Most recent are:  Vitals:    25 0625   BP: 127/69   Pulse: 78   Resp: 19   Temp: 97.8 °F (36.6 °C)   SpO2: 94%       PHYSICAL EXAM:  General: Cooperative, no acute distress    Neurologic:  Alert and oriented X 3.  HEENT: EOMI, no scleral icterus   Lungs:  CTA bilaterally. No wheezing  Heart:  S1 S2, regular rhythm  Abdomen: Soft, non-distended, no tenderness. +Bowel sounds  Extremities: No edema  Psych:   Good insight. Not anxious or agitated.    Lab Data Reviewed:     Recent Results (from the past 24 hours)   Basic Metabolic Panel    Collection Time: 25  7:45 AM   Result Value Ref Range    Sodium 144 136 - 145 mmol/L    Potassium 4.2 3.5 - 5.1 mmol/L    Chloride 115 (H) 97 - 108 mmol/L    CO2 25 21 - 32 mmol/L    Anion Gap 4 2 - 12 mmol/L    Glucose 104 (H) 65 - 100 mg/dL    BUN 3 (L) 6 - 20 MG/DL    Creatinine 0.63 0.55 - 1.02 MG/DL    BUN/Creatinine Ratio 5 (L) 12 - 20      Est, Glom Filt Rate >90 >60 ml/min/1.73m2    Calcium 8.3 (L) 8.5 - 10.1 MG/DL            Assessment:     Dysphagia/odynophagia: Resolved. S/p EGD  showing grade C esophagitis noted in the lower third without evidence of necrosis or bleeding. Gastritis with superficial and linear ulcerations in stomach. Normal duodenum.   History of recent caustic ingestion   Hx bipolar disorder      Patient Active Problem List   Diagnosis    Gastroesophageal reflux disease without esophagitis

## 2025-07-22 NOTE — BSMART NOTE
Initial BSMART Liaison Assessment Form     Section I - Integrated Summary    LOS:  2     Reason for Consult is:  suicide attempt; bipolar.  Follow up for suicidal attempt, for possible inpatient [facility] admission     Presenting problem/Summary:  Pt presented to the ED after suicide attempt via ingestion of ammonia .  Pt was last admitted to [facility] on 11/24/24 for suicide attempt after taking \"20-22 Fluoxetine 20 mg tablets and 20-22 Rosuvastatin 5 mg tablets\".  According to the BSMART note from 11/24/24, \"Pt also reports thoughts of wanting to shoot herself using her partner's firearm stating, \"He had a gun and I wanted to use the gun... to just go like this\" and motions shooting herself in the head\".     Liaison met with the pt, dante, on the medical floor with 1:1 sitter at bedside.  She is alert, oriented to self, month/year, situation, and hospital setting.  Her thoughts are loose, grandiose, paranoid, and delusional, flat affect, intense eye contact, and pressured, monotone and robotic speech.  Pt endorses SI and describes multiple ways, throughout the session, of how she can complete it, such as rat poison, medication, or a gun.   Pt denies HI/AVH.  Pt denies hearing voices and instead says she hears her thoughts and they are rapid - confirming they are \"her thoughts\".  Pt reports she is feeling \"sad, frightened, suicidal, and scared\".   Pt reports 2 years ago she woke up one day and decided she didn't want to live anymore and \"took some pills\".  After much probing, pt identifies that she gets \"overwhelmed\" by her thoughts.  She requests privacy from the 1:1 sitter, leans in to speak privately to this writer,  and states \"I am a certified kleptomaniac.  I can't stop.  I've been to care home but I get off every time because I'm a white woman and dress the way I do\".  Pt reports her friend Tata told her the police are getting very good at catching people and indicates that is why she feels scared and

## 2025-07-22 NOTE — PROGRESS NOTES
[facility]                                                                                          Hospitalist Progress Note  Domenica France MD  Answering service: [phone] OR [phone] from in house phone        Date of Service:  2025  NAME:  Jen Borges  :  1945  MRN:  [id number]       Admission Summary:     \"Jen Borges is a 80 y.o. female with a pmhx bipolar d/o, anxiety, depression, past suicide attempts, GERD, and arthritis who presents s/p suicide attempt of swallowing all purpose liquid .  She subsequently developed sore throat and dysphagia.     In the ED, VSS.  Labs are grossly unremarkable.        In the ED, GI was consulted, and recommended IV hydration and IV protonix with plan for EGD in the AM.\"       Interval history / Subjective:     Patient seen and examined at the bedside.  She states that she feels the same.  She denied depression but she has still suicidal ideation.  His friend was in the room.   Sitter in the room for one-to-one observation       Assessment & Plan:     Suicide attempt  Hx bipolar disorder  -Ingested all-purpose liquid   - Continue home Klonopin 0.5 mg Q12, Depakote, hydroxyzine, risperidone and Zoloft  -Continue one-to-one monitoring  -seen by psychiatrist, recommend inpatient psychiatric admission  -Medically stable to transfer to psych unit    Dysphagia likely related to ingestion of corrosive material  -s/p EGD  distal esophagitis from very caustic and congestion (mucosal injury only) Superficial gastric ulcers likely from caustic ingestion injury  -Diet as advanced to regular diet  -Continue Protonix 40 mg po twice daily for 8 weeks, Carafate 1 g p.o. 4 times daily for 10 days, continue prn iv zofran,   - Continue D5 percent and 0.9% NaCl with kcl at 125 mL/h  -GI on board on board and signed off    Hypokalemia  - Replaced and improved    Dyslipidemia  -Continue on home Crestor

## 2025-07-22 NOTE — PLAN OF CARE
Problem: Anxiety  Goal: Will report anxiety at manageable levels  Description: INTERVENTIONS:  1. Administer medication as ordered  2. Teach and rehearse alternative coping skills  3. Provide emotional support with 1:1 interaction with staff  Outcome: Progressing     Problem: Depression/Self Harm  Goal: Effect of psychiatric condition will be minimized and patient will be protected from self harm  Description: INTERVENTIONS:  1. Assess impact of patient's symptoms on level of functioning, self care needs and offer support as indicated  2. Assess patient/family knowledge of depression, impact on illness and need for teaching  3. Provide emotional support, presence and reassurance  4. Assess for possible suicidal thoughts or ideation. If patient expresses suicidal thoughts or statements do not leave alone, initiate Suicide Precautions, move to a room close to the nursing station and obtain sitter  5. Initiate consults as appropriate with Mental Health Professional, Spiritual Care, Psychosocial CNS, and consider a recommendation to the LIP for a Psychiatric Consultation  Outcome: Progressing     Problem: Gastrointestinal - Adult  Goal: Minimal or absence of nausea and vomiting  Outcome: Progressing  Goal: Maintains or returns to baseline bowel function  Outcome: Progressing  Goal: Maintains adequate nutritional intake  Outcome: Progressing  Goal: Establish and maintain optimal ostomy function  Outcome: Progressing     Problem: Safety - Adult  Goal: Free from fall injury  Outcome: Progressing  Flowsheets (Taken 7/22/2025 0800)  Free From Fall Injury: Instruct family/caregiver on patient safety     Problem: Discharge Planning  Goal: Discharge to home or other facility with appropriate resources  Outcome: Progressing  Flowsheets (Taken 7/22/2025 0800)  Discharge to home or other facility with appropriate resources: Identify barriers to discharge with patient and caregiver     Problem: Pain  Goal: Verbalizes/displays

## 2025-07-23 LAB
ALBUMIN SERPL-MCNC: 3.1 G/DL (ref 3.5–5)
ALBUMIN/GLOB SERPL: 1.2 (ref 1.1–2.2)
ALP SERPL-CCNC: 65 U/L (ref 45–117)
ALT SERPL-CCNC: 21 U/L (ref 12–78)
ANION GAP SERPL CALC-SCNC: 6 MMOL/L (ref 2–12)
AST SERPL-CCNC: 16 U/L (ref 15–37)
BILIRUB SERPL-MCNC: 0.3 MG/DL (ref 0.2–1)
BUN SERPL-MCNC: 4 MG/DL (ref 6–20)
BUN/CREAT SERPL: 6 (ref 12–20)
CALCIUM SERPL-MCNC: 8.4 MG/DL (ref 8.5–10.1)
CHLORIDE SERPL-SCNC: 116 MMOL/L (ref 97–108)
CO2 SERPL-SCNC: 24 MMOL/L (ref 21–32)
CREAT SERPL-MCNC: 0.66 MG/DL (ref 0.55–1.02)
EKG ATRIAL RATE: 80 BPM
EKG DIAGNOSIS: NORMAL
EKG P AXIS: 62 DEGREES
EKG P-R INTERVAL: 116 MS
EKG Q-T INTERVAL: 356 MS
EKG QRS DURATION: 70 MS
EKG QTC CALCULATION (BAZETT): 410 MS
EKG R AXIS: 24 DEGREES
EKG T AXIS: 61 DEGREES
EKG VENTRICULAR RATE: 80 BPM
GLOBULIN SER CALC-MCNC: 2.5 G/DL (ref 2–4)
GLUCOSE SERPL-MCNC: 104 MG/DL (ref 65–100)
POTASSIUM SERPL-SCNC: 4.3 MMOL/L (ref 3.5–5.1)
PROT SERPL-MCNC: 5.6 G/DL (ref 6.4–8.2)
SODIUM SERPL-SCNC: 146 MMOL/L (ref 136–145)

## 2025-07-23 PROCEDURE — 93005 ELECTROCARDIOGRAM TRACING: CPT | Performed by: PSYCHIATRY & NEUROLOGY

## 2025-07-23 PROCEDURE — 6370000000 HC RX 637 (ALT 250 FOR IP): Performed by: HOSPITALIST

## 2025-07-23 PROCEDURE — 6370000000 HC RX 637 (ALT 250 FOR IP): Performed by: PSYCHIATRY & NEUROLOGY

## 2025-07-23 PROCEDURE — 1240000000 HC EMOTIONAL WELLNESS R&B

## 2025-07-23 PROCEDURE — 93010 ELECTROCARDIOGRAM REPORT: CPT | Performed by: SPECIALIST

## 2025-07-23 RX ORDER — RISPERIDONE 1 MG/1
1 TABLET ORAL
Status: ON HOLD | COMMUNITY
End: 2025-07-28 | Stop reason: HOSPADM

## 2025-07-23 RX ORDER — HYDROXYZINE HYDROCHLORIDE 25 MG/1
25 TABLET, FILM COATED ORAL 3 TIMES DAILY PRN
Status: ON HOLD | COMMUNITY
End: 2025-07-28 | Stop reason: HOSPADM

## 2025-07-23 RX ORDER — MIRTAZAPINE 15 MG/1
15 TABLET, FILM COATED ORAL NIGHTLY
Status: DISCONTINUED | OUTPATIENT
Start: 2025-07-23 | End: 2025-07-28 | Stop reason: HOSPADM

## 2025-07-23 RX ORDER — MIRTAZAPINE 30 MG/1
30 TABLET, FILM COATED ORAL NIGHTLY
Status: ON HOLD | COMMUNITY
End: 2025-07-28 | Stop reason: HOSPADM

## 2025-07-23 RX ADMIN — HYDROXYZINE HYDROCHLORIDE 25 MG: 25 TABLET ORAL at 21:13

## 2025-07-23 RX ADMIN — HYDROXYZINE HYDROCHLORIDE 25 MG: 25 TABLET ORAL at 08:07

## 2025-07-23 RX ADMIN — RISPERIDONE 0.5 MG: 1 TABLET, FILM COATED ORAL at 21:14

## 2025-07-23 RX ADMIN — DIVALPROEX SODIUM 500 MG: 500 TABLET, EXTENDED RELEASE ORAL at 08:06

## 2025-07-23 RX ADMIN — MIRTAZAPINE 15 MG: 15 TABLET, FILM COATED ORAL at 21:14

## 2025-07-23 RX ADMIN — Medication 3 MG: at 21:13

## 2025-07-23 RX ADMIN — SUCRALFATE 1 G: 1 TABLET ORAL at 21:13

## 2025-07-23 RX ADMIN — CLONAZEPAM 0.5 MG: 0.5 TABLET ORAL at 08:07

## 2025-07-23 RX ADMIN — CLONAZEPAM 0.5 MG: 0.5 TABLET ORAL at 21:14

## 2025-07-23 RX ADMIN — PANTOPRAZOLE SODIUM 40 MG: 40 TABLET, DELAYED RELEASE ORAL at 06:15

## 2025-07-23 RX ADMIN — SERTRALINE HYDROCHLORIDE 100 MG: 50 TABLET ORAL at 08:07

## 2025-07-23 RX ADMIN — HYDROXYZINE HYDROCHLORIDE 10 MG: 10 TABLET, FILM COATED ORAL at 06:17

## 2025-07-23 RX ADMIN — HYDROXYZINE HYDROCHLORIDE 25 MG: 25 TABLET ORAL at 15:03

## 2025-07-23 RX ADMIN — SUCRALFATE 1 G: 1 TABLET ORAL at 12:21

## 2025-07-23 RX ADMIN — SUCRALFATE 1 G: 1 TABLET ORAL at 17:10

## 2025-07-23 RX ADMIN — PANTOPRAZOLE SODIUM 40 MG: 40 TABLET, DELAYED RELEASE ORAL at 15:02

## 2025-07-23 RX ADMIN — SUCRALFATE 1 G: 1 TABLET ORAL at 08:06

## 2025-07-23 RX ADMIN — ROSUVASTATIN 5 MG: 10 TABLET, FILM COATED ORAL at 08:06

## 2025-07-23 ASSESSMENT — PAIN SCALES - GENERAL: PAINLEVEL_OUTOF10: 0

## 2025-07-23 NOTE — CARE COORDINATION
07/23/25 1549   Suicidal Ideation   Wish to be Dead Lifetime - Yes;Past 1 month - Yes   Non-Specific Active Suicidal Thoughts Lifetime - Yes;Past 1 month - Yes   Suicidal Behavior Trigger Wish to be Dead   Active Suicidal Ideation with Any Methods (Not Plan) without Intent to Act Lifetime - Yes;Past 1 month - Yes   Active Suicidal Ideation with Some Intent to Act, without Specific Plan Lifetime - Yes;Past 1 month - Yes   Active Suicidal Ideation with Specific Plan and Intent Lifetime - Yes;Past 1 month - Yes   Intensity of Ideation   Lifetime - Most Severe Ideation 5 - most severe   Lifetime - Most Recent Ideation 4   Suicidal Behavior   Actual Attempt Lifetime - Yes;Past 3 months - Yes   Actual Lethality/Medical Damage 4   Potential Lethality 2   Most Recent Attempt Date 07/21/25

## 2025-07-23 NOTE — PLAN OF CARE
Problem: Safety - Adult  Goal: Free from fall injury  7/23/2025 1545 by Tata Santiago, RN  Outcome: Progressing     Problem: Anxiety  Goal: Will report anxiety at manageable levels  Description: INTERVENTIONS:  1. Administer medication as ordered  2. Teach and rehearse alternative coping skills  3. Provide emotional support with 1:1 interaction with staff  7/23/2025 1545 by Tata Santiago, RN  Outcome: Progressing  Flowsheets (Taken 7/23/2025 1540)  Will report anxiety at manageable levels:   Administer medication as ordered   Provide emotional support with 1:1 interaction with staff   Teach and rehearse alternative coping skills    Patient visible on unit. Will engage with peers, quiet demeanor. Flat, dull affect, though appears brightened at times. Medication and meal compliant. Patient asks frequently how long she will be on her meds for her stomach. Cooperative overall, pleasant, fair eye contact.

## 2025-07-23 NOTE — BH NOTE
PSYCHOSOCIAL ASSESSMENT  :Patient identifying info:   Jen Borges is a 80 y.o., female admitted 7/22/2025  6:59 PM     Presenting problem and precipitating factors: She was admitted to the medical unit after a suicide attempt - she drank ammonia   - medically cleared and transferred to psych last night     Mental status assessment:  alert -oriented x's 3 - denies AH or VH , depressed , insight and judgement are impaired     Strengths/Recreation/Coping Skills:established outpatient treatment - safe housing     Collateral information: Andrei Barrera  Other  Emergency Contact  [phone]        Current psychiatric /substance abuse providers and contact info: [facility]     Previous psychiatric/substance abuse providers and response to treatment: She has a history of outpatient and inpatient treatment     Family history of mental illness or substance abuse: denies     Substance abuse history:  denies   Social History     Tobacco Use    Smoking status: Never    Smokeless tobacco: Never   Substance Use Topics    Alcohol use: No       History of biomedical complications associated with substance abuse: denies     Patient's current acceptance of treatment or motivation for change: she is a voluntary admission     Family constellation: son - sister - partner   Is significant other involved?     Describe support system:     Describe living arrangements and home environment: she lives at home with her partner     GUARDIAN/POA: no    Guardian Name:     Guardian Contact:     Health issues:   Past Medical History:   Diagnosis Date    Arthritis     Osteoarthritis    Broken arm 11/17/2021    Excessive sweating     Occurs in summers; TSH/CMP/CBC normal    GERD (gastroesophageal reflux disease)     WITH HAITEL HERNIA    Menopause     LMP-32 years old    MRSA (methicillin resistant Staphylococcus aureus) infection 2007    MRSA + abd abscess; nasal swab negative after treatment    Psychiatric disorder     DEPRESSION.         Trauma

## 2025-07-23 NOTE — PROGRESS NOTES
EXAM note    History    Natasha Hanson is a 44 year old female who presents with recurrent sinus infection beginning on Thursday last week, with increased pain and pressure over her frontal and left maxillary sinus over the last 3 days.  Low-grade fevers to 100.3, she is now having dental pain left upper dentition.  Minimal sore throat related to drainage, no wheezing coughing or shortness of breath.  She was on doxycycline early in October for a staph infection related to her recent hip replacement.  No other headache, ear pain skin rash or body achiness.  She does have a history of rheumatoid arthritis but is not taking any immune suppressant/biologics this time.      medical history    Past Medical History:   Diagnosis Date   • Anxiety    • Arthritis      Past Medical History:   Diagnosis Date   • Anxiety    • Arthritis       SURGICAL history    Past Surgical History:   Procedure Laterality Date   • Removal adenoids,primary,<13 y/o Bilateral        social history    Social History     Tobacco Use   • Smoking status: Former Smoker     Packs/day: 0.00     Last attempt to quit: 2019     Years since quittin.6   • Smokeless tobacco: Never Used   Substance Use Topics   • Alcohol use: Yes     Comment: < 6 drinks per week   • Drug use: No       family history    Family History   Problem Relation Age of Onset   • Cancer Maternal Grandmother         stomach   • Cancer Paternal Grandmother         blood cancer       mEDICATIONS    Current Outpatient Medications   Medication Sig   • ALPRAZolam (XANAX) 0.25 MG tablet TAKE ONE TABLET BY MOUTH DAILY AS NEEDED FOR SLEEP OR ANXIETY   • meloxicam (MOBIC) 15 MG tablet    • oxyCODONE, IMM REL, (ROXICODONE) 5 MG immediate release tablet Take 5-10 mg by mouth.   • escitalopram (LEXAPRO) 10 MG tablet TAKE ONE AND ONE-HALF TABLETS BY MOUTH DAILY   • buPROPion (WELLBUTRIN XL) 150 MG 24 hr tablet Take 1 tablet by mouth daily.   • baclofen (LIORESAL) 10 MG tablet One tab PO  Laboratory Monitoring for Antipsychotics:    This patient is currently prescribed the following medication(s):   Current Facility-Administered Medications: mirtazapine (REMERON) tablet 15 mg, 15 mg, Oral, Nightly  clonazePAM (KLONOPIN) tablet 0.5 mg, 0.5 mg, Oral, Q12H  divalproex (DEPAKOTE ER) extended release tablet 500 mg, 500 mg, Oral, Daily  hydrOXYzine HCl (ATARAX) tablet 25 mg, 25 mg, Oral, TID  ondansetron (ZOFRAN-ODT) disintegrating tablet 4 mg, 4 mg, Oral, Q8H PRN **OR** ondansetron (ZOFRAN) injection 4 mg, 4 mg, IntraVENous, Q6H PRN  pantoprazole (PROTONIX) tablet 40 mg, 40 mg, Oral, BID AC  polyethylene glycol (GLYCOLAX) packet 17 g, 17 g, Oral, Daily PRN  risperiDONE (RISPERDAL) tablet 0.5 mg, 0.5 mg, Oral, Nightly  rosuvastatin (CRESTOR) tablet 5 mg, 5 mg, Oral, Daily  sertraline (ZOLOFT) tablet 100 mg, 100 mg, Oral, Daily  sucralfate (CARAFATE) tablet 1 g, 1 g, Oral, 4x Daily  acetaminophen (TYLENOL) tablet 650 mg, 650 mg, Oral, Q4H PRN  senna (SENOKOT) tablet 8.6 mg, 1 tablet, Oral, Daily PRN  aluminum & magnesium hydroxide-simethicone (MAALOX PLUS) 200-200-20 MG/5ML suspension 30 mL, 30 mL, Oral, Q6H PRN  hydrOXYzine HCl (ATARAX) tablet 10 mg, 10 mg, Oral, TID PRN  haloperidol (HALDOL) tablet 2.5 mg, 2.5 mg, Oral, Q4H PRN **OR** haloperidol lactate (HALDOL) injection 2.5 mg, 2.5 mg, IntraMUSCular, Q4H PRN  diphenhydrAMINE (BENADRYL) injection 25 mg, 25 mg, IntraMUSCular, Q4H PRN  melatonin tablet 3 mg, 3 mg, Oral, Nightly    The following labs have been completed for monitoring of antipsychotics and/or mood stabilizers:    Height, Weight, BMI Estimation  Estimated body mass index is 22.69 kg/m² as calculated from the following:    Height as of this encounter: 1.524 m (5').    Weight as of this encounter: 52.7 kg (116 lb 2.9 oz).     Vital Signs/Blood Pressure  /67   Pulse 81   Temp 97.9 °F (36.6 °C) (Oral)   Resp 18   Ht 1.524 m (5')   Wt 52.7 kg (116 lb 2.9 oz)   SpO2 96%   BMI 22.69  tid PRN for muscle spasm   • acetaminophen (TYLENOL) 500 MG tablet Take 500 mg by mouth every 4 hours as needed for Pain (arthritis and left hip).   • Multiple Vitamin (MULTI-DAY VITAMINS PO) Take 1 tablet by mouth daily.   • traMADol (ULTRAM) 50 MG tablet    • ALPRAZolam (XANAX) 0.5 MG tablet As needed for panic attacks   • escitalopram (LEXAPRO) 10 MG tablet TAKE 1 TABLET BY MOUTH EVERY DAY   • doxycycline hyclate (VIBRAMYCIN) 100 MG capsule Take 1 capsule by mouth 2 times daily.   • gabapentin (NEURONTIN) 400 MG capsule TAKE ONE TO TWO CAPSULES BY MOUTH THREE TIMES A DAY AS NEEDED FOR PAIN   • escitalopram (LEXAPRO) 10 MG tablet Take 1.5 tablets by mouth daily.   • albuterol 108 (90 Base) MCG/ACT inhaler Two puffs every four hours prn cough or wheezing.   • Spacer/Aero-Holding Chambers (AEROCHAMBER) Use with albuterol inhaler   • naproxen (NAPROSYN) 500 MG tablet Take 1 tablet by mouth 2 times daily.   • IBUPROFEN PO Take 3 tablets by mouth every 6 hours as needed.     No current facility-administered medications for this visit.        aLLERGIES    ALLERGIES:  No Known Allergies    Review of systems    See HPI           Physical Exam    Vital Signs:    Vitals:    10/24/19 0915   BP: 108/74   Pulse: 92   Resp: 18   Temp: 97.8 °F (36.6 °C)   TempSrc: Tympanic   SpO2: 99%   Weight: 67 kg     Constitutional: Appears well  Integument:  Warm. Dry. No erythema. No rash.    HENT:  Normocephalic. Atraumatic. Bilateral external ears normal. Nose normal.  Left maxillary sinus percussion tenderness.  Pharynx: no erythema, masses, trismus or exudates.  Neck: Normal range of motion. No tenderness. Supple.    Eyes:  PERRL, EOMI. Conjunctivae normal. No discharge.    Cardiovascular:  Normal heart rate. Normal rhythm. No murmurs.    Respiratory:  Normal breath sounds.  GI:  Bowel sounds normal. Soft. No tenderness.       Assessment   Diagnosis recurrence sinusitis,/maxillary sinusitis given her RA, fevers and dental pain, will  began Augmentin 875 b.i.d., follow-up in plan as noted below.  Natasha appears very well she is stable for discharge all questions answered.    Plan     Tylenol (acetaminophen) 2 extra-strength tablets or 1000 mg every 6 hours as needed for discomfort or fever.  Do not take this or other medications containing Tylenol such as Percocet within 6 hours of each other.    Afrin or duration nasal spray - 1 spray in each nostril twice daily for 2 days only.    Mucinex (guaifenesin) as directed on the package to thin secretions.    Oceans (saline) nasal spray, one spray in each nostril hourly for congestion.    Put a cool mist vaporizer in your bedroom, and push fluids.     Elevate the head of your bed, or use multiple pillows to help with sinus drainage.    Augmentin 875 take 1 tablet twice daily for 10 days.     Antibiotics can reduce the effectiveness of the birth control pill, so use condoms   (or other barrier forms of contraception) while you are on the antibiotic and for 10 days after you have finished the antibiotic.    Benefits and risks of antibiotic discussed including nausea, vomiting, abdominal pain, diarrhea, yeast infection, and allergic reactions. Patient should stop medication if develops a rash. Should take probiotic (such as acidophilus, Floragen, Florastor, or lactobacillus) for 2 weeks.  Overuse/recurring antibiotics may result in antibiotic resistance (when bacteria don't respond to antibiotics).    Go to the Emergency Department right away for any of the warning symptoms listed above, or for difficulty breathing or swallowing, shortness of breath,   pain with mouth opening, or if any current symptoms worsen, or new symptoms develop.    Follow up with Dr. Traore in the next 4-5 days if any symptoms remain.         Natasha was seen today for sinus problem.    Diagnoses and all orders for this visit:    Acute recurrent maxillary sinusitis  -     amoxicillin-clavulanate (AUGMENTIN) 875-125 MG per tablet; Take  1 tablet by mouth 2 times daily for 10 days.

## 2025-07-23 NOTE — PROGRESS NOTES
Admission Medication Reconciliation:    Information obtained from:  patient interview, Insurance claims data, review of EMR, and VAPMP, partner Andrei Barrera  RxQuery data available¹:  YES    Comments/Recommendations: Updated PTA meds/reviewed patient's allergies.    1)  Patient reports that her partner manages her medications (signed CALVIN). Spoke with Andrei Barrera. He is a good historian. She was previously hospitalized at [facility] 11/24/24-11/29/24 and discharged on mirtazapine 30 mg QHS and risperidone 0.5 mg QHS. She had been managed on those medications (and sertraline) since then. In June, her former provider increased the mirtazapine to 45 mg QHS. Since then, she has started with a new provider (Dr. Vargas). Risperidone and mirtazapine were to be stopped/tapered due to \"physical tremors.\" Partner states risperidone was stopped in early July and he had gone back down to mirtazapine 30 mg QHS. Other changes as of 7/15/25 include addition of divalproex  mg QHS (partner reports splitting tablet and taking as two pieces) and clonazepam 0.5 mg QHS.     Patient takes rosuvastatin \"sometimes\" but partner reports recent lipid panel looked better.     2)  The [place] Prescription Monitoring Program () was assessed to determine fill history of any controlled medications. The patient has filled the following controlled medications in the last  2 years.  - 7/15/25: clonazepam 0.5 mg, #30 for 30 day supply  - 5/22/24: lorazepam 0.5 mg, #10 for 5 day supply    3)  Medication changes (since last review):  Adjusted  - clonazepam 0.5 mg QHS PRN (from BID PRN)  - hydroxyzine 25 mg TID PRN (from 10 mg TID) - takes BID everyday  - risperidone 1 mg QHS (from 0.5 mg) - patient stopped earlier this month   ¹RxQuery pharmacy benefit data reflects medications filled and processed through the patient's insurance, however this data does NOT capture whether the medication was picked up or is currently being taken by the

## 2025-07-23 NOTE — INTERDISCIPLINARY ROUNDS
Behavioral Health Interdisciplinary Rounds     Patient Name: Jen Borges  Age: 80 y.o.  Room/Bed:  [facility]/  Primary Diagnosis: Depression   Admission Status: Voluntary    Readmission within 30 days: No  Power of  in place: No  Patient requires a blocked bed: No          Reason for blocked bed:   Sleep hours: 7       Flu Vaccine:   Participation in Care/Groups:  Yes  Medication Compliant?: Yes  PRNS (last 24 hours): Antianxiety   Restraints (last 24 hours):  No  __________________________________________________  OQ Admission Analysis Survey completed:  OQ Admission Analysis Survey score:  __________________________________________________     Alcohol screening (AUDIT) completed -     If applicable, date SBIRT discussed in treatment team AND documented:    Tobacco - patient is a smoker:    Illegal Drugs use:      24 hour chart check complete: Yes    _______________________________________________    Patient goal(s) for today:   Treatment team focus/goals:   Progress note:      Spiritual Care Consult:   Financial concerns/prescription coverage:    Family contact:                        Family requesting physician contact today:    Discharge plan:   Access to weapons :                                                              Outpatient provider(s):   Patient's preferred phone number for follow up call :   Patient's preferred e-mail address :    LOS:  1  Expected LOS:     Participating treatment team members: Jen LUZMA Katerina, * (assigned SW),

## 2025-07-23 NOTE — H&P
PSYCHIATRY EVALUATION NOTE    CHIEF COMPLAINT: \"I will serve no purpose by taking my life.\"    HISTORY OF PRESENTING COMPLAINT:  Jen Borges is a 80 y.o. White (non-) female who is currently admitted to the acute side of 7th floor behavioral health Unit at [facility].     Jen says that she struggles with kleptomania.  Patient reports that prior to admission she was at her 'leidy's end.' She says that she wanted to stop stealing. She wants to be a responsible person. Prior to her ingesting cleaning agents she was caught at [employer] and became quite embarrassed. She is overwhelmed as a friend of hers is moving with her. She was trying to clean up her own things, but realized she has way too much. She presented to hospital on 7/20 after this volitional ingestion of her . She presented to hospital and was admitted to medicine for evaluation and treatment by GI. I did see patient on consult service and recommended this inpatient psychiatric admission as she was still adamant that she wanted to die.    Patient reflects on her life and says that she has a fantastic one, except for 'the stealing.' She says that she doesn't know if she can stop it. She reports that she had been to penitentiary but because she is 'white and speak intelligently,' and can afford expensive  that she can get out of trouble easily. She prides herself of raising a successful son. Today she is remorseful and says that she needs to find a way to get better. Her mood is down, but has no suicidal intent or plan today.    No AH. No sarah  No fire-arms at home    PAST PSYCHIATRIC HISTORY   1 past inpatient psychiatric admissions, last being 11/24 to 11/29/2024.   1 suicide attempts, last being in11/24.    SUBSTANCE USE HISTORY:  Denies use of substance    PAST MEDICAL HISTORY:    Please see H&P for details.     Past Medical History:   Diagnosis Date    Arthritis     Osteoarthritis    Broken arm 11/17/2021    Excessive

## 2025-07-23 NOTE — PLAN OF CARE
Problem: Safety - Adult  Goal: Free from fall injury  7/23/2025 0827 by Pamella Barnes RN  Outcome: Progressing  GAIT IS OBSERVED AS STEADY,NO FALLS REPORTED OR OBSERVED    Problem: Self Harm/Suicidality  Goal: Will have no self-injury during hospital stay  Description: INTERVENTIONS:  1.  Ensure constant observer at bedside with Q15M safety checks  2.  Maintain a safe environment  3.  Secure patient belongings  4.  Ensure family/visitors adhere to safety recommendations  5.  Ensure safety tray has been added to patient's diet order  6.  Every shift and PRN: Re-assess suicidal risk via Frequent Screener    Outcome: Progressing   DENIES si/hi  Problem: Anxiety  Goal: Will report anxiety at manageable levels  Description: INTERVENTIONS:  1. Administer medication as ordered  2. Teach and rehearse alternative coping skills  3. Provide emotional support with 1:1 interaction with staff  Outcome: Not Progressing   Reports she gets anxiety when she thinks about her kleptomania issue

## 2025-07-23 NOTE — INTERDISCIPLINARY ROUNDS
Behavioral Health Interdisciplinary Rounds     Patient Name: Jen Borges  Age: 80 y.o.  Room/Bed:  [facility]/  Primary Diagnosis: Depression   Admission Status: Voluntary    Readmission within 30 days: No  Power of  in place: No  Patient requires a blocked bed: No          Reason for blocked bed:   Sleep hours: 7       Flu Vaccine:   Participation in Care/Groups:  Yes  Medication Compliant?: Yes  PRNS (last 24 hours): Antianxiety   Restraints (last 24 hours):  No  __________________________________________________  OQ Admission Analysis Survey completed: no  OQ Admission Analysis Survey score:  __________________________________________________     Alcohol screening (AUDIT) completed -  yes    Score: 0    Tobacco - no   Illegal Drugs use:  no  CSSRS Lifetime: completed     24 hour chart check complete: Yes    _______________________________________________    Patient goal(s) for today: meet with treatment team   Treatment team focus/goals: Plan to assess for medications and discharge needs   Progress note:    She was admitted due to increased in depression and a overdose attempt -   Spiritual Care Consult: yes   Financial concerns/prescription coverage:  Medicare   Family contact:  Andrei Barrera  Other  Emergency Contact  [phone]    SW called and left a message for partner         Discharge plan: she will return home with family   Access to weapons :  no                                                            Outpatient provider(s): [facility]  LOS:  1  Expected LOS: TBD     Participating treatment team members: Jen Borges, PAYTON Muñoz- Dr. Raymundo Paulson,RN - Sofía Chavarria, PharmD.

## 2025-07-23 NOTE — PROGRESS NOTES
SPIRITUALITY GROUP      Meeting Topic: Methods of Spiritual Pathways    Meeting Time:  45-60 minutes    Meeting Goal: Patients will describe their personal definition of spirituality. The group will identify how their spiritual or Faith beliefs are relevant to their mental health. The  will discuss various approaches to making spiritual connections and invite the group to explore a new spiritual practice. The group will conclude with a guided meditation.     Today's meeting focus: Zee Borges attended and participated in the group appropriately.        For additional spiritual care, please contact the  on-call at ([phone]).    Anette Nugent MDiv, MS, [facility]  Staff   Spiritual Health Services

## 2025-07-23 NOTE — CARE COORDINATION
07/23/25 0739   ITP   Date of Plan 07/23/25   Date of Next Review 07/30/25   Primary Diagnosis Code Depression   Barriers to Treatment Need for psychoeducation   Strengths Incorporated in Plan Acknowledging need for assistance;Family supports;Seeking interactions;Verbal   Plan of Care   Long Term Goal (LTG) Stated in patient/guardian terms feel safe outside the hospital   Short Term Goal 1   Short Term Goal 1 Client will remain safe during stay   Baseline Functioning SI attept by swallowing ammonia   Target Be free of SI   Objectives Client will participate in group therapy   Intervention  Acknowledge client strengths;Assess safety   Frequency daily   Measured by Behavioral data;Self report;Staff observation   Staff Responsible [facility] staff;Clinical staff   Intervention 2 Milieu therapy and support   Frequency daily   Measured by Behavioral data;Self report;Staff observation   Staff Responsible [facility] staff;Clinical staff   Intervention 3 Group therapy   Frequency daily   Measured by Behavioral data;Self report;Staff observation   Staff Responsible [facility] staff;Clinical staff   Short Term Goal 2   Short Term Goal 2 Client will learn and demonstrate effective coping skills   Baseline Functioning feeling like a burden to others   Target learn two ways to manage frustration in a positive manor   Objectives Client will participate in group therapy   Intervention  Group therapy   Frequency daily   Measured by Behavioral data;Self report;Staff observation   Staff Responsible [facility] staff;Clinical staff   Intervention 2 Monitor medications   Frequency daily   Measured by Behavioral data;Self report;Staff observation   Staff Responsible Clinical staff;[facility] staff   Intervention 3 Milieu therapy and support   Frequency daily   Measured by Behavioral data;Self report;Staff observation   Staff Responsible [facility] staff;Clinical staff   Crisis/Safety/Discharge Plan   Crisis/Safety Plan Standard program interventions and protocol

## 2025-07-24 PROCEDURE — 1240000000 HC EMOTIONAL WELLNESS R&B

## 2025-07-24 PROCEDURE — 6370000000 HC RX 637 (ALT 250 FOR IP): Performed by: PSYCHIATRY & NEUROLOGY

## 2025-07-24 PROCEDURE — 6370000000 HC RX 637 (ALT 250 FOR IP): Performed by: HOSPITALIST

## 2025-07-24 RX ADMIN — HYDROXYZINE HYDROCHLORIDE 25 MG: 25 TABLET ORAL at 16:06

## 2025-07-24 RX ADMIN — CLONAZEPAM 0.5 MG: 0.5 TABLET ORAL at 20:42

## 2025-07-24 RX ADMIN — MIRTAZAPINE 15 MG: 15 TABLET, FILM COATED ORAL at 21:24

## 2025-07-24 RX ADMIN — PANTOPRAZOLE SODIUM 40 MG: 40 TABLET, DELAYED RELEASE ORAL at 16:06

## 2025-07-24 RX ADMIN — SERTRALINE HYDROCHLORIDE 100 MG: 50 TABLET ORAL at 08:45

## 2025-07-24 RX ADMIN — HYDROXYZINE HYDROCHLORIDE 25 MG: 25 TABLET ORAL at 08:46

## 2025-07-24 RX ADMIN — Medication 3 MG: at 21:24

## 2025-07-24 RX ADMIN — HYDROXYZINE HYDROCHLORIDE 25 MG: 25 TABLET ORAL at 21:24

## 2025-07-24 RX ADMIN — DIVALPROEX SODIUM 500 MG: 500 TABLET, EXTENDED RELEASE ORAL at 08:46

## 2025-07-24 RX ADMIN — ROSUVASTATIN 5 MG: 10 TABLET, FILM COATED ORAL at 08:45

## 2025-07-24 RX ADMIN — RISPERIDONE 0.5 MG: 1 TABLET, FILM COATED ORAL at 21:24

## 2025-07-24 RX ADMIN — CLONAZEPAM 0.5 MG: 0.5 TABLET ORAL at 08:45

## 2025-07-24 RX ADMIN — SUCRALFATE 1 G: 1 TABLET ORAL at 08:45

## 2025-07-24 NOTE — PROGRESS NOTES
Behavioral Services  Medicare Certification Upon Admission    I certify that this patient's inpatient psychiatric hospital admission is medically necessary for:    [x] (1) Treatment which could reasonably be expected to improve this patient's condition,       [] (2) Or for diagnostic study;     AND     [x](2) The inpatient psychiatric services are provided while the individual is under the care of a physician and are included in the individualized plan of care.    Estimated length of stay/service 3 days    Plan for post-hospital care outpatient psychiatry.    Electronically signed by Tania Fonseca MD on 7/24/2025 at 11:45 AM

## 2025-07-24 NOTE — BH NOTE
PSYCHIATRIC PROGRESS NOTE    Chief Complaint: \"how do I heal.\"    Length of Stay: 2 Days    Interval History:  Patient slept well overnight and is taking her medications.  Ms. Li says that she wants to invest in treatment for her mental illness. She has no SI.     Past Medical History:  Past Medical History:   Diagnosis Date    Arthritis     Osteoarthritis    Broken arm 11/17/2021    Excessive sweating     Occurs in summers; TSH/CMP/CBC normal    GERD (gastroesophageal reflux disease)     WITH HAITEL HERNIA    Menopause     LMP-32 years old    MRSA (methicillin resistant Staphylococcus aureus) infection 2007    MRSA + abd abscess; nasal swab negative after treatment    Psychiatric disorder     DEPRESSION.         mirtazapine  15 mg Oral Nightly    clonazePAM  0.5 mg Oral Q12H    divalproex  500 mg Oral Daily    hydrOXYzine HCl  25 mg Oral TID    pantoprazole  40 mg Oral BID AC    risperiDONE  0.5 mg Oral Nightly    rosuvastatin  5 mg Oral Daily    sertraline  100 mg Oral Daily    sucralfate  1 g Oral 4x Daily    melatonin  3 mg Oral Nightly     Labs:  Lab Results   Component Value Date/Time    WBC 5.1 07/22/2025 07:45 AM    HGB 12.4 07/22/2025 07:45 AM    HCT 36.7 07/22/2025 07:45 AM     07/22/2025 07:45 AM    MCV 92.0 07/22/2025 07:45 AM      Lab Results   Component Value Date/Time     07/22/2025 07:45 AM     07/22/2025 07:45 AM    K 4.2 07/22/2025 07:45 AM    K 4.3 07/22/2025 07:45 AM     07/22/2025 07:45 AM     07/22/2025 07:45 AM    CO2 25 07/22/2025 07:45 AM    CO2 24 07/22/2025 07:45 AM    BUN 3 07/22/2025 07:45 AM    BUN 4 07/22/2025 07:45 AM    GFRAA 75 12/21/2021 11:30 AM    GLOB 2.5 07/22/2025 07:45 AM    ALT 21 07/22/2025 07:45 AM      Vitals:    07/24/25 0836   BP: 130/64   Pulse: 93   Resp: 16   Temp:    SpO2: 97%      MENTAL STATUS EXAM:  79yo WF has short gray hair appears younger than stated age and is dressed casually today. She is engaged in the evaluation and

## 2025-07-24 NOTE — PLAN OF CARE
Problem: Safety - Adult  Goal: Free from fall injury  7/23/2025 2315 by Kamila Bell RN  Outcome: Progressing     Pt received resting in bed with eyes closed. Respirations are even and unlabored. Pt has no visible signs of distress or discomfort. Walkways are free and clear from clutter.

## 2025-07-24 NOTE — INTERDISCIPLINARY ROUNDS
Behavioral Health Interdisciplinary Rounds     Patient Name: Jen Borges  Age: 80 y.o.  Room/Bed:  [facility] [place]  Primary Diagnosis: Depression  Admission Type: Voluntary  Readmission within 30 days: No  Advance Directives       Power of  Living Will ACP-Advance Directive ACP-Power of     Not on File Not on File Not on File Not on File          Patient requires a blocked bed: No          Reason for blocked bed: N/A  Sleep hours: 7+  Participation in Care/Groups: Yes  Medication Compliant?: Yes  PRNS (last 24 hours): Antianxiety  Restraints (last 24 hours): No  __________________________________________________  OQ Admission Analysis Survey completed:   OQ Admission Analysis Survey score:     __________________________________________________     Alcohol screening (AUDIT) completed -     Score:   Tobacco :   Illegal Drugs use:   CSSR Lifetime:     24 hour chart check complete: Yes    _______________________________________________    Patient goal(s) for today:   Treatment team focus/goals:   Progress note:     Spiritual Care Consult:   Financial concerns/prescription coverage:   Family contact:   Family requesting physician contact today:   Discharge plan:   Access to weapons:   Outpatient provider(s):     LOS:  2  Expected LOS:     Participating treatment team members: Jen Borges, * (assigned SW),

## 2025-07-24 NOTE — INTERDISCIPLINARY ROUNDS
Behavioral Health Interdisciplinary Rounds     Patient Name: Jen Borges  Age: 80 y.o.  Room/Bed:  0/  Primary Diagnosis: Depression  Admission Type: Voluntary  Readmission within 30 days: No  Advance Directives       Power of  Living Will ACP-Advance Directive ACP-Power of     Not on File Not on File Not on File Not on File          Patient requires a blocked bed: No          Reason for blocked bed: N/A  Sleep hours: 7+  Participation in Care/Groups: Yes  Medication Compliant?: Yes  PRNS (last 24 hours): Antianxiety  Restraints (last 24 hours): No  ________________________________________________  24 hour chart check complete: Yes    _______________________________________________    Patient goal(s) for today: meet with treatment team   Treatment team focus/goals: Plan to titrate her medications / plan to transfer to the general unit   Progress note: She denies any issues with her medications, denies SI , denies  or      Spiritual Care Consult: yes   Financial concerns/prescription coverage: Medicare   Family contact: HARESH Barrera    Emergency Contact  [phone]     spoke to Andrei about the treatment plan - I will leave information about PHP and he would like patient to go to [facility] after discharge     Discharge plan: she will return home   Access to weapons: no  Outpatient provider(s): [facility] / recommend PHP     LOS:  2  Expected LOS: Monday     Participating treatment team members: Jen Borges, Michelle Pelaez,PAYTON- Dr. Raymundo GARCIA, RN

## 2025-07-24 NOTE — PLAN OF CARE
PT is calm, cooperative, endorses feeling depressed.  She is visible on the unit and engaged with peers. Denies H/I, V/H, A/H. Med/Meal compliant.  Mood/Affect: depressed/Congruent    Problem: Anxiety  Goal: Will report anxiety at manageable levels  Description: INTERVENTIONS:  1. Administer medication as ordered  2. Teach and rehearse alternative coping skills  3. Provide emotional support with 1:1 interaction with staff  7/24/2025 1423 by Little Hernandez RN  Outcome: Not Progressing  7/24/2025 1320 by Maryam Hernandez RN  Outcome: Progressing     Problem: Safety - Adult  Goal: Free from fall injury  Outcome: Progressing     Problem: Self Harm/Suicidality  Goal: Will have no self-injury during hospital stay  Description: INTERVENTIONS:  1.  Ensure constant observer at bedside with Q15M safety checks  2.  Maintain a safe environment  3.  Secure patient belongings  4.  Ensure family/visitors adhere to safety recommendations  5.  Ensure safety tray has been added to patient's diet order  6.  Every shift and PRN: Re-assess suicidal risk via Frequent Screener    7/24/2025 1423 by Little Hernandez RN  Outcome: Progressing  7/24/2025 1320 by Maryam Hernandez RN  Outcome: Progressing     Problem: Anxiety  Goal: Will report anxiety at manageable levels  Description: INTERVENTIONS:  1. Administer medication as ordered  2. Teach and rehearse alternative coping skills  3. Provide emotional support with 1:1 interaction with staff  7/24/2025 1423 by Little Hernandez RN  Outcome: Not Progressing  7/24/2025 1320 by Maryam Hernandez RN  Outcome: Progressing

## 2025-07-25 PROCEDURE — 6370000000 HC RX 637 (ALT 250 FOR IP): Performed by: PSYCHIATRY & NEUROLOGY

## 2025-07-25 PROCEDURE — 6370000000 HC RX 637 (ALT 250 FOR IP): Performed by: HOSPITALIST

## 2025-07-25 PROCEDURE — 1240000000 HC EMOTIONAL WELLNESS R&B

## 2025-07-25 RX ORDER — CLONAZEPAM 0.5 MG/1
0.5 TABLET ORAL
Status: DISCONTINUED | OUTPATIENT
Start: 2025-07-25 | End: 2025-07-28 | Stop reason: HOSPADM

## 2025-07-25 RX ADMIN — HYDROXYZINE HYDROCHLORIDE 25 MG: 25 TABLET ORAL at 21:11

## 2025-07-25 RX ADMIN — SERTRALINE HYDROCHLORIDE 100 MG: 50 TABLET ORAL at 08:27

## 2025-07-25 RX ADMIN — PANTOPRAZOLE SODIUM 40 MG: 40 TABLET, DELAYED RELEASE ORAL at 16:10

## 2025-07-25 RX ADMIN — HYDROXYZINE HYDROCHLORIDE 25 MG: 25 TABLET ORAL at 16:10

## 2025-07-25 RX ADMIN — ROSUVASTATIN 5 MG: 10 TABLET, FILM COATED ORAL at 08:26

## 2025-07-25 RX ADMIN — HYDROXYZINE HYDROCHLORIDE 25 MG: 25 TABLET ORAL at 08:26

## 2025-07-25 RX ADMIN — DIVALPROEX SODIUM 500 MG: 500 TABLET, EXTENDED RELEASE ORAL at 08:26

## 2025-07-25 RX ADMIN — Medication 3 MG: at 21:11

## 2025-07-25 RX ADMIN — CLONAZEPAM 0.5 MG: 0.5 TABLET ORAL at 08:27

## 2025-07-25 RX ADMIN — MIRTAZAPINE 15 MG: 15 TABLET, FILM COATED ORAL at 21:11

## 2025-07-25 NOTE — BH NOTE
GROUP THERAPY PROGRESS NOTE    Patient did not participate in psychoeducation group.    Katherine Gillies MSW, [facility] [[email or web address]]-A

## 2025-07-25 NOTE — BH NOTE
PSYCHIATRIC PROGRESS NOTE    Chief Complaint: \"how do I heal.\"    Length of Stay: 3 Days    Interval History:  7/25/25 - Jen Borges Reports some improvement in anxiety and mood. We discussed the relative contraindication of clonazepam in Patients her age. She was agreeable to discontinuing the  medication however her POA express some concerns. I would discuss the case with the pharmacist and treatment team. I will defer to the primary team's best judgment and continue the medication for now. The patient denies active suicidal ideations. No aggressive behaviors in the interim.    Patient slept well overnight and is taking her medications.  Ms. Li says that she wants to invest in treatment for her mental illness. She has no SI.     Past Medical History:  Past Medical History:   Diagnosis Date    Arthritis     Osteoarthritis    Broken arm 11/17/2021    Excessive sweating     Occurs in summers; TSH/CMP/CBC normal    GERD (gastroesophageal reflux disease)     WITH HAITEL HERNIA    Menopause     LMP-32 years old    MRSA (methicillin resistant Staphylococcus aureus) infection 2007    MRSA + abd abscess; nasal swab negative after treatment    Psychiatric disorder     DEPRESSION.         mirtazapine  15 mg Oral Nightly    divalproex  500 mg Oral Daily    hydrOXYzine HCl  25 mg Oral TID    pantoprazole  40 mg Oral BID AC    rosuvastatin  5 mg Oral Daily    sertraline  100 mg Oral Daily    melatonin  3 mg Oral Nightly     Labs:  Lab Results   Component Value Date/Time    WBC 5.1 07/22/2025 07:45 AM    HGB 12.4 07/22/2025 07:45 AM    HCT 36.7 07/22/2025 07:45 AM     07/22/2025 07:45 AM    MCV 92.0 07/22/2025 07:45 AM      Lab Results   Component Value Date/Time     07/22/2025 07:45 AM     07/22/2025 07:45 AM    K 4.2 07/22/2025 07:45 AM    K 4.3 07/22/2025 07:45 AM     07/22/2025 07:45 AM     07/22/2025 07:45 AM    CO2 25 07/22/2025 07:45 AM    CO2 24 07/22/2025 07:45 AM    BUN 3

## 2025-07-25 NOTE — PLAN OF CARE
Problem: Anxiety  Goal: Will report anxiety at manageable levels  Description: INTERVENTIONS:  1. Administer medication as ordered  2. Teach and rehearse alternative coping skills  3. Provide emotional support with 1:1 interaction with staff  Outcome: Progressing  Note: Out on unit engaged, demonstrates hopelessness and manipulative behaviors such as splitting staff. Denies SI, no self harming behaviors no plan, no intent. Verbalized understanding of medication adjustments

## 2025-07-25 NOTE — PLAN OF CARE
Patient resting in bed with eyes closed. No needs voiced to staff at this time, no respiratory distress noted. Patient in NAD, and monitored with 15 minute safety rounds.    Problem: Safety - Adult  Goal: Free from fall injury  7/25/2025 0011 by Honey Patricia, RN  Outcome: Progressing     Problem: Self Harm/Suicidality  Goal: Will have no self-injury during hospital stay  Description: INTERVENTIONS:  1.  Ensure constant observer at bedside with Q15M safety checks  2.  Maintain a safe environment  3.  Secure patient belongings  4.  Ensure family/visitors adhere to safety recommendations  5.  Ensure safety tray has been added to patient's diet order  6.  Every shift and PRN: Re-assess suicidal risk via Frequent Screener    7/25/2025 0011 by Honey Patricia, RN  Outcome: Progressing

## 2025-07-25 NOTE — BH NOTE
1409: PAYTON spoke with patient son Geovani Machado, [phone], who reports he is medical power of  and is wanting documentation to be filed. SW provided family member with email to obtain paperwork. Son stated that he DOES NOT give permission for treatment team to adjust medications without consulting him and Dr. Vargas together. He is insists that medications that were discontinued be restarted until further conversation can be had.     He expressed his frustration and voiced that he feels it is poor care to change months of long awaited medication adjustments that were made PTA. PAYTON explained that it is common practice for an acute care setting psychiatrist to adjust medications, son refused education and continually repeated \"I need these medications changed back now, this is not an ask this is an order as her power of \".    1415: SW spoke with pharmacist to inform of phone call. Pharmacist to contact MD regarding family orders and concerns.    1428: SW received POA paperwork, printed, and placed physical copies on patient chart in nursing station.    1450: SW received call from pharmacist, MD not to change medication regiment at this time as POA does not indicate that decisions can be made on her behalf while she has capacity to make decisions for her care.

## 2025-07-25 NOTE — INTERDISCIPLINARY ROUNDS
Behavioral Health Interdisciplinary Rounds     Patient Name: Jen Borges  Age: 80 y.o.  Room/Bed:  [facility]/  Primary Diagnosis: Depression   Admission Status: Voluntary     Readmission within 30 days:   Power of  in place:   Patient requires a blocked bed: No          Reason for blocked bed:   Sleep hours:   Flu vaccine :       Participation in Care/Groups:  Yes  Medication Compliant?:  Medication Compliant: Yes  PRNS (last 24 hours):  PRNS: None    Restraints (last 24 hours):  NO  __________________________________________________  OQ Admission Analysis Survey completed:  OQ Admission Analysis Survey score:    __________________________________________________     Alcohol screening (AUDIT) completed -     Score:   Tobacco :  Illegal Drugs use:   CSSR Lifetime:     24 hour chart check complete: Yes    _______________________________________________    Patient goal(s) for today:   Treatment team focus/goals:   Progress note: Patient met with treatment team and appeared with a fair mood and affect. She denies SI/HI and AHVH at this time. She denies issues with sleep. MD inquired about current medication regiment and if she has experienced AH or delusions in the past, she denied, states she's unsure why she is on an antipsychotic. MD provided education about Klonopin usage as a geriatric patient and how it increases likelihood of falls. She is asking about discharging home soon. She is agreeable to discharge Monday.    LOS:  3  Expected LOS: 6    Participating treatment team members: Jen Borges, Tracy Corcoran, MSW, Kamila OROZCO RN, Dr. Vidal, Sofía Chavarria PharmD

## 2025-07-25 NOTE — INTERDISCIPLINARY ROUNDS
Behavioral Health Interdisciplinary Rounds     Patient Name: Jen Borges  Age: 80 y.o.  Room/Bed:  [facility]  Primary Diagnosis: Depression   Admission Status: Voluntary     Readmission within 30 days:   Power of  in place:   Patient requires a blocked bed: No          Reason for blocked bed:   Sleep hours:   Flu vaccine :       Participation in Care/Groups:  Yes  Medication Compliant?:  Medication Compliant: Yes  PRNS (last 24 hours):  PRNS: None    Restraints (last 24 hours):  NO  __________________________________________________  OQ Admission Analysis Survey completed:  OQ Admission Analysis Survey score:    __________________________________________________     Alcohol screening (AUDIT) completed -     Score:   Tobacco :  Illegal Drugs use:   CSSR Lifetime:     24 hour chart check complete: Yes    _______________________________________________    Patient goal(s) for today:   Treatment team focus/goals:   Progress note:      Spiritual Care Consult:   Financial concerns/prescription coverage:    Family contact:                        Family requesting physician contact today:    Discharge plan:   Access to weapons :                                                              Outpatient provider(s):     LOS:  3  Expected LOS:     Participating treatment team members: Jen LUZMA Katerina, * (assigned SW),

## 2025-07-25 NOTE — BH NOTE
GROUP THERAPY PROGRESS NOTE    Patient did not participate in recreational therapy group.    Katherine Gillies, MSW, [facility]

## 2025-07-25 NOTE — DISCHARGE INSTRUCTIONS
DISCHARGE SUMMARY    NAME:Jen Borges  : 1945  MRN: [id number]    The patient Jen Borges exhibits the ability to control behavior in a less restrictive environment.  Patient's level of functioning is improving.  No assaultive/destructive behavior has been observed for the past 24 hours.  No suicidal/homicidal threat or behavior has been observed for the past 24 hours.  There is no evidence of serious medication side effects.  Patient has not been in physical or protective restraints for at least the past 24 hours.    If weapons involved, how are they secured? none    Is patient aware of and in agreement with discharge plan? yes    Arrangements for medication:  Prescriptions filled here     Copy of discharge instructions to provider?:  yes    Arrangements for transportation home:  sister to p/u    Keep all follow up appointments as scheduled, continue to take prescribed medications per physician instructions.  Mental health crisis number:  911 or your local mental health crisis line number at [phone]      Mental Health Emergency WARM LINE      [phone] ([phone])      M-F: 9am to 9pm      Sat & Sun: 5pm - 9pm  National suicide prevention lines:                             9-013-OXADUUP (7-845-194-6147)       0-627-495-TALK (1-423.896.5762)    Crisis Text Line:  Text HOME to 365433        DISCHARGE SUMMARY from Nurse    PATIENT INSTRUCTIONS:      What to do at Home:  Recommended activity: activity as tolerated,     If you experience any of the following symptoms: racing or intrusive thoughts, uncontrolled anxiety, urges to self harm or engage in high risk behaviors - talk with your support system including family/friends and Dr. TRISHA roque    *  Please give a list of your current medications to your Primary Care Provider.    *  Please update this list whenever your medications are discontinued, doses are      changed, or new medications (including over-the-counter products) are added.    *

## 2025-07-26 PROCEDURE — 6370000000 HC RX 637 (ALT 250 FOR IP): Performed by: HOSPITALIST

## 2025-07-26 PROCEDURE — 1240000000 HC EMOTIONAL WELLNESS R&B

## 2025-07-26 PROCEDURE — 6370000000 HC RX 637 (ALT 250 FOR IP): Performed by: PSYCHIATRY & NEUROLOGY

## 2025-07-26 RX ADMIN — HYDROXYZINE HYDROCHLORIDE 25 MG: 25 TABLET ORAL at 14:58

## 2025-07-26 RX ADMIN — SERTRALINE HYDROCHLORIDE 100 MG: 50 TABLET ORAL at 08:54

## 2025-07-26 RX ADMIN — DIVALPROEX SODIUM 500 MG: 500 TABLET, EXTENDED RELEASE ORAL at 08:52

## 2025-07-26 RX ADMIN — HYDROXYZINE HYDROCHLORIDE 25 MG: 25 TABLET ORAL at 20:37

## 2025-07-26 RX ADMIN — HYDROXYZINE HYDROCHLORIDE 25 MG: 25 TABLET ORAL at 08:53

## 2025-07-26 RX ADMIN — HYDROXYZINE HYDROCHLORIDE 10 MG: 10 TABLET, FILM COATED ORAL at 18:57

## 2025-07-26 RX ADMIN — MIRTAZAPINE 15 MG: 15 TABLET, FILM COATED ORAL at 20:38

## 2025-07-26 RX ADMIN — HYDROXYZINE HYDROCHLORIDE 10 MG: 10 TABLET, FILM COATED ORAL at 11:06

## 2025-07-26 RX ADMIN — PANTOPRAZOLE SODIUM 40 MG: 40 TABLET, DELAYED RELEASE ORAL at 14:58

## 2025-07-26 RX ADMIN — ROSUVASTATIN 5 MG: 10 TABLET, FILM COATED ORAL at 08:53

## 2025-07-26 RX ADMIN — Medication 3 MG: at 20:38

## 2025-07-26 RX ADMIN — HALOPERIDOL 2.5 MG: 5 TABLET ORAL at 13:07

## 2025-07-26 ASSESSMENT — PAIN SCALES - GENERAL: PAINLEVEL_OUTOF10: 0

## 2025-07-26 NOTE — INTERDISCIPLINARY ROUNDS
Behavioral Health Interdisciplinary Rounds     Patient Name: Jen Borges  Age: 80 y.o.  Room/Bed:  [facility]  Primary Diagnosis: Depression   Admission Status: Voluntary    Readmission within 30 days: No  Power of  in place: yes  Patient requires a blocked bed: No          Reason for blocked bed: n/a  Sleep hours: 7+   Flu Vaccine: No  Participation in Care/Groups:  Yes  Medication Compliant?: Yes  PRNS (last 24 hours): None   Restraints (last 24 hours):  No  __________________________________________________  OQ Admission Analysis Survey completed:  OQ Admission Analysis Survey score:  __________________________________________________     Alcohol screening (AUDIT) completed -     If applicable, date SBIRT discussed in treatment team AND documented:    Tobacco - patient is a smoker:    Illegal Drugs use:      24 hour chart check complete: Yes    _______________________________________________    Patient goal(s) for today:   Treatment team focus/goals:   Progress note:      Spiritual Care Consult:   Financial concerns/prescription coverage:    Family contact:                        Family requesting physician contact today:    Discharge plan:   Access to weapons :                                                              Outpatient provider(s):   Patient's preferred phone number for follow up call :   Patient's preferred e-mail address :    LOS:  4  Expected LOS:     Participating treatment team members: Jen Borges, * (assigned SW),

## 2025-07-26 NOTE — PLAN OF CARE
Problem: Safety - Adult  Goal: Free from fall injury  Outcome: Progressing   PATIENT IS CURRENTLY RESTING IN BED. NO DISTRESS NOTED. SAFETY MEASURE IN PLACE. WILL CONTINUE TO MONITOR WITH Q15 MINUTE CHECKS.

## 2025-07-26 NOTE — BH NOTE
PSYCHIATRIC PROGRESS NOTE    Chief Complaint: \"how do I heal.\"    Length of Stay: 4 Days    Interval History:  07/26/25  Patient reports she feels distressed about not being able to stop picking items from shops. Sleeping well. SI is fleeting but she denies plans or intent. Denies HI.  Denies AH/VH. Tolerating her medications.    7/25/25 - Jen Borges Reports some improvement in anxiety and mood. We discussed the relative contraindication of clonazepam in Patients her age. She was agreeable to discontinuing the  medication however her POA express some concerns. I would discuss the case with the pharmacist and treatment team. I will defer to the primary team's best judgment and continue the medication for now. The patient denies active suicidal ideations. No aggressive behaviors in the interim.    Patient slept well overnight and is taking her medications.  Ms. Li says that she wants to invest in treatment for her mental illness. She has no SI.     Past Medical History:  Past Medical History:   Diagnosis Date    Arthritis     Osteoarthritis    Broken arm 11/17/2021    Excessive sweating     Occurs in summers; TSH/CMP/CBC normal    GERD (gastroesophageal reflux disease)     WITH HAITEL HERNIA    Menopause     LMP-32 years old    MRSA (methicillin resistant Staphylococcus aureus) infection 2007    MRSA + abd abscess; nasal swab negative after treatment    Psychiatric disorder     DEPRESSION.         mirtazapine  15 mg Oral Nightly    divalproex  500 mg Oral Daily    hydrOXYzine HCl  25 mg Oral TID    pantoprazole  40 mg Oral BID AC    rosuvastatin  5 mg Oral Daily    sertraline  100 mg Oral Daily    melatonin  3 mg Oral Nightly     Labs:  Lab Results   Component Value Date/Time    WBC 5.1 07/22/2025 07:45 AM    HGB 12.4 07/22/2025 07:45 AM    HCT 36.7 07/22/2025 07:45 AM     07/22/2025 07:45 AM    MCV 92.0 07/22/2025 07:45 AM      Lab Results   Component Value Date/Time     07/22/2025 07:45 AM

## 2025-07-26 NOTE — BH NOTE
Patient requests medication for feelings of anxiety. Patient given PRN atarax PO for anxiety. Patient also encouraged to use coping skills to help.Patient visible on unit.

## 2025-07-26 NOTE — BH NOTE
Patient came up to nursing station saying she wants to die and doesn't want to live anymore. She says she wants to stop her addiction, but she can't. Patient appears worried and sad, anxious. Therapeutic communication provided, explained to patient she is safe here, encouraged distraction techniques. PRN atarax given PO. Patient visible on unit.

## 2025-07-26 NOTE — BH NOTE
Patient at the nurses station stating she still feels extremely anxious and needs some other medication to help her. She says her coping skills are not working. PRN haldol given PO. Patient visible on unit.

## 2025-07-26 NOTE — PLAN OF CARE
Problem: Anxiety  Goal: Will report anxiety at manageable levels  Description: INTERVENTIONS:  1. Administer medication as ordered  2. Teach and rehearse alternative coping skills  3. Provide emotional support with 1:1 interaction with staff  Outcome: Progressing  Note: Out on unit social w peers and staff. Continues to demonstrates helplessness and manipulative behaviors such as splitting staff. Denies SI, no self harm. Future focused frequently talking with peers about summer plans with her significant other and upcoming events to attend. Staff focus is on offering support   1259: up to nursing \"I am being haunted by my addiction\" continues to share she is struggling with cleptomania and is distressed thinking about her past behaviors. Request talking to  today. Informed pt chaplains office is aware of her request.

## 2025-07-26 NOTE — PROGRESS NOTES
Spiritual Health History and Assessment/Progress Note  [facility]    Follow-up,  , Adjustment to illness, Spirituality Group    Name: Jen Borges MRN: [id number]    Age: 80 y.o.     Sex: female   Language: English   Druze: Latter-day   Depression     Date: 7/26/2025            Total Time Calculated: 33 min              Spiritual Assessment continued in [facility]W [facility]        Referral/Consult From: Nurse   Encounter Overview/Reason: Follow-up  Service Provided For: Patient    Sandi, Belief, Meaning:   Patient identifies as spiritual and has beliefs or practices that help with coping during difficult times  Family/Friends No family/friends present      Importance and Influence:  Patient has spiritual/personal beliefs that influence decisions regarding their health  Family/Friends No family/friends present    Community:  Patient feels well-supported. Support system includes: Spouse/Partner and Children  Family/Friends No family/friends present    Assessment and Plan of Care:     Patient Interventions include: Facilitated expression of thoughts and feelings, Explored spiritual coping/struggle/distress, Affirmed coping skills/support systems, and Other: Provided spiritual presence and active listening as Jen shared about her concerns and feelings of hopelessness. Provided safe space for her to process her feelings. Acknowledged her concerns and offered words of support.. With her permission, had prayer on her behalf. Assured her of prayers on her behalf and of ongoing  availability for support.  Family/Friends Interventions include: No family/friends present    Patient Plan of Care: Spiritual Care available upon further referral  Family/Friends Plan of Care: No family/friends present      Rev Cathleen Liao MDiv, BCC  To page : [phone] ([phone])

## 2025-07-27 PROCEDURE — 6370000000 HC RX 637 (ALT 250 FOR IP): Performed by: PSYCHIATRY & NEUROLOGY

## 2025-07-27 PROCEDURE — 6370000000 HC RX 637 (ALT 250 FOR IP): Performed by: HOSPITALIST

## 2025-07-27 PROCEDURE — 1240000000 HC EMOTIONAL WELLNESS R&B

## 2025-07-27 RX ADMIN — SERTRALINE HYDROCHLORIDE 100 MG: 50 TABLET ORAL at 09:12

## 2025-07-27 RX ADMIN — Medication 3 MG: at 20:24

## 2025-07-27 RX ADMIN — DIVALPROEX SODIUM 500 MG: 500 TABLET, EXTENDED RELEASE ORAL at 09:12

## 2025-07-27 RX ADMIN — PANTOPRAZOLE SODIUM 40 MG: 40 TABLET, DELAYED RELEASE ORAL at 06:48

## 2025-07-27 RX ADMIN — HYDROXYZINE HYDROCHLORIDE 10 MG: 10 TABLET, FILM COATED ORAL at 05:10

## 2025-07-27 RX ADMIN — PANTOPRAZOLE SODIUM 40 MG: 40 TABLET, DELAYED RELEASE ORAL at 15:10

## 2025-07-27 RX ADMIN — HYDROXYZINE HYDROCHLORIDE 25 MG: 25 TABLET ORAL at 20:24

## 2025-07-27 RX ADMIN — MIRTAZAPINE 15 MG: 15 TABLET, FILM COATED ORAL at 20:24

## 2025-07-27 RX ADMIN — HALOPERIDOL 2.5 MG: 5 TABLET ORAL at 16:45

## 2025-07-27 RX ADMIN — HYDROXYZINE HYDROCHLORIDE 25 MG: 25 TABLET ORAL at 15:10

## 2025-07-27 RX ADMIN — ROSUVASTATIN 5 MG: 10 TABLET, FILM COATED ORAL at 09:12

## 2025-07-27 RX ADMIN — HYDROXYZINE HYDROCHLORIDE 25 MG: 25 TABLET ORAL at 09:13

## 2025-07-27 RX ADMIN — HYDROXYZINE HYDROCHLORIDE 10 MG: 10 TABLET, FILM COATED ORAL at 12:27

## 2025-07-27 ASSESSMENT — PAIN SCALES - GENERAL
PAINLEVEL_OUTOF10: 0
PAINLEVEL_OUTOF10: 0

## 2025-07-27 ASSESSMENT — PAIN - FUNCTIONAL ASSESSMENT: PAIN_FUNCTIONAL_ASSESSMENT: NONE - DENIES PAIN

## 2025-07-27 NOTE — PLAN OF CARE
Problem: Safety - Adult  Goal: Free from fall injury  Outcome: Progressing     Pt received resting in bed with eyes closed. Respirations are even and unlabored. Pt has no visible signs of distress or discomfort. Walkways are free and clear from clutter.

## 2025-07-27 NOTE — PLAN OF CARE
Problem: Anxiety  Goal: Will report anxiety at manageable levels  Description: INTERVENTIONS:  1. Administer medication as ordered  2. Teach and rehearse alternative coping skills  3. Provide emotional support with 1:1 interaction with staff  Outcome: Progressing  Note: Out on unit social w a bright affect. Up to nursing describing increase anxiety when thinking about her past illegal behaviors. During conversation with nursing when reports fleeting SI, will also share hope and excitement about upcoming events she is attending with her significant other and then deny SI. When asked to clarify if she is suicidal she states \"no\" and continues to share her long and short term goals. Staff offer support and encourage progress towards her goals.

## 2025-07-27 NOTE — INTERDISCIPLINARY ROUNDS
Behavioral Health Interdisciplinary Rounds     Patient Name: Jen Borges  Age: 80 y.o.  Room/Bed:  [facility]  Primary Diagnosis: Depression  Admission Status: Voluntary  Readmission within 30 days: No  Power of  in place: No  Patient requires a blocked bed: No          Reason for blocked bed: N/A  Sleep hours: 7+  Participation in Care/Groups: Yes  Medication Compliant?: Yes  PRNS (last 24 hours): Antipsychotic (PO) and Antianxiety  Restraints (last 24 hours): No  __________________________________________________  OQ Admission Analysis Survey completed:   OQ Admission Analysis Survey score:     __________________________________________________     Alcohol screening (AUDIT) completed -     Score:   Tobacco :   Illegal Drugs use:   CSSR Lifetime:     24 hour chart check complete: Yes    _______________________________________________    Patient goal(s) for today:   Treatment team focus/goals:   Progress note:     Spiritual Care Consult:   Financial concerns/prescription coverage:   Family contact:   Family requesting physician contact today:   Discharge plan:   Access to weapons:   Outpatient provider(s):     LOS:  5  Expected LOS:     Participating treatment team members: Jen Borges, * (assigned SW),

## 2025-07-28 VITALS
BODY MASS INDEX: 23.3 KG/M2 | DIASTOLIC BLOOD PRESSURE: 68 MMHG | HEART RATE: 78 BPM | HEIGHT: 60 IN | SYSTOLIC BLOOD PRESSURE: 140 MMHG | OXYGEN SATURATION: 96 % | WEIGHT: 118.7 LBS | RESPIRATION RATE: 16 BRPM | TEMPERATURE: 98.2 F

## 2025-07-28 LAB — VALPROATE SERPL-MCNC: 57 UG/ML (ref 50–100)

## 2025-07-28 PROCEDURE — 80164 ASSAY DIPROPYLACETIC ACD TOT: CPT

## 2025-07-28 PROCEDURE — 6370000000 HC RX 637 (ALT 250 FOR IP): Performed by: HOSPITALIST

## 2025-07-28 PROCEDURE — 6370000000 HC RX 637 (ALT 250 FOR IP): Performed by: PSYCHIATRY & NEUROLOGY

## 2025-07-28 RX ORDER — HYDROXYZINE HYDROCHLORIDE 10 MG/1
10 TABLET, FILM COATED ORAL 3 TIMES DAILY PRN
Qty: 60 TABLET | Refills: 0 | Status: SHIPPED | OUTPATIENT
Start: 2025-07-28 | End: 2025-08-27

## 2025-07-28 RX ORDER — PANTOPRAZOLE SODIUM 40 MG/1
40 TABLET, DELAYED RELEASE ORAL
Qty: 30 TABLET | Refills: 0 | Status: SHIPPED | OUTPATIENT
Start: 2025-07-28

## 2025-07-28 RX ORDER — CLONAZEPAM 0.5 MG/1
0.5 TABLET ORAL
Qty: 10 TABLET | Refills: 0 | Status: SHIPPED | OUTPATIENT
Start: 2025-07-28 | End: 2025-08-07

## 2025-07-28 RX ORDER — ROSUVASTATIN CALCIUM 5 MG/1
5 TABLET, COATED ORAL DAILY
Qty: 30 TABLET | Refills: 3 | Status: SHIPPED | OUTPATIENT
Start: 2025-07-29 | End: 2025-07-28

## 2025-07-28 RX ORDER — ROSUVASTATIN CALCIUM 5 MG/1
5 TABLET, COATED ORAL DAILY
Qty: 30 TABLET | Refills: 0 | Status: SHIPPED | OUTPATIENT
Start: 2025-07-29

## 2025-07-28 RX ORDER — SERTRALINE HYDROCHLORIDE 100 MG/1
100 TABLET, FILM COATED ORAL DAILY
Qty: 30 TABLET | Refills: 3 | Status: SHIPPED | OUTPATIENT
Start: 2025-07-29 | End: 2025-07-28

## 2025-07-28 RX ORDER — DIVALPROEX SODIUM 500 MG/1
500 TABLET, FILM COATED, EXTENDED RELEASE ORAL DAILY
Qty: 30 TABLET | Refills: 3 | Status: SHIPPED | OUTPATIENT
Start: 2025-07-28 | End: 2025-07-28

## 2025-07-28 RX ORDER — MIRTAZAPINE 15 MG/1
15 TABLET, FILM COATED ORAL NIGHTLY
Qty: 30 TABLET | Refills: 0 | Status: SHIPPED | OUTPATIENT
Start: 2025-07-28

## 2025-07-28 RX ORDER — PANTOPRAZOLE SODIUM 40 MG/1
40 TABLET, DELAYED RELEASE ORAL
Qty: 30 TABLET | Refills: 3 | Status: SHIPPED | OUTPATIENT
Start: 2025-07-28 | End: 2025-07-28

## 2025-07-28 RX ORDER — MIRTAZAPINE 15 MG/1
15 TABLET, FILM COATED ORAL NIGHTLY
Qty: 30 TABLET | Refills: 3 | Status: SHIPPED | OUTPATIENT
Start: 2025-07-28 | End: 2025-07-28

## 2025-07-28 RX ORDER — DIVALPROEX SODIUM 500 MG/1
500 TABLET, FILM COATED, EXTENDED RELEASE ORAL DAILY
Qty: 30 TABLET | Refills: 0 | Status: SHIPPED | OUTPATIENT
Start: 2025-07-28

## 2025-07-28 RX ORDER — SERTRALINE HYDROCHLORIDE 100 MG/1
100 TABLET, FILM COATED ORAL DAILY
Qty: 30 TABLET | Refills: 0 | Status: SHIPPED | OUTPATIENT
Start: 2025-07-29

## 2025-07-28 RX ADMIN — CLONAZEPAM 0.5 MG: 0.5 TABLET ORAL at 00:05

## 2025-07-28 RX ADMIN — HYDROXYZINE HYDROCHLORIDE 10 MG: 10 TABLET, FILM COATED ORAL at 09:21

## 2025-07-28 RX ADMIN — PANTOPRAZOLE SODIUM 40 MG: 40 TABLET, DELAYED RELEASE ORAL at 06:36

## 2025-07-28 RX ADMIN — SERTRALINE HYDROCHLORIDE 100 MG: 50 TABLET ORAL at 08:21

## 2025-07-28 RX ADMIN — ROSUVASTATIN 5 MG: 10 TABLET, FILM COATED ORAL at 08:21

## 2025-07-28 RX ADMIN — DIVALPROEX SODIUM 500 MG: 500 TABLET, EXTENDED RELEASE ORAL at 10:45

## 2025-07-28 RX ADMIN — HYDROXYZINE HYDROCHLORIDE 25 MG: 25 TABLET ORAL at 08:21

## 2025-07-28 RX ADMIN — HYDROXYZINE HYDROCHLORIDE 10 MG: 10 TABLET, FILM COATED ORAL at 03:25

## 2025-07-28 NOTE — PLAN OF CARE
Problem: Discharge Planning  Goal: Discharge to home or other facility with appropriate resources  Outcome: Progressing  Flowsheets (Taken 7/28/2025 0817)  Discharge to home or other facility with appropriate resources:   Identify barriers to discharge with patient and caregiver   Arrange for needed discharge resources and transportation as appropriate   Identify discharge learning needs (meds, wound care, etc)   Refer to discharge planning if patient needs post-hospital services based on physician order or complex needs related to functional status, cognitive ability or social support system

## 2025-07-28 NOTE — PLAN OF CARE
Problem: Safety - Adult  Goal: Free from fall injury  7/27/2025 2311 by Kamila Bell, RN  Outcome: Progressing     Pt received resting in bed with eyes closed. Respirations are even and unlabored. Pt has no visible signs of distress or discomfort. Walkways are free and clear from clutter.

## 2025-07-28 NOTE — PROGRESS NOTES
Pharmacist Discharge Medication Reconciliation    Discharging Provider: Dr. Simmons    Significant PMH:   Past Medical History:   Diagnosis Date    Arthritis     Osteoarthritis    Broken arm 11/17/2021    Excessive sweating     Occurs in summers; TSH/CMP/CBC normal    GERD (gastroesophageal reflux disease)     WITH HAITEL HERNIA    Menopause     LMP-32 years old    MRSA (methicillin resistant Staphylococcus aureus) infection 2007    MRSA + abd abscess; nasal swab negative after treatment    Psychiatric disorder     DEPRESSION.      Chief Complaint for this Admission: No chief complaint on file.    Allergies: Alendronate    Discharge Medications:      Medication List        START taking these medications      pantoprazole 40 MG tablet  Commonly known as: PROTONIX  Take 1 tablet by mouth 2 times daily (before meals)            CHANGE how you take these medications      clonazePAM 0.5 MG tablet  Commonly known as: KLONOPIN  Take 1 tablet by mouth nightly as needed (severe anxiety/agitation) for up to 10 days. Max Daily Amount: 0.5 mg  What changed:   when to take this  reasons to take this     divalproex 500 MG extended release tablet  Commonly known as: DEPAKOTE ER  Take 1 tablet by mouth daily  What changed: when to take this     hydrOXYzine HCl 10 MG tablet  Commonly known as: ATARAX  Take 1 tablet by mouth 3 times daily as needed for Anxiety  What changed:   when to take this  reasons to take this  Another medication with the same name was removed. Continue taking this medication, and follow the directions you see here.     mirtazapine 15 MG tablet  Commonly known as: REMERON  Take 1 tablet by mouth nightly  What changed:   medication strength  how much to take  Another medication with the same name was removed. Continue taking this medication, and follow the directions you see here.     rosuvastatin 5 MG tablet  Commonly known as: CRESTOR  Take 1 tablet by mouth daily  Start taking on: July 29, 2025  What changed:

## 2025-07-28 NOTE — INTERDISCIPLINARY ROUNDS
Behavioral Health Interdisciplinary Rounds     Patient Name: Jen Borges  Age: 80 y.o.  Room/Bed:  [facility]  Primary Diagnosis: Depression   Admission Status:      Readmission within 30 days:   Power of  in place:   Patient requires a blocked bed: no          Reason for blocked bed:   Sleep hours: 7+  Flu vaccine :       Participation in Care/Groups:  yes  Medication Compliant?: yes  PRNS (last 24 hours): anxiety, antipsychotic    Restraints (last 24 hours):  no  __________________________________________________  OQ Admission Analysis Survey completed:  OQ Admission Analysis Survey score:    __________________________________________________     Alcohol screening (AUDIT) completed -     Score:   Tobacco :  Illegal Drugs use:   CSSR Lifetime:     24 hour chart check complete: yes     _______________________________________________    Patient goal(s) for today:   Treatment team focus/goals:   Progress note:      Spiritual Care Consult:   Financial concerns/prescription coverage:    Family contact:                        Family requesting physician contact today:    Discharge plan:   Access to weapons :                                                              Outpatient provider(s):     LOS:  6  Expected LOS:     Participating treatment team members: Jen SEGAL Savageshayla, * (assigned SW),

## 2025-07-28 NOTE — DISCHARGE SUMMARY
mmol/L      Status: Final                Comment: PLEASE NOTE NEW REFERENCE RANGE  Glucose                                       Date: 07/22/2025  Value: 104 (H)     Ref range: 65 - 100 mg/dL     Status: Final  BUN                                           Date: 07/22/2025  Value: 4 (L)       Ref range: 6 - 20 MG/DL       Status: Final  Creatinine                                    Date: 07/22/2025  Value: 0.66        Ref range: 0.55 - 1.02 MG/DL  Status: Final  BUN/Creatinine Ratio                          Date: 07/22/2025  Value: 6 (L)       Ref range: 12 - 20            Status: Final  Est, Glom Filt Rate                           Date: 07/22/2025  Value: 90          Ref range: >60 ml/min/1.73m2  Status: Final                Comment:    Pediatric calculator link: https://www.kidney.org/professionals/kdoqi/gfr_calculatorped     These results are not intended for use in patients <18 years of age.     eGFR results are calculated without a race factor using  the 2021 CKD-EPI equation. Careful clinical correlation is recommended, particularly when comparing to results calculated using previous equations.  The CKD-EPI equation is less accurate in patients with extremes of muscle mass, extra-renal metabolism of creatinine, excessive creatine ingestion, or following therapy that affects renal tubular secretion.    Calcium                                       Date: 07/22/2025  Value: 8.4 (L)     Ref range: 8.5 - 10.1 MG/DL   Status: Final  Total Bilirubin                               Date: 07/22/2025  Value: 0.3         Ref range: 0.2 - 1.0 MG/DL    Status: Final  ALT                                           Date: 07/22/2025  Value: 21          Ref range: 12 - 78 U/L        Status: Final  AST                                           Date: 07/22/2025  Value: 16          Ref range: 15 - 37 U/L        Status: Final  Alk Phosphatase                               Date: 07/22/2025  Value: 65          Ref range: 45 - 117 U/L

## 2025-07-28 NOTE — TRANSITION OF CARE
Behavioral Health Transition Record    Patient Name: Jen Borges  YOB: 1945   Medical Record Number: [id number]  Date of Admission: 7/22/2025  6:59 PM   Date of Discharge: 7/28/25    Attending Provider: Tania Fonseca MD   Discharging Provider: Dr Simmons   To contact this individual call [phone] and ask the  to page.  If unavailable, ask to be transferred to Behavioral Health Provider on call.  A Behavioral Health Provider will be available on call 24/7 and during holidays.    Primary Care Provider: Justina Alejandre MD    Allergies   Allergen Reactions    Alendronate Other (See Comments)     Severe heartburn       Reason for Admission: Jen Borges is a 80 y.o. White (non-) female who is currently admitted to the acute side of 7th floor behavioral health Unit at [facility].      Jen says that she struggles with kleptomania.  Patient reports that prior to admission she was at her 'leidy's end.' She says that she wanted to stop stealing. She wants to be a responsible person. Prior to her ingesting cleaning agents she was caught at [place] and became quite embarrassed. She is overwhelmed as a friend of hers is moving with her. She was trying to clean up her own things, but realized she has way too much. She presented to hospital on 7/20 after this volitional ingestion of her . She presented to hospital and was admitted to medicine for evaluation and treatment by GI. I did see patient on consult service and recommended this inpatient psychiatric admission as she was still adamant that she wanted to die.     Patient reflects on her life and says that she has a fantastic one, except for 'the stealing.' She says that she doesn't know if she can stop it. She reports that she had been to group home but because she is 'white and speak intelligently,' and can afford expensive  that she can get out of trouble easily. She prides herself of raising a successful son. 
none

## 2025-07-29 ENCOUNTER — TELEPHONE (OUTPATIENT)
Facility: CLINIC | Age: 80
End: 2025-07-29

## (undated) DEVICE — 1200 GUARD II KIT W/5MM TUBE W/O VAC TUBE: Brand: GUARDIAN

## (undated) DEVICE — ORISE PROKNIFE 3.0 MM ELECTRODE: Brand: ORISE™ PROKNIFE

## (undated) DEVICE — SYR 10ML LUER LOK 1/5ML GRAD --

## (undated) DEVICE — CORD ELECSURG BPLR 12 FT DISP [810T818750] [ADLER INSTRUMENT CO]

## (undated) DEVICE — INFECTION CONTROL KIT SYS

## (undated) DEVICE — NEEDLE HYPO 27GA L1.25IN GRY POLYPR HUB S STL REG BVL STR

## (undated) DEVICE — DRAIN SURG L1 4IN 7MM RND HUBLESS

## (undated) DEVICE — SPONGE LAP 18X18IN STRL -- 5/PK

## (undated) DEVICE — TUBING HYDR IRR --

## (undated) DEVICE — MARKER,SKIN,WI/RULER AND LABELS: Brand: MEDLINE

## (undated) DEVICE — DRAPE,REIN 53X77,STERILE: Brand: MEDLINE

## (undated) DEVICE — STRIP SKIN CLSR W0.25XL4IN WHT SPUNBOUND FBR NYL HI ADH

## (undated) DEVICE — CATH IV AUTOGRD BC BLU 22GA 25 -- INSYTE

## (undated) DEVICE — BAG SPEC BIOHZD LF 2MIL 6X10IN -- CONVERT TO ITEM 357326

## (undated) DEVICE — KIT IV STRT W CHLORAPREP PD 1ML

## (undated) DEVICE — SET ADMIN 16ML TBNG L100IN 2 Y INJ SITE IV PIGGY BK DISP

## (undated) DEVICE — CONTAINER SPEC 20 ML LID NEUT BUFF FORMALIN 10 % POLYPR STS

## (undated) DEVICE — ROCKER SWITCH PENCIL BLADE ELECTRODE, HOLSTER: Brand: EDGE

## (undated) DEVICE — PACK,EENT,TURBAN DRAPE,PK II: Brand: MEDLINE

## (undated) DEVICE — BASIN EMSIS 16OZ GRAPHITE PLAS KID SHP MOLD GRAD FOR ORAL

## (undated) DEVICE — BANDAGE,GAUZE,CONFORMING,4"X75",STRL,LF: Brand: MEDLINE

## (undated) DEVICE — CODMAN® SURGICAL PATTIES 1/2" X 3" (1.27CM X 7.62CM): Brand: CODMAN®

## (undated) DEVICE — STERILE POLYISOPRENE POWDER-FREE SURGICAL GLOVES: Brand: PROTEXIS

## (undated) DEVICE — SUTURE NONABSORBABLE MONOFILAMENT 5-0 PS-2 18 IN BLK ETHILON 1666H

## (undated) DEVICE — ADULT SPO2 SENSOR: Brand: NELLCOR

## (undated) DEVICE — KENDALL RADIOLUCENT FOAM MONITORING ELECTRODE -RECTANGULAR SHAPE: Brand: KENDALL

## (undated) DEVICE — SUTURE PDS II SZ 4-0 L27IN ABSRB VLT L17MM RB-1 1/2 CIR Z304H

## (undated) DEVICE — INTENDED FOR TISSUE SEPARATION, AND OTHER PROCEDURES THAT REQUIRE A SHARP SURGICAL BLADE TO PUNCTURE OR CUT.: Brand: BARD-PARKER ® CARBON RIB-BACK BLADES

## (undated) DEVICE — TUBING, SUCTION, 1/4" X 12', STRAIGHT: Brand: MEDLINE

## (undated) DEVICE — Z DISCONTINUED NO SUB IDED SET EXTN W/ 4 W STPCOCK M SPIN LOK 36IN

## (undated) DEVICE — NEEDLE HYPO 25GA L1.5IN BVL ORIENTED ECLIPSE

## (undated) DEVICE — SUT PLN 4-0 18IN PS2 YEL --

## (undated) DEVICE — SUTURE PERMAHAND SZ 2-0 L18IN NONABSORBABLE BLK L26MM PS 1588H

## (undated) DEVICE — Device

## (undated) DEVICE — BANDAGE,GAUZE,BULKEE II,4.5"X4.1YD,STRL: Brand: MEDLINE

## (undated) DEVICE — RESERVOIR,SUCTION,100CC,SILICONE: Brand: MEDLINE

## (undated) DEVICE — REM POLYHESIVE ADULT PATIENT RETURN ELECTRODE: Brand: VALLEYLAB

## (undated) DEVICE — SUTURE ETHLN SZ 6-0 L18IN NONABSORBABLE BLK PC-3 L16MM 3/8 1866G

## (undated) DEVICE — ENDO CARRY-ON PROCEDURE KIT INCLUDES ENZYMATIC SPONGE, GAUZE, BIOHAZARD LABEL, TRAY, LUBRICANT, DIRTY SCOPE LABEL, WATER LABEL, TRAY, DRAWSTRING PAD, AND DEFENDO 4-PIECE KIT.: Brand: ENDO CARRY-ON PROCEDURE KIT

## (undated) DEVICE — NEEDLE SPNL 20GA L3.5IN YEL HUB S STL REG WALL FIT STYL W/

## (undated) DEVICE — BAG BELONG PT PERS CLEAR HANDL

## (undated) DEVICE — FORCEPS BX L240CM JAW DIA2.8MM L CAP W/ NDL MIC MESH TOOTH

## (undated) DEVICE — DRAIN SURG W10XL20CM SIL SMOOTH FLAT 3/4 PERF DBL WRP

## (undated) DEVICE — SYRINGE 50ML E/T

## (undated) DEVICE — ORISE PROKNIFE 1.5 MM ELECTRODE: Brand: ORISE™ PROKNIFE

## (undated) DEVICE — TRAY PREP DRY W/ PREM GLV 2 APPL 6 SPNG 2 UNDPD 1 OVERWRAP

## (undated) DEVICE — SOLUTION IV 1000ML 0.9% SOD CHL

## (undated) DEVICE — SOLIDIFIER FLUID 3000 CC ABSORB

## (undated) DEVICE — HANDLE LT SNAP ON ULT DURABLE LENS FOR TRUMPF ALC DISPOSABLE

## (undated) DEVICE — AMBU SPUR II ADULT WITH OPEN RESERVOIR 40, WITH DURACLEAR FACE MASK SIZE MEDIUM ADULT AND WITH PEEP VALVE. 6 PCS/BOX: Brand: SPUR® II ADULT RESUSCITATORSINGLE PATIENT USE RESUSCITATOR

## (undated) DEVICE — SUTURE MCRYL SZ 4-0 L18IN ABSRB UD P-3 L13MM 3/8 CIR PRIM Y494G

## (undated) DEVICE — SPONGE GZ W4XL4IN COT 12 PLY TYP VII WVN C FLD DSGN

## (undated) DEVICE — MICRODISSECTION NEEDLE STRAIGHT SLEEVE: Brand: COLORADO

## (undated) DEVICE — PAD,ABDOMINAL,5"X9",ST,LF,25/BX: Brand: MEDLINE INDUSTRIES, INC.

## (undated) DEVICE — BIPOLAR FORCEPS CORD: Brand: VALLEYLAB

## (undated) DEVICE — 7 FRENCH DRAIN SYSTEM, STERILE: Brand: TLS

## (undated) DEVICE — GARMENT,MEDLINE,DVT,INT,CALF,MED, GEN2: Brand: MEDLINE

## (undated) DEVICE — BW-412T DISP COMBO CLEANING BRUSH: Brand: SINGLE USE COMBINATION CLEANING BRUSH

## (undated) DEVICE — TOWEL SURG W17XL27IN STD BLU COT NONFENESTRATED PREWASHED

## (undated) DEVICE — CANN NASAL O2 CAPNOGRAPHY AD -- FILTERLINE

## (undated) DEVICE — SURGICAL PROCEDURE PACK BASIN MAJ SET CUST NO CAUT